# Patient Record
Sex: MALE | URBAN - NONMETROPOLITAN AREA
[De-identification: names, ages, dates, MRNs, and addresses within clinical notes are randomized per-mention and may not be internally consistent; named-entity substitution may affect disease eponyms.]

---

## 2017-11-27 ENCOUNTER — OFFICE VISIT (OUTPATIENT)
Dept: UROLOGY | Age: 67
End: 2017-11-27
Payer: COMMERCIAL

## 2017-11-27 VITALS
WEIGHT: 245 LBS | TEMPERATURE: 96.9 F | HEART RATE: 94 BPM | BODY MASS INDEX: 32.47 KG/M2 | DIASTOLIC BLOOD PRESSURE: 77 MMHG | SYSTOLIC BLOOD PRESSURE: 133 MMHG | HEIGHT: 73 IN

## 2017-11-27 DIAGNOSIS — R35.0 FREQUENCY OF URINATION: ICD-10-CM

## 2017-11-27 DIAGNOSIS — N41.8 OTHER PROSTATIC INFLAMMATORY DISEASES: ICD-10-CM

## 2017-11-27 DIAGNOSIS — N41.8 OTHER PROSTATIC INFLAMMATORY DISEASES: Primary | ICD-10-CM

## 2017-11-27 LAB
APPEARANCE FLUID: NORMAL
BILIRUBIN, POC: NORMAL
BLOOD URINE, POC: NORMAL
CLARITY, POC: CLEAR
COLOR, POC: YELLOW
GLUCOSE URINE, POC: NORMAL
KETONES, POC: NORMAL
LEUKOCYTE EST, POC: NORMAL
NITRITE, POC: NORMAL
PH, POC: 5.5
PROSTATE SPECIFIC ANTIGEN: 3.05 NG/ML (ref 0–4)
PROTEIN, POC: NORMAL
SPECIFIC GRAVITY, POC: 1.02
UROBILINOGEN, POC: 1

## 2017-11-27 PROCEDURE — 99202 OFFICE O/P NEW SF 15 MIN: CPT | Performed by: PHYSICIAN ASSISTANT

## 2017-11-27 PROCEDURE — 51798 US URINE CAPACITY MEASURE: CPT | Performed by: PHYSICIAN ASSISTANT

## 2017-11-27 PROCEDURE — 81003 URINALYSIS AUTO W/O SCOPE: CPT | Performed by: PHYSICIAN ASSISTANT

## 2017-11-27 RX ORDER — OMEPRAZOLE 20 MG/1
CAPSULE, DELAYED RELEASE ORAL
Refills: 2 | COMMUNITY
Start: 2017-09-06

## 2017-11-27 ASSESSMENT — ENCOUNTER SYMPTOMS
BACK PAIN: 0
EYE PAIN: 0
SHORTNESS OF BREATH: 0
VOMITING: 0
SINUS PAIN: 0
ABDOMINAL PAIN: 0
WHEEZING: 0
BLURRED VISION: 0

## 2017-11-27 NOTE — PROGRESS NOTES
Component Value Date    NITRITE neg 11/27/2017    COLORU yellow 11/27/2017    PROTEINU neg 11/27/2017    PHUR 5.5 11/27/2017    CLARITYU clear 11/27/2017    SPECGRAV 1.025 11/27/2017    LEUKOCYTESUR neg 11/27/2017    BILIRUBINUR neg 11/27/2017    BLOODU trace 11/27/2017    GLUCOSEU neg 11/27/2017           1. Other prostatic inflammatory diseases  With digital rectal exam I felt nothing suspicious there was no tenderness. Prostate is small benign feeling.   - POCT Urinalysis no Micro  - AL MEASUREMENT,POST-VOID RESIDUAL VOLUME BY US,NON-IMAGING  - PSA, Diagnostic; Future    2. Frequency of urination  His bladder scan was 48 mL postvoid residual. He states that he drinks a lot of Dr. Marleny Fang I told him to drastically reduce intake and Dr. Marleny Fang increased water intake. I feel this may help overall with his overactive bladder type symptoms. At this point I would not recommend any medications for his symptoms.  - PSA, Diagnostic; Future      Orders Placed This Encounter   Procedures    PSA, Diagnostic     Standing Status:   Future     Number of Occurrences:   1     Standing Expiration Date:   11/27/2018    POCT Urinalysis no Micro    AL MEASUREMENT,POST-VOID RESIDUAL VOLUME BY US,NON-IMAGING     48 ml     No orders of the defined types were placed in this encounter. Plan:  Patient will get PSA today and he will follow-up in 3 months.

## 2018-02-27 ENCOUNTER — OFFICE VISIT (OUTPATIENT)
Dept: UROLOGY | Age: 68
End: 2018-02-27
Payer: COMMERCIAL

## 2018-02-27 VITALS
BODY MASS INDEX: 31.94 KG/M2 | DIASTOLIC BLOOD PRESSURE: 78 MMHG | SYSTOLIC BLOOD PRESSURE: 138 MMHG | WEIGHT: 241 LBS | TEMPERATURE: 97.3 F | HEIGHT: 73 IN | HEART RATE: 78 BPM

## 2018-02-27 DIAGNOSIS — R35.0 FREQUENCY OF URINATION: Primary | ICD-10-CM

## 2018-02-27 DIAGNOSIS — R35.1 NOCTURIA: ICD-10-CM

## 2018-02-27 LAB
APPEARANCE FLUID: CLEAR
BILIRUBIN, POC: NORMAL
BLOOD URINE, POC: NORMAL
CLARITY, POC: NORMAL
COLOR, POC: NORMAL
GLUCOSE URINE, POC: NORMAL
KETONES, POC: NORMAL
LEUKOCYTE EST, POC: NORMAL
NITRITE, POC: NORMAL
PH, POC: 6
PROTEIN, POC: NORMAL
SPECIFIC GRAVITY, POC: 1.02
UROBILINOGEN, POC: 0.2

## 2018-02-27 PROCEDURE — 99213 OFFICE O/P EST LOW 20 MIN: CPT | Performed by: PHYSICIAN ASSISTANT

## 2018-02-27 RX ORDER — LEVOCETIRIZINE DIHYDROCHLORIDE 5 MG/1
TABLET, FILM COATED ORAL
Refills: 3 | COMMUNITY
Start: 2017-12-01

## 2018-02-27 ASSESSMENT — ENCOUNTER SYMPTOMS
HEARTBURN: 0
WHEEZING: 0
SHORTNESS OF BREATH: 0
EYE REDNESS: 0
NAUSEA: 0
EYE DISCHARGE: 0

## 2018-02-27 NOTE — PROGRESS NOTES
Mireya Kapadia is a 69 y.o. male who presents today   Chief Complaint   Patient presents with    Follow-up     I am here today for my follow up for prostate issues. Follow-up frequency nocturia:  Patient is a 38-year-old gentleman we saw 11/27/17 as a referral from Dr. Jimmie Muñoz with chief complaint of worsening frequency and nocturia. He was also treated for suspected prostatitis. Patient got PSA November 2017 that was 3.05. His digital rectal exam at last visit was negative. There were no prior PSAs to compare. I had told him to stop Dr. Jose Parsons intake and to start drinking water primarily. Since he has done this his symptoms have resolved. I did not place him on any medication. He states it took about 5 weeks after stopping the Dr. Jose Parsons but now he has no symptoms. He's not seen any gross hematuria had any urinary tract infections or has any history of kidney stones. Past Medical History:   Diagnosis Date    Acid reflux disease        Past Surgical History:   Procedure Laterality Date    SHOULDER SURGERY         Current Outpatient Prescriptions   Medication Sig Dispense Refill    levocetirizine (XYZAL) 5 MG tablet TAKE 1 TABLET BY MOUTH 2 TIMES DAILY  3    aspirin 81 MG tablet Take 81 mg by mouth daily      omeprazole (PRILOSEC) 20 MG delayed release capsule TAKE 1 CAPSULE TWICE A DAY  2     No current facility-administered medications for this visit. No Known Allergies    Social History     Social History    Marital status: Unknown     Spouse name: N/A    Number of children: N/A    Years of education: N/A     Social History Main Topics    Smoking status: Former Smoker    Smokeless tobacco: Never Used    Alcohol use None    Drug use: Unknown    Sexual activity: Not Asked     Other Topics Concern    None     Social History Narrative    None       History reviewed. No pertinent family history. REVIEW OF SYSTEMS:  Review of Systems   Constitutional: Negative for chills and fever. HENT: Negative for hearing loss. Eyes: Negative for discharge and redness. Respiratory: Negative for shortness of breath and wheezing. Cardiovascular: Negative for leg swelling and PND. Gastrointestinal: Negative for heartburn and nausea. Genitourinary: Negative for dysuria, flank pain, frequency, hematuria and urgency. Musculoskeletal: Negative for myalgias and neck pain. Skin: Negative for itching and rash. Neurological: Negative for seizures, loss of consciousness and headaches. Endo/Heme/Allergies: Negative for environmental allergies and polydipsia. Psychiatric/Behavioral: Negative for depression and suicidal ideas. PHYSICAL EXAM:  /78   Pulse 78   Temp 97.3 °F (36.3 °C) (Temporal)   Ht 6' 1\" (1.854 m)   Wt 241 lb (109.3 kg)   BMI 31.80 kg/m²   Physical Exam   Constitutional: No distress. HENT:   Mouth/Throat: No oropharyngeal exudate. Eyes: Left eye exhibits no discharge. No scleral icterus. Neck: No JVD present. No tracheal deviation present. No thyromegaly present. Cardiovascular: Exam reveals no gallop and no friction rub. Pulmonary/Chest: No stridor. He has no rales. Abdominal: He exhibits no distension and no mass. There is no rebound and no guarding. Musculoskeletal: He exhibits no edema. Neurological: No cranial nerve deficit. He exhibits normal muscle tone. Coordination normal.   Skin: He is not diaphoretic. No pallor. DATA:  U/A:    Lab Results   Component Value Date    NITRITE neg 2018    COLORU yellow 2017    PROTEINU neg 2018    PHUR 6.0 2018    CLARITYU clear 2017    SPECGRAV 1.020 2018    LEUKOCYTESUR neg 2018    BILIRUBINUR neg 2018    BLOODU trace--intact 2018    GLUCOSEU neg 2018           Imagin. Frequency of urination  Improved with stopping Dr. Asuncion Mehta intake and increasing water.  - POCT Urinalysis no Micro    2.  Nocturia  Symptoms have improved since last seen.      Orders Placed This Encounter   Procedures    PSA, Diagnostic     Standing Status:   Future     Standing Expiration Date:   2/27/2019    POCT Urinalysis no Micro     No orders of the defined types were placed in this encounter. Plan:  Patient will follow up in 6 months with PSA.

## 2019-07-08 ENCOUNTER — LAB REQUISITION (OUTPATIENT)
Dept: LAB | Facility: HOSPITAL | Age: 69
End: 2019-07-08

## 2019-07-08 DIAGNOSIS — Z00.00 ENCOUNTER FOR GENERAL ADULT MEDICAL EXAMINATION WITHOUT ABNORMAL FINDINGS: ICD-10-CM

## 2019-07-08 PROCEDURE — 88307 TISSUE EXAM BY PATHOLOGIST: CPT | Performed by: GENERAL PRACTICE

## 2019-07-09 LAB
CYTO UR: NORMAL
LAB AP CASE REPORT: NORMAL
PATH REPORT.FINAL DX SPEC: NORMAL
PATH REPORT.GROSS SPEC: NORMAL

## 2019-08-20 ENCOUNTER — OFFICE VISIT (OUTPATIENT)
Dept: PULMONOLOGY | Facility: CLINIC | Age: 69
End: 2019-08-20

## 2019-08-20 VITALS
DIASTOLIC BLOOD PRESSURE: 70 MMHG | OXYGEN SATURATION: 94 % | WEIGHT: 234 LBS | SYSTOLIC BLOOD PRESSURE: 124 MMHG | HEART RATE: 78 BPM | BODY MASS INDEX: 31.01 KG/M2 | HEIGHT: 73 IN

## 2019-08-20 DIAGNOSIS — J93.83 RECURRENT SPONTANEOUS PNEUMOTHORAX: Primary | ICD-10-CM

## 2019-08-20 DIAGNOSIS — J43.9 BULLOUS EMPHYSEMA (HCC): ICD-10-CM

## 2019-08-20 PROCEDURE — 99204 OFFICE O/P NEW MOD 45 MIN: CPT | Performed by: INTERNAL MEDICINE

## 2019-08-20 PROCEDURE — 94664 DEMO&/EVAL PT USE INHALER: CPT | Performed by: INTERNAL MEDICINE

## 2019-08-20 RX ORDER — OMEPRAZOLE 20 MG/1
CAPSULE, DELAYED RELEASE ORAL
COMMUNITY
Start: 2017-09-06

## 2019-08-20 RX ORDER — FLUTICASONE PROPIONATE 50 MCG
SPRAY, SUSPENSION (ML) NASAL
Refills: 2 | COMMUNITY
Start: 2019-07-16

## 2019-08-20 RX ORDER — LEVOCETIRIZINE DIHYDROCHLORIDE 5 MG/1
TABLET, FILM COATED ORAL
COMMUNITY
Start: 2017-12-01
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
## 2019-09-04 DIAGNOSIS — J93.83 RECURRENT SPONTANEOUS PNEUMOTHORAX: ICD-10-CM

## 2019-09-04 DIAGNOSIS — J43.9 BULLOUS EMPHYSEMA (HCC): ICD-10-CM

## 2019-10-17 ENCOUNTER — OFFICE VISIT (OUTPATIENT)
Dept: PULMONOLOGY | Facility: CLINIC | Age: 69
End: 2019-10-17

## 2019-10-17 VITALS
SYSTOLIC BLOOD PRESSURE: 110 MMHG | HEART RATE: 60 BPM | OXYGEN SATURATION: 98 % | WEIGHT: 240 LBS | HEIGHT: 72 IN | BODY MASS INDEX: 32.51 KG/M2 | DIASTOLIC BLOOD PRESSURE: 66 MMHG

## 2019-10-17 DIAGNOSIS — J98.4 RESTRICTIVE LUNG DISEASE: ICD-10-CM

## 2019-10-17 DIAGNOSIS — J43.9 BULLOUS EMPHYSEMA (HCC): Primary | ICD-10-CM

## 2019-10-17 DIAGNOSIS — E66.3 OVERWEIGHT: ICD-10-CM

## 2019-10-17 DIAGNOSIS — K21.9 GASTROESOPHAGEAL REFLUX DISEASE, ESOPHAGITIS PRESENCE NOT SPECIFIED: ICD-10-CM

## 2019-10-17 DIAGNOSIS — J30.9 ALLERGIC RHINITIS, UNSPECIFIED SEASONALITY, UNSPECIFIED TRIGGER: ICD-10-CM

## 2019-10-17 PROCEDURE — 94729 DIFFUSING CAPACITY: CPT | Performed by: NURSE PRACTITIONER

## 2019-10-17 PROCEDURE — 99214 OFFICE O/P EST MOD 30 MIN: CPT | Performed by: NURSE PRACTITIONER

## 2019-10-17 PROCEDURE — 94010 BREATHING CAPACITY TEST: CPT | Performed by: NURSE PRACTITIONER

## 2019-10-17 PROCEDURE — 94727 GAS DIL/WSHOT DETER LNG VOL: CPT | Performed by: NURSE PRACTITIONER

## 2019-10-17 RX ORDER — BUDESONIDE AND FORMOTEROL FUMARATE DIHYDRATE 160; 4.5 UG/1; UG/1
2 AEROSOL RESPIRATORY (INHALATION) 2 TIMES DAILY
Refills: 2 | COMMUNITY
Start: 2019-10-03 | End: 2020-01-17

## 2019-10-17 NOTE — PROGRESS NOTES
MIKE Fuentes  [facility]   Respiratory Disease Clinic  [address] [place] [address]  Phone: [phone]  Fax: [phone]     Jeramie Anaya is a 69 y.o. male.   : 1950  CC:   Chief Complaint   Patient presents with   • F/u of Shortness of Breath      HPI: Jeramie Anaya is a pleasant 69 y.o. male. The patient is here today for follow up of shortness of breath.  The patient was last seen in the office by Dr. Clay on 2019.  Pt with emphysema, left ptx and thoracoscopy with resection of bleb and pleurodesis in [facility] in July.  Overall the patient states that he feels stable from a pulmonary standpoint.  He was given a trial of Stiolto from his last office visit.  The patient states that he did not benefit from Stiolto as it gave him a sore throat.  He was then placed on Symbicort per his primary care provider.  He feels that he is benefiting from Symbicort.  He also utilizes Flonase and Xyzal for allergic rhinitis.  He is on chronic oxygen therapy for chronic respiratory failure with hypoxemia.  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  The patient states that he just had a chest x-ray obtained 2 weeks ago at [facility].  We will try to obtain those records.  The patient's PCP is Anders Longo MD.    The following portions of the patient's history were reviewed and updated as appropriate: allergies, current medications, past family history, past medical history, past social history, past surgical history and problem list.  Past Medical History:   Diagnosis Date   • Bullous emphysema (CMS/HCC) 2019   • High cholesterol      Family History   Problem Relation Age of Onset   • Leukemia Mother    • Alzheimer's disease Father    • Cancer Brother      Social History     Socioeconomic History   • Marital status:      Spouse name: Not on file   • Number of children: Not on file   • Years of education: Not on file   •  "Highest education level: Not on file   Tobacco Use   • Smoking status: Former Smoker     Packs/day: 3.00     Years: 35.00     Pack years: 105.00     Types: Cigarettes     Last attempt to quit:      Years since quittin.8   • Smokeless tobacco: Never Used   Substance and Sexual Activity   • Alcohol use: No     Frequency: Never   • Drug use: No   • Sexual activity: Defer     Review of Systems   Constitutional: Negative for chills and fever.   HENT: Negative for congestion.    Eyes: Negative for blurred vision.   Respiratory: Positive for shortness of breath (with exertion). Negative for cough.    Cardiovascular: Negative for chest pain.   Gastrointestinal: Negative for diarrhea, nausea and vomiting.   Endocrine: Negative for cold intolerance and heat intolerance.   Genitourinary: Negative for dysuria.   Musculoskeletal: Negative for arthralgias.   Skin: Negative for rash.   Neurological: Negative for dizziness, weakness and light-headedness.   Hematological: Does not bruise/bleed easily.   Psychiatric/Behavioral: Negative for agitation. The patient is not nervous/anxious.      /66   Pulse 60   Ht 181.6 cm (71.5\")   Wt 109 kg (240 lb)   SpO2 98% Comment: Ra  BMI 33.01 kg/m²   Physical Exam   Constitutional: He is oriented to person, place, and time. He appears well-developed and well-nourished. No distress. He appears overweight.   HENT:   Head: Normocephalic and atraumatic.   Eyes: Conjunctivae and EOM are normal. Pupils are equal, round, and reactive to light. No scleral icterus.   Neck: Normal range of motion. Neck supple.   Cardiovascular: Normal rate, regular rhythm and normal heart sounds. Exam reveals no friction rub.   No murmur heard.  Pulmonary/Chest: Effort normal. No respiratory distress. He has decreased breath sounds. He has no wheezes. He has no rales.   Abdominal: Soft. Bowel sounds are normal. He exhibits no distension. There is no tenderness.   Musculoskeletal: Normal range of " motion. He exhibits no edema.   Neurological: He is alert and oriented to person, place, and time.   Skin: Skin is warm and dry.   Psychiatric: He has a normal mood and affect. His behavior is normal. Judgment and thought content normal.   Nursing note and vitals reviewed.    Pulmonary Functions Testing Results:  Pulmonary Function Test  Performed by: Danna Sorenson APRN  Authorized by: Danna Sorenson APRN      Pre Drug    FVC: 76%   FEV1: 76%   FEF 25-75%: 70%   FEV1/FVC: 78.71%   T%   RV: 67%   DLCO: 41%   D/VAsb: 48%            My PFT Interpretation: Spirometry is consistent with moderate restrictive lung disease and moderate small airway disease.  Lung volumes show a reduced total lung capacity and residual volume.  Diffusion shows a severe diffusion impairment that remains severe when corrected for alveolar volume.  Imaging: None to review today    Assessment and Plan:   Jeramie was seen today for f/u of shortness of breath.    Diagnoses and all orders for this visit:    Bullous emphysema (CMS/HCC)  -     Pulmonary Function Test  Continue current treatment regimen with Symbicort and albuterol rescue inhaler as needed.  Restrictive lung disease  Noted per pulmonary function testing.  Likely secondary to overweight status and emphysema.  Overweight  Defer to PCP  Gastroesophageal reflux disease, esophagitis presence not specified  Continue Prilosec  Allergic rhinitis, unspecified seasonality, unspecified trigger  Continue Xyzal and Flonase.    Health maintenance:   Influenza vaccine: yes  Pneumovax 23: yes  Prevnar 13: yes  Patient's Body mass index is 33.01 kg/m². BMI is above normal parameters. Recommendations include: defer to pcp.    Follow up: 3 months  MIKE Fuentes  10/17/2019  3:44 PM    Please note that portions of this note were completed with a voice recognition program.

## 2019-10-17 NOTE — PROCEDURES
Pulmonary Function Test  Performed by: Danna Sorenson APRN  Authorized by: Danna Sorenson APRN      Pre Drug    FVC: 76%   FEV1: 76%   FEF 25-75%: 70%   FEV1/FVC: 78.71%   T%   RV: 67%   DLCO: 41%   D/VAsb: 48%

## 2020-01-17 ENCOUNTER — OFFICE VISIT (OUTPATIENT)
Dept: PULMONOLOGY | Facility: CLINIC | Age: 70
End: 2020-01-17

## 2020-01-17 VITALS
HEIGHT: 73 IN | WEIGHT: 247 LBS | SYSTOLIC BLOOD PRESSURE: 150 MMHG | DIASTOLIC BLOOD PRESSURE: 80 MMHG | BODY MASS INDEX: 32.74 KG/M2 | HEART RATE: 69 BPM | OXYGEN SATURATION: 98 %

## 2020-01-17 DIAGNOSIS — K21.9 GASTROESOPHAGEAL REFLUX DISEASE, ESOPHAGITIS PRESENCE NOT SPECIFIED: ICD-10-CM

## 2020-01-17 DIAGNOSIS — J98.4 SMALL AIRWAYS DISEASE: ICD-10-CM

## 2020-01-17 DIAGNOSIS — J30.9 ALLERGIC RHINITIS, UNSPECIFIED SEASONALITY, UNSPECIFIED TRIGGER: ICD-10-CM

## 2020-01-17 DIAGNOSIS — E66.3 OVERWEIGHT: ICD-10-CM

## 2020-01-17 DIAGNOSIS — J43.9 BULLOUS EMPHYSEMA (HCC): Primary | ICD-10-CM

## 2020-01-17 DIAGNOSIS — J98.4 RESTRICTIVE LUNG DISEASE: ICD-10-CM

## 2020-01-17 PROCEDURE — 99214 OFFICE O/P EST MOD 30 MIN: CPT | Performed by: NURSE PRACTITIONER

## 2020-01-17 NOTE — PROGRESS NOTES
MIKE Fuentes  [facility]   Respiratory Disease Clinic  [address] [place] [address]  Phone: [phone]  Fax: [phone]     Jeramie Anaya is a 69 y.o. male.   : 1950  CC:   Chief Complaint   Patient presents with   • bullous emphysema      HPI: Jeramie Anaya is a pleasant 69 y.o. male. The patient is here today for follow up of emphysema.    The patient has known emphysema, restrictive lung disease, GERD, and allergic rhinitis.  History of  left ptx and thoracoscopy with resection of bleb and pleurodesis in [facility] in 2019.  The patient has trialed Breo with no improvement and Stiolto caused him to have a sore throat.  He was given a sample of Symbicort at his last office visit.  He states that he feels that he benefited from Symbicort the best however about 4 hours after using the Symbicort he does experience some dizziness, hot flashes, and weakness.  However, he does like the way he is breathing on the Symbicort. No increasing shortness of breath, no fever, no chills, no cough with purulent sputum.    The patient's PCP is Anders Longo MD.    The following portions of the patient's history were reviewed and updated as appropriate: allergies, current medications, past family history, past medical history, past social history, past surgical history and problem list.  Past Medical History:   Diagnosis Date   • Bullous emphysema (CMS/HCC) 2019   • High cholesterol      Family History   Problem Relation Age of Onset   • Leukemia Mother    • Alzheimer's disease Father    • Cancer Brother      Social History     Socioeconomic History   • Marital status:      Spouse name: Not on file   • Number of children: Not on file   • Years of education: Not on file   • Highest education level: Not on file   Tobacco Use   • Smoking status: Former Smoker     Packs/day: 3.00     Years: 35.00     Pack years: 105.00     Types: Cigarettes     Last attempt  "to quit:      Years since quittin.0   • Smokeless tobacco: Never Used   Substance and Sexual Activity   • Alcohol use: No     Frequency: Never   • Drug use: No   • Sexual activity: Defer     Review of Systems   Constitutional: Negative for chills and fever.   HENT: Negative for congestion.    Eyes: Negative for blurred vision.   Respiratory: Negative for cough and shortness of breath.    Cardiovascular: Negative for chest pain.   Gastrointestinal: Negative for diarrhea, nausea and vomiting.   Endocrine: Negative for cold intolerance and heat intolerance.   Genitourinary: Negative for dysuria.   Musculoskeletal: Negative for arthralgias.   Skin: Negative for rash.   Neurological: Negative for dizziness, weakness and light-headedness.   Hematological: Does not bruise/bleed easily.   Psychiatric/Behavioral: Negative for agitation. The patient is not nervous/anxious.      /80   Pulse 69   Ht 185.4 cm (73\")   Wt 112 kg (247 lb)   SpO2 98% Comment: RA  BMI 32.59 kg/m²   Physical Exam   Constitutional: He is oriented to person, place, and time. He appears well-developed and well-nourished. No distress. He appears overweight.   HENT:   Head: Normocephalic and atraumatic.   Eyes: Pupils are equal, round, and reactive to light. Conjunctivae and EOM are normal. No scleral icterus.   Neck: Normal range of motion. Neck supple.   Cardiovascular: Normal rate, regular rhythm and normal heart sounds. Exam reveals no friction rub.   No murmur heard.  Pulmonary/Chest: Effort normal and breath sounds normal. No respiratory distress. He has no wheezes. He has no rales.   Abdominal: Soft. Bowel sounds are normal. He exhibits no distension. There is no tenderness.   Musculoskeletal: Normal range of motion. He exhibits no edema.   Left arm sling   Neurological: He is alert and oriented to person, place, and time.   Skin: Skin is warm and dry.   Psychiatric: He has a normal mood and affect. His behavior is normal. " Judgment and thought content normal.   Nursing note and vitals reviewed.      Pulmonary Functions Testing Results:  PFT Values        Some values may be hidden. Unless noted otherwise, only the newest values recorded on each date are displayed.         Old Values PFT Results 10/17/19   No data to display.      Pre Drug PFT Results 10/17/19   FVC 76   FEV1 76   FEF 25-75% 70   FEV1/FVC 78.71      Post Drug PFT Results 10/17/19   No data to display.      Other Tests PFT Results 10/17/19   TLC 79   RV 67   DLCO 41   D/VAsb 48              My PFT Interpretation: None to review today  Imaging: None to review today    Assessment and Plan:   Jeramie was seen today for bullous emphysema.    Diagnoses and all orders for this visit:    Bullous emphysema (CMS/HCC)  -     Mometasone Furoate (ASMANEX HFA) 100 MCG/ACT aerosol; Inhale 2 puffs 2 (Two) Times a Day.  The patient will stop Symbicort and trial Asmanex.  We will see if eliminating the beta agonist eliminates his symptoms of dizziness, fatigue, and hot flashes.  Small airways disease  Trial Asmanex.  Restrictive lung disease  Treatment regimen as stated above.  Allergic rhinitis, unspecified seasonality, unspecified trigger  Continue Flonase and Xyzal.  Gastroesophageal reflux disease, esophagitis presence not specified  Continue Prilosec  Overweight  Defer to PCP      Health maintenance:   Influenza vaccine: Yes  Pneumovax 23: Yes  Prevnar 13: Yes  Patient's Body mass index is 32.59 kg/m². BMI is above normal parameters. Recommendations include: Defer to PCP.    Follow up: 6 months  Danna Sorenson, APRN  1/17/2020  1:51 PM    Please note that portions of this note were completed with a voice recognition program.

## 2021-11-01 ENCOUNTER — LAB (OUTPATIENT)
Dept: LAB | Facility: HOSPITAL | Age: 71
End: 2021-11-01

## 2021-11-01 ENCOUNTER — APPOINTMENT (OUTPATIENT)
Dept: LAB | Facility: HOSPITAL | Age: 71
End: 2021-11-01

## 2021-11-01 ENCOUNTER — OFFICE VISIT (OUTPATIENT)
Dept: PULMONOLOGY | Facility: CLINIC | Age: 71
End: 2021-11-01

## 2021-11-01 VITALS — HEIGHT: 73 IN | BODY MASS INDEX: 33.53 KG/M2 | WEIGHT: 253 LBS

## 2021-11-01 DIAGNOSIS — J40 BRONCHITIS: ICD-10-CM

## 2021-11-01 DIAGNOSIS — E66.9 OBESITY (BMI 30-39.9): ICD-10-CM

## 2021-11-01 DIAGNOSIS — J43.9 BULLOUS EMPHYSEMA (HCC): Chronic | ICD-10-CM

## 2021-11-01 DIAGNOSIS — J96.11 CHRONIC RESPIRATORY FAILURE WITH HYPOXIA (HCC): ICD-10-CM

## 2021-11-01 DIAGNOSIS — K21.9 GASTROESOPHAGEAL REFLUX DISEASE, UNSPECIFIED WHETHER ESOPHAGITIS PRESENT: Chronic | ICD-10-CM

## 2021-11-01 DIAGNOSIS — J30.9 ALLERGIC RHINITIS, UNSPECIFIED SEASONALITY, UNSPECIFIED TRIGGER: Chronic | ICD-10-CM

## 2021-11-01 DIAGNOSIS — R06.02 SHORTNESS OF BREATH: Primary | ICD-10-CM

## 2021-11-01 DIAGNOSIS — J98.4 SMALL AIRWAYS DISEASE: Chronic | ICD-10-CM

## 2021-11-01 LAB
B PARAPERT DNA SPEC QL NAA+PROBE: NOT DETECTED
B PERT DNA SPEC QL NAA+PROBE: NOT DETECTED
C PNEUM DNA NPH QL NAA+NON-PROBE: NOT DETECTED
FLUAV SUBTYP SPEC NAA+PROBE: NOT DETECTED
FLUBV RNA ISLT QL NAA+PROBE: NOT DETECTED
HADV DNA SPEC NAA+PROBE: NOT DETECTED
HCOV 229E RNA SPEC QL NAA+PROBE: NOT DETECTED
HCOV HKU1 RNA SPEC QL NAA+PROBE: NOT DETECTED
HCOV NL63 RNA SPEC QL NAA+PROBE: NOT DETECTED
HCOV OC43 RNA SPEC QL NAA+PROBE: NOT DETECTED
HMPV RNA NPH QL NAA+NON-PROBE: NOT DETECTED
HPIV1 RNA SPEC QL NAA+PROBE: NOT DETECTED
HPIV2 RNA SPEC QL NAA+PROBE: NOT DETECTED
HPIV3 RNA NPH QL NAA+PROBE: NOT DETECTED
HPIV4 P GENE NPH QL NAA+PROBE: NOT DETECTED
M PNEUMO IGG SER IA-ACNC: NOT DETECTED
RHINOVIRUS RNA SPEC NAA+PROBE: NOT DETECTED
RSV RNA NPH QL NAA+NON-PROBE: NOT DETECTED

## 2021-11-01 PROCEDURE — 94664 DEMO&/EVAL PT USE INHALER: CPT | Performed by: NURSE PRACTITIONER

## 2021-11-01 PROCEDURE — 87633 RESP VIRUS 12-25 TARGETS: CPT

## 2021-11-01 PROCEDURE — 36415 COLL VENOUS BLD VENIPUNCTURE: CPT

## 2021-11-01 PROCEDURE — 86769 SARS-COV-2 COVID-19 ANTIBODY: CPT

## 2021-11-01 PROCEDURE — 99214 OFFICE O/P EST MOD 30 MIN: CPT | Performed by: NURSE PRACTITIONER

## 2021-11-01 RX ORDER — CEFDINIR 300 MG/1
300 CAPSULE ORAL 2 TIMES DAILY
Qty: 20 CAPSULE | Refills: 0 | Status: SHIPPED | OUTPATIENT
Start: 2021-11-01 | End: 2021-11-30

## 2021-11-01 RX ORDER — ALBUTEROL SULFATE 2.5 MG/3ML
2.5 SOLUTION RESPIRATORY (INHALATION) EVERY 4 HOURS PRN
COMMUNITY
End: 2022-08-02

## 2021-11-01 RX ORDER — PREDNISONE 10 MG/1
TABLET ORAL
Qty: 31 TABLET | Refills: 0 | Status: SHIPPED | OUTPATIENT
Start: 2021-11-01 | End: 2021-11-30

## 2021-11-01 RX ORDER — IPRATROPIUM BROMIDE AND ALBUTEROL SULFATE 2.5; .5 MG/3ML; MG/3ML
SOLUTION RESPIRATORY (INHALATION)
COMMUNITY
End: 2021-11-01

## 2021-11-01 RX ORDER — TIOTROPIUM BROMIDE INHALATION SPRAY 3.12 UG/1
2 SPRAY, METERED RESPIRATORY (INHALATION)
Qty: 2 EACH | Refills: 0 | COMMUNITY
Start: 2021-11-01 | End: 2021-12-15

## 2021-11-01 NOTE — PROCEDURES
Walking Oximetry  Performed by: Danna Sorenson APRN  Authorized by: Danna Sorenson APRN     Rest room air SAT %:  88  Exercise room air SAT %:  86  Rest on O2 @ Liters:  2  SAT %:  94  Exercise on O2 @ Liters:  2  SAT %:  86      Rest on O2 @ Liters 3  SAT % 98  Exercise on O2 @ liters 3  SAT % 91

## 2021-11-01 NOTE — PROGRESS NOTES
" MIKE Fuentes  [facility]   Respiratory Disease Clinic  [address] [place] [address]  Phone: [phone]  Fax: [phone]       Chief Complaint  BULLOUS EMPHYSEMA (has had the Moderna covid shots but doesnt know dates)    Subjective    History of Present Illness    Jeramie Anaya presents to [facility] PULMONARY & CRITICAL CARE MEDICINE   History of Present Illness   The patient has known severe emphysema, restrictive lung disease, GERD, chronic respiratory with hypoxemia, and allergic rhinitis.  History of  left ptx and thoracoscopy with resection of bleb and pleurodesis in [facility] in July 2019.  The patient has trialed Breo-no improvement, Stiolto-sore throat, Symbicort-dizziness, hot flashes, and weakness.  He did not trial the asmanex.  He now remains on symbicort.  States he still has dizziness, hot flashes, and weakness just after using Symbicort, but the symptoms go away quickly.   The patient is complaining of increased shortness of breath with exertion.  He states that this has been ongoing for about 2 months.  He has a cough with yellow sputum production.  He reports that he completed Z-Kosta last week.  He tells me that he was given a steroid shot from his PCP office.  Rest and exercise sats were obtained today.  The patient qualifies for 3 L with exertion and 2 L rest.  CT of the chest from October 20, 2021 was reviewed and shows stable moderate to severe emphysematous changes with numerous bullae in both lungs most pronounced peripherally.  No acute consolidation.  The patient has no fevers or chills.  He states that he was not Covid tested.  No other aggravating or alleviating factors.     Objective   Vital Signs:   Ht 185.4 cm (73\")   Wt 115 kg (253 lb)   BMI 33.38 kg/m²     Physical Exam  Vitals reviewed.   Constitutional:       General: He is not in acute distress.     Appearance: He is well-developed. He is obese.      Interventions: Nasal " cannula and face mask in place.   HENT:      Head: Normocephalic and atraumatic.   Eyes:      General: No scleral icterus.     Conjunctiva/sclera: Conjunctivae normal.      Pupils: Pupils are equal, round, and reactive to light.   Cardiovascular:      Rate and Rhythm: Normal rate.   Pulmonary:      Effort: Pulmonary effort is normal. No respiratory distress.      Breath sounds: Rhonchi present. No wheezing or rales.   Musculoskeletal:         General: Normal range of motion.      Cervical back: Normal range of motion and neck supple.   Skin:     General: Skin is warm and dry.   Neurological:      Mental Status: He is alert and oriented to person, place, and time.   Psychiatric:         Behavior: Behavior normal.         Thought Content: Thought content normal.         Judgment: Judgment normal.        Result Review :  The following data was reviewed by: MIKE Fuentes on 11/01/2021:  Chest x-ray from October 11, 2021 and showed no active disease.  Bilateral laterally placed bullae, more numerous on the left.    CT of the chest from October 20, 2021 was reviewed and shows stable moderate to severe emphysematous changes with numerous bullae in both lungs most pronounced peripherally.  No acute consolidation.  This was compared to a CT of the chest from February 26, 2021 per [facility] radiology.    Rest/Exercise Pulse Ox Values        Some values may be hidden. Unless noted otherwise, only the newest values recorded on each date are displayed.         Rest/Exercise Pulse Ox Results 11/1/21   Rest room air SAT % 88   Exercise room air SAT % 86   Rest on O2 @ Liters 2   Rest on O2 SAT % 94   Exercise on O2 @ Liters 2   Exercise on O2 SAT % 86               Results for orders placed in visit on 10/17/19    Pulmonary Function Test    Narrative  Pulmonary Function Test  Performed by: Danna Sorenson APRN  Authorized by: Danna Sorenson APRN    Pre Drug  FVC: 76%  FEV1: 76%  FEF 25-75%:  70%  FEV1/FVC: 78.71%  T%  RV: 67%  DLCO: 41%  D/VAsb: 48%           Assessment and Plan  Diagnoses and all orders for this visit:    1. Shortness of breath (Primary)  Comments:  Likely secondary to chronic respiratory failure with hypoxia and severe bullous emphysema.  The patient may possibly be experiencing post viral syndrome as he reports significant worsening in the last 2 months.  Orders:  -     Walking Oximetry; Future  -     Walking Oximetry    2. Chronic respiratory failure with hypoxia (HCC)  Comments:  Patient now qualifies for 3 L oxygen exertion and 2 L with rest.  Orders:  -     Oxygen Therapy    3. Bronchitis  Comments:  The patient was previously treated with a Z-Kosta.  Add Omnicef and a steroid taper.  Check for COVID-19 antibodies.  Obtain viral PCR  Orders:  -     predniSONE (DELTASONE) 10 MG tablet; Take 4 tabs daily x 3 days, then take 3 tabs daily x 3 days, then take 2 tabs daily x 3 days, then take 1 tab daily x 3 days  Dispense: 31 tablet; Refill: 0  -     cefdinir (OMNICEF) 300 MG capsule; Take 1 capsule by mouth 2 (Two) Times a Day for 10 days.  Dispense: 20 capsule; Refill: 0  -     Respiratory Panel, PCR (WITHOUT COVID) - Swab, Nasopharynx; Future  -     Cancel: SARS-CoV-2 Antibodies (Roche); Future    4. Bullous emphysema (HCC)  Comments:  Continue Symbicort and albuterol as needed.  Add Spiriva.  Inhaler demonstration provided.  Orders:  -     tiotropium bromide monohydrate (Spiriva Respimat) 2.5 MCG/ACT aerosol solution inhaler; Inhale 2 puffs Daily for 30 days.  Dispense: 2 each; Refill: 0  -     Pulmonary Function Test; Future    5. Small airways disease  Comments:  Continue Symbicort.  Trial Spiriva.  Continue albuterol as needed.    6. Allergic rhinitis, unspecified seasonality, unspecified trigger  Comments:  Continue Flonase    7. Gastroesophageal reflux disease, unspecified whether esophagitis present  Comments:  Continue Prilosec    8. Obesity (BMI  30-39.9)  Comments:  Recommend weight loss    Other orders  -     Cancel: Pulmonary Function Test; Future      Health maintenance:   Influenza vaccine: will take   Pneumovax 23: yes  Prevnar 13: yes  Covid 19: yes  Follow Up  Danna Sorenson, MIKE  11/2/2021  09:37 CDT  Return in about 4 weeks (around 11/29/2021) for FVL + Diff.  Patient was given instructions and counseling regarding his condition or for health maintenance advice. Please see specific information pulled into the AVS if appropriate.   Please note that portions of this note were completed with a voice recognition program.

## 2021-11-02 PROBLEM — J96.11 CHRONIC RESPIRATORY FAILURE WITH HYPOXIA: Chronic | Status: ACTIVE | Noted: 2021-11-02

## 2021-11-02 PROBLEM — J30.9 ALLERGIC RHINITIS: Chronic | Status: ACTIVE | Noted: 2019-10-17

## 2021-11-02 PROBLEM — E66.9 OBESITY (BMI 30-39.9): Status: ACTIVE | Noted: 2021-11-02

## 2021-11-02 PROBLEM — K21.9 GASTROESOPHAGEAL REFLUX DISEASE: Chronic | Status: ACTIVE | Noted: 2019-10-17

## 2021-11-02 PROBLEM — J98.4 SMALL AIRWAYS DISEASE: Chronic | Status: ACTIVE | Noted: 2019-10-17

## 2021-11-02 PROBLEM — J96.11 CHRONIC RESPIRATORY FAILURE WITH HYPOXIA: Status: ACTIVE | Noted: 2021-11-02

## 2021-11-02 PROBLEM — E66.9 OBESITY (BMI 30-39.9): Chronic | Status: ACTIVE | Noted: 2021-11-02

## 2021-11-02 PROBLEM — J43.9 BULLOUS EMPHYSEMA (HCC): Chronic | Status: ACTIVE | Noted: 2019-08-20

## 2021-11-03 LAB — SARS-COV-2 AB SERPL QL IA: NEGATIVE

## 2021-11-29 NOTE — PROGRESS NOTES
" Danna FarrisrMIKE  [facility]   Respiratory Disease Clinic  [address] [place] [address]  Phone: [phone]  Fax: [phone]       Chief Complaint  Shortness of Breath    Subjective    History of Present Illness    Jeramie Anaya presents to [facility] PULMONARY & CRITICAL CARE MEDICINE   History of Present Illness   The patient has known severe emphysema, restrictive lung disease, GERD, chronic respiratory with hypoxemia, and allergic rhinitis.  History of left ptx and thoracoscopy with resection of bleb and pleurodesis in [facility] in July 2019.  The patient has trialed Breo-no improvement, Stiolto-sore throat, Symbicort-dizziness, hot flashes, and weakness.  He did not trial the asmanex.  He now remains on symbicort.  States he still has dizziness, hot flashes, and weakness just after using Symbicort, but the symptoms go away quickly.  He was given a sample of Spiriva to use in addition to the Symbicort at his last office visit.  He states that he may have benefited from the Spiriva.  Now reports that he has been having a headache after using the inhalers.  Plan is to stop Symbicort and continue on Spiriva only to see if he has further benefit in that.  The patient states that he occasionally has yellow sputum production.  He states that he did clear up after completing Omnicef and a steroid taper.  He is wondering if he could do this again.  He states that his wife is currently sick with an upper respiratory infection.  The patient uses chronic oxygen therapy 3 L with exertion and 2 L at rest.  Covid 19 antibody screen from November 1, 2021 was negative.  The patient's viral panel from November 1, 2021 was negative.  No other aggravating or alleviating factors.     Objective   Vital Signs:   /78   Pulse 98   Ht 179.1 cm (70.5\")   Wt 115 kg (254 lb)   SpO2 92% Comment: 3 L  BMI 35.93 kg/m²     Physical Exam  Vitals reviewed.   Constitutional:       " General: He is not in acute distress.     Appearance: He is well-developed. He is obese.      Interventions: Face mask in place.   HENT:      Head: Normocephalic and atraumatic.   Eyes:      General: No scleral icterus.     Conjunctiva/sclera: Conjunctivae normal.      Pupils: Pupils are equal, round, and reactive to light.   Cardiovascular:      Rate and Rhythm: Normal rate.   Pulmonary:      Effort: Pulmonary effort is normal. No respiratory distress.      Breath sounds: Normal breath sounds. No wheezing or rales.   Musculoskeletal:         General: Normal range of motion.      Cervical back: Normal range of motion and neck supple.      Comments: Left arm sling   Skin:     General: Skin is warm and dry.   Neurological:      Mental Status: He is alert and oriented to person, place, and time.   Psychiatric:         Behavior: Behavior normal.         Thought Content: Thought content normal.         Judgment: Judgment normal.        Result Review :  The following data was reviewed by: MIKE Fuentes on 11/30/2021:  Respiratory Panel, PCR (WITHOUT COVID) - Swab, Nasopharynx (11/01/2021 17:13)  SSCANNED - IMAGING (10/11/2021 00:00)  ARS-CoV-2 Antibodies (Roche) (11/01/2021 16:54)  SCANNED - IMAGING (10/11/2021 00:00)    Rest/Exercise Pulse Ox Values        Some values may be hidden. Unless noted otherwise, only the newest values recorded on each date are displayed.         Rest/Exercise Pulse Ox Results 11/1/21   Rest room air SAT % 88   Exercise room air SAT % 86   Rest on O2 @ Liters 2   Rest on O2 SAT % 94   Exercise on O2 @ Liters 2   Exercise on O2 SAT % 86           PFT Values        Some values may be hidden. Unless noted otherwise, only the newest values recorded on each date are displayed.         Old Values PFT Results 11/30/21   No data to display.      Pre Drug PFT Results 11/30/21   FVC 90   FEV1 82   FEF 25-75% 55   FEV1/FVC 71.62      Post Drug PFT Results 11/30/21   No data to display.       Other Tests PFT Results 21   DLCO 42   D/VAsb 51             Results for orders placed in visit on 21    Pulmonary Function Test    Narrative  Pulmonary Function Test  Performed by: Luna Sanford RRT  Authorized by: Danna Sorenson APRN    Pre Drug % Predicted  FVC: 90%  FEV1: 82%  FEF 25-75%: 55%  FEV1/FVC: 71.62%  DLCO: 42%  D/VAsb: 51%    Interpretation  Spirometry  Spirometry shows normal results. There is reduced midflow suggesting small airway/airflow obstruction.  Diffusion Capacity  The patient's diffusion capacity is severely reduced.  Diffusion capacity is moderately reduced when corrected for alveolar volume.      Results for orders placed in visit on 10/17/19    Pulmonary Function Test    Narrative  Pulmonary Function Test  Performed by: Danna Sorenson APRN  Authorized by: Danna Sorenson APRN    Pre Drug  FVC: 76%  FEV1: 76%  FEF 25-75%: 70%  FEV1/FVC: 78.71%  T%  RV: 67%  DLCO: 41%  D/VAsb: 48%           Assessment and Plan  Diagnoses and all orders for this visit:    1. Bullous emphysema (HCC) (Primary)  Comments:  Stop Symbicort.  Trial Spiriva only.  He will notify us if he needs a prescription for the Spiriva.  Orders:  -     tiotropium bromide monohydrate (Spiriva Respimat) 2.5 MCG/ACT aerosol solution inhaler; Inhale 2 puffs Daily for 30 days.  Dispense: 2 each; Refill: 0    2. Encounter for preoperative screening laboratory testing for COVID-19 virus  Comments:  Completed for pulmonary function testing    3. Small airways disease  Comments:  Continue Spiriva, albuterol HFA, albuterol nebs as needed    4. Allergic rhinitis, unspecified seasonality, unspecified trigger  Comments:  Continue Xyzal and Flonase    5. Chronic respiratory failure with hypoxia (HCC)  Comments:  Continue chronic oxygen therapy.  The patient is benefiting from it and wishes to continue it.    6. Gastroesophageal reflux disease, unspecified whether esophagitis  present  Comments:  Continue Prilosec    7. Obesity (BMI 30-39.9)  Comments:  Recommend weight loss    8. Bronchitis  Comments:  Obtain respiratory culture and Gram stain.  Prescription was sent for doxycycline and prednisone taper.  Orders:  -     albuterol sulfate  (90 Base) MCG/ACT inhaler; Inhale 2 puffs Every 4 (Four) Hours As Needed for Wheezing or Shortness of Air for up to 30 days.  Dispense: 18 g; Refill: 11  -     Gram Stain With / Sputum Cult Rflx ([facility]) - Sputum, Cough; Future  -     Respiratory Culture - Sputum, Cough; Future  -     predniSONE (DELTASONE) 10 MG tablet; Take 4 tabs daily x 3 days, then take 3 tabs daily x 3 days, then take 2 tabs daily x 3 days, then take 1 tab daily x 3 days  Dispense: 31 tablet; Refill: 0  -     doxycycline (VIBRAMYICN) 100 MG tablet; Take 1 tablet by mouth 2 (Two) Times a Day.  Dispense: 20 tablet; Refill: 0      Health maintenance:   Influenza vaccine: yes  Pneumovax 23: yes  Prevnar 13: yes  Covid 19: yes  Follow Up  Danna Sorenson, APRN  11/30/2021  14:55 CST  Return in about 2 months (around 1/30/2022).  Patient was given instructions and counseling regarding his condition or for health maintenance advice. Please see specific information pulled into the AVS if appropriate.   Please note that portions of this note were completed with a voice recognition program.

## 2021-11-30 ENCOUNTER — OFFICE VISIT (OUTPATIENT)
Dept: PULMONOLOGY | Facility: CLINIC | Age: 71
End: 2021-11-30

## 2021-11-30 ENCOUNTER — LAB (OUTPATIENT)
Dept: LAB | Facility: HOSPITAL | Age: 71
End: 2021-11-30

## 2021-11-30 ENCOUNTER — PROCEDURE VISIT (OUTPATIENT)
Dept: PULMONOLOGY | Facility: CLINIC | Age: 71
End: 2021-11-30

## 2021-11-30 VITALS
WEIGHT: 254 LBS | HEART RATE: 98 BPM | DIASTOLIC BLOOD PRESSURE: 78 MMHG | BODY MASS INDEX: 35.56 KG/M2 | HEIGHT: 71 IN | OXYGEN SATURATION: 92 % | SYSTOLIC BLOOD PRESSURE: 138 MMHG

## 2021-11-30 DIAGNOSIS — Z01.812 ENCOUNTER FOR PREOPERATIVE SCREENING LABORATORY TESTING FOR COVID-19 VIRUS: ICD-10-CM

## 2021-11-30 DIAGNOSIS — J98.4 SMALL AIRWAYS DISEASE: Chronic | ICD-10-CM

## 2021-11-30 DIAGNOSIS — Z20.822 ENCOUNTER FOR PREOPERATIVE SCREENING LABORATORY TESTING FOR COVID-19 VIRUS: ICD-10-CM

## 2021-11-30 DIAGNOSIS — A49.2 HAEMOPHILUS INFLUENZAE INFECTION: ICD-10-CM

## 2021-11-30 DIAGNOSIS — E66.9 OBESITY (BMI 30-39.9): Chronic | ICD-10-CM

## 2021-11-30 DIAGNOSIS — J43.9 BULLOUS EMPHYSEMA (HCC): Chronic | ICD-10-CM

## 2021-11-30 DIAGNOSIS — J40 BRONCHITIS: ICD-10-CM

## 2021-11-30 DIAGNOSIS — K21.9 GASTROESOPHAGEAL REFLUX DISEASE, UNSPECIFIED WHETHER ESOPHAGITIS PRESENT: Chronic | ICD-10-CM

## 2021-11-30 DIAGNOSIS — J30.9 ALLERGIC RHINITIS, UNSPECIFIED SEASONALITY, UNSPECIFIED TRIGGER: Chronic | ICD-10-CM

## 2021-11-30 DIAGNOSIS — J43.9 BULLOUS EMPHYSEMA (HCC): Primary | Chronic | ICD-10-CM

## 2021-11-30 DIAGNOSIS — J96.11 CHRONIC RESPIRATORY FAILURE WITH HYPOXIA (HCC): Chronic | ICD-10-CM

## 2021-11-30 PROCEDURE — 87205 SMEAR GRAM STAIN: CPT

## 2021-11-30 PROCEDURE — 99214 OFFICE O/P EST MOD 30 MIN: CPT | Performed by: NURSE PRACTITIONER

## 2021-11-30 PROCEDURE — 87185 SC STD ENZYME DETCJ PER NZM: CPT

## 2021-11-30 PROCEDURE — 87077 CULTURE AEROBIC IDENTIFY: CPT

## 2021-11-30 PROCEDURE — 87070 CULTURE OTHR SPECIMN AEROBIC: CPT

## 2021-11-30 PROCEDURE — 94729 DIFFUSING CAPACITY: CPT | Performed by: NURSE PRACTITIONER

## 2021-11-30 PROCEDURE — 94010 BREATHING CAPACITY TEST: CPT | Performed by: NURSE PRACTITIONER

## 2021-11-30 RX ORDER — PREDNISONE 10 MG/1
TABLET ORAL
Qty: 31 TABLET | Refills: 0 | Status: SHIPPED | OUTPATIENT
Start: 2021-11-30 | End: 2022-02-01

## 2021-11-30 RX ORDER — ALBUTEROL SULFATE 90 UG/1
2 AEROSOL, METERED RESPIRATORY (INHALATION) EVERY 4 HOURS PRN
Qty: 18 G | Refills: 11 | Status: SHIPPED | OUTPATIENT
Start: 2021-11-30 | End: 2021-12-30

## 2021-11-30 RX ORDER — DOXYCYCLINE HYCLATE 100 MG
100 TABLET ORAL 2 TIMES DAILY
Qty: 20 TABLET | Refills: 0 | Status: SHIPPED | OUTPATIENT
Start: 2021-11-30 | End: 2022-02-01 | Stop reason: ALTCHOICE

## 2021-11-30 RX ORDER — TIOTROPIUM BROMIDE INHALATION SPRAY 3.12 UG/1
2 SPRAY, METERED RESPIRATORY (INHALATION)
Qty: 2 EACH | Refills: 0 | COMMUNITY
Start: 2021-11-30 | End: 2021-12-15

## 2021-11-30 NOTE — PROCEDURES
Pulmonary Function Test  Performed by: Luna Sanford, RRT  Authorized by: Danna Sorenson APRN      Pre Drug % Predicted    FVC: 90%   FEV1: 82%   FEF 25-75%: 55%   FEV1/FVC: 71.62%   DLCO: 42%   D/VAsb: 51%    Interpretation   Spirometry   Spirometry shows normal results. There is reduced midflow suggesting small airway/airflow obstruction.   Diffusion Capacity  The patient's diffusion capacity is severely reduced.  Diffusion capacity is moderately reduced when corrected for alveolar volume.

## 2021-11-30 NOTE — PATIENT INSTRUCTIONS
Gastroesophageal Reflux Disease, Adult    Gastroesophageal reflux (JEREMY) happens when acid from the stomach flows up into the tube that connects the mouth and the stomach (esophagus). Normally, food travels down the esophagus and stays in the stomach to be digested. With JEREMY, food and stomach acid sometimes move back up into the esophagus.  You may have a disease called gastroesophageal reflux disease (GERD) if the reflux:  · Happens often.  · Causes frequent or very bad symptoms.  · Causes problems such as damage to the esophagus.  When this happens, the esophagus becomes sore and swollen. Over time, GERD can make small holes (ulcers) in the lining of the esophagus.  What are the causes?  This condition is caused by a problem with the muscle between the esophagus and the stomach. When this muscle is weak or not normal, it does not close properly to keep food and acid from coming back up from the stomach.  The muscle can be weak because of:  · Tobacco use.  · Pregnancy.  · Having a certain type of hernia (hiatal hernia).  · Alcohol use.  · Certain foods and drinks, such as coffee, chocolate, onions, and peppermint.  What increases the risk?  · Being overweight.  · Having a disease that affects your connective tissue.  · Taking NSAIDs, such a ibuprofen.  What are the signs or symptoms?  · Heartburn.  · Difficult or painful swallowing.  · The feeling of having a lump in the throat.  · A bitter taste in the mouth.  · Bad breath.  · Having a lot of saliva.  · Having an upset or bloated stomach.  · Burping.  · Chest pain. Different conditions can cause chest pain. Make sure you see your doctor if you have chest pain.  · Shortness of breath or wheezing.  · A long-term cough or a cough at night.  · Wearing away of the surface of teeth (tooth enamel).  · Weight loss.  How is this treated?  · Making changes to your diet.  · Taking medicine.  · Having surgery.  Treatment will depend on how bad your symptoms are.  Follow these  instructions at home:  Eating and drinking    · Follow a diet as told by your doctor. You may need to avoid foods and drinks such as:  ? Coffee and tea, with or without caffeine.  ? Drinks that contain alcohol.  ? Energy drinks and sports drinks.  ? Bubbly (carbonated) drinks or sodas.  ? Chocolate and cocoa.  ? Peppermint and mint flavorings.  ? Garlic and onions.  ? Horseradish.  ? Spicy and acidic foods. These include peppers, chili powder, magaña powder, vinegar, hot sauces, and BBQ sauce.  ? Citrus fruit juices and citrus fruits, such as oranges, leydi, and limes.  ? Tomato-based foods. These include red sauce, chili, salsa, and pizza with red sauce.  ? Fried and fatty foods. These include donuts, french fries, potato chips, and high-fat dressings.  ? High-fat meats. These include hot dogs, rib eye steak, sausage, ham, and mendes.  ? High-fat dairy items, such as whole milk, butter, and cream cheese.  · Eat small meals often. Avoid eating large meals.  · Avoid drinking large amounts of liquid with your meals.  · Avoid eating meals during the 2-3 hours before bedtime.  · Avoid lying down right after you eat.  · Do not exercise right after you eat.    Lifestyle    · Do not smoke or use any products that contain nicotine or tobacco. If you need help quitting, ask your doctor.  · Try to lower your stress. If you need help doing this, ask your doctor.  · If you are overweight, lose an amount of weight that is healthy for you. Ask your doctor about a safe weight loss goal.    General instructions  · Pay attention to any changes in your symptoms.  · Take over-the-counter and prescription medicines only as told by your doctor.  · Do not take aspirin, ibuprofen, or other NSAIDs unless your doctor says it is okay.  · Wear loose clothes. Do not wear anything tight around your waist.  · Raise (elevate) the head of your bed about 6 inches (15 cm). You may need to use a wedge to do this.  · Avoid bending over if this makes  your symptoms worse.  · Keep all follow-up visits.  Contact a doctor if:  · You have new symptoms.  · You lose weight and you do not know why.  · You have trouble swallowing or it hurts to swallow.  · You have wheezing or a cough that keeps happening.  · You have a hoarse voice.  · Your symptoms do not get better with treatment.  Get help right away if:  · You have sudden pain in your arms, neck, jaw, teeth, or back.  · You suddenly feel sweaty, dizzy, or light-headed.  · You have chest pain or shortness of breath.  · You vomit and the vomit is green, yellow, or black, or it looks like blood or coffee grounds.  · You faint.  · Your poop (stool) is red, bloody, or black.  · You cannot swallow, drink, or eat.  These symptoms may represent a serious problem that is an emergency. Do not wait to see if the symptoms will go away. Get medical help right away. Call your local emergency services (911 in the [place] Do not drive yourself to the hospital.  Summary  · If a person has gastroesophageal reflux disease (GERD), food and stomach acid move back up into the esophagus and cause symptoms or problems such as damage to the esophagus.  · Treatment will depend on how bad your symptoms are.  · Follow a diet as told by your doctor.  · Take all medicines only as told by your doctor.  This information is not intended to replace advice given to you by your health care provider. Make sure you discuss any questions you have with your health care provider.  Document Revised: 06/28/2021 Document Reviewed: 06/28/2021  [employer] Patient Education © 2021 [employer]      Allergic Rhinitis, Adult    Allergic rhinitis is an allergic reaction that affects the mucous membrane inside the nose. The mucous membrane is the tissue that produces mucus.  There are two types of allergic rhinitis:  · Seasonal. This type is also called hay fever and happens only during certain seasons.  · Perennial. This type can happen at any time of the  year.  Allergic rhinitis cannot be spread from person to person. This condition can be mild, moderate, or severe. It can develop at any age and may be outgrown.  What are the causes?  This condition is caused by allergens. These are things that can cause an allergic reaction. Allergens may differ for seasonal allergic rhinitis and perennial allergic rhinitis.  · Seasonal allergic rhinitis is triggered by pollen. Pollen can come from grasses, trees, and weeds.  · Perennial allergic rhinitis may be triggered by:  ? Dust mites.  ? Proteins in a pet's urine, saliva, or dander. Dander is dead skin cells from a pet.  ? Smoke, mold, or car fumes.  What increases the risk?  You are more likely to develop this condition if you have a family history of allergies or other conditions related to allergies, including:  · Allergic conjunctivitis. This is inflammation of parts of the eyes and eyelids.  · Asthma. This condition affects the lungs and makes it hard to breathe.  · Atopic dermatitis or eczema. This is long term (chronic) inflammation of the skin.  · Food allergies.  What are the signs or symptoms?  Symptoms of this condition include:  · Sneezing or coughing.  · A stuffy nose (nasal congestion), itchy nose, or nasal discharge.  · Itchy eyes and tearing of the eyes.  · A feeling of mucus dripping down the back of your throat (postnasal drip).  · Trouble sleeping.  · Tiredness or fatigue.  · Headache.  · Sore throat.  How is this diagnosed?  This condition may be diagnosed with your symptoms, medical history, and physical exam. Your health care provider may check for related conditions, such as:  · Asthma.  · Pink eye. This is eye inflammation caused by infection (conjunctivitis).  · Ear infection.  · Upper respiratory infection. This is an infection in the nose, throat, or upper airways.  You may also have tests to find out which allergens trigger your symptoms. These may include skin tests or blood tests.  How is this  treated?  There is no cure for this condition, but treatment can help control symptoms. Treatment may include:  · Taking medicines that block allergy symptoms, such as corticosteroids and antihistamines. Medicine may be given as a shot, nasal spray, or pill.  · Avoiding any allergens.  · Being exposed again and again to tiny amounts of allergens to help you build a defense against allergens (immunotherapy). This is done if other treatments have not helped. It may include:  ? Allergy shots. These are injected medicines that have small amounts of allergen in them.  ? Sublingual immunotherapy. This involves taking small doses of a medicine with allergen in it under your tongue.  If these treatments do not work, your health care provider may prescribe newer, stronger medicines.  Follow these instructions at home:  Avoiding allergens  Find out what you are allergic to and avoid those allergens. These are some things you can do to help avoid allergens:  · If you have perennial allergies:  ? Replace carpet with wood, tile, or vinyl hipolito. Carpet can trap dander and dust.  ? Do not smoke. Do not allow smoking in your home.  ? Change your heating and air conditioning filters at least once a month.  · If you have seasonal allergies, take these steps during allergy season:  ? Keep windows closed as much as possible.  ? Plan outdoor activities when pollen counts are lowest. Check pollen counts before you plan outdoor activities.  ? When coming indoors, change clothing and shower before sitting on furniture or bedding.  · If you have a pet in the house that produces allergens:  ? Keep the pet out of the bedroom.  ? Vacuum, sweep, and dust regularly.  General instructions  · Take over-the-counter and prescription medicines only as told by your health care provider.  · Drink enough fluid to keep your urine pale yellow.  · Keep all follow-up visits as told by your health care provider. This is important.  Where to find more  information  · American Academy of Allergy, Asthma & Immunology: www.aaaai.org  Contact a health care provider if:  · You have a fever.  · You develop a cough that does not go away.  · You make whistling sounds when you breathe (wheeze).  · Your symptoms slow you down or stop you from doing your normal activities each day.  Get help right away if:  · You have shortness of breath.  This symptom may represent a serious problem that is an emergency. Do not wait to see if the symptom will go away. Get medical help right away. Call your local emergency services (911 in the [place] Do not drive yourself to the hospital.  Summary  · Allergic rhinitis may be managed by taking medicines as directed and avoiding allergens.  · If you have seasonal allergies, keep windows closed as much as possible during allergy season.  · Contact your health care provider if you develop a fever or a cough that does not go away.  This information is not intended to replace advice given to you by your health care provider. Make sure you discuss any questions you have with your health care provider.  Document Revised: 02/05/2021 Document Reviewed: 12/15/2020  [employer] Patient Education © 2021 [employer]

## 2021-12-02 LAB
BACTERIA SPEC RESP CULT: ABNORMAL
BACTERIA SPEC RESP CULT: ABNORMAL
GRAM STN SPEC: ABNORMAL

## 2021-12-02 RX ORDER — AMOXICILLIN AND CLAVULANATE POTASSIUM 875; 125 MG/1; MG/1
1 TABLET, FILM COATED ORAL 2 TIMES DAILY
Qty: 20 TABLET | Refills: 0 | Status: SHIPPED | OUTPATIENT
Start: 2021-12-02 | End: 2021-12-12

## 2021-12-15 ENCOUNTER — TELEPHONE (OUTPATIENT)
Dept: PULMONOLOGY | Facility: CLINIC | Age: 71
End: 2021-12-15

## 2021-12-15 DIAGNOSIS — J43.9 BULLOUS EMPHYSEMA (HCC): Chronic | ICD-10-CM

## 2021-12-15 RX ORDER — TIOTROPIUM BROMIDE INHALATION SPRAY 3.12 UG/1
2 SPRAY, METERED RESPIRATORY (INHALATION)
Qty: 2 EACH | Refills: 0 | COMMUNITY
Start: 2021-12-15 | End: 2021-12-15 | Stop reason: SDUPTHER

## 2021-12-15 RX ORDER — TIOTROPIUM BROMIDE INHALATION SPRAY 3.12 UG/1
2 SPRAY, METERED RESPIRATORY (INHALATION)
Qty: 1 EACH | Refills: 5 | Status: SHIPPED | OUTPATIENT
Start: 2021-12-15 | End: 2022-02-01 | Stop reason: ALTCHOICE

## 2021-12-15 NOTE — TELEPHONE ENCOUNTER
Patient just came in for a sample of Spiriva 2.5    Rx Refill Note  Requested Prescriptions     Pending Prescriptions Disp Refills   • tiotropium bromide monohydrate (Spiriva Respimat) 2.5 MCG/ACT aerosol solution inhaler 2 each 0     Sig: Inhale 2 puffs Daily for 30 days.      Last office visit with prescribing clinician: 11/30/2021      Next office visit with prescribing clinician: 2/1/2022            Ainsley England MA  12/15/21, 08:44 CST

## 2021-12-15 NOTE — TELEPHONE ENCOUNTER
Patient was in for a sample of Spiriva and I gave him one but he is also wanting it sent to [facility] pharmacy.  Rx Refill Note  Requested Prescriptions     Pending Prescriptions Disp Refills   • tiotropium bromide monohydrate (Spiriva Respimat) 2.5 MCG/ACT aerosol solution inhaler 1 each 05     Sig: Inhale 2 puffs Daily for 30 days.      Last office visit with prescribing clinician: 11/30/2021      Next office visit with prescribing clinician: 2/1/2022            Ainsley Engladn MA  12/15/21, 09:16 CST

## 2021-12-16 NOTE — TELEPHONE ENCOUNTER
Patient was given a sample of Spiriva yesterday and wanted a prescription to the pharmacy also.    Pharmacy sent a fax stating that it isn't covered with patients insurance. The suggested alternative listed is Incruse ellipta. Would you want to change it?

## 2021-12-23 NOTE — TELEPHONE ENCOUNTER
Talked with insurance company to try to get Spiriva approved cause there hasn't been a determination. She is sending over the information to get it approved and return. Case # [id number]  Talked with [facility].

## 2021-12-23 NOTE — TELEPHONE ENCOUNTER
Danna said we could try the Incruse inhaler for him.   Patient notified of the change.  If he doesn't like it then we could try to get it through [facility].    Please approve attached prescription

## 2022-02-01 ENCOUNTER — OFFICE VISIT (OUTPATIENT)
Dept: PULMONOLOGY | Facility: CLINIC | Age: 72
End: 2022-02-01

## 2022-02-01 VITALS
OXYGEN SATURATION: 93 % | HEART RATE: 98 BPM | SYSTOLIC BLOOD PRESSURE: 142 MMHG | DIASTOLIC BLOOD PRESSURE: 78 MMHG | HEIGHT: 71 IN | WEIGHT: 254 LBS | BODY MASS INDEX: 35.56 KG/M2

## 2022-02-01 DIAGNOSIS — E66.01 SEVERE OBESITY (BMI 35.0-35.9 WITH COMORBIDITY): ICD-10-CM

## 2022-02-01 DIAGNOSIS — J96.11 CHRONIC RESPIRATORY FAILURE WITH HYPOXIA: Chronic | ICD-10-CM

## 2022-02-01 DIAGNOSIS — J43.9 PULMONARY EMPHYSEMA, UNSPECIFIED EMPHYSEMA TYPE: Primary | ICD-10-CM

## 2022-02-01 DIAGNOSIS — J98.4 SMALL AIRWAYS DISEASE: Chronic | ICD-10-CM

## 2022-02-01 DIAGNOSIS — K21.9 GASTROESOPHAGEAL REFLUX DISEASE, UNSPECIFIED WHETHER ESOPHAGITIS PRESENT: Chronic | ICD-10-CM

## 2022-02-01 DIAGNOSIS — J30.9 ALLERGIC RHINITIS, UNSPECIFIED SEASONALITY, UNSPECIFIED TRIGGER: Chronic | ICD-10-CM

## 2022-02-01 PROBLEM — E66.9 OBESITY (BMI 30-39.9): Chronic | Status: RESOLVED | Noted: 2021-11-02 | Resolved: 2022-02-01

## 2022-02-01 PROCEDURE — 99214 OFFICE O/P EST MOD 30 MIN: CPT | Performed by: NURSE PRACTITIONER

## 2022-02-01 RX ORDER — TIOTROPIUM BROMIDE INHALATION SPRAY 3.12 UG/1
2 SPRAY, METERED RESPIRATORY (INHALATION) DAILY
Qty: 1 EACH | Refills: 11 | Status: SHIPPED | OUTPATIENT
Start: 2022-02-01 | End: 2022-03-10

## 2022-02-01 RX ORDER — ALBUTEROL SULFATE 90 UG/1
2 AEROSOL, METERED RESPIRATORY (INHALATION) EVERY 4 HOURS PRN
Qty: 18 G | Refills: 11 | Status: SHIPPED | OUTPATIENT
Start: 2022-02-01 | End: 2022-03-03

## 2022-02-01 RX ORDER — TIOTROPIUM BROMIDE INHALATION SPRAY 3.12 UG/1
2 SPRAY, METERED RESPIRATORY (INHALATION) DAILY
Qty: 2 EACH | Refills: 0 | COMMUNITY
Start: 2022-02-01 | End: 2022-02-07 | Stop reason: SDUPTHER

## 2022-02-01 NOTE — PATIENT INSTRUCTIONS
Obesity, Adult  Obesity is having too much body fat. Being obese means that your weight is more than what is healthy for you.  BMI is a number that explains how much body fat you have. If you have a BMI of 30 or more, you are obese. Obesity is often caused by eating or drinking more calories than your body uses. Changing your lifestyle can help you lose weight.  Obesity can cause serious health problems, such as:  · Stroke.  · Coronary artery disease (CAD).  · Type 2 diabetes.  · Some types of cancer, including cancers of the colon, breast, uterus, and gallbladder.  · Osteoarthritis.  · High blood pressure (hypertension).  · High cholesterol.  · Sleep apnea.  · Gallbladder stones.  · Infertility problems.  What are the causes?  · Eating meals each day that are high in calories, sugar, and fat.  · Being born with genes that may make you more likely to become obese.  · Having a medical condition that causes obesity.  · Taking certain medicines.  · Sitting a lot (having a sedentary lifestyle).  · Not getting enough sleep.  · Drinking a lot of drinks that have sugar in them.  What increases the risk?  · Having a family history of obesity.  · Being an  woman.  · Being a  man.  · Living in an area with limited access to:  ? [place], recreation centers, or sidewalks.  ? Healthy food choices, such as grocery stores and [employer].  What are the signs or symptoms?  The main sign is having too much body fat.  How is this treated?  · Treatment for this condition often includes changing your lifestyle. Treatment may include:  ? Changing your diet. This may include making a healthy meal plan.  ? Exercise. This may include activity that causes your heart to beat faster (aerobic exercise) and strength training. Work with your doctor to design a program that works for you.  ? Medicine to help you lose weight. This may be used if you are not able to lose 1 pound a week after 6 weeks of healthy eating and  more exercise.  ? Treating conditions that cause the obesity.  ? Surgery. Options may include gastric banding and gastric bypass. This may be done if:  § Other treatments have not helped to improve your condition.  § You have a BMI of 40 or higher.  § You have life-threatening health problems related to obesity.  Follow these instructions at home:  Eating and drinking    · Follow advice from your doctor about what to eat and drink. Your doctor may tell you to:  ? Limit fast food, sweets, and processed snack foods.  ? Choose low-fat options. For example, choose low-fat milk instead of whole milk.  ? Eat 5 or more servings of fruits or vegetables each day.  ? Eat at home more often. This gives you more control over what you eat.  ? Choose healthy foods when you eat out.  ? Learn to read food labels. This will help you learn how much food is in 1 serving.  ? Keep low-fat snacks available.  ? Avoid drinks that have a lot of sugar in them. These include soda, fruit juice, iced tea with sugar, and flavored milk.  · Drink enough water to keep your pee (urine) pale yellow.  · Do not go on fad diets.    Physical activity  · Exercise often, as told by your doctor. Most adults should get up to 150 minutes of moderate-intensity exercise every week.Ask your doctor:  ? What types of exercise are safe for you.  ? How often you should exercise.  · Warm up and stretch before being active.  · Do slow stretching after being active (cool down).  · Rest between times of being active.  Lifestyle  · Work with your doctor and a food expert (dietitian) to set a weight-loss goal that is best for you.  · Limit your screen time.  · Find ways to reward yourself that do not involve food.  · Do not drink alcohol if:  ? Your doctor tells you not to drink.  ? You are pregnant, may be pregnant, or are planning to become pregnant.  · If you drink alcohol:  ? Limit how much you use to:  § 0-1 drink a day for women.  § 0-2 drinks a day for men.  ? Be  aware of how much alcohol is in your drink. In the [place] one drink equals one 12 oz bottle of beer (355 mL), one 5 oz glass of wine (148 mL), or one 1½ oz glass of hard liquor (44 mL).  General instructions  · Keep a weight-loss journal. This can help you keep track of:  ? The food that you eat.  ? How much exercise you get.  · Take over-the-counter and prescription medicines only as told by your doctor.  · Take vitamins and supplements only as told by your doctor.  · Think about joining a support group.  · Keep all follow-up visits as told by your doctor. This is important.  Contact a doctor if:  · You cannot meet your weight loss goal after you have changed your diet and lifestyle for 6 weeks.  Get help right away if you:  · Are having trouble breathing.  · Are having thoughts of harming yourself.  Summary  · Obesity is having too much body fat.  · Being obese means that your weight is more than what is healthy for you.  · Work with your doctor to set a weight-loss goal.  · Get regular exercise as told by your doctor.  This information is not intended to replace advice given to you by your health care provider. Make sure you discuss any questions you have with your health care provider.  Document Revised: 08/22/2019 Document Reviewed: 08/22/2019  [employer] Patient Education © 2021 [employer]      Allergic Rhinitis, Adult    Allergic rhinitis is an allergic reaction that affects the mucous membrane inside the nose. The mucous membrane is the tissue that produces mucus.  There are two types of allergic rhinitis:  · Seasonal. This type is also called hay fever and happens only during certain seasons.  · Perennial. This type can happen at any time of the year.  Allergic rhinitis cannot be spread from person to person. This condition can be mild, moderate, or severe. It can develop at any age and may be outgrown.  What are the causes?  This condition is caused by allergens. These are things that can cause an allergic  reaction. Allergens may differ for seasonal allergic rhinitis and perennial allergic rhinitis.  · Seasonal allergic rhinitis is triggered by pollen. Pollen can come from grasses, trees, and weeds.  · Perennial allergic rhinitis may be triggered by:  ? Dust mites.  ? Proteins in a pet's urine, saliva, or dander. Dander is dead skin cells from a pet.  ? Smoke, mold, or car fumes.  What increases the risk?  You are more likely to develop this condition if you have a family history of allergies or other conditions related to allergies, including:  · Allergic conjunctivitis. This is inflammation of parts of the eyes and eyelids.  · Asthma. This condition affects the lungs and makes it hard to breathe.  · Atopic dermatitis or eczema. This is long term (chronic) inflammation of the skin.  · Food allergies.  What are the signs or symptoms?  Symptoms of this condition include:  · Sneezing or coughing.  · A stuffy nose (nasal congestion), itchy nose, or nasal discharge.  · Itchy eyes and tearing of the eyes.  · A feeling of mucus dripping down the back of your throat (postnasal drip).  · Trouble sleeping.  · Tiredness or fatigue.  · Headache.  · Sore throat.  How is this diagnosed?  This condition may be diagnosed with your symptoms, medical history, and physical exam. Your health care provider may check for related conditions, such as:  · Asthma.  · Pink eye. This is eye inflammation caused by infection (conjunctivitis).  · Ear infection.  · Upper respiratory infection. This is an infection in the nose, throat, or upper airways.  You may also have tests to find out which allergens trigger your symptoms. These may include skin tests or blood tests.  How is this treated?  There is no cure for this condition, but treatment can help control symptoms. Treatment may include:  · Taking medicines that block allergy symptoms, such as corticosteroids and antihistamines. Medicine may be given as a shot, nasal spray, or  pill.  · Avoiding any allergens.  · Being exposed again and again to tiny amounts of allergens to help you build a defense against allergens (immunotherapy). This is done if other treatments have not helped. It may include:  ? Allergy shots. These are injected medicines that have small amounts of allergen in them.  ? Sublingual immunotherapy. This involves taking small doses of a medicine with allergen in it under your tongue.  If these treatments do not work, your health care provider may prescribe newer, stronger medicines.  Follow these instructions at home:  Avoiding allergens  Find out what you are allergic to and avoid those allergens. These are some things you can do to help avoid allergens:  · If you have perennial allergies:  ? Replace carpet with wood, tile, or vinyl hipolito. Carpet can trap dander and dust.  ? Do not smoke. Do not allow smoking in your home.  ? Change your heating and air conditioning filters at least once a month.  · If you have seasonal allergies, take these steps during allergy season:  ? Keep windows closed as much as possible.  ? Plan outdoor activities when pollen counts are lowest. Check pollen counts before you plan outdoor activities.  ? When coming indoors, change clothing and shower before sitting on furniture or bedding.  · If you have a pet in the house that produces allergens:  ? Keep the pet out of the bedroom.  ? Vacuum, sweep, and dust regularly.  General instructions  · Take over-the-counter and prescription medicines only as told by your health care provider.  · Drink enough fluid to keep your urine pale yellow.  · Keep all follow-up visits as told by your health care provider. This is important.  Where to find more information  · American Academy of Allergy, Asthma & Immunology: www.aaaai.org  Contact a health care provider if:  · You have a fever.  · You develop a cough that does not go away.  · You make whistling sounds when you breathe (wheeze).  · Your symptoms  slow you down or stop you from doing your normal activities each day.  Get help right away if:  · You have shortness of breath.  This symptom may represent a serious problem that is an emergency. Do not wait to see if the symptom will go away. Get medical help right away. Call your local emergency services (911 in the [place] Do not drive yourself to the hospital.  Summary  · Allergic rhinitis may be managed by taking medicines as directed and avoiding allergens.  · If you have seasonal allergies, keep windows closed as much as possible during allergy season.  · Contact your health care provider if you develop a fever or a cough that does not go away.  This information is not intended to replace advice given to you by your health care provider. Make sure you discuss any questions you have with your health care provider.  Document Revised: 02/05/2021 Document Reviewed: 12/15/2020  [employer] Patient Education © 2021 [employer]

## 2022-02-07 ENCOUNTER — TELEPHONE (OUTPATIENT)
Dept: PULMONOLOGY | Facility: CLINIC | Age: 72
End: 2022-02-07

## 2022-02-07 DIAGNOSIS — J43.9 PULMONARY EMPHYSEMA, UNSPECIFIED EMPHYSEMA TYPE: ICD-10-CM

## 2022-02-07 RX ORDER — TIOTROPIUM BROMIDE INHALATION SPRAY 3.12 UG/1
2 SPRAY, METERED RESPIRATORY (INHALATION) DAILY
Qty: 3 EACH | Refills: 3 | Status: SHIPPED | OUTPATIENT
Start: 2022-02-07 | End: 2022-03-10

## 2022-02-08 NOTE — TELEPHONE ENCOUNTER
Faxed prescription to  [facility]ares for the Spiriva    
MALGORZATA rebollar is needing a prescription for the Spiriva.   Needs to be signed. It is in your in box.  
That's fine just make sure its in my box to sign.   
anxiety

## 2022-02-14 ENCOUNTER — TELEPHONE (OUTPATIENT)
Dept: PULMONOLOGY | Facility: CLINIC | Age: 72
End: 2022-02-14

## 2022-02-14 NOTE — TELEPHONE ENCOUNTER
Pharmacy faxed us regarding the Spiriva not being covered under patients insurance. The suggested alternative is Incruse.    We received the fax last week from [facility] on this and they just needed the prescription so hopefully he will be approved soon on the assistance program.    Called [facility] to check on status of the assistance    Approved 02/14/2022-12/31/2022  Shipping out today and should receive in 5-7 business days.

## 2022-02-15 ENCOUNTER — TELEPHONE (OUTPATIENT)
Dept: PULMONOLOGY | Facility: CLINIC | Age: 72
End: 2022-02-15

## 2022-02-15 NOTE — TELEPHONE ENCOUNTER
Pharmacy faxed a request to change the inhaler to Incruse.  I wrote on the prescription that patient is getting Spiriva through the [facility] patient assistant program.

## 2022-03-09 NOTE — TELEPHONE ENCOUNTER
Pharmacy sent a request for refills on Incruse.  Rx Refill Note  Requested Prescriptions     Pending Prescriptions Disp Refills   • Incruse Ellipta 62.5 MCG/INH aerosol powder  [Pharmacy Med Name: INCRUSE ELLIPTA 62.5 MCG INH] 30 each 11     Sig: INHALE 1 PUFF DAILY FOR 30 DAYS.      Last office visit with prescribing clinician: 2/1/2022      Next office visit with prescribing clinician: 8/2/2022            Silvino Kevin CMA  03/09/22, 12:24 CST

## 2022-03-09 NOTE — TELEPHONE ENCOUNTER
Stopped spiriva 3 days ago due to blurry vision and he has some incruse that he started on and the blurry vision is getting better.       Patient aware that you are out today.       Please advise.

## 2022-03-10 NOTE — TELEPHONE ENCOUNTER
Notified patient and he is tolerating the incruse.       He does need a script to go the pharmacy.    He is also contacting [facility] to let them know he is no longer taking the spiriva.        Please approve the script.

## 2022-03-10 NOTE — TELEPHONE ENCOUNTER
The [facility] was approved until the end of this year for Spiriva.        Do you want him to continue the spiriva or go back to incruse, since spiriva is causing blurry vision and seems to be better after stoppping it for 3 days and being back on incruse.

## 2022-03-10 NOTE — TELEPHONE ENCOUNTER
Call the patient and discuss the options with him.  Again, I have that incruse also cause him to have a headache.  I am fine with him continuing on incruse if he is tolerating it now.  His choice.

## 2022-03-10 NOTE — TELEPHONE ENCOUNTER
Incruse causes him to have a headache.  The last OV he filled out paperwork for [facility].  Did that go through?

## 2022-04-18 ENCOUNTER — TELEPHONE (OUTPATIENT)
Dept: PULMONOLOGY | Facility: CLINIC | Age: 72
End: 2022-04-18

## 2022-04-18 DIAGNOSIS — J43.9 PULMONARY EMPHYSEMA, UNSPECIFIED EMPHYSEMA TYPE: Primary | ICD-10-CM

## 2022-06-09 RX ORDER — TIOTROPIUM BROMIDE INHALATION SPRAY 3.12 UG/1
2 SPRAY, METERED RESPIRATORY (INHALATION) DAILY
Qty: 1 EACH | Refills: 11 | Status: SHIPPED | OUTPATIENT
Start: 2022-06-09 | End: 2022-08-02

## 2022-06-09 NOTE — TELEPHONE ENCOUNTER
Patient called checking on the status of the [employer] application on the Incruse    I called [facility] to check on that information for the patient. I was told that they needed a prescription, copy of prescription insurance and documentation on that he has spent $600 at the pharmacy for the calendar year.    Prescription printed    I am calling the pharmacy to check on what he has spent for the calendar year.  The pharmacy said he had only spent $200-250 for the calendar year of 2022.    It is covered on his insurance but he is having to pay $87.56 for it with his insurance. He is wanting something cheaper than that.

## 2022-06-10 NOTE — TELEPHONE ENCOUNTER
Pharmacy faxed us regarding that the Spiriva wasn't covered and the alternatives is Incruse.    Incruse is the one that the patient cant afford so that is why we changed it to Spiriva.   I talked with Danna and she suggested that we get the [facility] started for the patient to see if we could assist him with that.  I left a message with the patient to see if he wanted to do that.

## 2022-08-01 NOTE — PROGRESS NOTES
" Danna Sorenson, MSN, APRN, NP-C, REJI, CFRN  [facility]  [address]            [place] [address]  Phone: [phone]  Fax: [phone]          Chief Complaint  pulmonary emphysema    Subjective    History of Present Illness    Jeramie Anaya presents to [facility]ULMON[facility]IT[facility] MEDICINE   History of Present Illness   The patient presents today for follow-up of emphysema.  The patient has known severe emphysema, small airway disease, GERD, chronic respiratory with hypoxemia, and allergic rhinitis.  History of left ptx and thoracoscopy with resection of bleb and pleurodesis in [facility] in July 2019.  He is using albuterol HFA as needed.  He has tried multiple inhalers without benefit.  He is compliant with portable oxygen.  He has no new pulmonary complaints today.  No other aggravating or alleviating factors.     Objective   Vital Signs:   /80   Ht 179.1 cm (70.5\")   Wt 108 kg (237 lb)   BMI 33.53 kg/m²     Physical Exam  Vitals reviewed.   Constitutional:       General: He is not in acute distress.     Appearance: He is well-developed. He is obese.      Interventions: Nasal cannula and face mask in place.   HENT:      Head: Normocephalic and atraumatic.   Eyes:      General: No scleral icterus.     Conjunctiva/sclera: Conjunctivae normal.      Pupils: Pupils are equal, round, and reactive to light.   Cardiovascular:      Rate and Rhythm: Normal rate.   Pulmonary:      Effort: Pulmonary effort is normal. No respiratory distress.      Breath sounds: Decreased breath sounds present. No wheezing or rales.   Musculoskeletal:         General: Normal range of motion.      Cervical back: Normal range of motion and neck supple.      Comments: Chronic Left arm sling   Skin:     General: Skin is warm and dry.   Neurological:      Mental Status: He is alert and oriented to person, place, and time.   Psychiatric:         Behavior: Behavior normal.         " Thought Content: Thought content normal.         Judgment: Judgment normal.        Result Review :  The following data was reviewed by: MIKE Fuentes on 2022:    Rest/Exercise Pulse Ox Values        Some values may be hidden. Unless noted otherwise, only the newest values recorded on each date are displayed.         Rest/Exercise Pulse Ox Results 21   Rest room air SAT % 88   Exercise room air SAT % 86   Rest on O2 @ Liters 2   Rest on O2 SAT % 94   Exercise on O2 @ Liters 2   Exercise on O2 SAT % 86           PFT Values        Some values may be hidden. Unless noted otherwise, only the newest values recorded on each date are displayed.         Old Values PFT Results 21   No data to display.      Pre Drug PFT Results 21   FVC 90   FEV1 82   FEF 25-75% 55   FEV1/FVC 71.62      Post Drug PFT Results 21   No data to display.      Other Tests PFT Results 21   DLCO 42   D/VAsb 51             Results for orders placed in visit on 21    Pulmonary Function Test    Narrative  Pulmonary Function Test  Performed by: Luna Sanford, RRT  Authorized by: Danna Sorenson APRN    Pre Drug % Predicted  FVC: 90%  FEV1: 82%  FEF 25-75%: 55%  FEV1/FVC: 71.62%  DLCO: 42%  D/VAsb: 51%    Interpretation  Spirometry  Spirometry shows normal results. There is reduced midflow suggesting small airway/airflow obstruction.  Diffusion Capacity  The patient's diffusion capacity is severely reduced.  Diffusion capacity is moderately reduced when corrected for alveolar volume.      Results for orders placed in visit on 10/17/19    Pulmonary Function Test    Narrative  Pulmonary Function Test  Performed by: Danna Sorenson APRN  Authorized by: Danna Sorenson APRN    Pre Drug  FVC: 76%  FEV1: 76%  FEF 25-75%: 70%  FEV1/FVC: 78.71%  T%  RV: 67%  DLCO: 41%  D/VAsb: 48%           Assessment and Plan  Diagnoses and all orders for this visit:    1. Pulmonary emphysema,  unspecified emphysema type (HCC) (Primary)  Comments:  Continue current treatment regimen    2. Small airways disease  Comments:  Continue current treatment regimen  Overview:  Albuterol HFA as needed      3. Allergic rhinitis, unspecified seasonality, unspecified trigger  Comments:  Continue current treatment regimen  Overview:  Xyzal, Flonase      4. Chronic respiratory failure with hypoxia (HCC)  Comments:  Continue chronic oxygen therapy.  The patient is benefiting from it and wishes to continue it.    5. Gastroesophageal reflux disease, unspecified whether esophagitis present  Comments:  Continue current treatment regimen  Overview:  PPI-Prilosec      6. Obesity (BMI 30-39.9)  Assessment & Plan:  Patient's (Body mass index is 33.53 kg/m².)  Recommend weight loss as this can help improve pulmonary function.    Follow Up  Danna Sorenson, MIKE  8/2/2022  15:16 CDT  Return in about 6 months (around 2/2/2023) for FVL+DLCO.  Patient was given instructions and counseling regarding his condition or for health maintenance advice. Please see specific information pulled into the AVS if appropriate.   Please note that portions of this note were completed with a voice recognition program.

## 2022-08-02 ENCOUNTER — OFFICE VISIT (OUTPATIENT)
Dept: PULMONOLOGY | Facility: CLINIC | Age: 72
End: 2022-08-02

## 2022-08-02 VITALS
SYSTOLIC BLOOD PRESSURE: 142 MMHG | BODY MASS INDEX: 33.18 KG/M2 | DIASTOLIC BLOOD PRESSURE: 80 MMHG | WEIGHT: 237 LBS | HEIGHT: 71 IN

## 2022-08-02 DIAGNOSIS — J96.11 CHRONIC RESPIRATORY FAILURE WITH HYPOXIA: Chronic | ICD-10-CM

## 2022-08-02 DIAGNOSIS — E66.9 OBESITY (BMI 30-39.9): ICD-10-CM

## 2022-08-02 DIAGNOSIS — J43.9 PULMONARY EMPHYSEMA, UNSPECIFIED EMPHYSEMA TYPE: Primary | Chronic | ICD-10-CM

## 2022-08-02 DIAGNOSIS — J30.9 ALLERGIC RHINITIS, UNSPECIFIED SEASONALITY, UNSPECIFIED TRIGGER: Chronic | ICD-10-CM

## 2022-08-02 DIAGNOSIS — K21.9 GASTROESOPHAGEAL REFLUX DISEASE, UNSPECIFIED WHETHER ESOPHAGITIS PRESENT: Chronic | ICD-10-CM

## 2022-08-02 DIAGNOSIS — J98.4 SMALL AIRWAYS DISEASE: Chronic | ICD-10-CM

## 2022-08-02 PROCEDURE — 99214 OFFICE O/P EST MOD 30 MIN: CPT | Performed by: NURSE PRACTITIONER

## 2022-08-02 RX ORDER — ALBUTEROL SULFATE 90 UG/1
2 AEROSOL, METERED RESPIRATORY (INHALATION) EVERY 4 HOURS PRN
COMMUNITY
End: 2023-01-20 | Stop reason: SDUPTHER

## 2022-08-02 NOTE — ASSESSMENT & PLAN NOTE
Patient's (Body mass index is 33.53 kg/m².)  Recommend weight loss as this can help improve pulmonary function.

## 2022-08-02 NOTE — PATIENT INSTRUCTIONS
Obesity, Adult  Obesity is having too much body fat. Being obese means that your weight is more than what is healthy for you.  BMI is a number that explains how much body fat you have. If you have a BMI of 30 or more, you are obese. Obesity is often caused by eating or drinking more calories than your body uses. Changing your lifestyle can help you lose weight.  Obesity can cause serious health problems, such as:  Stroke.  Coronary artery disease (CAD).  Type 2 diabetes.  Some types of cancer, including cancers of the colon, breast, uterus, and gallbladder.  Osteoarthritis.  High blood pressure (hypertension).  High cholesterol.  Sleep apnea.  Gallbladder stones.  Infertility problems.  What are the causes?  Eating meals each day that are high in calories, sugar, and fat.  Being born with genes that may make you more likely to become obese.  Having a medical condition that causes obesity.  Taking certain medicines.  Sitting a lot (having a sedentary lifestyle).  Not getting enough sleep.  Drinking a lot of drinks that have sugar in them.  What increases the risk?  Having a family history of obesity.  Being an  woman.  Being a  man.  Living in an area with limited access to:  [place], recreation centers, or sidewalks.  Healthy food choices, such as grocery stores and [employer] markets.  What are the signs or symptoms?  The main sign is having too much body fat.  How is this treated?  Treatment for this condition often includes changing your lifestyle. Treatment may include:  Changing your diet. This may include making a healthy meal plan.  Exercise. This may include activity that causes your heart to beat faster (aerobic exercise) and strength training. Work with your doctor to design a program that works for you.  Medicine to help you lose weight. This may be used if you are not able to lose 1 pound a week after 6 weeks of healthy eating and more exercise.  Treating conditions that cause the  obesity.  Surgery. Options may include gastric banding and gastric bypass. This may be done if:  Other treatments have not helped to improve your condition.  You have a BMI of 40 or higher.  You have life-threatening health problems related to obesity.  Follow these instructions at home:  Eating and drinking    Follow advice from your doctor about what to eat and drink. Your doctor may tell you to:  Limit fast food, sweets, and processed snack foods.  Choose low-fat options. For example, choose low-fat milk instead of whole milk.  Eat 5 or more servings of fruits or vegetables each day.  Eat at home more often. This gives you more control over what you eat.  Choose healthy foods when you eat out.  Learn to read food labels. This will help you learn how much food is in 1 serving.  Keep low-fat snacks available.  Avoid drinks that have a lot of sugar in them. These include soda, fruit juice, iced tea with sugar, and flavored milk.  Drink enough water to keep your pee (urine) pale yellow.  Do not go on fad diets.    Physical activity  Exercise often, as told by your doctor. Most adults should get up to 150 minutes of moderate-intensity exercise every week.Ask your doctor:  What types of exercise are safe for you.  How often you should exercise.  Warm up and stretch before being active.  Do slow stretching after being active (cool down).  Rest between times of being active.  Lifestyle  Work with your doctor and a food expert (dietitian) to set a weight-loss goal that is best for you.  Limit your screen time.  Find ways to reward yourself that do not involve food.  Do not drink alcohol if:  Your doctor tells you not to drink.  You are pregnant, may be pregnant, or are planning to become pregnant.  If you drink alcohol:  Limit how much you use to:  0-1 drink a day for women.  0-2 drinks a day for men.  Be aware of how much alcohol is in your drink. In the [place] one drink equals one 12 oz bottle of beer (355 mL), one 5 oz  glass of wine (148 mL), or one 1½ oz glass of hard liquor (44 mL).  General instructions  Keep a weight-loss journal. This can help you keep track of:  The food that you eat.  How much exercise you get.  Take over-the-counter and prescription medicines only as told by your doctor.  Take vitamins and supplements only as told by your doctor.  Think about joining a support group.  Keep all follow-up visits as told by your doctor. This is important.  Contact a doctor if:  You cannot meet your weight loss goal after you have changed your diet and lifestyle for 6 weeks.  Get help right away if you:  Are having trouble breathing.  Are having thoughts of harming yourself.  Summary  Obesity is having too much body fat.  Being obese means that your weight is more than what is healthy for you.  Work with your doctor to set a weight-loss goal.  Get regular exercise as told by your doctor.  This information is not intended to replace advice given to you by your health care provider. Make sure you discuss any questions you have with your health care provider.  Document Revised: 08/22/2019 Document Reviewed: 08/22/2019  [employer] Patient Education © 2021 [employer]  Gastroesophageal Reflux Disease, Adult    Gastroesophageal reflux (JEREMY) happens when acid from the stomach flows up into the tube that connects the mouth and the stomach (esophagus). Normally, food travels down the esophagus and stays in the stomach to be digested. With JEREMY, food and stomach acid sometimes move back up into the esophagus.  You may have a disease called gastroesophageal reflux disease (GERD) if the reflux:  Happens often.  Causes frequent or very bad symptoms.  Causes problems such as damage to the esophagus.  When this happens, the esophagus becomes sore and swollen. Over time, GERD can make small holes (ulcers) in the lining of the esophagus.  What are the causes?  This condition is caused by a problem with the muscle between the esophagus and the  stomach. When this muscle is weak or not normal, it does not close properly to keep food and acid from coming back up from the stomach.  The muscle can be weak because of:  Tobacco use.  Pregnancy.  Having a certain type of hernia (hiatal hernia).  Alcohol use.  Certain foods and drinks, such as coffee, chocolate, onions, and peppermint.  What increases the risk?  Being overweight.  Having a disease that affects your connective tissue.  Taking NSAIDs, such a ibuprofen.  What are the signs or symptoms?  Heartburn.  Difficult or painful swallowing.  The feeling of having a lump in the throat.  A bitter taste in the mouth.  Bad breath.  Having a lot of saliva.  Having an upset or bloated stomach.  Burping.  Chest pain. Different conditions can cause chest pain. Make sure you see your doctor if you have chest pain.  Shortness of breath or wheezing.  A long-term cough or a cough at night.  Wearing away of the surface of teeth (tooth enamel).  Weight loss.  How is this treated?  Making changes to your diet.  Taking medicine.  Having surgery.  Treatment will depend on how bad your symptoms are.  Follow these instructions at home:  Eating and drinking    Follow a diet as told by your doctor. You may need to avoid foods and drinks such as:  Coffee and tea, with or without caffeine.  Drinks that contain alcohol.  Energy drinks and sports drinks.  Bubbly (carbonated) drinks or sodas.  Chocolate and cocoa.  Peppermint and mint flavorings.  Garlic and onions.  Horseradish.  Spicy and acidic foods. These include peppers, chili powder, magaña powder, vinegar, hot sauces, and BBQ sauce.  Citrus fruit juices and citrus fruits, such as oranges, leydi, and limes.  Tomato-based foods. These include red sauce, chili, salsa, and pizza with red sauce.  Fried and fatty foods. These include donuts, french fries, potato chips, and high-fat dressings.  High-fat meats. These include hot dogs, rib eye steak, sausage, ham, and mendes.  High-fat  dairy items, such as whole milk, butter, and cream cheese.  Eat small meals often. Avoid eating large meals.  Avoid drinking large amounts of liquid with your meals.  Avoid eating meals during the 2-3 hours before bedtime.  Avoid lying down right after you eat.  Do not exercise right after you eat.    Lifestyle    Do not smoke or use any products that contain nicotine or tobacco. If you need help quitting, ask your doctor.  Try to lower your stress. If you need help doing this, ask your doctor.  If you are overweight, lose an amount of weight that is healthy for you. Ask your doctor about a safe weight loss goal.    General instructions  Pay attention to any changes in your symptoms.  Take over-the-counter and prescription medicines only as told by your doctor.  Do not take aspirin, ibuprofen, or other NSAIDs unless your doctor says it is okay.  Wear loose clothes. Do not wear anything tight around your waist.  Raise (elevate) the head of your bed about 6 inches (15 cm). You may need to use a wedge to do this.  Avoid bending over if this makes your symptoms worse.  Keep all follow-up visits.  Contact a doctor if:  You have new symptoms.  You lose weight and you do not know why.  You have trouble swallowing or it hurts to swallow.  You have wheezing or a cough that keeps happening.  You have a hoarse voice.  Your symptoms do not get better with treatment.  Get help right away if:  You have sudden pain in your arms, neck, jaw, teeth, or back.  You suddenly feel sweaty, dizzy, or light-headed.  You have chest pain or shortness of breath.  You vomit and the vomit is green, yellow, or black, or it looks like blood or coffee grounds.  You faint.  Your poop (stool) is red, bloody, or black.  You cannot swallow, drink, or eat.  These symptoms may represent a serious problem that is an emergency. Do not wait to see if the symptoms will go away. Get medical help right away. Call your local emergency services (704 in the  [place] Do not drive yourself to the hospital.  Summary  If a person has gastroesophageal reflux disease (GERD), food and stomach acid move back up into the esophagus and cause symptoms or problems such as damage to the esophagus.  Treatment will depend on how bad your symptoms are.  Follow a diet as told by your doctor.  Take all medicines only as told by your doctor.  This information is not intended to replace advice given to you by your health care provider. Make sure you discuss any questions you have with your health care provider.  Document Revised: 06/28/2021 Document Reviewed: 06/28/2021  [employer] Patient Education © 2021 [employer]

## 2022-09-28 ENCOUNTER — TELEPHONE (OUTPATIENT)
Dept: PULMONOLOGY | Facility: CLINIC | Age: 72
End: 2022-09-28

## 2022-09-28 NOTE — TELEPHONE ENCOUNTER
I called patient due to not coming by to get paperwork on the [facility] (spiriva) assistance program.  Patient said he was no longer using an inhaler.    He said he received a letter stating that his insurance said that Danna was no longer in his network and he needed to find a new provider.   Saritha said that he needed to call his insurance to make sure they are using [facility] instead of another office.  He said he would do that and let us know what he finds out.

## 2023-01-19 NOTE — PROGRESS NOTES
Danna Sorenson, MSN, APRN, NP-C, REJI, CFRN, EMT-B  [facility]  [address] [place] [address]  Phone: [phone]  Fax: [phone]          Chief Complaint  PULMONARY EMPHYSEMA, UNSPECIFIED TYPE    Subjective    History of Present Illness    Jeramie Anaya presents to [facility] PULMONARY & CRITICAL CARE MEDICINE   History of Present Illness   The patient presents today for follow-up of emphysema.  The patient has known severe emphysema, small airway disease, GERD, chronic respiratory with hypoxemia, and allergic rhinitis.  History of left ptx and thoracoscopy with resection of bleb and pleurodesis in [facility] in July 2019.  He is using albuterol HFA as needed.  He has tried multiple inhalers without benefit.  He is compliant with portable oxygen.  He reports having shingles recently.  He states that he has had shingles several times and felt like he was getting it again.  He states that he had increasing shortness of breath and some pain with breathing then he broke out in the rash on his chest area and around to his back.  He states that after the rash appeared that his breathing improved.  He states that he took and antibiotic and steroid as well.  He states that he utilized his albuterol HFA a few times when he had the shortness of breath.  He is asking if the shortness of breath could have been related to him having shingles.  Discussed that it is possible that shingles could have contributed to the shortness of breath.  He denies fever or chills now.  He states that his breathing is much better at this time.  He denies cough with purulent sputum.  O2 sat 94% on 3L.  CXR from 1/5/23 shows possible worsening of interstitial disease compared to CXR 10/11/21.  Discussed that if patient were to experience increasing shortness of breath again then need to consider CT chest for further evaluation.  No other aggravating or alleviating factors.     Objective  "  Vital Signs:   /82   Pulse 93   Ht 179.1 cm (70.5\")   Wt 106 kg (234 lb)   SpO2 94% Comment: 3 L  BMI 33.10 kg/m²     Physical Exam  Vitals reviewed.   Constitutional:       General: He is not in acute distress.     Appearance: He is well-developed. He is obese.      Interventions: Face mask in place.   HENT:      Head: Normocephalic and atraumatic.   Eyes:      General: No scleral icterus.     Conjunctiva/sclera: Conjunctivae normal.      Pupils: Pupils are equal, round, and reactive to light.   Cardiovascular:      Rate and Rhythm: Normal rate.   Pulmonary:      Effort: Pulmonary effort is normal. No respiratory distress.      Breath sounds: Normal breath sounds. No wheezing or rales.   Musculoskeletal:         General: Normal range of motion.      Cervical back: Normal range of motion and neck supple.      Comments: Left arm sling    Skin:     General: Skin is warm and dry.   Neurological:      Mental Status: He is alert and oriented to person, place, and time.   Psychiatric:         Behavior: Behavior normal.         Thought Content: Thought content normal.         Judgment: Judgment normal.        Result Review :  The following data was reviewed by: MIKE Fuentes on 01/20/2023:    1/5/23        10/11/21    Rest/Exercise Pulse Ox Values        Some values may be hidden. Unless noted otherwise, only the newest values recorded on each date are displayed.         Rest/Exercise Pulse Ox Results 11/1/21   Rest room air SAT % 88   Exercise room air SAT % 86   Rest on O2 @ Liters 2   Rest on O2 SAT % 94   Exercise on O2 @ Liters 2   Exercise on O2 SAT % 86           PFT Values        Some values may be hidden. Unless noted otherwise, only the newest values recorded on each date are displayed.         Old Values PFT Results 11/30/21 1/20/23   No data to display.      Pre Drug PFT Results 11/30/21 1/20/23   FVC 90 91   FEV1 82 79   FEF 25-75% 55 45   FEV1/FVC 71.62 67.9      Post Drug PFT Results " 21   No data to display.      Other Tests PFT Results 21   DLCO 42 35   D/VAsb 51 45             Results for orders placed in visit on 23    Pulmonary Function Test    Narrative  Pulmonary Function Test  Performed by: Silvino Kevin CMA  Authorized by: Danna Sorenson APRN    Pre Drug % Predicted  FVC: 91%  FEV1: 79%  FEF 25-75%: 45%  FEV1/FVC: 67.9%  DLCO: 35%  D/VAsb: 45%    Interpretation  Spirometry  Spirometry shows mild obstruction. There is reduced midflow suggesting small airway/airflow obstruction.  Review of FVL curve  Patient's effort is normal.  Diffusion Capacity  The patient's diffusion capacity is severely reduced.  Diffusion capacity is severely reduced when corrected for alveolar volume.      Results for orders placed in visit on 21    Pulmonary Function Test    Narrative  Pulmonary Function Test  Performed by: Luna Sanford RRT  Authorized by: Danna Sorenson APRN    Pre Drug % Predicted  FVC: 90%  FEV1: 82%  FEF 25-75%: 55%  FEV1/FVC: 71.62%  DLCO: 42%  D/VAsb: 51%    Interpretation  Spirometry  Spirometry shows normal results. There is reduced midflow suggesting small airway/airflow obstruction.  Diffusion Capacity  The patient's diffusion capacity is severely reduced.  Diffusion capacity is moderately reduced when corrected for alveolar volume.      Results for orders placed in visit on 10/17/19    Pulmonary Function Test    Narrative  Pulmonary Function Test  Performed by: Danna Sorenson APRN  Authorized by: Danna Sorenson APRN    Pre Drug  FVC: 76%  FEV1: 76%  FEF 25-75%: 70%  FEV1/FVC: 78.71%  T%  RV: 67%  DLCO: 41%  D/VAsb: 48%           Assessment and Plan  Diagnoses and all orders for this visit:    1. Pulmonary emphysema, unspecified emphysema type (HCC) (Primary)  Overview:  Albuterol HFA     Assessment & Plan:  Continue         Orders:  -     Pulmonary Function Test  -     albuterol sulfate  (90  Base) MCG/ACT inhaler; Inhale 2 puffs Every 4 (Four) Hours As Needed for Wheezing or Shortness of Air for up to 30 days.  Dispense: 18 g; Refill: 11    2. Small airways disease  Overview:  Albuterol HFA as needed    Assessment & Plan:  Continue current treatment regimen       3. Allergic rhinitis, unspecified seasonality, unspecified trigger  Overview:  Xyzal, Flonase    Assessment & Plan:  Continue       4. Chronic respiratory failure with hypoxia (HCC)  Overview:  O2 at 3L    Assessment & Plan:  The patient is using home oxygen, benefiting from it, and wishes to continue it.      5. Gastroesophageal reflux disease, unspecified whether esophagitis present  Overview:  PPI-Prilosec    Assessment & Plan:  Continue current treatment regimen      6. Obesity (BMI 30-39.9)  Assessment & Plan:  Patient's (Body mass index is 33.1 kg/m².)  Recommend weight loss, defer to PCP.        Follow Up  Danna Sorenson, APRN  1/20/2023  14:06 CST  Return in about 6 months (around 7/20/2023).  Patient was given instructions and counseling regarding his condition or for health maintenance advice. Please see specific information pulled into the AVS if appropriate.   Please note that portions of this note were completed with a voice recognition program.

## 2023-01-20 ENCOUNTER — OFFICE VISIT (OUTPATIENT)
Dept: PULMONOLOGY | Facility: CLINIC | Age: 73
End: 2023-01-20
Payer: COMMERCIAL

## 2023-01-20 ENCOUNTER — PROCEDURE VISIT (OUTPATIENT)
Dept: PULMONOLOGY | Facility: CLINIC | Age: 73
End: 2023-01-20
Payer: COMMERCIAL

## 2023-01-20 VITALS
HEIGHT: 71 IN | WEIGHT: 234 LBS | OXYGEN SATURATION: 94 % | HEART RATE: 93 BPM | SYSTOLIC BLOOD PRESSURE: 138 MMHG | BODY MASS INDEX: 32.76 KG/M2 | DIASTOLIC BLOOD PRESSURE: 82 MMHG

## 2023-01-20 DIAGNOSIS — J43.9 PULMONARY EMPHYSEMA, UNSPECIFIED EMPHYSEMA TYPE: Primary | ICD-10-CM

## 2023-01-20 DIAGNOSIS — K21.9 GASTROESOPHAGEAL REFLUX DISEASE, UNSPECIFIED WHETHER ESOPHAGITIS PRESENT: Chronic | ICD-10-CM

## 2023-01-20 DIAGNOSIS — E66.9 OBESITY (BMI 30-39.9): Chronic | ICD-10-CM

## 2023-01-20 DIAGNOSIS — J98.4 SMALL AIRWAYS DISEASE: Chronic | ICD-10-CM

## 2023-01-20 DIAGNOSIS — J96.11 CHRONIC RESPIRATORY FAILURE WITH HYPOXIA: Chronic | ICD-10-CM

## 2023-01-20 DIAGNOSIS — J30.9 ALLERGIC RHINITIS, UNSPECIFIED SEASONALITY, UNSPECIFIED TRIGGER: Chronic | ICD-10-CM

## 2023-01-20 PROCEDURE — 94010 BREATHING CAPACITY TEST: CPT | Performed by: NURSE PRACTITIONER

## 2023-01-20 PROCEDURE — 99214 OFFICE O/P EST MOD 30 MIN: CPT | Performed by: NURSE PRACTITIONER

## 2023-01-20 PROCEDURE — 94729 DIFFUSING CAPACITY: CPT | Performed by: NURSE PRACTITIONER

## 2023-01-20 RX ORDER — GABAPENTIN 100 MG/1
CAPSULE ORAL
COMMUNITY
Start: 2023-01-15

## 2023-01-20 RX ORDER — ACYCLOVIR 800 MG/1
TABLET ORAL
COMMUNITY
Start: 2023-01-15

## 2023-01-20 RX ORDER — ALBUTEROL SULFATE 90 UG/1
2 AEROSOL, METERED RESPIRATORY (INHALATION) EVERY 4 HOURS PRN
Qty: 18 G | Refills: 11 | Status: SHIPPED | OUTPATIENT
Start: 2023-01-20 | End: 2023-02-19

## 2023-01-20 NOTE — PATIENT INSTRUCTIONS
Obesity, Adult  Obesity is having too much body fat. Being obese means that your weight is more than what is healthy for you.   BMI (body mass index) is a number that explains how much body fat you have. If you have a BMI of 30 or more, you are obese.  Obesity can cause serious health problems, such as:  Stroke.  Coronary artery disease (CAD).  Type 2 diabetes.  Some types of cancer.  High blood pressure (hypertension).  High cholesterol.  Gallbladder stones.  Obesity can also contribute to:  Osteoarthritis.  Sleep apnea.  Infertility problems.  What are the causes?  Eating meals each day that are high in calories, sugar, and fat.  Drinking a lot of drinks that have sugar in them.  Being born with genes that may make you more likely to become obese.  Having a medical condition that causes obesity.  Taking certain medicines.  Sitting a lot (having a sedentary lifestyle).  Not getting enough sleep.  What increases the risk?  Having a family history of obesity.  Living in an area with limited access to:  [place], recreation centers, or sidewalks.  Healthy food choices, such as grocery stores and farmers' markets.  What are the signs or symptoms?  The main sign is having too much body fat.  How is this treated?  Treatment for this condition often includes changing your lifestyle. Treatment may include:  Changing your diet. This may include making a healthy meal plan.  Exercise. This may include activity that causes your heart to beat faster (aerobic exercise) and strength training. Work with your doctor to design a program that works for you.  Medicine to help you lose weight. This may be used if you are not able to lose one pound a week after 6 weeks of healthy eating and more exercise.  Treating conditions that cause the obesity.  Surgery. Options may include gastric banding and gastric bypass. This may be done if:  Other treatments have not helped to improve your condition.  You have a BMI of 40 or higher.  You have  life-threatening health problems related to obesity.  Follow these instructions at home:  Eating and drinking    Follow advice from your doctor about what to eat and drink. Your doctor may tell you to:  Limit fast food, sweets, and processed snack foods.  Choose low-fat options. For example, choose low-fat milk instead of whole milk.  Eat five or more servings of fruits or vegetables each day.  Eat at home more often. This gives you more control over what you eat.  Choose healthy foods when you eat out.  Learn to read food labels. This will help you learn how much food is in one serving.  Keep low-fat snacks available.  Avoid drinks that have a lot of sugar in them. These include soda, fruit juice, iced tea with sugar, and flavored milk.  Drink enough water to keep your pee (urine) pale yellow.  Do not go on fad diets.  Physical activity  Exercise often, as told by your doctor. Most adults should get up to 150 minutes of moderate-intensity exercise every week.Ask your doctor:  What types of exercise are safe for you.  How often you should exercise.  Warm up and stretch before being active.  Do slow stretching after being active (cool down).  Rest between times of being active.  Lifestyle  Work with your doctor and a food expert (dietitian) to set a weight-loss goal that is best for you.  Limit your screen time.  Find ways to reward yourself that do not involve food.  Do not drink alcohol if:  Your doctor tells you not to drink.  You are pregnant, may be pregnant, or are planning to become pregnant.  If you drink alcohol:  Limit how much you have to:  0-1 drink a day for women.  0-2 drinks a day for men.  Know how much alcohol is in your drink. In the [place] one drink equals one 12 oz bottle of beer (355 mL), one 5 oz glass of wine (148 mL), or one 1½ oz glass of hard liquor (44 mL).  General instructions  Keep a weight-loss journal. This can help you keep track of:  The food that you eat.  How much exercise you  get.  Take over-the-counter and prescription medicines only as told by your doctor.  Take vitamins and supplements only as told by your doctor.  Think about joining a support group.  Pay attention to your mental health as obesity can lead to depression or self esteem issues.  Keep all follow-up visits.  Contact a doctor if:  You cannot meet your weight-loss goal after you have changed your diet and lifestyle for 6 weeks.  You are having trouble breathing.  Summary  Obesity is having too much body fat.  Being obese means that your weight is more than what is healthy for you.  Work with your doctor to set a weight-loss goal.  Get regular exercise as told by your doctor.  This information is not intended to replace advice given to you by your health care provider. Make sure you discuss any questions you have with your health care provider.  Document Revised: 07/26/2022 Document Reviewed: 07/26/2022  Elsevier Patient Education © 2022 [employer]

## 2023-01-20 NOTE — PROCEDURES
Pulmonary Function Test  Performed by: Silvino Kevin CMA  Authorized by: Danna Sorenson APRN      Pre Drug % Predicted    FVC: 91%   FEV1: 79%   FEF 25-75%: 45%   FEV1/FVC: 67.9%   DLCO: 35%   D/VAsb: 45%    Interpretation   Spirometry   Spirometry shows mild obstruction. There is reduced midflow suggesting small airway/airflow obstruction.   Review of FVL curve   Patient's effort is normal.   Diffusion Capacity  The patient's diffusion capacity is severely reduced.  Diffusion capacity is severely reduced when corrected for alveolar volume.

## 2023-08-01 ENCOUNTER — OFFICE VISIT (OUTPATIENT)
Dept: PULMONOLOGY | Facility: CLINIC | Age: 73
End: 2023-08-01
Payer: COMMERCIAL

## 2023-08-01 ENCOUNTER — HOSPITAL ENCOUNTER (OUTPATIENT)
Dept: GENERAL RADIOLOGY | Facility: HOSPITAL | Age: 73
Discharge: HOME | End: 2023-08-01
Admitting: NURSE PRACTITIONER
Payer: COMMERCIAL

## 2023-08-01 VITALS
OXYGEN SATURATION: 92 % | HEIGHT: 71 IN | HEART RATE: 74 BPM | BODY MASS INDEX: 30.24 KG/M2 | WEIGHT: 216 LBS | SYSTOLIC BLOOD PRESSURE: 122 MMHG | DIASTOLIC BLOOD PRESSURE: 84 MMHG

## 2023-08-01 DIAGNOSIS — J43.9 ACUTE EXACERBATION OF EMPHYSEMA: ICD-10-CM

## 2023-08-01 DIAGNOSIS — J96.11 CHRONIC RESPIRATORY FAILURE WITH HYPOXIA: ICD-10-CM

## 2023-08-01 DIAGNOSIS — E66.9 OBESITY (BMI 30-39.9): Chronic | ICD-10-CM

## 2023-08-01 DIAGNOSIS — J30.9 ALLERGIC RHINITIS, UNSPECIFIED SEASONALITY, UNSPECIFIED TRIGGER: Chronic | ICD-10-CM

## 2023-08-01 DIAGNOSIS — J43.9 PULMONARY EMPHYSEMA, UNSPECIFIED EMPHYSEMA TYPE: Primary | ICD-10-CM

## 2023-08-01 PROCEDURE — 71046 X-RAY EXAM CHEST 2 VIEWS: CPT

## 2023-08-01 RX ORDER — REVEFENACIN 175 UG/3ML
175 SOLUTION RESPIRATORY (INHALATION) DAILY
Qty: 90 ML | Refills: 0 | COMMUNITY
Start: 2023-08-01 | End: 2023-08-31

## 2023-08-01 RX ORDER — PREDNISONE 10 MG/1
TABLET ORAL
Qty: 31 TABLET | Refills: 0 | Status: SHIPPED | OUTPATIENT
Start: 2023-08-01

## 2023-08-01 RX ORDER — ALBUTEROL SULFATE 2.5 MG/3ML
2.5 SOLUTION RESPIRATORY (INHALATION) EVERY 4 HOURS PRN
COMMUNITY

## 2023-08-01 RX ORDER — PANTOPRAZOLE SODIUM 20 MG/1
20 TABLET, DELAYED RELEASE ORAL
COMMUNITY
Start: 2023-03-01

## 2023-08-01 RX ORDER — ALBUTEROL SULFATE 90 UG/1
2 AEROSOL, METERED RESPIRATORY (INHALATION) EVERY 4 HOURS PRN
COMMUNITY

## 2023-08-14 DIAGNOSIS — J43.9 PULMONARY EMPHYSEMA, UNSPECIFIED EMPHYSEMA TYPE: ICD-10-CM

## 2023-08-14 RX ORDER — REVEFENACIN 175 UG/3ML
175 SOLUTION RESPIRATORY (INHALATION) DAILY
Qty: 90 ML | Refills: 11 | Status: SHIPPED | OUTPATIENT
Start: 2023-08-14

## 2023-08-14 NOTE — TELEPHONE ENCOUNTER
Patient notified and said the pharmacy was going to order it for him and let him know when it is available. I told him that if there was a problem to let us know so we can try to send it somewhere else. He voiced understanding.

## 2023-08-14 NOTE — TELEPHONE ENCOUNTER
Patient called stating that he thinks the Yupelri worked and would like a prescription sent to [facility] pharmacy.    Rx Refill Note  Requested Prescriptions     Pending Prescriptions Disp Refills    revefenacin (Yupelri) 175 MCG/3ML nebulizer solution 90 mL      Sig: Take 3 mL by nebulization Daily for 30 days.      Last office visit with prescribing clinician: 8/1/2023   Last telemedicine visit with prescribing clinician: Visit date not found   Next office visit with prescribing clinician: 8/31/2023                         Would you like a call back once the refill request has been completed: [] Yes [] No    If the office needs to give you a call back, can they leave a voicemail: [] Yes [] No    Ainsley England MA  08/14/23, 13:39 CDT

## 2023-08-29 ENCOUNTER — HOSPITAL ENCOUNTER (INPATIENT)
Facility: HOSPITAL | Age: 73
LOS: 8 days | Discharge: HOME | DRG: 694 | End: 2023-09-06
Attending: HOSPITALIST | Admitting: HOSPITALIST
Payer: MEDICARE

## 2023-08-29 DIAGNOSIS — N20.1 URETEROLITHIASIS: Primary | ICD-10-CM

## 2023-08-29 LAB
ANION GAP SERPL CALCULATED.3IONS-SCNC: 10 MMOL/L (ref 5–15)
BASOPHILS # BLD AUTO: 0.02 10*3/MM3 (ref 0–0.2)
BASOPHILS NFR BLD AUTO: 0.3 % (ref 0–1.5)
BUN SERPL-MCNC: 16 MG/DL (ref 8–23)
BUN/CREAT SERPL: 12.5 (ref 7–25)
CALCIUM SPEC-SCNC: 8.5 MG/DL (ref 8.6–10.5)
CHLORIDE SERPL-SCNC: 100 MMOL/L (ref 98–107)
CO2 SERPL-SCNC: 23 MMOL/L (ref 22–29)
CREAT SERPL-MCNC: 1.28 MG/DL (ref 0.76–1.27)
DEPRECATED RDW RBC AUTO: 43.1 FL (ref 37–54)
EGFRCR SERPLBLD CKD-EPI 2021: 58.4 ML/MIN/1.73
EOSINOPHIL # BLD AUTO: 0.1 10*3/MM3 (ref 0–0.4)
EOSINOPHIL NFR BLD AUTO: 1.4 % (ref 0.3–6.2)
ERYTHROCYTE [DISTWIDTH] IN BLOOD BY AUTOMATED COUNT: 13.9 % (ref 12.3–15.4)
GLUCOSE SERPL-MCNC: 119 MG/DL (ref 65–99)
HCT VFR BLD AUTO: 42.8 % (ref 37.5–51)
HGB BLD-MCNC: 14.5 G/DL (ref 13–17.7)
HOLD SPECIMEN: NORMAL
HOLD SPECIMEN: NORMAL
IMM GRANULOCYTES # BLD AUTO: 0.03 10*3/MM3 (ref 0–0.05)
IMM GRANULOCYTES NFR BLD AUTO: 0.4 % (ref 0–0.5)
LYMPHOCYTES # BLD AUTO: 0.62 10*3/MM3 (ref 0.7–3.1)
LYMPHOCYTES NFR BLD AUTO: 8.9 % (ref 19.6–45.3)
MCH RBC QN AUTO: 28.7 PG (ref 26.6–33)
MCHC RBC AUTO-ENTMCNC: 33.9 G/DL (ref 31.5–35.7)
MCV RBC AUTO: 84.8 FL (ref 79–97)
MONOCYTES # BLD AUTO: 0.83 10*3/MM3 (ref 0.1–0.9)
MONOCYTES NFR BLD AUTO: 11.9 % (ref 5–12)
NEUTROPHILS NFR BLD AUTO: 5.35 10*3/MM3 (ref 1.7–7)
NEUTROPHILS NFR BLD AUTO: 77.1 % (ref 42.7–76)
NRBC BLD AUTO-RTO: 0 /100 WBC (ref 0–0.2)
PLATELET # BLD AUTO: 217 10*3/MM3 (ref 140–450)
PMV BLD AUTO: 9.4 FL (ref 6–12)
POTASSIUM SERPL-SCNC: 4.1 MMOL/L (ref 3.5–5.2)
RBC # BLD AUTO: 5.05 10*6/MM3 (ref 4.14–5.8)
SODIUM SERPL-SCNC: 133 MMOL/L (ref 136–145)
WBC NRBC COR # BLD: 6.95 10*3/MM3 (ref 3.4–10.8)
WHOLE BLOOD HOLD COAG: NORMAL

## 2023-08-29 PROCEDURE — 80048 BASIC METABOLIC PNL TOTAL CA: CPT | Performed by: HOSPITALIST

## 2023-08-29 PROCEDURE — 85025 COMPLETE CBC W/AUTO DIFF WBC: CPT | Performed by: HOSPITALIST

## 2023-08-29 RX ORDER — ALBUTEROL SULFATE 2.5 MG/3ML
2.5 SOLUTION RESPIRATORY (INHALATION) EVERY 6 HOURS PRN
Status: DISCONTINUED | OUTPATIENT
Start: 2023-08-29 | End: 2023-09-06 | Stop reason: HOSPADM

## 2023-08-29 RX ORDER — NALOXONE HCL 0.4 MG/ML
0.4 VIAL (ML) INJECTION
Status: DISCONTINUED | OUTPATIENT
Start: 2023-08-29 | End: 2023-08-30

## 2023-08-29 RX ORDER — PANTOPRAZOLE SODIUM 40 MG/1
40 TABLET, DELAYED RELEASE ORAL
Status: DISCONTINUED | OUTPATIENT
Start: 2023-08-30 | End: 2023-09-06 | Stop reason: HOSPADM

## 2023-08-29 RX ORDER — SODIUM CHLORIDE 0.9 % (FLUSH) 0.9 %
10 SYRINGE (ML) INJECTION EVERY 12 HOURS SCHEDULED
Status: DISCONTINUED | OUTPATIENT
Start: 2023-08-29 | End: 2023-09-06 | Stop reason: HOSPADM

## 2023-08-29 RX ORDER — MORPHINE SULFATE 2 MG/ML
2 INJECTION, SOLUTION INTRAMUSCULAR; INTRAVENOUS EVERY 4 HOURS PRN
Status: DISCONTINUED | OUTPATIENT
Start: 2023-08-29 | End: 2023-08-30

## 2023-08-29 RX ORDER — BISACODYL 10 MG
10 SUPPOSITORY, RECTAL RECTAL DAILY PRN
Status: DISCONTINUED | OUTPATIENT
Start: 2023-08-29 | End: 2023-09-06 | Stop reason: HOSPADM

## 2023-08-29 RX ORDER — ALBUTEROL SULFATE 2.5 MG/3ML
2.5 SOLUTION RESPIRATORY (INHALATION) EVERY 4 HOURS PRN
Status: DISCONTINUED | OUTPATIENT
Start: 2023-08-29 | End: 2023-08-29

## 2023-08-29 RX ORDER — SODIUM CHLORIDE 9 MG/ML
40 INJECTION, SOLUTION INTRAVENOUS AS NEEDED
Status: DISCONTINUED | OUTPATIENT
Start: 2023-08-29 | End: 2023-09-06 | Stop reason: HOSPADM

## 2023-08-29 RX ORDER — BISACODYL 5 MG/1
5 TABLET, DELAYED RELEASE ORAL DAILY PRN
Status: DISCONTINUED | OUTPATIENT
Start: 2023-08-29 | End: 2023-09-06 | Stop reason: HOSPADM

## 2023-08-29 RX ORDER — SODIUM CHLORIDE 0.9 % (FLUSH) 0.9 %
10 SYRINGE (ML) INJECTION AS NEEDED
Status: DISCONTINUED | OUTPATIENT
Start: 2023-08-29 | End: 2023-09-06 | Stop reason: HOSPADM

## 2023-08-29 RX ORDER — POLYETHYLENE GLYCOL 3350 17 G/17G
17 POWDER, FOR SOLUTION ORAL DAILY PRN
Status: DISCONTINUED | OUTPATIENT
Start: 2023-08-29 | End: 2023-09-06 | Stop reason: HOSPADM

## 2023-08-29 RX ORDER — ONDANSETRON 2 MG/ML
4 INJECTION INTRAMUSCULAR; INTRAVENOUS EVERY 6 HOURS PRN
Status: DISCONTINUED | OUTPATIENT
Start: 2023-08-29 | End: 2023-09-06 | Stop reason: HOSPADM

## 2023-08-29 RX ORDER — SODIUM CHLORIDE 9 MG/ML
75 INJECTION, SOLUTION INTRAVENOUS CONTINUOUS
Status: DISCONTINUED | OUTPATIENT
Start: 2023-08-29 | End: 2023-08-31

## 2023-08-29 RX ORDER — FLUTICASONE PROPIONATE 50 MCG
2 SPRAY, SUSPENSION (ML) NASAL DAILY
Status: DISCONTINUED | OUTPATIENT
Start: 2023-08-30 | End: 2023-09-06 | Stop reason: HOSPADM

## 2023-08-29 RX ORDER — AMOXICILLIN 250 MG
2 CAPSULE ORAL 2 TIMES DAILY
Status: DISCONTINUED | OUTPATIENT
Start: 2023-08-29 | End: 2023-09-06 | Stop reason: HOSPADM

## 2023-08-29 RX ADMIN — SODIUM CHLORIDE 75 ML/HR: 9 INJECTION, SOLUTION INTRAVENOUS at 17:54

## 2023-08-29 RX ADMIN — DOCUSATE SODIUM 50 MG AND SENNOSIDES 8.6 MG 2 TABLET: 8.6; 5 TABLET, FILM COATED ORAL at 20:28

## 2023-08-29 RX ADMIN — Medication 10 ML: at 20:28

## 2023-08-29 NOTE — PLAN OF CARE
Goal Outcome Evaluation: patient admitted from [facility]. Patient has kidney stone and hypoxic events. Patient desats very quickly with any activity. Patient is currently on the veti mask 14L of oxygen. Nasal canula 4L when eating meals. Patient is alert and oriented. Admission assessment complete, pta med list complete. Meds were sent down to pharmacy that were from home. VSS. No new concerns at this time. Will continue to monitor.

## 2023-08-29 NOTE — H&P
[facility] Medicine Admission      Date of Admission: 8/29/2023      Primary Care Physician: Hi Herrera      Chief Complaint: Right flank pain    HPI: Patient is a 73-year-old male past medical history of severe emphysema on home oxygen who presented to [facility] 5 days ago with complaints of flank pain.  He was found to have a ureteral stone with hydronephrosis.  Since that time he has been monitored in the [facility] getting IV fluids and symptomatic care.  He has not passed the stone.  He was transferred to our facility for further specialty care.  He still does complain of fairly significant pain.  He states that he does not really have any nausea at this point.  He states he was getting Toradol for the entire time he was at the [facility].  He states that the Toradol helped fairly well, but Demerol did not help at all.    Concurrent Medical History:  has a past medical history of Bullous emphysema (08/20/2019), Emphysema of lung (03/2019), High cholesterol, and Shingles.    Past Surgical History:  has a past surgical history that includes Superior vena cava angioplasty / stenting; Lung surgery; and Cyst Removal.    Family History: family history includes Alzheimer's disease in his father; Cancer in his brother; Leukemia in his mother.     Social History:  reports that he quit smoking about 26 years ago. His smoking use included cigarettes. He has a 105.00 pack-year smoking history. He has never used smokeless tobacco. He reports that he does not drink alcohol and does not use drugs.    Allergies:   Allergies   Allergen Reactions    Other Other (See Comments)     Contrast for eyes    Statins Other (See Comments)     Pain all over body and joint pain all over body.        Medications:   Prior to Admission medications    Medication Sig Start Date End Date Taking? Authorizing Provider   revefenacin (Yupelri)  175 MCG/3ML nebulizer solution Take 3 mL by nebulization Daily. 8/14/23  Yes Danna Sorenson APRN   albuterol (PROVENTIL) (2.5 MG/3ML) 0.083% nebulizer solution Take 2.5 mg by nebulization Every 4 (Four) Hours As Needed for Wheezing.    Alissa Barnard MD   albuterol sulfate  (90 Base) MCG/ACT inhaler Inhale 2 puffs Every 4 (Four) Hours As Needed for Wheezing.    Alissa Barnard MD   aspirin 81 MG tablet Take 1 tablet by mouth.    Alissa Barnard MD   fluticasone (FLONASE) 50 MCG/ACT nasal spray 2 SPRAYS IN EACH NOSTRIL ONCE A DAY 7/16/19   Alissa Barnard MD   levocetirizine (XYZAL) 5 MG tablet TAKE 1 TABLET BY MOUTH 2 TIMES DAILY 12/1/17   Alissa Barnard MD   omeprazole (priLOSEC) 20 MG capsule TAKE 1 CAPSULE TWICE A DAY  Patient not taking: Reported on 8/1/2023 9/6/17   Alissa Barnard MD   pantoprazole (PROTONIX) 20 MG EC tablet Take 1 tablet by mouth. 3/1/23   Alissa Barnard MD   predniSONE (DELTASONE) 10 MG tablet Take 4 tabs daily x 3 days, then take 3 tabs daily x 3 days, then take 2 tabs daily x 3 days, then take 1 tab daily x 3 days 8/1/23   Danna Sorenson APRN     I have utilized all available immediate resources to obtain, update, or review the patient's current medications (including all prescriptions, over-the-counter products, herbals, cannabis/cannabidiol products, and vitamin/mineral/dietary (nutritional) supplements).      Review of Systems:  Review of Systems   Constitutional:  Negative for appetite change, chills, fatigue, fever and unexpected weight change.   HENT:  Negative for congestion, facial swelling, hearing loss, nosebleeds, rhinorrhea, sneezing, trouble swallowing and voice change.    Eyes:  Negative for photophobia and visual disturbance.   Respiratory:  Negative for apnea, cough, choking, chest tightness, shortness of breath, wheezing and stridor.    Cardiovascular:  Negative for chest pain, palpitations and leg swelling.    Gastrointestinal:  Negative for abdominal pain, blood in stool, constipation, diarrhea, nausea and vomiting.   Endocrine: Negative for cold intolerance, heat intolerance, polydipsia, polyphagia and polyuria.   Genitourinary:  Positive for flank pain. Negative for dysuria and hematuria.   Musculoskeletal:  Negative for arthralgias, back pain, myalgias and neck pain.   Skin:  Negative for rash and wound.   Allergic/Immunologic: Negative for immunocompromised state.   Neurological:  Negative for dizziness, seizures, syncope, speech difficulty, weakness, light-headedness, numbness and headaches.   Hematological:  Does not bruise/bleed easily.   Psychiatric/Behavioral:  Negative for agitation, behavioral problems, confusion, decreased concentration, hallucinations, self-injury and suicidal ideas. The patient is not nervous/anxious.     Otherwise complete ROS is negative except as mentioned above.    Physical Exam:   Temp:  [97.9 °F (36.6 °C)] 97.9 °F (36.6 °C)  Heart Rate:  [81] 81  Resp:  [20] 20  BP: (123)/(73) 123/73  Physical Exam  Constitutional:       Appearance: He is well-developed.   HENT:      Head: Normocephalic and atraumatic.      Nose: Nose normal.   Eyes:      General: Lids are normal. No scleral icterus.     Conjunctiva/sclera: Conjunctivae normal.      Pupils: Pupils are equal, round, and reactive to light.   Neck:      Vascular: No JVD.      Trachea: No tracheal tenderness or tracheal deviation.   Cardiovascular:      Rate and Rhythm: Normal rate and regular rhythm.      Pulses: Normal pulses.      Heart sounds: Normal heart sounds, S1 normal and S2 normal. No murmur heard.    No friction rub. No gallop.   Pulmonary:      Effort: Pulmonary effort is normal. No accessory muscle usage or respiratory distress.      Breath sounds: Normal breath sounds. No decreased breath sounds, wheezing or rales.   Chest:      Chest wall: No tenderness.   Abdominal:      General: Bowel sounds are normal. There is no  distension.      Palpations: Abdomen is soft. There is no mass.      Tenderness: There is no abdominal tenderness. There is no guarding or rebound.   Musculoskeletal:         General: No tenderness.      Cervical back: Normal range of motion and neck supple. No spinous process tenderness.   Skin:     General: Skin is warm.      Coloration: Skin is not pale.      Findings: No rash.   Neurological:      Mental Status: He is alert and oriented to person, place, and time.      Cranial Nerves: No cranial nerve deficit.      Sensory: No sensory deficit.      Motor: No atrophy, abnormal muscle tone or seizure activity.      Coordination: Coordination normal.      Deep Tendon Reflexes: Reflexes are normal and symmetric. Reflexes normal.   Psychiatric:         Behavior: Behavior normal.         Thought Content: Thought content normal.         Judgment: Judgment normal.         Results Reviewed:  I have personally reviewed current lab, radiology, and data and agree with results.  Lab Results (last 24 hours)       ** No results found for the last 24 hours. **          Imaging Results (Last 24 Hours)       ** No results found for the last 24 hours. **              Assessment:    Active Hospital Problems    Diagnosis     **Ureterolithiasis              Plan:  1.  Ureterolithiasis: Continue gentle IV fluid hydration, off Flomax, antiemetics, and pain control.  As patient received 5 days of Toradol this will not be given.  Will start morphine and escalate as indicated.  Discussed with Dr. Malone who is agreed to see in consultation.  2.  Hydronephrosis: Secondary to #1.  3.  COPD: Patient with home oxygen requirement of 3 L per nasal cannula at rest and 4 L per nasal cannula with exertion.  Patient states that since he has been hospitalized he has required 4 L per simple mask.  He also states that he has gained significant weight since hospitalization due to IV fluids.  4.  DVT prophylaxis: SCDs.    Medical Decision  Making  Number and Complexity of problems: 3 highly complex medical problems    Conditions and Status:        Condition is unchanged.     [facility] Data  External documents reviewed: Outpatient subspecialty notes.     Discussed with: Patient and wife     Treatment Plan  As above    Care Planning  Shared decision making: Patient agreement plan of care  Code status and discussions: Full code    Disposition  Social Determinants of Health that impact treatment or disposition: None  I expect the patient to be discharged to home in 1-2 days.      I confirmed that the patient's Advance Care Plan is present, code status is documented, or surrogate decision maker is listed in the patient's medical record.          This document has been electronically signed by Carl Mendoza MD on August 29, 2023 17:51 CDT

## 2023-08-30 ENCOUNTER — APPOINTMENT (OUTPATIENT)
Dept: CT IMAGING | Facility: HOSPITAL | Age: 73
DRG: 694 | End: 2023-08-30
Payer: MEDICARE

## 2023-08-30 ENCOUNTER — ANESTHESIA (OUTPATIENT)
Dept: TELEMETRY | Facility: HOSPITAL | Age: 73
End: 2023-08-30

## 2023-08-30 ENCOUNTER — ANESTHESIA EVENT (OUTPATIENT)
Dept: TELEMETRY | Facility: HOSPITAL | Age: 73
End: 2023-08-30

## 2023-08-30 LAB
ANION GAP SERPL CALCULATED.3IONS-SCNC: 9 MMOL/L (ref 5–15)
BASOPHILS # BLD AUTO: 0.05 10*3/MM3 (ref 0–0.2)
BASOPHILS NFR BLD AUTO: 0.7 % (ref 0–1.5)
BUN SERPL-MCNC: 15 MG/DL (ref 8–23)
BUN/CREAT SERPL: 12.6 (ref 7–25)
CALCIUM SPEC-SCNC: 8.1 MG/DL (ref 8.6–10.5)
CHLORIDE SERPL-SCNC: 102 MMOL/L (ref 98–107)
CO2 SERPL-SCNC: 22 MMOL/L (ref 22–29)
CREAT SERPL-MCNC: 1.19 MG/DL (ref 0.76–1.27)
DEPRECATED RDW RBC AUTO: 42.8 FL (ref 37–54)
EGFRCR SERPLBLD CKD-EPI 2021: 63.7 ML/MIN/1.73
EOSINOPHIL # BLD AUTO: 0.14 10*3/MM3 (ref 0–0.4)
EOSINOPHIL NFR BLD AUTO: 2 % (ref 0.3–6.2)
ERYTHROCYTE [DISTWIDTH] IN BLOOD BY AUTOMATED COUNT: 13.7 % (ref 12.3–15.4)
GLUCOSE SERPL-MCNC: 100 MG/DL (ref 65–99)
HCT VFR BLD AUTO: 38.6 % (ref 37.5–51)
HGB BLD-MCNC: 13.3 G/DL (ref 13–17.7)
IMM GRANULOCYTES # BLD AUTO: 0.02 10*3/MM3 (ref 0–0.05)
IMM GRANULOCYTES NFR BLD AUTO: 0.3 % (ref 0–0.5)
LYMPHOCYTES # BLD AUTO: 0.77 10*3/MM3 (ref 0.7–3.1)
LYMPHOCYTES NFR BLD AUTO: 11 % (ref 19.6–45.3)
MCH RBC QN AUTO: 29.3 PG (ref 26.6–33)
MCHC RBC AUTO-ENTMCNC: 34.5 G/DL (ref 31.5–35.7)
MCV RBC AUTO: 85 FL (ref 79–97)
MONOCYTES # BLD AUTO: 1.08 10*3/MM3 (ref 0.1–0.9)
MONOCYTES NFR BLD AUTO: 15.4 % (ref 5–12)
NEUTROPHILS NFR BLD AUTO: 4.96 10*3/MM3 (ref 1.7–7)
NEUTROPHILS NFR BLD AUTO: 70.6 % (ref 42.7–76)
NRBC BLD AUTO-RTO: 0 /100 WBC (ref 0–0.2)
PLATELET # BLD AUTO: 207 10*3/MM3 (ref 140–450)
PMV BLD AUTO: 9.3 FL (ref 6–12)
POTASSIUM SERPL-SCNC: 4.7 MMOL/L (ref 3.5–5.2)
RBC # BLD AUTO: 4.54 10*6/MM3 (ref 4.14–5.8)
SODIUM SERPL-SCNC: 133 MMOL/L (ref 136–145)
WBC NRBC COR # BLD: 7.02 10*3/MM3 (ref 3.4–10.8)

## 2023-08-30 PROCEDURE — 71275 CT ANGIOGRAPHY CHEST: CPT

## 2023-08-30 PROCEDURE — G0463 HOSPITAL OUTPT CLINIC VISIT: HCPCS | Performed by: UROLOGY

## 2023-08-30 PROCEDURE — 94760 N-INVAS EAR/PLS OXIMETRY 1: CPT

## 2023-08-30 PROCEDURE — 94799 UNLISTED PULMONARY SVC/PX: CPT

## 2023-08-30 PROCEDURE — 85025 COMPLETE CBC W/AUTO DIFF WBC: CPT | Performed by: HOSPITALIST

## 2023-08-30 PROCEDURE — 25010000002 MORPHINE PER 10 MG

## 2023-08-30 PROCEDURE — 80048 BASIC METABOLIC PNL TOTAL CA: CPT | Performed by: HOSPITALIST

## 2023-08-30 PROCEDURE — 25010000002 MORPHINE PER 10 MG: Performed by: HOSPITALIST

## 2023-08-30 PROCEDURE — 25510000001 IOPAMIDOL PER 1 ML: Performed by: HOSPITALIST

## 2023-08-30 RX ORDER — NALOXONE HCL 0.4 MG/ML
0.4 VIAL (ML) INJECTION
Status: DISCONTINUED | OUTPATIENT
Start: 2023-08-30 | End: 2023-09-06 | Stop reason: HOSPADM

## 2023-08-30 RX ADMIN — Medication 10 ML: at 08:51

## 2023-08-30 RX ADMIN — MORPHINE SULFATE 4 MG: 4 INJECTION, SOLUTION INTRAMUSCULAR; INTRAVENOUS at 17:05

## 2023-08-30 RX ADMIN — IOPAMIDOL 74 ML: 755 INJECTION, SOLUTION INTRAVENOUS at 18:09

## 2023-08-30 RX ADMIN — MORPHINE SULFATE 2 MG: 2 INJECTION, SOLUTION INTRAMUSCULAR; INTRAVENOUS at 01:15

## 2023-08-30 RX ADMIN — PANTOPRAZOLE SODIUM 40 MG: 40 TABLET, DELAYED RELEASE ORAL at 04:13

## 2023-08-30 RX ADMIN — MORPHINE SULFATE 4 MG: 4 INJECTION, SOLUTION INTRAMUSCULAR; INTRAVENOUS at 04:13

## 2023-08-30 RX ADMIN — SODIUM CHLORIDE 75 ML/HR: 9 INJECTION, SOLUTION INTRAVENOUS at 07:09

## 2023-08-30 RX ADMIN — SODIUM CHLORIDE 75 ML/HR: 9 INJECTION, SOLUTION INTRAVENOUS at 21:51

## 2023-08-30 RX ADMIN — FLUTICASONE PROPIONATE 2 SPRAY: 50 SPRAY, METERED NASAL at 08:51

## 2023-08-30 RX ADMIN — MORPHINE SULFATE 4 MG: 4 INJECTION, SOLUTION INTRAMUSCULAR; INTRAVENOUS at 09:48

## 2023-08-30 NOTE — PROGRESS NOTES
[facility] Medicine Services  INPATIENT PROGRESS NOTE    Length of Stay: 1  Date of Admission: 8/29/2023  Primary Care Physician: Hi Herrera    Subjective   Chief Complaint: Flank pain  HPI: Continues to have flank pain.  Otherwise okay.    As of today, 08/30/23  Review of Systems   Constitutional:  Negative for appetite change, chills, fatigue and fever.   Respiratory:  Negative for chest tightness and shortness of breath.    Cardiovascular:  Negative for chest pain, palpitations and leg swelling.   Gastrointestinal:  Negative for abdominal pain, constipation, diarrhea, nausea and vomiting.   Genitourinary:  Positive for flank pain.   Skin:  Negative for wound.   Neurological:  Negative for dizziness, weakness, light-headedness, numbness and headaches.      All pertinent negatives and positives are as above. All other systems have been reviewed and are negative unless otherwise stated.    Objective    Temp:  [97.7 °F (36.5 °C)-98.4 °F (36.9 °C)] (P) 97.7 °F (36.5 °C)  Heart Rate:  [81-99] (P) 81  Resp:  [20] (P) 20  BP: (123-130)/(61-76) (P) 113/71    AM-PAC 6 Clicks Score (PT): 24 (08/29/23 1737)    As of today, 08/30/23  Physical Exam  Vitals reviewed.   Constitutional:       Appearance: He is well-developed.   HENT:      Head: Normocephalic and atraumatic.   Eyes:      Pupils: Pupils are equal, round, and reactive to light.   Cardiovascular:      Rate and Rhythm: Normal rate and regular rhythm.      Heart sounds: Normal heart sounds. No murmur heard.    No friction rub. No gallop.   Pulmonary:      Effort: Pulmonary effort is normal. No respiratory distress.      Breath sounds: Normal breath sounds. No wheezing or rales.   Chest:      Chest wall: No tenderness.   Abdominal:      General: Bowel sounds are normal. There is no distension.      Palpations: Abdomen is soft.      Tenderness: There is no abdominal tenderness.   Musculoskeletal:      Cervical back:  Normal range of motion and neck supple.   Psychiatric:         Behavior: Behavior normal.         Results Review:  I have reviewed the labs, radiology results, and diagnostic studies.    Laboratory Data:   Results from last 7 days   Lab Units 08/30/23  0557 08/29/23  1853   SODIUM mmol/L 133* 133*   POTASSIUM mmol/L 4.7 4.1   CHLORIDE mmol/L 102 100   CO2 mmol/L 22.0 23.0   BUN mg/dL 15 16   CREATININE mg/dL 1.19 1.28*   GLUCOSE mg/dL 100* 119*   CALCIUM mg/dL 8.1* 8.5*   ANION GAP mmol/L 9.0 10.0     Estimated Creatinine Clearance: 67.8 mL/min (by C-G formula based on SCr of 1.19 mg/dL).          Results from last 7 days   Lab Units 08/30/23  0557 08/29/23  1853   WBC 10*3/mm3 7.02 6.95   HEMOGLOBIN g/dL 13.3 14.5   HEMATOCRIT % 38.6 42.8   PLATELETS 10*3/mm3 207 217           Culture Data:   No results found for: BLOODCX  No results found for: URINECX  No results found for: RESPCX  No results found for: WOUNDCX  No results found for: STOOLCX  No components found for: BODYFLD    Radiology Data:   Imaging Results (Last 24 Hours)       ** No results found for the last 24 hours. **            I have utilized all available immediate resources to obtain, update, or review the patient's current medications (including all prescriptions, over-the-counter products, herbals, cannabis/cannabidiol products, and vitamin/mineral/dietary (nutritional) supplements).       Assessment/Plan     Active Hospital Problems    Diagnosis     **Ureterolithiasis        Plan:    1.  Ureterolithiasis: Continue gentle IV fluid hydration, Flomax, antiemetics, and pain control.  Appreciate Dr. Malone help.  2.  Hydronephrosis: Secondary to #1.  3.  COPD: Patient with home oxygen requirement of 3 L per nasal cannula at rest and 4 L per nasal cannula with exertion.  Patient states that since he has been hospitalized he has required 4 L per simple mask.  4.  DVT prophylaxis: SCDs.    Medical Decision Making  Number and Complexity of problems: 2  highly complex medical problems    Conditions and Status:        Condition is unchanged.     Discussed with: Patient     Treatment Plan  As above    Care Planning  Shared decision making: Patient agreement plan of care  Code status and discussions: Patient request DNR/DNI    Disposition  Social Determinants of Health that impact treatment or disposition: None  I expect the patient to be discharged to home in 2-3 days.       The patient was evaluated during the global COVID-19 pandemic, and the diagnosis was suspected/considered upon their initial presentation.  Evaluation, treatment, and testing were consistent with current guidelines for patients who present with complaints or symptoms that may be related to COVID-19.    I confirmed that the patient's Advance Care Plan is present, code status is documented, or surrogate decision maker is listed in the patient's medical record.        This document has been electronically signed by Carl Mendoza MD on August 30, 2023 11:43 CDT

## 2023-08-30 NOTE — PAYOR COMM NOTE
"[facility]  Case Managment Extender   Hailee Bush  P) [phone] (F) [phone]        REF# [id number]  Jeramie Baker (73 y.o. Male)       Date of Birth   1950    Social Security Number       Address   [address] [place] [address]    Home Phone       MRN   [id number]       [facility]    Marital Status                               Admission Date   8/29/23    Admission Type   Urgent    Admitting Provider   Carl Mendoza MD    Attending Provider   Carl Mendoza MD    Department, Room/Bed   [facility] 3 EAST, 371/1       Discharge Date       Discharge Disposition       Discharge Destination                                 Attending Provider: Carl Mendoza MD    Allergies: Other, Statins    Isolation: None   Infection: None   Code Status: CPR    Ht: 182.9 cm (72\")   Wt: 107 kg (235 lb)    Admission Cmt: None   Principal Problem: Ureterolithiasis [N20.1]                   Active Insurance as of 8/29/2023       Primary Coverage       Payor Plan Insurance Group Employer/Plan Group    AETNA MEDICARE REPLACEMENT AETNA MEDICARE REPLACEMENT [id number]       Payor Plan Address Payor Plan Phone Number Payor Plan Fax Number Effective Dates    [address] [phone]  4/1/2022 - None Entered    [facility] [id number]         Subscriber Name Subscriber Birth Date Member ID       JERAMIE BAKER 1950 [id number]                     Emergency Contacts        (Rel.) Home Phone Work Phone Mobile Phone    Meme Ellis (Daughter) -- -- [phone]                 History & Physical        Carl Mendoza MD at 08/29/23 1751                [facility] Medicine Admission      Date of Admission: 8/29/2023      Primary Care Physician: Hi Herrera      Chief Complaint: Right flank pain    HPI: Patient is a 73-year-old male past medical history of severe " emphysema on home oxygen who presented to [facility] 5 days ago with complaints of flank pain.  He was found to have a ureteral stone with hydronephrosis.  Since that time he has been monitored in the [facility] getting IV fluids and symptomatic care.  He has not passed the stone.  He was transferred to our facility for further specialty care.  He still does complain of fairly significant pain.  He states that he does not really have any nausea at this point.  He states he was getting Toradol for the entire time he was at the [facility].  He states that the Toradol helped fairly well, but Demerol did not help at all.    Concurrent Medical History:  has a past medical history of Bullous emphysema (08/20/2019), Emphysema of lung (03/2019), High cholesterol, and Shingles.    Past Surgical History:  has a past surgical history that includes Superior vena cava angioplasty / stenting; Lung surgery; and Cyst Removal.    Family History: family history includes Alzheimer's disease in his father; Cancer in his brother; Leukemia in his mother.     Social History:  reports that he quit smoking about 26 years ago. His smoking use included cigarettes. He has a 105.00 pack-year smoking history. He has never used smokeless tobacco. He reports that he does not drink alcohol and does not use drugs.    Allergies:   Allergies   Allergen Reactions    Other Other (See Comments)     Contrast for eyes    Statins Other (See Comments)     Pain all over body and joint pain all over body.        Medications:   Prior to Admission medications    Medication Sig Start Date End Date Taking? Authorizing Provider   revefenacin (Yupelri) 175 MCG/3ML nebulizer solution Take 3 mL by nebulization Daily. 8/14/23  Yes YasDanna APRN   albuterol (PROVENTIL) (2.5 MG/3ML) 0.083% nebulizer solution Take 2.5 mg by nebulization Every 4 (Four) Hours As Needed for Wheezing.    Provider, MD Alissa    albuterol sulfate  (90 Base) MCG/ACT inhaler Inhale 2 puffs Every 4 (Four) Hours As Needed for Wheezing.    Alissa Barnard MD   aspirin 81 MG tablet Take 1 tablet by mouth.    Alissa Barnard MD   fluticasone (FLONASE) 50 MCG/ACT nasal spray 2 SPRAYS IN EACH NOSTRIL ONCE A DAY 7/16/19   Alissa Barnard MD   levocetirizine (XYZAL) 5 MG tablet TAKE 1 TABLET BY MOUTH 2 TIMES DAILY 12/1/17   Alissa Barnard MD   omeprazole (priLOSEC) 20 MG capsule TAKE 1 CAPSULE TWICE A DAY  Patient not taking: Reported on 8/1/2023 9/6/17   Alissa Barnard MD   pantoprazole (PROTONIX) 20 MG EC tablet Take 1 tablet by mouth. 3/1/23   Alissa Barnard MD   predniSONE (DELTASONE) 10 MG tablet Take 4 tabs daily x 3 days, then take 3 tabs daily x 3 days, then take 2 tabs daily x 3 days, then take 1 tab daily x 3 days 8/1/23   YasDanna APRN     I have utilized all available immediate resources to obtain, update, or review the patient's current medications (including all prescriptions, over-the-counter products, herbals, cannabis/cannabidiol products, and vitamin/mineral/dietary (nutritional) supplements).      Review of Systems:  Review of Systems   Constitutional:  Negative for appetite change, chills, fatigue, fever and unexpected weight change.   HENT:  Negative for congestion, facial swelling, hearing loss, nosebleeds, rhinorrhea, sneezing, trouble swallowing and voice change.    Eyes:  Negative for photophobia and visual disturbance.   Respiratory:  Negative for apnea, cough, choking, chest tightness, shortness of breath, wheezing and stridor.    Cardiovascular:  Negative for chest pain, palpitations and leg swelling.   Gastrointestinal:  Negative for abdominal pain, blood in stool, constipation, diarrhea, nausea and vomiting.   Endocrine: Negative for cold intolerance, heat intolerance, polydipsia, polyphagia and polyuria.   Genitourinary:  Positive for flank pain. Negative for  dysuria and hematuria.   Musculoskeletal:  Negative for arthralgias, back pain, myalgias and neck pain.   Skin:  Negative for rash and wound.   Allergic/Immunologic: Negative for immunocompromised state.   Neurological:  Negative for dizziness, seizures, syncope, speech difficulty, weakness, light-headedness, numbness and headaches.   Hematological:  Does not bruise/bleed easily.   Psychiatric/Behavioral:  Negative for agitation, behavioral problems, confusion, decreased concentration, hallucinations, self-injury and suicidal ideas. The patient is not nervous/anxious.     Otherwise complete ROS is negative except as mentioned above.    Physical Exam:   Temp:  [97.9 °F (36.6 °C)] 97.9 °F (36.6 °C)  Heart Rate:  [81] 81  Resp:  [20] 20  BP: (123)/(73) 123/73  Physical Exam  Constitutional:       Appearance: He is well-developed.   HENT:      Head: Normocephalic and atraumatic.      Nose: Nose normal.   Eyes:      General: Lids are normal. No scleral icterus.     Conjunctiva/sclera: Conjunctivae normal.      Pupils: Pupils are equal, round, and reactive to light.   Neck:      Vascular: No JVD.      Trachea: No tracheal tenderness or tracheal deviation.   Cardiovascular:      Rate and Rhythm: Normal rate and regular rhythm.      Pulses: Normal pulses.      Heart sounds: Normal heart sounds, S1 normal and S2 normal. No murmur heard.    No friction rub. No gallop.   Pulmonary:      Effort: Pulmonary effort is normal. No accessory muscle usage or respiratory distress.      Breath sounds: Normal breath sounds. No decreased breath sounds, wheezing or rales.   Chest:      Chest wall: No tenderness.   Abdominal:      General: Bowel sounds are normal. There is no distension.      Palpations: Abdomen is soft. There is no mass.      Tenderness: There is no abdominal tenderness. There is no guarding or rebound.   Musculoskeletal:         General: No tenderness.      Cervical back: Normal range of motion and neck supple. No spinous  process tenderness.   Skin:     General: Skin is warm.      Coloration: Skin is not pale.      Findings: No rash.   Neurological:      Mental Status: He is alert and oriented to person, place, and time.      Cranial Nerves: No cranial nerve deficit.      Sensory: No sensory deficit.      Motor: No atrophy, abnormal muscle tone or seizure activity.      Coordination: Coordination normal.      Deep Tendon Reflexes: Reflexes are normal and symmetric. Reflexes normal.   Psychiatric:         Behavior: Behavior normal.         Thought Content: Thought content normal.         Judgment: Judgment normal.         Results Reviewed:  I have personally reviewed current lab, radiology, and data and agree with results.  Lab Results (last 24 hours)       ** No results found for the last 24 hours. **          Imaging Results (Last 24 Hours)       ** No results found for the last 24 hours. **              Assessment:    Active Hospital Problems    Diagnosis     **Ureterolithiasis              Plan:  1.  Ureterolithiasis: Continue gentle IV fluid hydration, off Flomax, antiemetics, and pain control.  As patient received 5 days of Toradol this will not be given.  Will start morphine and escalate as indicated.  Discussed with Dr. Malone who is agreed to see in consultation.  2.  Hydronephrosis: Secondary to #1.  3.  COPD: Patient with home oxygen requirement of 3 L per nasal cannula at rest and 4 L per nasal cannula with exertion.  Patient states that since he has been hospitalized he has required 4 L per simple mask.  He also states that he has gained significant weight since hospitalization due to IV fluids.  4.  DVT prophylaxis: SCDs.    Medical Decision Making  Number and Complexity of problems: 3 highly complex medical problems    Conditions and Status:        Condition is unchanged.     [facility] Data  External documents reviewed: Outpatient subspecialty notes.     Discussed with: Patient and wife     Treatment Plan  As above    Care  Planning  Shared decision making: Patient agreement plan of care  Code status and discussions: Full code    Disposition  Social Determinants of Health that impact treatment or disposition: None  I expect the patient to be discharged to home in 1-2 days.      I confirmed that the patient's Advance Care Plan is present, code status is documented, or surrogate decision maker is listed in the patient's medical record.          This document has been electronically signed by Carl Mendoza MD on August 29, 2023 17:51 CDT                    Electronically signed by Carl Mendoza MD at 08/29/23 1801       Lab Results (last 24 hours)       Procedure Component Value Units Date/Time    Basic Metabolic Panel [[id number]]  (Abnormal) Collected: 08/30/23 0557    Specimen: Blood Updated: 08/30/23 0717     Glucose 100 mg/dL      BUN 15 mg/dL      Creatinine 1.19 mg/dL      Sodium 133 mmol/L      Potassium 4.7 mmol/L      Chloride 102 mmol/L      CO2 22.0 mmol/L      Calcium 8.1 mg/dL      BUN/Creatinine Ratio 12.6     Anion Gap 9.0 mmol/L      eGFR 63.7 mL/min/1.73     Narrative:      GFR Normal >60  Chronic Kidney Disease <60  Kidney Failure <15    The GFR formula is only valid for adults with stable renal function between ages 18 and 70.    CBC & Differential [[id number]]  (Abnormal) Collected: 08/30/23 0557    Specimen: Blood Updated: 08/30/23 0656    Narrative:      The following orders were created for panel order CBC & Differential.  Procedure                               Abnormality         Status                     ---------                               -----------         ------                     CBC Auto Differential[[id number]]        Abnormal            Final result                 Please view results for these tests on the individual orders.    CBC Auto Differential [[id number]]  (Abnormal) Collected: 08/30/23 0557    Specimen: Blood Updated: 08/30/23 0656     WBC 7.02 10*3/mm3      RBC 4.54 10*6/mm3       Hemoglobin 13.3 g/dL      Hematocrit 38.6 %      MCV 85.0 fL      MCH 29.3 pg      MCHC 34.5 g/dL      RDW 13.7 %      RDW-SD 42.8 fl      MPV 9.3 fL      Platelets 207 10*3/mm3      Neutrophil % 70.6 %      Lymphocyte % 11.0 %      Monocyte % 15.4 %      Eosinophil % 2.0 %      Basophil % 0.7 %      Immature Grans % 0.3 %      Neutrophils, Absolute 4.96 10*3/mm3      Lymphocytes, Absolute 0.77 10*3/mm3      Monocytes, Absolute 1.08 10*3/mm3      Eosinophils, Absolute 0.14 10*3/mm3      Basophils, Absolute 0.05 10*3/mm3      Immature Grans, Absolute 0.02 10*3/mm3      nRBC 0.0 /100 WBC     Extra Tubes [[id number]] Collected: 08/29/23 1853    Specimen: Blood, Venous Line Updated: 08/29/23 2300    Narrative:      The following orders were created for panel order Extra Tubes.  Procedure                               Abnormality         Status                     ---------                               -----------         ------                     Gold Top - SST[[id number]]                                   Final result               Gray Top[[id number]]                                         Final result               Light Blue Top[[id number]]                                   Final result                 Please view results for these tests on the individual orders.    Gray Top [[id number]] Collected: 08/29/23 1853    Specimen: Blood Updated: 08/29/23 2300     Extra Tube Hold for add-ons.     Comment: Auto resulted.       Gold Top - SST [[id number]] Collected: 08/29/23 1853    Specimen: Blood Updated: 08/29/23 2002     Extra Tube Hold for add-ons.     Comment: Auto resulted.       Light Blue Top [[id number]] Collected: 08/29/23 1853    Specimen: Blood Updated: 08/29/23 2002     Extra Tube Hold for add-ons.     Comment: Auto resulted       Basic Metabolic Panel [[id number]]  (Abnormal) Collected: 08/29/23 1853    Specimen: Blood Updated: 08/29/23 1930     Glucose 119 mg/dL      BUN 16 mg/dL      Creatinine 1.28 mg/dL       Sodium 133 mmol/L      Potassium 4.1 mmol/L      Chloride 100 mmol/L      CO2 23.0 mmol/L      Calcium 8.5 mg/dL      BUN/Creatinine Ratio 12.5     Anion Gap 10.0 mmol/L      eGFR 58.4 mL/min/1.73     Narrative:      GFR Normal >60  Chronic Kidney Disease <60  Kidney Failure <15    The GFR formula is only valid for adults with stable renal function between ages 18 and 70.    CBC & Differential [[id number]]  (Abnormal) Collected: 08/29/23 1853    Specimen: Blood Updated: 08/29/23 1915    Narrative:      The following orders were created for panel order CBC & Differential.  Procedure                               Abnormality         Status                     ---------                               -----------         ------                     CBC Auto Differential[[id number]]        Abnormal            Final result                 Please view results for these tests on the individual orders.    CBC Auto Differential [[id number]]  (Abnormal) Collected: 08/29/23 1853    Specimen: Blood Updated: 08/29/23 1915     WBC 6.95 10*3/mm3      RBC 5.05 10*6/mm3      Hemoglobin 14.5 g/dL      Hematocrit 42.8 %      MCV 84.8 fL      MCH 28.7 pg      MCHC 33.9 g/dL      RDW 13.9 %      RDW-SD 43.1 fl      MPV 9.4 fL      Platelets 217 10*3/mm3      Neutrophil % 77.1 %      Lymphocyte % 8.9 %      Monocyte % 11.9 %      Eosinophil % 1.4 %      Basophil % 0.3 %      Immature Grans % 0.4 %      Neutrophils, Absolute 5.35 10*3/mm3      Lymphocytes, Absolute 0.62 10*3/mm3      Monocytes, Absolute 0.83 10*3/mm3      Eosinophils, Absolute 0.10 10*3/mm3      Basophils, Absolute 0.02 10*3/mm3      Immature Grans, Absolute 0.03 10*3/mm3      nRBC 0.0 /100 WBC           Imaging Results (Last 24 Hours)       ** No results found for the last 24 hours. **          ECG/EMG Results (last 24 hours)       ** No results found for the last 24 hours. **          Physician Progress Notes (last 24 hours)  Notes from 08/29/23 1110 through 08/30/23 1110    No notes of this type exist for this encounter.       Medical Student Notes (last 24 hours)  Notes from 08/29/23 1110 through 08/30/23 1110   No notes of this type exist for this encounter.       Consult Notes (last 24 hours)  Notes from 08/29/23 1110 through 08/30/23 1110   No notes of this type exist for this encounter.

## 2023-08-30 NOTE — ANESTHESIA PREPROCEDURE EVALUATION
Anesthesia Evaluation     Patient summary reviewed and Nursing notes reviewed   NPO Solid Status: > 8 hours  NPO Liquid Status: > 8 hours           Airway   Mallampati: II  TM distance: >3 FB  Neck ROM: full  Dental    (+) upper dentures and lower dentures    Pulmonary     breath sounds clear to auscultation  (+) a smoker Former, COPD,home oxygen (3L at rest and 4L when walking), shortness of breath  (-) sleep apnea, rhonchi, decreased breath sounds, wheezes, rales    PE comment: 14L via venturi mask system   Cardiovascular   Exercise tolerance: poor (<4 METS)    Rhythm: regular  Rate: normal    (+) cardiac stents (10-12 years ago) Drug eluting stent more than 12 months ago , FOY, hyperlipidemia  (-) hypertension, past MI, dysrhythmias, angina, murmur, DVT      Neuro/Psych  (-) seizures, TIA, CVA  GI/Hepatic/Renal/Endo    (+) obesity, GERD well controlled  (-) liver disease, no renal disease, diabetes, no thyroid disorder    Musculoskeletal     Abdominal   (+) obese   Substance History   (-) alcohol use, drug use     OB/GYN          Other        ROS/Med Hx Other: Hx: COPD: uses once a day at night.     Hx:GERD: controlled on Protonix     Pulmonary emphysema: 14L Via Venturi mask                       Anesthesia Plan    ASA 3     general     (Currently on 14L via Venturi mask  Per pt he had a reaction from Duo-Neb that has caused his oxygen to drop since being in the hospital.  )  intravenous induction     Anesthetic plan, risks, benefits, and alternatives have been provided, discussed and informed consent has been obtained with: patient.      CODE STATUS:    While under anesthesia and immediate perioperative period:  Code Status: CPR - Full Support

## 2023-08-30 NOTE — CONSULTS
Referring Provider: Hospitalist  Reason for Consultation: Mid right ureteral stone with hydronephrosis    Patient Care Team:  Hi Herrera as PCP - General (Physician Assistant)  Ryan Clay MD as Consulting Physician (Pulmonary Disease)  YasDanna APRN as Nurse Practitioner (Pulmonary Disease)  [place]     Chief complaint: Patient is admitted from transfer at [facility] for he had been in the hospital for several days for right ureteral stone and would be passed due to the chronicity and not getting results of passing stone transferred here his significant history of COPD has had some nausea but no real fever    Subjective .     History of present illness: As above    Review of Systems:  Pertinent items are noted in HPI    History:  Past Medical History:   Diagnosis Date    Bullous emphysema 2019    Emphysema of lung 2019    High cholesterol     Shingles      Past Surgical History:   Procedure Laterality Date    CYST REMOVAL      on tailbone    LUNG SURGERY      SUPERIOR VENA CAVA ANGIOPLASTY / STENTING       Family History   Problem Relation Age of Onset    Leukemia Mother     Alzheimer's disease Father     Cancer Brother      Social History     Tobacco Use    Smoking status: Former     Packs/day: 3.00     Years: 35.00     Pack years: 105.00     Types: Cigarettes     Quit date: 1997     Years since quittin.6    Smokeless tobacco: Never   Vaping Use    Vaping Use: Never used   Substance Use Topics    Alcohol use: No    Drug use: No     Medications Prior to Admission   Medication Sig Dispense Refill Last Dose    revefenacin (Yupelri) 175 MCG/3ML nebulizer solution Take 3 mL by nebulization Daily. 90 mL 11 2023    albuterol (PROVENTIL) (2.5 MG/3ML) 0.083% nebulizer solution Take 2.5 mg by nebulization Every 4 (Four) Hours As Needed for Wheezing.       albuterol sulfate  (90 Base) MCG/ACT inhaler Inhale 2 puffs Every 4 (Four) Hours As Needed for  Wheezing.       aspirin 81 MG tablet Take 1 tablet by mouth.       fluticasone (FLONASE) 50 MCG/ACT nasal spray 2 SPRAYS IN EACH NOSTRIL ONCE A DAY  2     levocetirizine (XYZAL) 5 MG tablet TAKE 1 TABLET BY MOUTH 2 TIMES DAILY       omeprazole (priLOSEC) 20 MG capsule TAKE 1 CAPSULE TWICE A DAY (Patient not taking: Reported on 8/1/2023)       pantoprazole (PROTONIX) 20 MG EC tablet Take 1 tablet by mouth.       predniSONE (DELTASONE) 10 MG tablet Take 4 tabs daily x 3 days, then take 3 tabs daily x 3 days, then take 2 tabs daily x 3 days, then take 1 tab daily x 3 days 31 tablet 0         Allergies: Other and Statins    Objective     Vital Signs:   Temp:  [97.9 °F (36.6 °C)] 97.9 °F (36.6 °C)  Heart Rate:  [81] 81  Resp:  [20] 20  BP: (123)/(73) 123/73    Physical Exam:     General Appearance:    Alert, cooperative, in no acute distress.   Head:    Normocephalic, without obvious abnormality, atraumatic.   Eyes:            Lids and lashes normal, conjunctivae and sclerae normal, no   icterus, no pallor, corneas clear, PERRLA.   Ears:    Ears appear intact with no abnormalities noted.   Throat:   No oral lesions, no thrush, oral mucosa moist.   Lungs:     Clear to auscultation, respirations regular, even and                  unlabored.    Heart:    Regular rhythm and normal rate, normal S1 and S2, no            murmur, no gallop, no rub, no click.   Abdomen:     Normal bowel sounds, no masses, no organomegaly, soft        nontender, nondistended, no guarding, no rebound          tenderness.   Genitourinary: Urine clear.   Rectal:   Deferred.   Extremities:   Moves all extremities well, no edema, no cyanosis, no             redness.   Pulses:   Pulses palpable and equal bilaterally.   Skin:   No bleeding, bruising or rash.   Neurologic:   Cranial nerves 2 - 12 grossly intact, sensation intact, DTR       present and equal bilaterally.       Results Review:    Lab Results (last 24 hours)       Procedure Component Value  Units Date/Time    Basic Metabolic Panel [[id number]]  (Abnormal) Collected: 08/29/23 1853    Specimen: Blood Updated: 08/29/23 1930     Glucose 119 mg/dL      BUN 16 mg/dL      Creatinine 1.28 mg/dL      Sodium 133 mmol/L      Potassium 4.1 mmol/L      Chloride 100 mmol/L      CO2 23.0 mmol/L      Calcium 8.5 mg/dL      BUN/Creatinine Ratio 12.5     Anion Gap 10.0 mmol/L      eGFR 58.4 mL/min/1.73     Narrative:      GFR Normal >60  Chronic Kidney Disease <60  Kidney Failure <15    The GFR formula is only valid for adults with stable renal function between ages 18 and 70.    CBC & Differential [[id number]]  (Abnormal) Collected: 08/29/23 1853    Specimen: Blood Updated: 08/29/23 1915    Narrative:      The following orders were created for panel order CBC & Differential.  Procedure                               Abnormality         Status                     ---------                               -----------         ------                     CBC Auto Differential[[id number]]        Abnormal            Final result                 Please view results for these tests on the individual orders.    CBC Auto Differential [[id number]]  (Abnormal) Collected: 08/29/23 1853    Specimen: Blood Updated: 08/29/23 1915     WBC 6.95 10*3/mm3      RBC 5.05 10*6/mm3      Hemoglobin 14.5 g/dL      Hematocrit 42.8 %      MCV 84.8 fL      MCH 28.7 pg      MCHC 33.9 g/dL      RDW 13.9 %      RDW-SD 43.1 fl      MPV 9.4 fL      Platelets 217 10*3/mm3      Neutrophil % 77.1 %      Lymphocyte % 8.9 %      Monocyte % 11.9 %      Eosinophil % 1.4 %      Basophil % 0.3 %      Immature Grans % 0.4 %      Neutrophils, Absolute 5.35 10*3/mm3      Lymphocytes, Absolute 0.62 10*3/mm3      Monocytes, Absolute 0.83 10*3/mm3      Eosinophils, Absolute 0.10 10*3/mm3      Basophils, Absolute 0.02 10*3/mm3      Immature Grans, Absolute 0.03 10*3/mm3      nRBC 0.0 /100 WBC     Extra Tubes [[id number]] Collected: 08/29/23 1853    Specimen: Blood, Venous Line  Updated: 08/29/23 1912    Narrative:      The following orders were created for panel order Extra Tubes.  Procedure                               Abnormality         Status                     ---------                               -----------         ------                     Gold Top - SST[[id number]]                                   In process                 Vickers Top[[id number]]                                         In process                 Light Blue Top[[id number]]                                   In process                   Please view results for these tests on the individual orders.    Gold Top - SST [[id number]] Collected: 08/29/23 1853    Specimen: Blood Updated: 08/29/23 1912    Vickers Top [[id number]] Collected: 08/29/23 1853    Specimen: Blood Updated: 08/29/23 1912    Light Blue Top [[id number]] Collected: 08/29/23 1853    Specimen: Blood Updated: 08/29/23 1912           Imaging Results (Last 24 Hours)       ** No results found for the last 24 hours. **            I reviewed the patient's new clinical results.  I reviewed the patient's new imaging results and agree with the interpretation.  I reviewed the patient's other test results and agree with the interpretation.      Assessment:    Mid right ureteral stone with hydronephrosis    Plan:    Strain urine analgesics IV fluid Flomax and possible ureteroscopy laser lithotripsy    I discussed the patient's findings and my recommendations with patient.     Krishna Malone MD  08/29/23  19:39 CDT

## 2023-08-30 NOTE — PLAN OF CARE
Goal Outcome Evaluation: Patient to have stent placed today for kidney stone. Strict NPO. No new concerns at this time. Will continue to monitor.

## 2023-08-31 LAB
ANION GAP SERPL CALCULATED.3IONS-SCNC: 8 MMOL/L (ref 5–15)
BASOPHILS # BLD AUTO: 0.02 10*3/MM3 (ref 0–0.2)
BASOPHILS NFR BLD AUTO: 0.3 % (ref 0–1.5)
BUN SERPL-MCNC: 13 MG/DL (ref 8–23)
BUN/CREAT SERPL: 16.3 (ref 7–25)
CALCIUM SPEC-SCNC: 8.3 MG/DL (ref 8.6–10.5)
CHLORIDE SERPL-SCNC: 102 MMOL/L (ref 98–107)
CO2 SERPL-SCNC: 23 MMOL/L (ref 22–29)
CREAT SERPL-MCNC: 0.8 MG/DL (ref 0.76–1.27)
DEPRECATED RDW RBC AUTO: 42.1 FL (ref 37–54)
EGFRCR SERPLBLD CKD-EPI 2021: 92.3 ML/MIN/1.73
EOSINOPHIL # BLD AUTO: 0.11 10*3/MM3 (ref 0–0.4)
EOSINOPHIL NFR BLD AUTO: 1.6 % (ref 0.3–6.2)
ERYTHROCYTE [DISTWIDTH] IN BLOOD BY AUTOMATED COUNT: 13.6 % (ref 12.3–15.4)
GLUCOSE SERPL-MCNC: 132 MG/DL (ref 65–99)
HCT VFR BLD AUTO: 39.2 % (ref 37.5–51)
HGB BLD-MCNC: 13.4 G/DL (ref 13–17.7)
IMM GRANULOCYTES # BLD AUTO: 0.03 10*3/MM3 (ref 0–0.05)
IMM GRANULOCYTES NFR BLD AUTO: 0.4 % (ref 0–0.5)
LYMPHOCYTES # BLD AUTO: 0.58 10*3/MM3 (ref 0.7–3.1)
LYMPHOCYTES NFR BLD AUTO: 8.5 % (ref 19.6–45.3)
MCH RBC QN AUTO: 28.5 PG (ref 26.6–33)
MCHC RBC AUTO-ENTMCNC: 34.2 G/DL (ref 31.5–35.7)
MCV RBC AUTO: 83.4 FL (ref 79–97)
MONOCYTES # BLD AUTO: 1.01 10*3/MM3 (ref 0.1–0.9)
MONOCYTES NFR BLD AUTO: 14.7 % (ref 5–12)
NEUTROPHILS NFR BLD AUTO: 5.11 10*3/MM3 (ref 1.7–7)
NEUTROPHILS NFR BLD AUTO: 74.5 % (ref 42.7–76)
NRBC BLD AUTO-RTO: 0 /100 WBC (ref 0–0.2)
PLATELET # BLD AUTO: 226 10*3/MM3 (ref 140–450)
PMV BLD AUTO: 9.1 FL (ref 6–12)
POTASSIUM SERPL-SCNC: 4.3 MMOL/L (ref 3.5–5.2)
RBC # BLD AUTO: 4.7 10*6/MM3 (ref 4.14–5.8)
SODIUM SERPL-SCNC: 133 MMOL/L (ref 136–145)
WBC NRBC COR # BLD: 6.86 10*3/MM3 (ref 3.4–10.8)

## 2023-08-31 PROCEDURE — 25010000002 FUROSEMIDE PER 20 MG: Performed by: HOSPITALIST

## 2023-08-31 PROCEDURE — 94799 UNLISTED PULMONARY SVC/PX: CPT

## 2023-08-31 PROCEDURE — 94760 N-INVAS EAR/PLS OXIMETRY 1: CPT

## 2023-08-31 PROCEDURE — 85025 COMPLETE CBC W/AUTO DIFF WBC: CPT | Performed by: HOSPITALIST

## 2023-08-31 PROCEDURE — 80048 BASIC METABOLIC PNL TOTAL CA: CPT | Performed by: HOSPITALIST

## 2023-08-31 RX ORDER — FUROSEMIDE 10 MG/ML
40 INJECTION INTRAMUSCULAR; INTRAVENOUS EVERY 12 HOURS
Status: DISCONTINUED | OUTPATIENT
Start: 2023-08-31 | End: 2023-09-02

## 2023-08-31 RX ADMIN — FUROSEMIDE 40 MG: 10 INJECTION, SOLUTION INTRAMUSCULAR; INTRAVENOUS at 20:16

## 2023-08-31 RX ADMIN — DOCUSATE SODIUM 50 MG AND SENNOSIDES 8.6 MG 2 TABLET: 8.6; 5 TABLET, FILM COATED ORAL at 20:02

## 2023-08-31 RX ADMIN — FUROSEMIDE 40 MG: 10 INJECTION, SOLUTION INTRAMUSCULAR; INTRAVENOUS at 08:52

## 2023-08-31 RX ADMIN — Medication 10 ML: at 20:02

## 2023-08-31 RX ADMIN — DOCUSATE SODIUM 50 MG AND SENNOSIDES 8.6 MG 2 TABLET: 8.6; 5 TABLET, FILM COATED ORAL at 08:52

## 2023-08-31 RX ADMIN — PANTOPRAZOLE SODIUM 40 MG: 40 TABLET, DELAYED RELEASE ORAL at 05:26

## 2023-08-31 RX ADMIN — FLUTICASONE PROPIONATE 2 SPRAY: 50 SPRAY, METERED NASAL at 08:52

## 2023-08-31 NOTE — PROGRESS NOTES
"   LOS: 2 days   Patient Care Team:  Hi Herrera as PCP - General (Physician Assistant)  Ryan Clay MD as Consulting Physician (Pulmonary Disease)  Yas, MIKE Smith as Nurse Practitioner (Pulmonary Disease)  [place]     Subjective     Subjective:  Symptoms:  Stable.  He reports shortness of breath.    Pain:  He reports pain is improving.      History taken from: patient chart family    Objective     Vital Signs  Temp:  [97.5 °F (36.4 °C)-99.1 °F (37.3 °C)] 97.8 °F (36.6 °C)  Heart Rate:  [] 84  Resp:  [16-20] 16  BP: (104-176)/(47-90) 137/61    Objective:  General Appearance:  In no acute distress.    Vital signs: (most recent): Blood pressure 137/61, pulse 84, temperature 97.8 °F (36.6 °C), temperature source Oral, resp. rate 16, height 182.9 cm (72\"), weight 107 kg (235 lb), SpO2 94 %.  Vital signs are normal.  No fever.    Output: Producing urine.    Lungs:  Tachypnea and increased effort.    Abdomen: Abdomen is soft and non-distended.  There is no abdominal tenderness.     Neurological: Patient is alert and oriented to person, place and time.    Pupils:  Pupils are equal, round, and reactive to light.    Skin:  Warm and dry.              Results Review:    Lab Results (last 24 hours)       Procedure Component Value Units Date/Time    Basic Metabolic Panel [[id number]]  (Abnormal) Collected: 08/31/23 0555    Specimen: Blood Updated: 08/31/23 0630     Glucose 132 mg/dL      BUN 13 mg/dL      Creatinine 0.80 mg/dL      Sodium 133 mmol/L      Potassium 4.3 mmol/L      Chloride 102 mmol/L      CO2 23.0 mmol/L      Calcium 8.3 mg/dL      BUN/Creatinine Ratio 16.3     Anion Gap 8.0 mmol/L      eGFR 92.3 mL/min/1.73     Narrative:      GFR Normal >60  Chronic Kidney Disease <60  Kidney Failure <15    The GFR formula is only valid for adults with stable renal function between ages 18 and 70.    CBC & Differential [[id number]]  (Abnormal) Collected: 08/31/23 0555    Specimen: Blood Updated: " 08/31/23 0607    Narrative:      The following orders were created for panel order CBC & Differential.  Procedure                               Abnormality         Status                     ---------                               -----------         ------                     CBC Auto Differential[[id number]]        Abnormal            Final result                 Please view results for these tests on the individual orders.    CBC Auto Differential [[id number]]  (Abnormal) Collected: 08/31/23 0555    Specimen: Blood Updated: 08/31/23 0607     WBC 6.86 10*3/mm3      RBC 4.70 10*6/mm3      Hemoglobin 13.4 g/dL      Hematocrit 39.2 %      MCV 83.4 fL      MCH 28.5 pg      MCHC 34.2 g/dL      RDW 13.6 %      RDW-SD 42.1 fl      MPV 9.1 fL      Platelets 226 10*3/mm3      Neutrophil % 74.5 %      Lymphocyte % 8.5 %      Monocyte % 14.7 %      Eosinophil % 1.6 %      Basophil % 0.3 %      Immature Grans % 0.4 %      Neutrophils, Absolute 5.11 10*3/mm3      Lymphocytes, Absolute 0.58 10*3/mm3      Monocytes, Absolute 1.01 10*3/mm3      Eosinophils, Absolute 0.11 10*3/mm3      Basophils, Absolute 0.02 10*3/mm3      Immature Grans, Absolute 0.03 10*3/mm3      nRBC 0.0 /100 WBC            Imaging Results (Last 24 Hours)       Procedure Component Value Units Date/Time    CT Angiogram Chest [[id number]] Collected: 08/30/23 1820     Updated: 08/30/23 1856    Narrative:      INDICATION:  Pulmonary embolism (PE) suspected, unknown D-dimer.    COMPARISON:  None relevant.    TECHNIQUE:  Helical CT of the chest was performed with intravenous contrast in the pulmonary  arterial phase of enhancement.  Multiplanar and MIP reformations were provided  performed on an independent workstation.    FINDINGS:  No pulmonary embolism.  Thoracic aorta is atherosclerotic without aneurysm or  dissection.  Heart size normal with coronary artery calcifications.  Mediastinal  and bilateral hilar lymphadenopathy, largest node right hilar region 2.2  cm  short axis diameter.    Chronic interstitial lung disease with diffuse intralobular and interlobular  septal thickening, centrilobular paraseptal cystic changes with honeycombing in  the lung bases.  Trace bilateral pleural fluid.      Impression:      No pulmonary embolism.    Chronic ILD with UIP appearance.    Prominent intrathoracic lymphadenopathy, presumably reactive.                 I reviewed the patient's new clinical results.  I reviewed the patient's new imaging results and agree with the interpretation.  I reviewed the patient's other test results and agree with the interpretation      Assessment & Plan       Ureterolithiasis      Assessment:    Condition: In stable condition.       Consulted to Urology for mid right ureter stone with hydronephrosis, lithotripsy cancelled due to respiratory function  Estimated Creatinine Clearance: 100.9 mL/min (by C-G formula based on SCr of 0.8 mg/dL).    Plan:  Continue medical management to facilitate stone's passage for now    Marthatahir MIKE Ivey  08/31/23  16:19 CDT

## 2023-08-31 NOTE — PLAN OF CARE
Problem: Adult Inpatient Plan of Care  Goal: Plan of Care Review  Outcome: Ongoing, Progressing  Flowsheets  Taken 8/31/2023 1754  Progress: improving  Plan of Care Reviewed With: patient  Outcome Evaluation: Patient remains on 10L HFNC. No episodes of SOB thios shift. Urine pale yellow without hematuria noted this shift. VSS. NAD noted.  Taken 8/31/2023 1740  Progress: improving  Plan of Care Reviewed With: patient  Outcome Evaluation: D/C instructions given to patient. All questions answered. Pt waiting on her son to pick her up.   Goal Outcome Evaluation:  Plan of Care Reviewed With: patient        Progress: improving  Outcome Evaluation: Patient remains on 10L HFNC. No episodes of SOB thios shift. Urine pale yellow without hematuria noted this shift. VSS. NAD noted.

## 2023-08-31 NOTE — PLAN OF CARE
Goal Outcome Evaluation:  Plan of Care Reviewed With: patient        Progress: improving  Outcome Evaluation: VSS. pt has blood in his urine, MD notifed. Will continue to monitor

## 2023-08-31 NOTE — PAYOR COMM NOTE
"[facility]  Case Managment Extender   Hailee Bush  P) [phone] (F) [phone]      REF# [id number]   Jeramie Baker (73 y.o. Male)       Date of Birth   1950    Social Security Number       Address   [address] [place] [address]    Home Phone       MRN   [id number]       Protestant   Spiritism    Marital Status                               Admission Date   8/29/23    Admission Type   Urgent    Admitting Provider   Carl Mendoza MD    Attending Provider   Carl Mendoza MD    Department, Room/Bed   [facility] 3 EAST, 371/1       Discharge Date       Discharge Disposition       Discharge Destination                                 Attending Provider: Carl Mendoza MD    Allergies: Other, Statins    Isolation: None   Infection: None   Code Status: No CPR    Ht: 182.9 cm (72\")   Wt: 107 kg (235 lb)    Admission Cmt: None   Principal Problem: Ureterolithiasis [N20.1]                   Active Insurance as of 8/29/2023       Primary Coverage       Payor Plan Insurance Group Employer/Plan Group    AETNA MEDICARE REPLACEMENT AETNA MEDICARE REPLACEMENT [id number]       Payor Plan Address Payor Plan Phone Number Payor Plan Fax Number Effective Dates    [address] [phone]  4/1/2022 - None Entered    [facility] [id number]         Subscriber Name Subscriber Birth Date Member ID       JERAMIE BAKER 1950 [id number]                     Emergency Contacts        (Rel.) Home Phone Work Phone Mobile Phone    Meme Ellis (Daughter) -- -- [phone]              Lab Results (last 24 hours)       Procedure Component Value Units Date/Time    Basic Metabolic Panel [[id number]]  (Abnormal) Collected: 08/31/23 0555    Specimen: Blood Updated: 08/31/23 0630     Glucose 132 mg/dL      BUN 13 mg/dL      Creatinine 0.80 mg/dL      Sodium 133 mmol/L      Potassium 4.3 mmol/L      Chloride 102 mmol/L      CO2 23.0 " mmol/L      Calcium 8.3 mg/dL      BUN/Creatinine Ratio 16.3     Anion Gap 8.0 mmol/L      eGFR 92.3 mL/min/1.73     Narrative:      GFR Normal >60  Chronic Kidney Disease <60  Kidney Failure <15    The GFR formula is only valid for adults with stable renal function between ages 18 and 70.    CBC & Differential [[id number]]  (Abnormal) Collected: 08/31/23 0555    Specimen: Blood Updated: 08/31/23 0607    Narrative:      The following orders were created for panel order CBC & Differential.  Procedure                               Abnormality         Status                     ---------                               -----------         ------                     CBC Auto Differential[[id number]]        Abnormal            Final result                 Please view results for these tests on the individual orders.    CBC Auto Differential [[id number]]  (Abnormal) Collected: 08/31/23 0555    Specimen: Blood Updated: 08/31/23 0607     WBC 6.86 10*3/mm3      RBC 4.70 10*6/mm3      Hemoglobin 13.4 g/dL      Hematocrit 39.2 %      MCV 83.4 fL      MCH 28.5 pg      MCHC 34.2 g/dL      RDW 13.6 %      RDW-SD 42.1 fl      MPV 9.1 fL      Platelets 226 10*3/mm3      Neutrophil % 74.5 %      Lymphocyte % 8.5 %      Monocyte % 14.7 %      Eosinophil % 1.6 %      Basophil % 0.3 %      Immature Grans % 0.4 %      Neutrophils, Absolute 5.11 10*3/mm3      Lymphocytes, Absolute 0.58 10*3/mm3      Monocytes, Absolute 1.01 10*3/mm3      Eosinophils, Absolute 0.11 10*3/mm3      Basophils, Absolute 0.02 10*3/mm3      Immature Grans, Absolute 0.03 10*3/mm3      nRBC 0.0 /100 WBC           Imaging Results (Last 24 Hours)       Procedure Component Value Units Date/Time    CT Angiogram Chest [[id number]] Collected: 08/30/23 1820     Updated: 08/30/23 1856    Narrative:      INDICATION:  Pulmonary embolism (PE) suspected, unknown D-dimer.    COMPARISON:  None relevant.    TECHNIQUE:  Helical CT of the chest was performed with intravenous contrast in  the pulmonary  arterial phase of enhancement.  Multiplanar and MIP reformations were provided  performed on an independent workstation.    FINDINGS:  No pulmonary embolism.  Thoracic aorta is atherosclerotic without aneurysm or  dissection.  Heart size normal with coronary artery calcifications.  Mediastinal  and bilateral hilar lymphadenopathy, largest node right hilar region 2.2 cm  short axis diameter.    Chronic interstitial lung disease with diffuse intralobular and interlobular  septal thickening, centrilobular paraseptal cystic changes with honeycombing in  the lung bases.  Trace bilateral pleural fluid.      Impression:      No pulmonary embolism.    Chronic ILD with UIP appearance.    Prominent intrathoracic lymphadenopathy, presumably reactive.              ECG/EMG Results (last 24 hours)       ** No results found for the last 24 hours. **             Physician Progress Notes (last 24 hours)        Carl Mendoza MD at 08/31/23 1057              [facility] Medicine Services  INPATIENT PROGRESS NOTE    Length of Stay: 2  Date of Admission: 8/29/2023  Primary Care Physician: Hi Herrera    Subjective   Chief Complaint: Flank pain  HPI: Developed significant shortness of breath yesterday.  Remains short of breath today, although he may be some better.    As of today, 08/31/23  Review of Systems   Constitutional:  Negative for appetite change, chills, fatigue and fever.   Respiratory:  Positive for shortness of breath. Negative for chest tightness.    Cardiovascular:  Negative for chest pain, palpitations and leg swelling.   Gastrointestinal:  Negative for abdominal pain, constipation, diarrhea, nausea and vomiting.   Genitourinary:  Positive for flank pain.   Skin:  Negative for wound.   Neurological:  Negative for dizziness, weakness, light-headedness, numbness and headaches.      All pertinent negatives and positives are as above. All other systems have  been reviewed and are negative unless otherwise stated.    Objective    Temp:  [97.2 °F (36.2 °C)-99.1 °F (37.3 °C)] 99.1 °F (37.3 °C)  Heart Rate:  [] 85  Resp:  [18-20] 20  BP: (114-176)/(63-97) 132/90    AM-PAC 6 Clicks Score (PT): 24 (08/30/23 0803)    As of today, 08/31/23  Physical Exam  Vitals reviewed.   Constitutional:       Appearance: He is well-developed.   HENT:      Head: Normocephalic and atraumatic.   Eyes:      Pupils: Pupils are equal, round, and reactive to light.   Cardiovascular:      Rate and Rhythm: Normal rate and regular rhythm.      Heart sounds: Normal heart sounds. No murmur heard.    No friction rub. No gallop.   Pulmonary:      Effort: Pulmonary effort is normal. No respiratory distress.      Breath sounds: Examination of the right-lower field reveals rales. Rales present. No wheezing.      Comments: Patient with increased respiratory effort  Chest:      Chest wall: No tenderness.   Abdominal:      General: Bowel sounds are normal. There is no distension.      Palpations: Abdomen is soft.      Tenderness: There is no abdominal tenderness.   Musculoskeletal:      Cervical back: Normal range of motion and neck supple.   Psychiatric:         Behavior: Behavior normal.         Results Review:  I have reviewed the labs, radiology results, and diagnostic studies.    Laboratory Data:   Results from last 7 days   Lab Units 08/31/23  0555 08/30/23  0557 08/29/23  1853   SODIUM mmol/L 133* 133* 133*   POTASSIUM mmol/L 4.3 4.7 4.1   CHLORIDE mmol/L 102 102 100   CO2 mmol/L 23.0 22.0 23.0   BUN mg/dL 13 15 16   CREATININE mg/dL 0.80 1.19 1.28*   GLUCOSE mg/dL 132* 100* 119*   CALCIUM mg/dL 8.3* 8.1* 8.5*   ANION GAP mmol/L 8.0 9.0 10.0       Estimated Creatinine Clearance: 100.9 mL/min (by C-G formula based on SCr of 0.8 mg/dL).          Results from last 7 days   Lab Units 08/31/23  0555 08/30/23  0557 08/29/23  1853   WBC 10*3/mm3 6.86 7.02 6.95   HEMOGLOBIN g/dL 13.4 13.3 14.5   HEMATOCRIT  % 39.2 38.6 42.8   PLATELETS 10*3/mm3 226 207 217             Culture Data:   No results found for: BLOODCX  No results found for: URINECX  No results found for: RESPCX  No results found for: WOUNDCX  No results found for: STOOLCX  No components found for: BODYFLD    Radiology Data:   Imaging Results (Last 24 Hours)       Procedure Component Value Units Date/Time    CT Angiogram Chest [[id number]] Collected: 08/30/23 1820     Updated: 08/30/23 1856    Narrative:      INDICATION:  Pulmonary embolism (PE) suspected, unknown D-dimer.    COMPARISON:  None relevant.    TECHNIQUE:  Helical CT of the chest was performed with intravenous contrast in the pulmonary  arterial phase of enhancement.  Multiplanar and MIP reformations were provided  performed on an independent workstation.    FINDINGS:  No pulmonary embolism.  Thoracic aorta is atherosclerotic without aneurysm or  dissection.  Heart size normal with coronary artery calcifications.  Mediastinal  and bilateral hilar lymphadenopathy, largest node right hilar region 2.2 cm  short axis diameter.    Chronic interstitial lung disease with diffuse intralobular and interlobular  septal thickening, centrilobular paraseptal cystic changes with honeycombing in  the lung bases.  Trace bilateral pleural fluid.      Impression:      No pulmonary embolism.    Chronic ILD with UIP appearance.    Prominent intrathoracic lymphadenopathy, presumably reactive.                I have utilized all available immediate resources to obtain, update, or review the patient's current medications (including all prescriptions, over-the-counter products, herbals, cannabis/cannabidiol products, and vitamin/mineral/dietary (nutritional) supplements).       Assessment/Plan     Active Hospital Problems    Diagnosis     **Ureterolithiasis        Plan:    1.  Ureterolithiasis: Continue gentle IV fluid hydration, Flomax, antiemetics, and pain control.  Appreciate Dr. Malone help.  2.  Hydronephrosis:  Secondary to #1.  3.  Acute on chronic hypoxic respiratory failure: Patient currently requiring 10 L of oxygen per nasal cannula.  Baseline oxygen requirement is 3 L at rest and 4 L with exertion.  CTA negative for PE or pneumonia.  Patient does have significant weight gain over the last 2 weeks.  Likely fluid overload.  We will diurese.  4.  DVT prophylaxis: SCDs.    Medical Decision Making  Number and Complexity of problems: 3 highly complex medical problems    Conditions and Status:        Condition is unchanged.     Discussed with: Patient     Treatment Plan  As above    Care Planning  Shared decision making: Patient agreement plan of care  Code status and discussions: Patient request DNR/DNI    Disposition  Social Determinants of Health that impact treatment or disposition: None  I expect the patient to be discharged to home in 2-3 days.       The patient was evaluated during the global COVID-19 pandemic, and the diagnosis was suspected/considered upon their initial presentation.  Evaluation, treatment, and testing were consistent with current guidelines for patients who present with complaints or symptoms that may be related to COVID-19.    I confirmed that the patient's Advance Care Plan is present, code status is documented, or surrogate decision maker is listed in the patient's medical record.        This document has been electronically signed by Carl Mendoza MD on August 31, 2023 10:57 CDT        Electronically signed by Carl Mendoza MD at 08/31/23 1100       Carl Mendoza MD at 08/30/23 1143              [facility] Medicine Services  INPATIENT PROGRESS NOTE    Length of Stay: 1  Date of Admission: 8/29/2023  Primary Care Physician: Hi Herrera    Subjective   Chief Complaint: Flank pain  HPI: Continues to have flank pain.  Otherwise okay.    As of today, 08/30/23  Review of Systems   Constitutional:  Negative for appetite change, chills,  fatigue and fever.   Respiratory:  Negative for chest tightness and shortness of breath.    Cardiovascular:  Negative for chest pain, palpitations and leg swelling.   Gastrointestinal:  Negative for abdominal pain, constipation, diarrhea, nausea and vomiting.   Genitourinary:  Positive for flank pain.   Skin:  Negative for wound.   Neurological:  Negative for dizziness, weakness, light-headedness, numbness and headaches.      All pertinent negatives and positives are as above. All other systems have been reviewed and are negative unless otherwise stated.    Objective    Temp:  [97.7 °F (36.5 °C)-98.4 °F (36.9 °C)] (P) 97.7 °F (36.5 °C)  Heart Rate:  [81-99] (P) 81  Resp:  [20] (P) 20  BP: (123-130)/(61-76) (P) 113/71    AM-PAC 6 Clicks Score (PT): 24 (08/29/23 1737)    As of today, 08/30/23  Physical Exam  Vitals reviewed.   Constitutional:       Appearance: He is well-developed.   HENT:      Head: Normocephalic and atraumatic.   Eyes:      Pupils: Pupils are equal, round, and reactive to light.   Cardiovascular:      Rate and Rhythm: Normal rate and regular rhythm.      Heart sounds: Normal heart sounds. No murmur heard.    No friction rub. No gallop.   Pulmonary:      Effort: Pulmonary effort is normal. No respiratory distress.      Breath sounds: Normal breath sounds. No wheezing or rales.   Chest:      Chest wall: No tenderness.   Abdominal:      General: Bowel sounds are normal. There is no distension.      Palpations: Abdomen is soft.      Tenderness: There is no abdominal tenderness.   Musculoskeletal:      Cervical back: Normal range of motion and neck supple.   Psychiatric:         Behavior: Behavior normal.         Results Review:  I have reviewed the labs, radiology results, and diagnostic studies.    Laboratory Data:   Results from last 7 days   Lab Units 08/30/23  0557 08/29/23  1853   SODIUM mmol/L 133* 133*   POTASSIUM mmol/L 4.7 4.1   CHLORIDE mmol/L 102 100   CO2 mmol/L 22.0 23.0   BUN mg/dL 15 16    CREATININE mg/dL 1.19 1.28*   GLUCOSE mg/dL 100* 119*   CALCIUM mg/dL 8.1* 8.5*   ANION GAP mmol/L 9.0 10.0     Estimated Creatinine Clearance: 67.8 mL/min (by C-G formula based on SCr of 1.19 mg/dL).          Results from last 7 days   Lab Units 08/30/23  0557 08/29/23  1853   WBC 10*3/mm3 7.02 6.95   HEMOGLOBIN g/dL 13.3 14.5   HEMATOCRIT % 38.6 42.8   PLATELETS 10*3/mm3 207 217           Culture Data:   No results found for: BLOODCX  No results found for: URINECX  No results found for: RESPCX  No results found for: WOUNDCX  No results found for: STOOLCX  No components found for: BODYFLD    Radiology Data:   Imaging Results (Last 24 Hours)       ** No results found for the last 24 hours. **            I have utilized all available immediate resources to obtain, update, or review the patient's current medications (including all prescriptions, over-the-counter products, herbals, cannabis/cannabidiol products, and vitamin/mineral/dietary (nutritional) supplements).       Assessment/Plan     Active Hospital Problems    Diagnosis     **Ureterolithiasis        Plan:    1.  Ureterolithiasis: Continue gentle IV fluid hydration, Flomax, antiemetics, and pain control.  Appreciate Dr. Malone help.  2.  Hydronephrosis: Secondary to #1.  3.  COPD: Patient with home oxygen requirement of 3 L per nasal cannula at rest and 4 L per nasal cannula with exertion.  Patient states that since he has been hospitalized he has required 4 L per simple mask.  4.  DVT prophylaxis: SCDs.    Medical Decision Making  Number and Complexity of problems: 2 highly complex medical problems    Conditions and Status:        Condition is unchanged.     Discussed with: Patient     Treatment Plan  As above    Care Planning  Shared decision making: Patient agreement plan of care  Code status and discussions: Patient request DNR/DNI    Disposition  Social Determinants of Health that impact treatment or disposition: None  I expect the patient to be  discharged to home in 2-3 days.       The patient was evaluated during the global COVID-19 pandemic, and the diagnosis was suspected/considered upon their initial presentation.  Evaluation, treatment, and testing were consistent with current guidelines for patients who present with complaints or symptoms that may be related to COVID-19.    I confirmed that the patient's Advance Care Plan is present, code status is documented, or surrogate decision maker is listed in the patient's medical record.        This document has been electronically signed by Carl Mendoza MD on August 30, 2023 11:43 CDT        Electronically signed by Carl Mendoza MD at 08/30/23 1200       Medical Student Notes (last 24 hours)  Notes from 08/30/23 1104 through 08/31/23 1104   No notes of this type exist for this encounter.       Consult Notes (last 24 hours)  Notes from 08/30/23 1104 through 08/31/23 1104   No notes of this type exist for this encounter.

## 2023-08-31 NOTE — PROGRESS NOTES
[facility] Medicine Services  INPATIENT PROGRESS NOTE    Length of Stay: 2  Date of Admission: 8/29/2023  Primary Care Physician: Hi Herrera    Subjective   Chief Complaint: Flank pain  HPI: Developed significant shortness of breath yesterday.  Remains short of breath today, although he may be some better.    As of today, 08/31/23  Review of Systems   Constitutional:  Negative for appetite change, chills, fatigue and fever.   Respiratory:  Positive for shortness of breath. Negative for chest tightness.    Cardiovascular:  Negative for chest pain, palpitations and leg swelling.   Gastrointestinal:  Negative for abdominal pain, constipation, diarrhea, nausea and vomiting.   Genitourinary:  Positive for flank pain.   Skin:  Negative for wound.   Neurological:  Negative for dizziness, weakness, light-headedness, numbness and headaches.      All pertinent negatives and positives are as above. All other systems have been reviewed and are negative unless otherwise stated.    Objective    Temp:  [97.2 °F (36.2 °C)-99.1 °F (37.3 °C)] 99.1 °F (37.3 °C)  Heart Rate:  [] 85  Resp:  [18-20] 20  BP: (114-176)/(63-97) 132/90    AM-PAC 6 Clicks Score (PT): 24 (08/30/23 0803)    As of today, 08/31/23  Physical Exam  Vitals reviewed.   Constitutional:       Appearance: He is well-developed.   HENT:      Head: Normocephalic and atraumatic.   Eyes:      Pupils: Pupils are equal, round, and reactive to light.   Cardiovascular:      Rate and Rhythm: Normal rate and regular rhythm.      Heart sounds: Normal heart sounds. No murmur heard.    No friction rub. No gallop.   Pulmonary:      Effort: Pulmonary effort is normal. No respiratory distress.      Breath sounds: Examination of the right-lower field reveals rales. Rales present. No wheezing.      Comments: Patient with increased respiratory effort  Chest:      Chest wall: No tenderness.   Abdominal:      General: Bowel sounds  are normal. There is no distension.      Palpations: Abdomen is soft.      Tenderness: There is no abdominal tenderness.   Musculoskeletal:      Cervical back: Normal range of motion and neck supple.   Psychiatric:         Behavior: Behavior normal.         Results Review:  I have reviewed the labs, radiology results, and diagnostic studies.    Laboratory Data:   Results from last 7 days   Lab Units 08/31/23  0555 08/30/23  0557 08/29/23  1853   SODIUM mmol/L 133* 133* 133*   POTASSIUM mmol/L 4.3 4.7 4.1   CHLORIDE mmol/L 102 102 100   CO2 mmol/L 23.0 22.0 23.0   BUN mg/dL 13 15 16   CREATININE mg/dL 0.80 1.19 1.28*   GLUCOSE mg/dL 132* 100* 119*   CALCIUM mg/dL 8.3* 8.1* 8.5*   ANION GAP mmol/L 8.0 9.0 10.0       Estimated Creatinine Clearance: 100.9 mL/min (by C-G formula based on SCr of 0.8 mg/dL).          Results from last 7 days   Lab Units 08/31/23  0555 08/30/23  0557 08/29/23  1853   WBC 10*3/mm3 6.86 7.02 6.95   HEMOGLOBIN g/dL 13.4 13.3 14.5   HEMATOCRIT % 39.2 38.6 42.8   PLATELETS 10*3/mm3 226 207 217             Culture Data:   No results found for: BLOODCX  No results found for: URINECX  No results found for: RESPCX  No results found for: WOUNDCX  No results found for: STOOLCX  No components found for: BODYFLD    Radiology Data:   Imaging Results (Last 24 Hours)       Procedure Component Value Units Date/Time    CT Angiogram Chest [[id number]] Collected: 08/30/23 1820     Updated: 08/30/23 1856    Narrative:      INDICATION:  Pulmonary embolism (PE) suspected, unknown D-dimer.    COMPARISON:  None relevant.    TECHNIQUE:  Helical CT of the chest was performed with intravenous contrast in the pulmonary  arterial phase of enhancement.  Multiplanar and MIP reformations were provided  performed on an independent workstation.    FINDINGS:  No pulmonary embolism.  Thoracic aorta is atherosclerotic without aneurysm or  dissection.  Heart size normal with coronary artery calcifications.  Mediastinal  and  bilateral hilar lymphadenopathy, largest node right hilar region 2.2 cm  short axis diameter.    Chronic interstitial lung disease with diffuse intralobular and interlobular  septal thickening, centrilobular paraseptal cystic changes with honeycombing in  the lung bases.  Trace bilateral pleural fluid.      Impression:      No pulmonary embolism.    Chronic ILD with UIP appearance.    Prominent intrathoracic lymphadenopathy, presumably reactive.                I have utilized all available immediate resources to obtain, update, or review the patient's current medications (including all prescriptions, over-the-counter products, herbals, cannabis/cannabidiol products, and vitamin/mineral/dietary (nutritional) supplements).       Assessment/Plan     Active Hospital Problems    Diagnosis     **Ureterolithiasis        Plan:    1.  Ureterolithiasis: Continue gentle IV fluid hydration, Flomax, antiemetics, and pain control.  Appreciate Dr. Malone help.  2.  Hydronephrosis: Secondary to #1.  3.  Acute on chronic hypoxic respiratory failure: Patient currently requiring 10 L of oxygen per nasal cannula.  Baseline oxygen requirement is 3 L at rest and 4 L with exertion.  CTA negative for PE or pneumonia.  Patient does have significant weight gain over the last 2 weeks.  Likely fluid overload.  We will diurese.  4.  DVT prophylaxis: SCDs.    Medical Decision Making  Number and Complexity of problems: 3 highly complex medical problems    Conditions and Status:        Condition is unchanged.     Discussed with: Patient     Treatment Plan  As above    Care Planning  Shared decision making: Patient agreement plan of care  Code status and discussions: Patient request DNR/DNI    Disposition  Social Determinants of Health that impact treatment or disposition: None  I expect the patient to be discharged to home in 2-3 days.       The patient was evaluated during the global COVID-19 pandemic, and the diagnosis was suspected/considered  upon their initial presentation.  Evaluation, treatment, and testing were consistent with current guidelines for patients who present with complaints or symptoms that may be related to COVID-19.    I confirmed that the patient's Advance Care Plan is present, code status is documented, or surrogate decision maker is listed in the patient's medical record.        This document has been electronically signed by Carl Mendoza MD on August 31, 2023 10:57 CDT

## 2023-09-01 ENCOUNTER — APPOINTMENT (OUTPATIENT)
Dept: GENERAL RADIOLOGY | Facility: HOSPITAL | Age: 73
DRG: 694 | End: 2023-09-01
Payer: MEDICARE

## 2023-09-01 LAB
ANION GAP SERPL CALCULATED.3IONS-SCNC: 11 MMOL/L (ref 5–15)
BASOPHILS # BLD AUTO: 0.05 10*3/MM3 (ref 0–0.2)
BASOPHILS NFR BLD AUTO: 0.6 % (ref 0–1.5)
BUN SERPL-MCNC: 12 MG/DL (ref 8–23)
BUN/CREAT SERPL: 14.3 (ref 7–25)
CALCIUM SPEC-SCNC: 8.5 MG/DL (ref 8.6–10.5)
CHLORIDE SERPL-SCNC: 99 MMOL/L (ref 98–107)
CO2 SERPL-SCNC: 25 MMOL/L (ref 22–29)
CREAT SERPL-MCNC: 0.84 MG/DL (ref 0.76–1.27)
DEPRECATED RDW RBC AUTO: 40.3 FL (ref 37–54)
EGFRCR SERPLBLD CKD-EPI 2021: 90.9 ML/MIN/1.73
EOSINOPHIL # BLD AUTO: 0.12 10*3/MM3 (ref 0–0.4)
EOSINOPHIL NFR BLD AUTO: 1.5 % (ref 0.3–6.2)
ERYTHROCYTE [DISTWIDTH] IN BLOOD BY AUTOMATED COUNT: 13.4 % (ref 12.3–15.4)
GLUCOSE SERPL-MCNC: 133 MG/DL (ref 65–99)
HCT VFR BLD AUTO: 40.8 % (ref 37.5–51)
HGB BLD-MCNC: 14.5 G/DL (ref 13–17.7)
IMM GRANULOCYTES # BLD AUTO: 0.04 10*3/MM3 (ref 0–0.05)
IMM GRANULOCYTES NFR BLD AUTO: 0.5 % (ref 0–0.5)
LYMPHOCYTES # BLD AUTO: 0.86 10*3/MM3 (ref 0.7–3.1)
LYMPHOCYTES NFR BLD AUTO: 10.5 % (ref 19.6–45.3)
MCH RBC QN AUTO: 29.4 PG (ref 26.6–33)
MCHC RBC AUTO-ENTMCNC: 35.5 G/DL (ref 31.5–35.7)
MCV RBC AUTO: 82.8 FL (ref 79–97)
MONOCYTES # BLD AUTO: 0.98 10*3/MM3 (ref 0.1–0.9)
MONOCYTES NFR BLD AUTO: 11.9 % (ref 5–12)
NEUTROPHILS NFR BLD AUTO: 6.16 10*3/MM3 (ref 1.7–7)
NEUTROPHILS NFR BLD AUTO: 75 % (ref 42.7–76)
NRBC BLD AUTO-RTO: 0 /100 WBC (ref 0–0.2)
PLATELET # BLD AUTO: 253 10*3/MM3 (ref 140–450)
PMV BLD AUTO: 9.4 FL (ref 6–12)
POTASSIUM SERPL-SCNC: 3.4 MMOL/L (ref 3.5–5.2)
POTASSIUM SERPL-SCNC: 3.8 MMOL/L (ref 3.5–5.2)
QT INTERVAL: 330 MS
QTC INTERVAL: 545 MS
RBC # BLD AUTO: 4.93 10*6/MM3 (ref 4.14–5.8)
SODIUM SERPL-SCNC: 135 MMOL/L (ref 136–145)
WBC NRBC COR # BLD: 8.21 10*3/MM3 (ref 3.4–10.8)

## 2023-09-01 PROCEDURE — 80048 BASIC METABOLIC PNL TOTAL CA: CPT | Performed by: HOSPITALIST

## 2023-09-01 PROCEDURE — 84132 ASSAY OF SERUM POTASSIUM: CPT | Performed by: HOSPITALIST

## 2023-09-01 PROCEDURE — 93010 ELECTROCARDIOGRAM REPORT: CPT | Performed by: INTERNAL MEDICINE

## 2023-09-01 PROCEDURE — 85025 COMPLETE CBC W/AUTO DIFF WBC: CPT | Performed by: HOSPITALIST

## 2023-09-01 PROCEDURE — 74018 RADEX ABDOMEN 1 VIEW: CPT

## 2023-09-01 PROCEDURE — 25010000002 FUROSEMIDE PER 20 MG: Performed by: HOSPITALIST

## 2023-09-01 PROCEDURE — 93005 ELECTROCARDIOGRAM TRACING: CPT | Performed by: HOSPITALIST

## 2023-09-01 RX ORDER — POTASSIUM CHLORIDE 20 MEQ/1
40 TABLET, EXTENDED RELEASE ORAL EVERY 4 HOURS
Status: COMPLETED | OUTPATIENT
Start: 2023-09-01 | End: 2023-09-01

## 2023-09-01 RX ADMIN — PANTOPRAZOLE SODIUM 40 MG: 40 TABLET, DELAYED RELEASE ORAL at 05:43

## 2023-09-01 RX ADMIN — FUROSEMIDE 40 MG: 10 INJECTION, SOLUTION INTRAMUSCULAR; INTRAVENOUS at 08:32

## 2023-09-01 RX ADMIN — Medication 10 ML: at 20:25

## 2023-09-01 RX ADMIN — FLUTICASONE PROPIONATE 2 SPRAY: 50 SPRAY, METERED NASAL at 08:33

## 2023-09-01 RX ADMIN — POTASSIUM CHLORIDE 40 MEQ: 20 TABLET, EXTENDED RELEASE ORAL at 17:00

## 2023-09-01 RX ADMIN — FUROSEMIDE 40 MG: 10 INJECTION, SOLUTION INTRAMUSCULAR; INTRAVENOUS at 20:25

## 2023-09-01 RX ADMIN — Medication 10 ML: at 08:44

## 2023-09-01 RX ADMIN — POTASSIUM CHLORIDE 40 MEQ: 20 TABLET, EXTENDED RELEASE ORAL at 12:24

## 2023-09-01 NOTE — PROGRESS NOTES
[facility] Medicine Services  INPATIENT PROGRESS NOTE    Length of Stay: 3  Date of Admission: 8/29/2023  Primary Care Physician: Hi Herrera    Subjective   Chief Complaint: Flank pain  HPI: Patient states he is breathing a little bit better this morning.  Flank pain has improved.    As of today, 09/01/23  Review of Systems   Constitutional:  Negative for appetite change, chills, fatigue and fever.   Respiratory:  Positive for shortness of breath. Negative for chest tightness.    Cardiovascular:  Negative for chest pain, palpitations and leg swelling.   Gastrointestinal:  Negative for abdominal pain, constipation, diarrhea, nausea and vomiting.   Genitourinary:  Negative for flank pain.   Skin:  Negative for wound.   Neurological:  Negative for dizziness, weakness, light-headedness, numbness and headaches.      All pertinent negatives and positives are as above. All other systems have been reviewed and are negative unless otherwise stated.    Objective    Temp:  [97.5 °F (36.4 °C)-99.4 °F (37.4 °C)] 99.4 °F (37.4 °C)  Heart Rate:  [] (P) 93  Resp:  [16-22] 22  BP: ()/(47-77) (P) 116/57    AM-PAC 6 Clicks Score (PT): 24 (09/01/23 0900)    As of today, 09/01/23  Physical Exam  Vitals reviewed.   Constitutional:       Appearance: He is well-developed.   HENT:      Head: Normocephalic and atraumatic.   Eyes:      Pupils: Pupils are equal, round, and reactive to light.   Cardiovascular:      Rate and Rhythm: Normal rate and regular rhythm.      Heart sounds: Normal heart sounds. No murmur heard.    No friction rub. No gallop.   Pulmonary:      Effort: Pulmonary effort is normal. No respiratory distress.      Breath sounds: Examination of the right-lower field reveals rales. Rales present. No wheezing.      Comments: Patient with increased respiratory effort  Chest:      Chest wall: No tenderness.   Abdominal:      General: Bowel sounds are normal. There  is no distension.      Palpations: Abdomen is soft.      Tenderness: There is no abdominal tenderness.   Musculoskeletal:      Cervical back: Normal range of motion and neck supple.   Psychiatric:         Behavior: Behavior normal.         Results Review:  I have reviewed the labs, radiology results, and diagnostic studies.    Laboratory Data:   Results from last 7 days   Lab Units 09/01/23 0627 08/31/23  0555 08/30/23  0557   SODIUM mmol/L 135* 133* 133*   POTASSIUM mmol/L 3.4* 4.3 4.7   CHLORIDE mmol/L 99 102 102   CO2 mmol/L 25.0 23.0 22.0   BUN mg/dL 12 13 15   CREATININE mg/dL 0.84 0.80 1.19   GLUCOSE mg/dL 133* 132* 100*   CALCIUM mg/dL 8.5* 8.3* 8.1*   ANION GAP mmol/L 11.0 8.0 9.0       Estimated Creatinine Clearance: 96.1 mL/min (by C-G formula based on SCr of 0.84 mg/dL).          Results from last 7 days   Lab Units 09/01/23 0627 08/31/23  0555 08/30/23  0557 08/29/23  1853   WBC 10*3/mm3 8.21 6.86 7.02 6.95   HEMOGLOBIN g/dL 14.5 13.4 13.3 14.5   HEMATOCRIT % 40.8 39.2 38.6 42.8   PLATELETS 10*3/mm3 253 226 207 217             Culture Data:   No results found for: BLOODCX  No results found for: URINECX  No results found for: RESPCX  No results found for: WOUNDCX  No results found for: STOOLCX  No components found for: BODYFLD    Radiology Data:   Imaging Results (Last 24 Hours)       Procedure Component Value Units Date/Time    XR Abdomen KUB [[id number]] Collected: 09/01/23 0941     Updated: 09/01/23 1002    Narrative:        HISTORY:  Kidney stones    TECHNIQUE:  Two frontal films of the abdomen were obtained.    FINDINGS:  There is a tiny calculus overlying the left renal shadow.  No definite ureteral  calculi are seen.      Impression:      Tiny calculus overlying the upper pole of the left renal shadow.  No definite  ureteral calculi are seen.            I have utilized all available immediate resources to obtain, update, or review the patient's current medications (including all prescriptions,  over-the-counter products, herbals, cannabis/cannabidiol products, and vitamin/mineral/dietary (nutritional) supplements).       Assessment/Plan     Active Hospital Problems    Diagnosis     **Ureterolithiasis        Plan:    1.  Ureterolithiasis: Continue gentle IV fluid hydration, Flomax, antiemetics, and pain control.  Appreciate Dr. Malone help.  2.  Hydronephrosis: Secondary to #1.  3.  Acute on chronic hypoxic respiratory failure: Patient currently requiring 10 L of oxygen per nasal cannula.  Baseline oxygen requirement is 3 L at rest and 4 L with exertion.  CTA negative for PE or pneumonia.  Patient does have significant weight gain over the last 2 weeks.  Shortness of breath is most likely secondary to fluid overload.  Continue to diurese.  4.  DVT prophylaxis: SCDs.    Medical Decision Making  Number and Complexity of problems: 3 highly complex medical problems    Conditions and Status:        Condition is unchanged.     Discussed with: Patient     Treatment Plan  As above    Care Planning  Shared decision making: Patient agreement plan of care  Code status and discussions: Patient request DNR/DNI    Disposition  Social Determinants of Health that impact treatment or disposition: None  I expect the patient to be discharged to home in 2-3 days.       The patient was evaluated during the global COVID-19 pandemic, and the diagnosis was suspected/considered upon their initial presentation.  Evaluation, treatment, and testing were consistent with current guidelines for patients who present with complaints or symptoms that may be related to COVID-19.    I confirmed that the patient's Advance Care Plan is present, code status is documented, or surrogate decision maker is listed in the patient's medical record.        This document has been electronically signed by Carl Mendoza MD on September 1, 2023 11:39 CDT

## 2023-09-01 NOTE — PAYOR COMM NOTE
"Jeramie Anaya (73 y.o. Male)     [facility] Case Management  Phone [phone]  Fax [phone]        Date of Birth   1950    Social Security Number       Address   [address] [place] [address]    Home Phone       MRN   [id number]       [facility]    Marital Status                               Admission Date   8/29/23    Admission Type   Urgent    Admitting Provider   aCrl Mendoza MD    Attending Provider   Carl Mendoza MD    Department, Room/Bed   [address], 371/1       Discharge Date       Discharge Disposition       Discharge Destination                                 Attending Provider: Carl Mendoza MD    Allergies: Other, Statins    Isolation: None   Infection: None   Code Status: No CPR    Ht: 182.9 cm (72\")   Wt: 107 kg (235 lb)    Admission Cmt: None   Principal Problem: Ureterolithiasis [N20.1]                   Active Insurance as of 8/29/2023       Primary Coverage       Payor Plan Insurance Group Employer/Plan Group    AETNA MEDICARE REPLACEMENT AETNA MEDICARE REPLACEMENT [id number]       Payor Plan Address Payor Plan Phone Number Payor Plan Fax Number Effective Dates    [address] [phone]  4/1/2022 - None Entered    [facility] [id number]         Subscriber Name Subscriber Birth Date Member ID       JERAMIE ANAYA 1950 [id number]                     Emergency Contacts        (Rel.) Home Phone Work Phone Mobile Phone    Meme Ellis (Daughter) -- -- [phone]              Vital Signs (last day)       Date/Time Temp Temp src Pulse Resp BP Patient Position SpO2    09/01/23 0329 97.5 (36.4) Temporal 97 20 95/76 Lying 94    08/31/23 2000 -- -- -- -- -- -- 95    08/31/23 1907 97.5 (36.4) Temporal 75 18 131/77 Lying 94    08/31/23 1554 -- -- -- -- 137/61 Lying --    08/31/23 1551 97.8 (36.6) Oral 84 16 104/47 Lying 94    08/31/23 1123 -- -- -- -- -- -- 95    08/31/23 0752 99.1 (37.3) Oral 85 20 " 132/90 Lying 93    08/31/23 0725 -- -- -- -- -- -- 90    08/31/23 0723 -- -- -- -- -- -- 93    08/31/23 0353 97.9 (36.6) Oral 80 18 176/77 Lying 91          Oxygen Therapy (last day)       Date/Time SpO2 Device (Oxygen Therapy) Flow (L/min) Oxygen Concentration (%) ETCO2 (mmHg)    09/01/23 0329 94 humidified;high-flow nasal cannula 9 -- --    08/31/23 2000 95 humidified;high-flow nasal cannula 9 -- --    08/31/23 1907 94 humidified;nasal cannula 10 -- --    08/31/23 1551 94 room air -- -- --    08/31/23 1123 95 -- -- -- --    08/31/23 1030 -- high-flow nasal cannula;humidified 10 -- --    08/31/23 0752 93 humidified;high-flow nasal cannula 10 -- --    08/31/23 0725 90 humidified;high-flow nasal cannula 10 -- --    08/31/23 0723 93 humidified;high-flow nasal cannula 12 -- --    08/31/23 0353 91 humidified;high-flow nasal cannula 12 -- --          Current Facility-Administered Medications   Medication Dose Route Frequency Provider Last Rate Last Admin    albuterol (PROVENTIL) nebulizer solution 0.083% 2.5 mg/3mL  2.5 mg Nebulization Q6H PRN Carl Mendoza MD        sennosides-docusate (PERICOLACE) 8.6-50 MG per tablet 2 tablet  2 tablet Oral BID Carl Mendoza MD   2 tablet at 08/31/23 2002    And    polyethylene glycol (MIRALAX) packet 17 g  17 g Oral Daily PRN Carl Mendoza MD        And    bisacodyl (DULCOLAX) EC tablet 5 mg  5 mg Oral Daily PRN Carl Mendoza MD        And    bisacodyl (DULCOLAX) suppository 10 mg  10 mg Rectal Daily PRN Carl Mendoza MD        Calcium Replacement - Follow Nurse / BPA Driven Protocol   Does not apply PRN Carl Mendoza MD        fluticasone (FLONASE) 50 MCG/ACT nasal spray 2 spray  2 spray Each Nare Daily Carl Mendoza MD   2 spray at 08/31/23 0852    furosemide (LASIX) injection 40 mg  40 mg Intravenous Q12H Carl Mendoza MD   40 mg at 08/31/23 2016    Magnesium Standard Dose Replacement - Follow Nurse / BPA Driven Protocol   Does not apply PRN  "Carl Mendoza MD        morphine injection 4 mg  4 mg Intravenous Q3H PRN Huseyin Rodrigez MD   4 mg at 08/30/23 1705    And    naloxone (NARCAN) injection 0.4 mg  0.4 mg Intravenous Q5 Min PRN Huseyin Rodrigez MD        ondansetron (ZOFRAN) injection 4 mg  4 mg Intravenous Q6H PRN Carl Mendoza MD        pantoprazole (PROTONIX) EC tablet 40 mg  40 mg Oral Q AM Carl Mendoza MD   40 mg at 09/01/23 0543    Phosphorus Replacement - Follow Nurse / BPA Driven Protocol   Does not apply PRN Carl Mendoza MD        Potassium Replacement - Follow Nurse / BPA Driven Protocol   Does not apply PRN Carl Mendoza MD        sodium chloride 0.9 % flush 10 mL  10 mL Intravenous Q12H Carl Mendoza MD   10 mL at 08/31/23 2002    sodium chloride 0.9 % flush 10 mL  10 mL Intravenous PRN Carl Mendoza MD        sodium chloride 0.9 % infusion 40 mL  40 mL Intravenous PRN Carl Mendoza MD            Physician Progress Notes (last 24 hours)        Barbara Ivey APRN at 08/31/23 1619       Attestation signed by Krishna Malone MD at 08/31/23 2036    I have reviewed this documentation and agree.                     LOS: 2 days   Patient Care Team:  Hi Herrera as PCP - General (Physician Assistant)  Ryan Clay MD as Consulting Physician (Pulmonary Disease)  Yas, MIKE Smith as Nurse Practitioner (Pulmonary Disease)  [facility]     Subjective     Subjective:  Symptoms:  Stable.  He reports shortness of breath.    Pain:  He reports pain is improving.      History taken from: patient chart family    Objective     Vital Signs  Temp:  [97.5 °F (36.4 °C)-99.1 °F (37.3 °C)] 97.8 °F (36.6 °C)  Heart Rate:  [] 84  Resp:  [16-20] 16  BP: (104-176)/(47-90) 137/61    Objective:  General Appearance:  In no acute distress.    Vital signs: (most recent): Blood pressure 137/61, pulse 84, temperature 97.8 °F (36.6 °C), temperature source Oral, resp. rate 16, height 182.9 cm (72\"), " weight 107 kg (235 lb), SpO2 94 %.  Vital signs are normal.  No fever.    Output: Producing urine.    Lungs:  Tachypnea and increased effort.    Abdomen: Abdomen is soft and non-distended.  There is no abdominal tenderness.     Neurological: Patient is alert and oriented to person, place and time.    Pupils:  Pupils are equal, round, and reactive to light.    Skin:  Warm and dry.              Results Review:    Lab Results (last 24 hours)       Procedure Component Value Units Date/Time    Basic Metabolic Panel [[id number]]  (Abnormal) Collected: 08/31/23 0555    Specimen: Blood Updated: 08/31/23 0630     Glucose 132 mg/dL      BUN 13 mg/dL      Creatinine 0.80 mg/dL      Sodium 133 mmol/L      Potassium 4.3 mmol/L      Chloride 102 mmol/L      CO2 23.0 mmol/L      Calcium 8.3 mg/dL      BUN/Creatinine Ratio 16.3     Anion Gap 8.0 mmol/L      eGFR 92.3 mL/min/1.73     Narrative:      GFR Normal >60  Chronic Kidney Disease <60  Kidney Failure <15    The GFR formula is only valid for adults with stable renal function between ages 18 and 70.    CBC & Differential [[id number]]  (Abnormal) Collected: 08/31/23 0555    Specimen: Blood Updated: 08/31/23 0607    Narrative:      The following orders were created for panel order CBC & Differential.  Procedure                               Abnormality         Status                     ---------                               -----------         ------                     CBC Auto Differential[[id number]]        Abnormal            Final result                 Please view results for these tests on the individual orders.    CBC Auto Differential [[id number]]  (Abnormal) Collected: 08/31/23 0555    Specimen: Blood Updated: 08/31/23 0607     WBC 6.86 10*3/mm3      RBC 4.70 10*6/mm3      Hemoglobin 13.4 g/dL      Hematocrit 39.2 %      MCV 83.4 fL      MCH 28.5 pg      MCHC 34.2 g/dL      RDW 13.6 %      RDW-SD 42.1 fl      MPV 9.1 fL      Platelets 226 10*3/mm3      Neutrophil % 74.5 %       Lymphocyte % 8.5 %      Monocyte % 14.7 %      Eosinophil % 1.6 %      Basophil % 0.3 %      Immature Grans % 0.4 %      Neutrophils, Absolute 5.11 10*3/mm3      Lymphocytes, Absolute 0.58 10*3/mm3      Monocytes, Absolute 1.01 10*3/mm3      Eosinophils, Absolute 0.11 10*3/mm3      Basophils, Absolute 0.02 10*3/mm3      Immature Grans, Absolute 0.03 10*3/mm3      nRBC 0.0 /100 WBC            Imaging Results (Last 24 Hours)       Procedure Component Value Units Date/Time    CT Angiogram Chest [[id number]] Collected: 08/30/23 1820     Updated: 08/30/23 1856    Narrative:      INDICATION:  Pulmonary embolism (PE) suspected, unknown D-dimer.    COMPARISON:  None relevant.    TECHNIQUE:  Helical CT of the chest was performed with intravenous contrast in the pulmonary  arterial phase of enhancement.  Multiplanar and MIP reformations were provided  performed on an independent workstation.    FINDINGS:  No pulmonary embolism.  Thoracic aorta is atherosclerotic without aneurysm or  dissection.  Heart size normal with coronary artery calcifications.  Mediastinal  and bilateral hilar lymphadenopathy, largest node right hilar region 2.2 cm  short axis diameter.    Chronic interstitial lung disease with diffuse intralobular and interlobular  septal thickening, centrilobular paraseptal cystic changes with honeycombing in  the lung bases.  Trace bilateral pleural fluid.      Impression:      No pulmonary embolism.    Chronic ILD with UIP appearance.    Prominent intrathoracic lymphadenopathy, presumably reactive.                 I reviewed the patient's new clinical results.  I reviewed the patient's new imaging results and agree with the interpretation.  I reviewed the patient's other test results and agree with the interpretation      Assessment & Plan       Ureterolithiasis      Assessment:    Condition: In stable condition.       Consulted to Urology for mid right ureter stone with hydronephrosis, lithotripsy cancelled due  to respiratory function  Estimated Creatinine Clearance: 100.9 mL/min (by C-G formula based on SCr of 0.8 mg/dL).    Plan:  Continue medical management to facilitate stone's passage for now    MIKE Allan  08/31/23  16:19 CDT        Electronically signed by [place], Krishna TILLMAN MD at 08/31/23 2036       Carl Mendoza MD at 08/31/23 1057              [facility] Medicine Services  INPATIENT PROGRESS NOTE    Length of Stay: 2  Date of Admission: 8/29/2023  Primary Care Physician: Hi Herrera    Subjective   Chief Complaint: Flank pain  HPI: Developed significant shortness of breath yesterday.  Remains short of breath today, although he may be some better.    As of today, 08/31/23  Review of Systems   Constitutional:  Negative for appetite change, chills, fatigue and fever.   Respiratory:  Positive for shortness of breath. Negative for chest tightness.    Cardiovascular:  Negative for chest pain, palpitations and leg swelling.   Gastrointestinal:  Negative for abdominal pain, constipation, diarrhea, nausea and vomiting.   Genitourinary:  Positive for flank pain.   Skin:  Negative for wound.   Neurological:  Negative for dizziness, weakness, light-headedness, numbness and headaches.      All pertinent negatives and positives are as above. All other systems have been reviewed and are negative unless otherwise stated.    Objective    Temp:  [97.2 °F (36.2 °C)-99.1 °F (37.3 °C)] 99.1 °F (37.3 °C)  Heart Rate:  [] 85  Resp:  [18-20] 20  BP: (114-176)/(63-97) 132/90    AM-PAC 6 Clicks Score (PT): 24 (08/30/23 0803)    As of today, 08/31/23  Physical Exam  Vitals reviewed.   Constitutional:       Appearance: He is well-developed.   HENT:      Head: Normocephalic and atraumatic.   Eyes:      Pupils: Pupils are equal, round, and reactive to light.   Cardiovascular:      Rate and Rhythm: Normal rate and regular rhythm.      Heart sounds: Normal heart sounds. No  murmur heard.    No friction rub. No gallop.   Pulmonary:      Effort: Pulmonary effort is normal. No respiratory distress.      Breath sounds: Examination of the right-lower field reveals rales. Rales present. No wheezing.      Comments: Patient with increased respiratory effort  Chest:      Chest wall: No tenderness.   Abdominal:      General: Bowel sounds are normal. There is no distension.      Palpations: Abdomen is soft.      Tenderness: There is no abdominal tenderness.   Musculoskeletal:      Cervical back: Normal range of motion and neck supple.   Psychiatric:         Behavior: Behavior normal.         Results Review:  I have reviewed the labs, radiology results, and diagnostic studies.    Laboratory Data:   Results from last 7 days   Lab Units 08/31/23  0555 08/30/23  0557 08/29/23  1853   SODIUM mmol/L 133* 133* 133*   POTASSIUM mmol/L 4.3 4.7 4.1   CHLORIDE mmol/L 102 102 100   CO2 mmol/L 23.0 22.0 23.0   BUN mg/dL 13 15 16   CREATININE mg/dL 0.80 1.19 1.28*   GLUCOSE mg/dL 132* 100* 119*   CALCIUM mg/dL 8.3* 8.1* 8.5*   ANION GAP mmol/L 8.0 9.0 10.0       Estimated Creatinine Clearance: 100.9 mL/min (by C-G formula based on SCr of 0.8 mg/dL).          Results from last 7 days   Lab Units 08/31/23  0555 08/30/23  0557 08/29/23  1853   WBC 10*3/mm3 6.86 7.02 6.95   HEMOGLOBIN g/dL 13.4 13.3 14.5   HEMATOCRIT % 39.2 38.6 42.8   PLATELETS 10*3/mm3 226 207 217             Culture Data:   No results found for: BLOODCX  No results found for: URINECX  No results found for: RESPCX  No results found for: WOUNDCX  No results found for: STOOLCX  No components found for: BODYFLD    Radiology Data:   Imaging Results (Last 24 Hours)       Procedure Component Value Units Date/Time    CT Angiogram Chest [[id number]] Collected: 08/30/23 1820     Updated: 08/30/23 1856    Narrative:      INDICATION:  Pulmonary embolism (PE) suspected, unknown D-dimer.    COMPARISON:  None relevant.    TECHNIQUE:  Helical CT of the chest  was performed with intravenous contrast in the pulmonary  arterial phase of enhancement.  Multiplanar and MIP reformations were provided  performed on an independent workstation.    FINDINGS:  No pulmonary embolism.  Thoracic aorta is atherosclerotic without aneurysm or  dissection.  Heart size normal with coronary artery calcifications.  Mediastinal  and bilateral hilar lymphadenopathy, largest node right hilar region 2.2 cm  short axis diameter.    Chronic interstitial lung disease with diffuse intralobular and interlobular  septal thickening, centrilobular paraseptal cystic changes with honeycombing in  the lung bases.  Trace bilateral pleural fluid.      Impression:      No pulmonary embolism.    Chronic ILD with UIP appearance.    Prominent intrathoracic lymphadenopathy, presumably reactive.                I have utilized all available immediate resources to obtain, update, or review the patient's current medications (including all prescriptions, over-the-counter products, herbals, cannabis/cannabidiol products, and vitamin/mineral/dietary (nutritional) supplements).       Assessment/Plan     Active Hospital Problems    Diagnosis     **Ureterolithiasis        Plan:    1.  Ureterolithiasis: Continue gentle IV fluid hydration, Flomax, antiemetics, and pain control.  Appreciate Dr. Malone help.  2.  Hydronephrosis: Secondary to #1.  3.  Acute on chronic hypoxic respiratory failure: Patient currently requiring 10 L of oxygen per nasal cannula.  Baseline oxygen requirement is 3 L at rest and 4 L with exertion.  CTA negative for PE or pneumonia.  Patient does have significant weight gain over the last 2 weeks.  Likely fluid overload.  We will diurese.  4.  DVT prophylaxis: SCDs.    Medical Decision Making  Number and Complexity of problems: 3 highly complex medical problems    Conditions and Status:        Condition is unchanged.     Discussed with: Patient     Treatment Plan  As above    Care Planning  Shared  decision making: Patient agreement plan of care  Code status and discussions: Patient request DNR/DNI    Disposition  Social Determinants of Health that impact treatment or disposition: None  I expect the patient to be discharged to home in 2-3 days.       The patient was evaluated during the global COVID-19 pandemic, and the diagnosis was suspected/considered upon their initial presentation.  Evaluation, treatment, and testing were consistent with current guidelines for patients who present with complaints or symptoms that may be related to COVID-19.    I confirmed that the patient's Advance Care Plan is present, code status is documented, or surrogate decision maker is listed in the patient's medical record.        This document has been electronically signed by Carl Mendoza MD on August 31, 2023 10:57 CDT        Electronically signed by Carl Mendoza MD at 08/31/23 1100

## 2023-09-01 NOTE — PLAN OF CARE
Goal Outcome Evaluation:  Plan of Care Reviewed With: patient        Progress: improving  Outcome Evaluation: VSS. RRT lowered his O2 to 9L HFNC, stats 95%. No other new changes this shift, pt resting comfortable in bed. will continue to monitor.

## 2023-09-01 NOTE — PLAN OF CARE
Problem: Adult Inpatient Plan of Care  Goal: Plan of Care Review  Outcome: Ongoing, Progressing  Goal: Patient-Specific Goal (Individualized)  Outcome: Ongoing, Progressing  Goal: Absence of Hospital-Acquired Illness or Injury  Outcome: Ongoing, Progressing  Intervention: Identify and Manage Fall Risk  Recent Flowsheet Documentation  Taken 9/1/2023 0900 by Keane, Shea, Nursing Student  Safety Promotion/Fall Prevention: activity supervised  Intervention: Prevent and Manage VTE (Venous Thromboembolism) Risk  Recent Flowsheet Documentation  Taken 9/1/2023 0900 by Shea Keane Nursing Willie  Activity Management: ambulated in room  Range of Motion: active ROM (range of motion) encouraged  Goal: Optimal Comfort and Wellbeing  Outcome: Ongoing, Progressing  Intervention: Provide Person-Centered Care  Recent Flowsheet Documentation  Taken 9/1/2023 0900 by Keane, Shea, Nursing Student  Trust Relationship/Rapport:   care explained   questions answered  Goal: Readiness for Transition of Care  Outcome: Ongoing, Progressing     Problem: Pain Acute  Goal: Acceptable Pain Control and Functional Ability  Outcome: Ongoing, Progressing  Intervention: Optimize Psychosocial Wellbeing  Recent Flowsheet Documentation  Taken 9/1/2023 0900 by Keane, Shea, Nursing Student  Supportive Measures: active listening utilized     Problem: Fall Injury Risk  Goal: Absence of Fall and Fall-Related Injury  Outcome: Ongoing, Progressing  Intervention: Promote Injury-Free Environment  Recent Flowsheet Documentation  Taken 9/1/2023 0900 by Keane, Shea, Nursing Student  Safety Promotion/Fall Prevention: activity supervised     Problem: Gas Exchange Impaired  Goal: Optimal Gas Exchange  Outcome: Ongoing, Progressing  Intervention: Optimize Oxygenation and Ventilation  Recent Flowsheet Documentation  Taken 9/1/2023 0900 by Keane, Shea, Nursing Student  Airway/Ventilation Management:   airway patency maintained    humidification applied   calming measures promoted   Goal Outcome Evaluation:

## 2023-09-02 LAB
ANION GAP SERPL CALCULATED.3IONS-SCNC: 13 MMOL/L (ref 5–15)
BASOPHILS # BLD AUTO: 0.09 10*3/MM3 (ref 0–0.2)
BASOPHILS NFR BLD AUTO: 0.9 % (ref 0–1.5)
BUN SERPL-MCNC: 12 MG/DL (ref 8–23)
BUN/CREAT SERPL: 12.6 (ref 7–25)
CALCIUM SPEC-SCNC: 9 MG/DL (ref 8.6–10.5)
CHLORIDE SERPL-SCNC: 95 MMOL/L (ref 98–107)
CO2 SERPL-SCNC: 23 MMOL/L (ref 22–29)
CREAT SERPL-MCNC: 0.95 MG/DL (ref 0.76–1.27)
DEPRECATED RDW RBC AUTO: 40.4 FL (ref 37–54)
EGFRCR SERPLBLD CKD-EPI 2021: 83.5 ML/MIN/1.73
EOSINOPHIL # BLD AUTO: 0.12 10*3/MM3 (ref 0–0.4)
EOSINOPHIL NFR BLD AUTO: 1.2 % (ref 0.3–6.2)
ERYTHROCYTE [DISTWIDTH] IN BLOOD BY AUTOMATED COUNT: 13.5 % (ref 12.3–15.4)
GLUCOSE SERPL-MCNC: 132 MG/DL (ref 65–99)
HCT VFR BLD AUTO: 46.5 % (ref 37.5–51)
HGB BLD-MCNC: 16.4 G/DL (ref 13–17.7)
IMM GRANULOCYTES # BLD AUTO: 0.07 10*3/MM3 (ref 0–0.05)
IMM GRANULOCYTES NFR BLD AUTO: 0.7 % (ref 0–0.5)
LYMPHOCYTES # BLD AUTO: 1.16 10*3/MM3 (ref 0.7–3.1)
LYMPHOCYTES NFR BLD AUTO: 12 % (ref 19.6–45.3)
MCH RBC QN AUTO: 29.2 PG (ref 26.6–33)
MCHC RBC AUTO-ENTMCNC: 35.3 G/DL (ref 31.5–35.7)
MCV RBC AUTO: 82.9 FL (ref 79–97)
MONOCYTES # BLD AUTO: 1.21 10*3/MM3 (ref 0.1–0.9)
MONOCYTES NFR BLD AUTO: 12.5 % (ref 5–12)
NEUTROPHILS NFR BLD AUTO: 7.04 10*3/MM3 (ref 1.7–7)
NEUTROPHILS NFR BLD AUTO: 72.7 % (ref 42.7–76)
NRBC BLD AUTO-RTO: 0 /100 WBC (ref 0–0.2)
PLATELET # BLD AUTO: 313 10*3/MM3 (ref 140–450)
PMV BLD AUTO: 9.8 FL (ref 6–12)
POTASSIUM SERPL-SCNC: 3.6 MMOL/L (ref 3.5–5.2)
POTASSIUM SERPL-SCNC: 4.1 MMOL/L (ref 3.5–5.2)
RBC # BLD AUTO: 5.61 10*6/MM3 (ref 4.14–5.8)
SODIUM SERPL-SCNC: 131 MMOL/L (ref 136–145)
WBC NRBC COR # BLD: 9.69 10*3/MM3 (ref 3.4–10.8)

## 2023-09-02 PROCEDURE — 80048 BASIC METABOLIC PNL TOTAL CA: CPT | Performed by: HOSPITALIST

## 2023-09-02 PROCEDURE — 84132 ASSAY OF SERUM POTASSIUM: CPT | Performed by: HOSPITALIST

## 2023-09-02 PROCEDURE — 94799 UNLISTED PULMONARY SVC/PX: CPT

## 2023-09-02 PROCEDURE — 25010000002 FUROSEMIDE PER 20 MG: Performed by: HOSPITALIST

## 2023-09-02 PROCEDURE — 85025 COMPLETE CBC W/AUTO DIFF WBC: CPT | Performed by: HOSPITALIST

## 2023-09-02 PROCEDURE — 94760 N-INVAS EAR/PLS OXIMETRY 1: CPT

## 2023-09-02 RX ORDER — CALCIUM CARBONATE 500 MG/1
2 TABLET, CHEWABLE ORAL DAILY PRN
Status: DISCONTINUED | OUTPATIENT
Start: 2023-09-02 | End: 2023-09-06 | Stop reason: HOSPADM

## 2023-09-02 RX ORDER — POTASSIUM CHLORIDE 20 MEQ/1
40 TABLET, EXTENDED RELEASE ORAL EVERY 4 HOURS
Status: COMPLETED | OUTPATIENT
Start: 2023-09-02 | End: 2023-09-02

## 2023-09-02 RX ORDER — HYDROXYZINE HYDROCHLORIDE 25 MG/1
25 TABLET, FILM COATED ORAL EVERY 12 HOURS PRN
COMMUNITY

## 2023-09-02 RX ORDER — ACETAMINOPHEN 325 MG/1
650 TABLET ORAL EVERY 6 HOURS PRN
Status: DISCONTINUED | OUTPATIENT
Start: 2023-09-02 | End: 2023-09-06 | Stop reason: HOSPADM

## 2023-09-02 RX ADMIN — Medication 10 ML: at 21:00

## 2023-09-02 RX ADMIN — POTASSIUM CHLORIDE 40 MEQ: 20 TABLET, EXTENDED RELEASE ORAL at 12:05

## 2023-09-02 RX ADMIN — FUROSEMIDE 40 MG: 10 INJECTION, SOLUTION INTRAMUSCULAR; INTRAVENOUS at 08:22

## 2023-09-02 RX ADMIN — Medication 10 ML: at 08:43

## 2023-09-02 RX ADMIN — PANTOPRAZOLE SODIUM 40 MG: 40 TABLET, DELAYED RELEASE ORAL at 05:41

## 2023-09-02 RX ADMIN — ACETAMINOPHEN 650 MG: 325 TABLET, FILM COATED ORAL at 22:30

## 2023-09-02 RX ADMIN — FLUTICASONE PROPIONATE 2 SPRAY: 50 SPRAY, METERED NASAL at 10:37

## 2023-09-02 RX ADMIN — POTASSIUM CHLORIDE 40 MEQ: 20 TABLET, EXTENDED RELEASE ORAL at 08:22

## 2023-09-02 RX ADMIN — CALCIUM CARBONATE (ANTACID) CHEW TAB 500 MG 2 TABLET: 500 CHEW TAB at 18:12

## 2023-09-02 RX ADMIN — ACETAMINOPHEN 650 MG: 325 TABLET, FILM COATED ORAL at 12:05

## 2023-09-02 RX ADMIN — ACETAMINOPHEN 650 MG: 325 TABLET, FILM COATED ORAL at 18:12

## 2023-09-02 NOTE — PROGRESS NOTES
[facility] Medicine Services  INPATIENT PROGRESS NOTE    Length of Stay: 4  Date of Admission: 8/29/2023  Primary Care Physician: Hi Herrera    Subjective   Chief Complaint: Flank pain  HPI: Patient states his breathing is improved.  He does states he is having some hand cramps and his urine output has decreased.    As of today, 09/02/23  Review of Systems   Constitutional:  Negative for appetite change, chills, fatigue and fever.   Respiratory:  Positive for shortness of breath. Negative for chest tightness.    Cardiovascular:  Negative for chest pain, palpitations and leg swelling.   Gastrointestinal:  Negative for abdominal pain, constipation, diarrhea, nausea and vomiting.   Genitourinary:  Negative for flank pain.   Skin:  Negative for wound.   Neurological:  Negative for dizziness, weakness, light-headedness, numbness and headaches.      All pertinent negatives and positives are as above. All other systems have been reviewed and are negative unless otherwise stated.    Objective    Temp:  [96.4 °F (35.8 °C)-98 °F (36.7 °C)] 96.7 °F (35.9 °C)  Heart Rate:  [] 106  Resp:  [20] 20  BP: ()/(49-74) 114/71    AM-PAC 6 Clicks Score (PT): 24 (09/01/23 0900)    As of today, 09/02/23  Physical Exam  Vitals reviewed.   Constitutional:       Appearance: He is well-developed.   HENT:      Head: Normocephalic and atraumatic.   Eyes:      Pupils: Pupils are equal, round, and reactive to light.   Cardiovascular:      Rate and Rhythm: Normal rate and regular rhythm.      Heart sounds: Normal heart sounds. No murmur heard.    No friction rub. No gallop.   Pulmonary:      Effort: Pulmonary effort is normal. No respiratory distress.      Breath sounds: No wheezing or rales.      Comments: Patient with increased respiratory effort  Chest:      Chest wall: No tenderness.   Abdominal:      General: Bowel sounds are normal. There is no distension.      Palpations:  Abdomen is soft.      Tenderness: There is no abdominal tenderness.   Musculoskeletal:      Cervical back: Normal range of motion and neck supple.   Psychiatric:         Behavior: Behavior normal.         Results Review:  I have reviewed the labs, radiology results, and diagnostic studies.    Laboratory Data:   Results from last 7 days   Lab Units 09/02/23  0603 09/01/23 2119 09/01/23  0627 08/31/23  0555   SODIUM mmol/L 131*  --  135* 133*   POTASSIUM mmol/L 3.6 3.8 3.4* 4.3   CHLORIDE mmol/L 95*  --  99 102   CO2 mmol/L 23.0  --  25.0 23.0   BUN mg/dL 12  --  12 13   CREATININE mg/dL 0.95  --  0.84 0.80   GLUCOSE mg/dL 132*  --  133* 132*   CALCIUM mg/dL 9.0  --  8.5* 8.3*   ANION GAP mmol/L 13.0  --  11.0 8.0       Estimated Creatinine Clearance: 80.1 mL/min (by C-G formula based on SCr of 0.95 mg/dL).          Results from last 7 days   Lab Units 09/02/23  0603 09/01/23 0627 08/31/23  0555 08/30/23  0557 08/29/23  1853   WBC 10*3/mm3 9.69 8.21 6.86 7.02 6.95   HEMOGLOBIN g/dL 16.4 14.5 13.4 13.3 14.5   HEMATOCRIT % 46.5 40.8 39.2 38.6 42.8   PLATELETS 10*3/mm3 313 253 226 207 217             Culture Data:   No results found for: BLOODCX  No results found for: URINECX  No results found for: RESPCX  No results found for: WOUNDCX  No results found for: STOOLCX  No components found for: BODYFLD    Radiology Data:   Imaging Results (Last 24 Hours)       Procedure Component Value Units Date/Time    XR Abdomen KUB [[id number]] Collected: 09/01/23 0941     Updated: 09/01/23 1648    Narrative:        HISTORY:  Kidney stones    TECHNIQUE:  Two frontal films of the abdomen were obtained.    FINDINGS:  There is a tiny calculus overlying the left renal shadow.  No definite ureteral  calculi are seen.      Impression:      Tiny calculus overlying the upper pole of the left renal shadow.  No definite  ureteral calculi are seen.            I have utilized all available immediate resources to obtain, update, or review the  patient's current medications (including all prescriptions, over-the-counter products, herbals, cannabis/cannabidiol products, and vitamin/mineral/dietary (nutritional) supplements).       Assessment/Plan     Active Hospital Problems    Diagnosis     **Ureterolithiasis        Plan:    1.  Ureterolithiasis: Pain and has resolved.  No definite ureteral calculus was seen on x-ray.  2.  Hydronephrosis: Secondary to #1.  Plan ultrasound to reevaluate.  3.  Acute on chronic hypoxic respiratory failure: Patient currently requiring 8 L of oxygen per nasal cannula.  Baseline oxygen requirement is 3 L at rest and 4 L with exertion.  CTA negative for PE or pneumonia.  Patient did have significant weight gain over the last 2 weeks.  Patient is now back down to his dry weight.  Will discontinue diuretics.  4.  DVT prophylaxis: SCDs.    Medical Decision Making  Number and Complexity of problems: 3 highly complex medical problems    Conditions and Status:        Condition is unchanged.     Discussed with: Patient     Treatment Plan  As above    Care Planning  Shared decision making: Patient agreement plan of care  Code status and discussions: Patient request DNR/DNI    Disposition  Social Determinants of Health that impact treatment or disposition: None  I expect the patient to be discharged to home in 2-3 days.       The patient was evaluated during the global COVID-19 pandemic, and the diagnosis was suspected/considered upon their initial presentation.  Evaluation, treatment, and testing were consistent with current guidelines for patients who present with complaints or symptoms that may be related to COVID-19.    I confirmed that the patient's Advance Care Plan is present, code status is documented, or surrogate decision maker is listed in the patient's medical record.        This document has been electronically signed by Carl Mendoza MD on September 2, 2023 13:21 CDT

## 2023-09-02 NOTE — PROGRESS NOTES
LOS: 4 days     Patient Care Team:  Hi Herrera as PCP - General (Physician Assistant)  Ryan Clay MD as Consulting Physician (Pulmonary Disease)  Yas, MIKE Smith as Nurse Practitioner (Pulmonary Disease)  [place]       Subjective     Renal colic resolved    Objective       Vital Signs  Temp:  [96.4 °F (35.8 °C)-98 °F (36.7 °C)] 96.4 °F (35.8 °C)  Heart Rate:  [] 88  Resp:  [20] 20  BP: ()/(49-74) 100/74    Physical Exam:        General Appearance:  No distress     Respiratory:    UNLABORED RESPIRATIONS.     Abdomen:     SOFT.       Genitourinary: Urine clear     Rectal:     DEFERRED       Results Review:       Imaging Results (Last 24 Hours)       Procedure Component Value Units Date/Time    XR Abdomen KUB [[id number]] Collected: 09/01/23 0941     Updated: 09/01/23 1648    Narrative:        HISTORY:  Kidney stones    TECHNIQUE:  Two frontal films of the abdomen were obtained.    FINDINGS:  There is a tiny calculus overlying the left renal shadow.  No definite ureteral  calculi are seen.      Impression:      Tiny calculus overlying the upper pole of the left renal shadow.  No definite  ureteral calculi are seen.          Lab Results (last 24 hours)       Procedure Component Value Units Date/Time    Basic Metabolic Panel [[id number]]  (Abnormal) Collected: 09/02/23 0603    Specimen: Blood Updated: 09/02/23 0719     Glucose 132 mg/dL      BUN 12 mg/dL      Creatinine 0.95 mg/dL      Sodium 131 mmol/L      Potassium 3.6 mmol/L      Chloride 95 mmol/L      CO2 23.0 mmol/L      Calcium 9.0 mg/dL      BUN/Creatinine Ratio 12.6     Anion Gap 13.0 mmol/L      eGFR 83.5 mL/min/1.73     Narrative:      GFR Normal >60  Chronic Kidney Disease <60  Kidney Failure <15    The GFR formula is only valid for adults with stable renal function between ages 18 and 70.    CBC & Differential [[id number]]  (Abnormal) Collected: 09/02/23 0603    Specimen: Blood Updated: 09/02/23 0703    Narrative:      The  following orders were created for panel order CBC & Differential.  Procedure                               Abnormality         Status                     ---------                               -----------         ------                     CBC Auto Differential[[id number]]        Abnormal            Final result                 Please view results for these tests on the individual orders.    CBC Auto Differential [[id number]]  (Abnormal) Collected: 09/02/23 0603    Specimen: Blood Updated: 09/02/23 0703     WBC 9.69 10*3/mm3      RBC 5.61 10*6/mm3      Hemoglobin 16.4 g/dL      Hematocrit 46.5 %      MCV 82.9 fL      MCH 29.2 pg      MCHC 35.3 g/dL      RDW 13.5 %      RDW-SD 40.4 fl      MPV 9.8 fL      Platelets 313 10*3/mm3      Neutrophil % 72.7 %      Lymphocyte % 12.0 %      Monocyte % 12.5 %      Eosinophil % 1.2 %      Basophil % 0.9 %      Immature Grans % 0.7 %      Neutrophils, Absolute 7.04 10*3/mm3      Lymphocytes, Absolute 1.16 10*3/mm3      Monocytes, Absolute 1.21 10*3/mm3      Eosinophils, Absolute 0.12 10*3/mm3      Basophils, Absolute 0.09 10*3/mm3      Immature Grans, Absolute 0.07 10*3/mm3      nRBC 0.0 /100 WBC     Potassium [[id number]]  (Normal) Collected: 09/01/23 2119    Specimen: Blood Updated: 09/01/23 2155     Potassium 3.8 mmol/L               I reviewed the patient's new clinical results.  I reviewed the patient's new imaging results and agree with the interpretation.  I reviewed the patient's other test results and agree with the interpretation        Assessment:    Possibly passed right ureteral stone as no pain and KUB negative    Plan:     We will ultrasound to rule out any possible hydronephrosis  Continued attention regarding lungs    Krishna Malone MD  09/02/23  09:20 CDT

## 2023-09-03 ENCOUNTER — APPOINTMENT (OUTPATIENT)
Dept: ULTRASOUND IMAGING | Facility: HOSPITAL | Age: 73
DRG: 694 | End: 2023-09-03
Payer: MEDICARE

## 2023-09-03 ENCOUNTER — APPOINTMENT (OUTPATIENT)
Dept: CARDIOLOGY | Facility: HOSPITAL | Age: 73
DRG: 694 | End: 2023-09-03
Payer: MEDICARE

## 2023-09-03 LAB
ANION GAP SERPL CALCULATED.3IONS-SCNC: 12 MMOL/L (ref 5–15)
BH CV ECHO MEAS - ACS: 1.82 CM
BH CV ECHO MEAS - AO MAX PG: 4.5 MMHG
BH CV ECHO MEAS - AO MEAN PG: 2 MMHG
BH CV ECHO MEAS - AO ROOT DIAM: 3.2 CM
BH CV ECHO MEAS - AO V2 MAX: 106 CM/SEC
BH CV ECHO MEAS - AO V2 VTI: 20.6 CM
BH CV ECHO MEAS - AVA(I,D): 2.5 CM2
BH CV ECHO MEAS - EDV(CUBED): 93.3 ML
BH CV ECHO MEAS - EDV(MOD-SP2): 76.7 ML
BH CV ECHO MEAS - EDV(MOD-SP4): 81.6 ML
BH CV ECHO MEAS - EF(MOD-BP): 56 %
BH CV ECHO MEAS - EF(MOD-SP2): 56.3 %
BH CV ECHO MEAS - EF(MOD-SP4): 53.7 %
BH CV ECHO MEAS - ESV(CUBED): 25.7 ML
BH CV ECHO MEAS - ESV(MOD-SP2): 33.5 ML
BH CV ECHO MEAS - ESV(MOD-SP4): 37.8 ML
BH CV ECHO MEAS - FS: 35 %
BH CV ECHO MEAS - IVS/LVPW: 0.9 CM
BH CV ECHO MEAS - IVSD: 0.97 CM
BH CV ECHO MEAS - LA DIMENSION: 3.3 CM
BH CV ECHO MEAS - LAT PEAK E' VEL: 6 CM/SEC
BH CV ECHO MEAS - LV DIASTOLIC VOL/BSA (35-75): 37.7 CM2
BH CV ECHO MEAS - LV MASS(C)D: 159.8 GRAMS
BH CV ECHO MEAS - LV MAX PG: 2.8 MMHG
BH CV ECHO MEAS - LV MEAN PG: 1.21 MMHG
BH CV ECHO MEAS - LV SYSTOLIC VOL/BSA (12-30): 17.4 CM2
BH CV ECHO MEAS - LV V1 MAX: 83.6 CM/SEC
BH CV ECHO MEAS - LV V1 VTI: 17 CM
BH CV ECHO MEAS - LVIDD: 4.5 CM
BH CV ECHO MEAS - LVIDS: 2.9 CM
BH CV ECHO MEAS - LVOT AREA: 3.1 CM2
BH CV ECHO MEAS - LVOT DIAM: 1.98 CM
BH CV ECHO MEAS - LVPWD: 1.08 CM
BH CV ECHO MEAS - MED PEAK E' VEL: 4.6 CM/SEC
BH CV ECHO MEAS - MV A MAX VEL: 102 CM/SEC
BH CV ECHO MEAS - MV DEC SLOPE: 183.9 CM/SEC2
BH CV ECHO MEAS - MV DEC TIME: 0.26 MSEC
BH CV ECHO MEAS - MV E MAX VEL: 52.3 CM/SEC
BH CV ECHO MEAS - MV E/A: 0.51
BH CV ECHO MEAS - MV MAX PG: 3.4 MMHG
BH CV ECHO MEAS - MV MEAN PG: 1.24 MMHG
BH CV ECHO MEAS - MV P1/2T: 77.8 MSEC
BH CV ECHO MEAS - MV V2 VTI: 24.2 CM
BH CV ECHO MEAS - MVA(P1/2T): 2.8 CM2
BH CV ECHO MEAS - MVA(VTI): 2.17 CM2
BH CV ECHO MEAS - PA V2 MAX: 78.7 CM/SEC
BH CV ECHO MEAS - PI END-D VEL: 176.5 CM/SEC
BH CV ECHO MEAS - RAP SYSTOLE: 3 MMHG
BH CV ECHO MEAS - RVDD: 2.48 CM
BH CV ECHO MEAS - RVSP: 49.4 MMHG
BH CV ECHO MEAS - SI(MOD-SP2): 19.9 ML/M2
BH CV ECHO MEAS - SI(MOD-SP4): 20.2 ML/M2
BH CV ECHO MEAS - SV(LVOT): 52.3 ML
BH CV ECHO MEAS - SV(MOD-SP2): 43.2 ML
BH CV ECHO MEAS - SV(MOD-SP4): 43.8 ML
BH CV ECHO MEAS - TAPSE (>1.6): 1.8 CM
BH CV ECHO MEAS - TR MAX PG: 46.4 MMHG
BH CV ECHO MEAS - TR MAX VEL: 340.7 CM/SEC
BH CV ECHO MEASUREMENTS AVERAGE E/E' RATIO: 9.87
BUN SERPL-MCNC: 13 MG/DL (ref 8–23)
BUN/CREAT SERPL: 15.1 (ref 7–25)
CALCIUM SPEC-SCNC: 9.1 MG/DL (ref 8.6–10.5)
CHLORIDE SERPL-SCNC: 98 MMOL/L (ref 98–107)
CO2 SERPL-SCNC: 22 MMOL/L (ref 22–29)
CREAT SERPL-MCNC: 0.86 MG/DL (ref 0.76–1.27)
DEPRECATED RDW RBC AUTO: 41.4 FL (ref 37–54)
EGFRCR SERPLBLD CKD-EPI 2021: 90.3 ML/MIN/1.73
ERYTHROCYTE [DISTWIDTH] IN BLOOD BY AUTOMATED COUNT: 13.6 % (ref 12.3–15.4)
GLUCOSE SERPL-MCNC: 142 MG/DL (ref 65–99)
HCT VFR BLD AUTO: 47.6 % (ref 37.5–51)
HGB BLD-MCNC: 16.4 G/DL (ref 13–17.7)
LEFT ATRIUM VOLUME INDEX: 27.1 ML/M2
MAGNESIUM SERPL-MCNC: 1.6 MG/DL (ref 1.6–2.4)
MCH RBC QN AUTO: 28.8 PG (ref 26.6–33)
MCHC RBC AUTO-ENTMCNC: 34.5 G/DL (ref 31.5–35.7)
MCV RBC AUTO: 83.5 FL (ref 79–97)
PLATELET # BLD AUTO: 347 10*3/MM3 (ref 140–450)
PMV BLD AUTO: 9.2 FL (ref 6–12)
POTASSIUM SERPL-SCNC: 4.2 MMOL/L (ref 3.5–5.2)
QT INTERVAL: 370 MS
QTC INTERVAL: 445 MS
RBC # BLD AUTO: 5.7 10*6/MM3 (ref 4.14–5.8)
SODIUM SERPL-SCNC: 132 MMOL/L (ref 136–145)
TSH SERPL DL<=0.05 MIU/L-ACNC: 2.37 UIU/ML (ref 0.27–4.2)
WBC NRBC COR # BLD: 9.81 10*3/MM3 (ref 3.4–10.8)

## 2023-09-03 PROCEDURE — 85027 COMPLETE CBC AUTOMATED: CPT | Performed by: INTERNAL MEDICINE

## 2023-09-03 PROCEDURE — 84443 ASSAY THYROID STIM HORMONE: CPT | Performed by: INTERNAL MEDICINE

## 2023-09-03 PROCEDURE — 93010 ELECTROCARDIOGRAM REPORT: CPT | Performed by: INTERNAL MEDICINE

## 2023-09-03 PROCEDURE — 25010000002 MAGNESIUM SULFATE 2 GM/50ML SOLUTION: Performed by: INTERNAL MEDICINE

## 2023-09-03 PROCEDURE — 76775 US EXAM ABDO BACK WALL LIM: CPT

## 2023-09-03 PROCEDURE — 25510000001 PERFLUTREN (DEFINITY) 8.476 MG IN SODIUM CHLORIDE (PF) 0.9 % 10 ML INJECTION: Performed by: HOSPITALIST

## 2023-09-03 PROCEDURE — 93005 ELECTROCARDIOGRAM TRACING: CPT | Performed by: INTERNAL MEDICINE

## 2023-09-03 PROCEDURE — 80048 BASIC METABOLIC PNL TOTAL CA: CPT | Performed by: INTERNAL MEDICINE

## 2023-09-03 PROCEDURE — 83735 ASSAY OF MAGNESIUM: CPT | Performed by: INTERNAL MEDICINE

## 2023-09-03 PROCEDURE — 93306 TTE W/DOPPLER COMPLETE: CPT | Performed by: INTERNAL MEDICINE

## 2023-09-03 PROCEDURE — 93306 TTE W/DOPPLER COMPLETE: CPT

## 2023-09-03 RX ORDER — MAGNESIUM SULFATE HEPTAHYDRATE 40 MG/ML
2 INJECTION, SOLUTION INTRAVENOUS ONCE
Status: COMPLETED | OUTPATIENT
Start: 2023-09-03 | End: 2023-09-03

## 2023-09-03 RX ADMIN — MAGNESIUM SULFATE HEPTAHYDRATE 2 G: 40 INJECTION, SOLUTION INTRAVENOUS at 08:30

## 2023-09-03 RX ADMIN — PERFLUTREN 4 ML: 6.52 INJECTION, SUSPENSION INTRAVENOUS at 09:52

## 2023-09-03 RX ADMIN — FLUTICASONE PROPIONATE 2 SPRAY: 50 SPRAY, METERED NASAL at 08:31

## 2023-09-03 RX ADMIN — DOCUSATE SODIUM 50 MG AND SENNOSIDES 8.6 MG 2 TABLET: 8.6; 5 TABLET, FILM COATED ORAL at 21:32

## 2023-09-03 RX ADMIN — ACETAMINOPHEN 650 MG: 325 TABLET, FILM COATED ORAL at 21:32

## 2023-09-03 RX ADMIN — Medication 10 ML: at 08:29

## 2023-09-03 RX ADMIN — DOCUSATE SODIUM 50 MG AND SENNOSIDES 8.6 MG 2 TABLET: 8.6; 5 TABLET, FILM COATED ORAL at 08:30

## 2023-09-03 RX ADMIN — Medication 10 ML: at 21:32

## 2023-09-03 RX ADMIN — PANTOPRAZOLE SODIUM 40 MG: 40 TABLET, DELAYED RELEASE ORAL at 05:50

## 2023-09-03 NOTE — PROGRESS NOTES
LOS: 5 days     Patient Care Team:  Hi Herrera as PCP - General (Physician Assistant)  Ryan Clay MD as Consulting Physician (Pulmonary Disease)  Yas, MIKE Smith as Nurse Practitioner (Pulmonary Disease)  [place]       Subjective     Probable past right ureteral stone with negative ultrasound as well as negative KUB    Objective       Vital Signs  Temp:  [96.4 °F (35.8 °C)-99.3 °F (37.4 °C)] (P) 96.4 °F (35.8 °C)  Heart Rate:  [] (P) 75  Resp:  [18-20] (P) 20  BP: (114-138)/(58-71) (P) 124/66    Physical Exam:        General Appearance:  No acute distress     Respiratory:    UNLABORED RESPIRATIONS.     Abdomen:     SOFT.       Genitourinary: Urine clear     Rectal:     DEFERRED       Results Review:       Imaging Results (Last 24 Hours)       ** No results found for the last 24 hours. **          Lab Results (last 24 hours)       Procedure Component Value Units Date/Time    TSH [[id number]]  (Normal) Collected: 09/03/23 0631    Specimen: Blood Updated: 09/03/23 0716     TSH 2.370 uIU/mL     Basic Metabolic Panel [[id number]]  (Abnormal) Collected: 09/03/23 0631    Specimen: Blood Updated: 09/03/23 0711     Glucose 142 mg/dL      BUN 13 mg/dL      Creatinine 0.86 mg/dL      Sodium 132 mmol/L      Potassium 4.2 mmol/L      Chloride 98 mmol/L      CO2 22.0 mmol/L      Calcium 9.1 mg/dL      BUN/Creatinine Ratio 15.1     Anion Gap 12.0 mmol/L      eGFR 90.3 mL/min/1.73     Narrative:      GFR Normal >60  Chronic Kidney Disease <60  Kidney Failure <15    The GFR formula is only valid for adults with stable renal function between ages 18 and 70.    Magnesium [[id number]]  (Normal) Collected: 09/03/23 0631    Specimen: Blood Updated: 09/03/23 0711     Magnesium 1.6 mg/dL     CBC (No Diff) [[id number]]  (Normal) Collected: 09/03/23 0631    Specimen: Blood Updated: 09/03/23 0650     WBC 9.81 10*3/mm3      RBC 5.70 10*6/mm3      Hemoglobin 16.4 g/dL      Hematocrit 47.6 %      MCV 83.5 fL      MCH  28.8 pg      MCHC 34.5 g/dL      RDW 13.6 %      RDW-SD 41.4 fl      MPV 9.2 fL      Platelets 347 10*3/mm3     Potassium [[id number]]  (Normal) Collected: 09/02/23 1633    Specimen: Blood Updated: 09/02/23 1718     Potassium 4.1 mmol/L               I reviewed the patient's new clinical results.  I reviewed the patient's new imaging results and agree with the interpretation.  I reviewed the patient's other test results and agree with the interpretation        Assessment:    Probable passed right ureteral stone    Plan:     Continue work on pulmonary possibly home soon      Krishna Malone MD  09/03/23  09:39 CDT

## 2023-09-03 NOTE — PROGRESS NOTES
[facility] Medicine Services  INPATIENT PROGRESS NOTE    Length of Stay: 5  Date of Admission: 8/29/2023  Primary Care Physician: Hi Herrera    Subjective   Chief Complaint: Flank pain  HPI: Breathing is some better.  Flank pain resolved.    As of today, 09/03/23  Review of Systems   Constitutional:  Negative for appetite change, chills, fatigue and fever.   Respiratory:  Positive for shortness of breath. Negative for chest tightness.    Cardiovascular:  Negative for chest pain, palpitations and leg swelling.   Gastrointestinal:  Negative for abdominal pain, constipation, diarrhea, nausea and vomiting.   Genitourinary:  Negative for flank pain.   Skin:  Negative for wound.   Neurological:  Negative for dizziness, weakness, light-headedness, numbness and headaches.      All pertinent negatives and positives are as above. All other systems have been reviewed and are negative unless otherwise stated.    Objective    Temp:  [96.4 °F (35.8 °C)-99.3 °F (37.4 °C)] 97.7 °F (36.5 °C)  Heart Rate:  [] 75  Resp:  [18-20] 18  BP: (124-143)/(58-71) 143/71    AM-PAC 6 Clicks Score (PT): 24 (09/02/23 1326)    As of today, 09/03/23  Physical Exam  Vitals reviewed.   Constitutional:       Appearance: He is well-developed.   HENT:      Head: Normocephalic and atraumatic.   Eyes:      Pupils: Pupils are equal, round, and reactive to light.   Cardiovascular:      Rate and Rhythm: Normal rate and regular rhythm.      Heart sounds: Normal heart sounds. No murmur heard.    No friction rub. No gallop.   Pulmonary:      Effort: Pulmonary effort is normal. No respiratory distress.      Breath sounds: No wheezing or rales.      Comments: Patient with increased respiratory effort  Chest:      Chest wall: No tenderness.   Abdominal:      General: Bowel sounds are normal. There is no distension.      Palpations: Abdomen is soft.      Tenderness: There is no abdominal tenderness.    Musculoskeletal:      Cervical back: Normal range of motion and neck supple.   Psychiatric:         Behavior: Behavior normal.         Results Review:  I have reviewed the labs, radiology results, and diagnostic studies.    Laboratory Data:   Results from last 7 days   Lab Units 09/03/23  0631 09/02/23  1633 09/02/23  0603 09/01/23  2119 09/01/23  0627   SODIUM mmol/L 132*  --  131*  --  135*   POTASSIUM mmol/L 4.2 4.1 3.6   < > 3.4*   CHLORIDE mmol/L 98  --  95*  --  99   CO2 mmol/L 22.0  --  23.0  --  25.0   BUN mg/dL 13  --  12  --  12   CREATININE mg/dL 0.86  --  0.95  --  0.84   GLUCOSE mg/dL 142*  --  132*  --  133*   CALCIUM mg/dL 9.1  --  9.0  --  8.5*   ANION GAP mmol/L 12.0  --  13.0  --  11.0    < > = values in this interval not displayed.       Estimated Creatinine Clearance: 88.5 mL/min (by C-G formula based on SCr of 0.86 mg/dL).  Results from last 7 days   Lab Units 09/03/23  0631   MAGNESIUM mg/dL 1.6         Results from last 7 days   Lab Units 09/03/23  0631 09/02/23  0603 09/01/23  0627 08/31/23  0555 08/30/23  0557   WBC 10*3/mm3 9.81 9.69 8.21 6.86 7.02   HEMOGLOBIN g/dL 16.4 16.4 14.5 13.4 13.3   HEMATOCRIT % 47.6 46.5 40.8 39.2 38.6   PLATELETS 10*3/mm3 347 313 253 226 207             Culture Data:   No results found for: BLOODCX  No results found for: URINECX  No results found for: RESPCX  No results found for: WOUNDCX  No results found for: STOOLCX  No components found for: BODYFLD    Radiology Data:   Imaging Results (Last 24 Hours)       ** No results found for the last 24 hours. **            I have utilized all available immediate resources to obtain, update, or review the patient's current medications (including all prescriptions, over-the-counter products, herbals, cannabis/cannabidiol products, and vitamin/mineral/dietary (nutritional) supplements).       Assessment/Plan     Active Hospital Problems    Diagnosis     **Ureterolithiasis        Plan:    1.  Ureterolithiasis: Pain and  has resolved.  No definite ureteral calculus was seen on x-ray.  2.  Hydronephrosis: Secondary to #1.  Ultrasound pending to reevaluate hydronephrosis.  3.  Acute on chronic hypoxic respiratory failure: Patient currently on 8 L of oxygen per nasal cannula.  Baseline oxygen requirement is 3 L at rest and 4 L with exertion.  CTA negative for PE or pneumonia.  Patient did have significant weight gain over the last 2 weeks.  Patient is now back down to his dry weight.  Will discontinue diuretics.  Will wean supplemental oxygen to maintain oxygen saturations of 88 to 91%.  4.  Ventricular dysrhythmia: Patient with reported ventricular dysrhythmia overnight.  EKG shows normal sinus rhythm.  Echocardiogram pending.  Magnesium given.  5.  DVT prophylaxis: SCDs.    Medical Decision Making  Number and Complexity of problems: 3 highly complex medical problems    Conditions and Status:        Condition is unchanged.     Discussed with: Patient     Treatment Plan  As above    Care Planning  Shared decision making: Patient agreement plan of care  Code status and discussions: Patient request DNR/DNI    Disposition  Social Determinants of Health that impact treatment or disposition: None  I expect the patient to be discharged to home in 2-3 days.       The patient was evaluated during the global COVID-19 pandemic, and the diagnosis was suspected/considered upon their initial presentation.  Evaluation, treatment, and testing were consistent with current guidelines for patients who present with complaints or symptoms that may be related to COVID-19.    I confirmed that the patient's Advance Care Plan is present, code status is documented, or surrogate decision maker is listed in the patient's medical record.        This document has been electronically signed by Carl Mendoza MD on September 3, 2023 11:19 CDT

## 2023-09-03 NOTE — PLAN OF CARE
Problem: Adult Inpatient Plan of Care  Goal: Plan of Care Review  Outcome: Ongoing, Progressing  Goal: Patient-Specific Goal (Individualized)  Outcome: Ongoing, Progressing  Goal: Absence of Hospital-Acquired Illness or Injury  Outcome: Ongoing, Progressing  Goal: Optimal Comfort and Wellbeing  Outcome: Ongoing, Progressing  Goal: Readiness for Transition of Care  Outcome: Ongoing, Progressing     Problem: Pain Acute  Goal: Acceptable Pain Control and Functional Ability  Outcome: Ongoing, Progressing     Problem: Fall Injury Risk  Goal: Absence of Fall and Fall-Related Injury  Outcome: Ongoing, Progressing     Problem: Gas Exchange Impaired  Goal: Optimal Gas Exchange  Outcome: Ongoing, Progressing   Goal Outcome Evaluation:

## 2023-09-04 PROCEDURE — 94760 N-INVAS EAR/PLS OXIMETRY 1: CPT

## 2023-09-04 PROCEDURE — 94799 UNLISTED PULMONARY SVC/PX: CPT

## 2023-09-04 PROCEDURE — 25010000002 MORPHINE PER 10 MG

## 2023-09-04 RX ADMIN — ACETAMINOPHEN 650 MG: 325 TABLET, FILM COATED ORAL at 17:01

## 2023-09-04 RX ADMIN — PANTOPRAZOLE SODIUM 40 MG: 40 TABLET, DELAYED RELEASE ORAL at 06:13

## 2023-09-04 RX ADMIN — Medication 10 ML: at 21:00

## 2023-09-04 RX ADMIN — Medication 10 ML: at 09:32

## 2023-09-04 RX ADMIN — MORPHINE SULFATE 4 MG: 4 INJECTION, SOLUTION INTRAMUSCULAR; INTRAVENOUS at 19:50

## 2023-09-04 NOTE — PROGRESS NOTES
[facility] Medicine Services  INPATIENT PROGRESS NOTE    Length of Stay: 6  Date of Admission: 8/29/2023  Primary Care Physician: Hi Herrera    Subjective   Chief Complaint: Flank pain  HPI: Shortness of breath continuing to improve.    As of today, 09/04/23  Review of Systems   Constitutional:  Negative for appetite change, chills, fatigue and fever.   Respiratory:  Positive for shortness of breath. Negative for chest tightness.    Cardiovascular:  Negative for chest pain, palpitations and leg swelling.   Gastrointestinal:  Negative for abdominal pain, constipation, diarrhea, nausea and vomiting.   Genitourinary:  Negative for flank pain.   Skin:  Negative for wound.   Neurological:  Negative for dizziness, weakness, light-headedness, numbness and headaches.      All pertinent negatives and positives are as above. All other systems have been reviewed and are negative unless otherwise stated.    Objective    Temp:  [97.3 °F (36.3 °C)-97.7 °F (36.5 °C)] 97.7 °F (36.5 °C)  Heart Rate:  [] 85  Resp:  [18] 18  BP: (121-134)/(67-70) 127/68    AM-PAC 6 Clicks Score (PT): 24 (09/04/23 0845)    As of today, 09/04/23  Physical Exam  Vitals reviewed.   Constitutional:       Appearance: He is well-developed.   HENT:      Head: Normocephalic and atraumatic.   Eyes:      Pupils: Pupils are equal, round, and reactive to light.   Cardiovascular:      Rate and Rhythm: Normal rate and regular rhythm.      Heart sounds: Normal heart sounds. No murmur heard.    No friction rub. No gallop.   Pulmonary:      Effort: Pulmonary effort is normal. No respiratory distress.      Breath sounds: No wheezing or rales.      Comments: Patient with increased respiratory effort  Chest:      Chest wall: No tenderness.   Abdominal:      General: Bowel sounds are normal. There is no distension.      Palpations: Abdomen is soft.      Tenderness: There is no abdominal tenderness.    Musculoskeletal:      Cervical back: Normal range of motion and neck supple.   Psychiatric:         Behavior: Behavior normal.         Results Review:  I have reviewed the labs, radiology results, and diagnostic studies.    Laboratory Data:   Results from last 7 days   Lab Units 09/03/23  0631 09/02/23  1633 09/02/23  0603 09/01/23 2119 09/01/23 0627   SODIUM mmol/L 132*  --  131*  --  135*   POTASSIUM mmol/L 4.2 4.1 3.6   < > 3.4*   CHLORIDE mmol/L 98  --  95*  --  99   CO2 mmol/L 22.0  --  23.0  --  25.0   BUN mg/dL 13  --  12  --  12   CREATININE mg/dL 0.86  --  0.95  --  0.84   GLUCOSE mg/dL 142*  --  132*  --  133*   CALCIUM mg/dL 9.1  --  9.0  --  8.5*   ANION GAP mmol/L 12.0  --  13.0  --  11.0    < > = values in this interval not displayed.       Estimated Creatinine Clearance: 88.5 mL/min (by C-G formula based on SCr of 0.86 mg/dL).  Results from last 7 days   Lab Units 09/03/23  0631   MAGNESIUM mg/dL 1.6           Results from last 7 days   Lab Units 09/03/23  0631 09/02/23  0603 09/01/23  0627 08/31/23  0555 08/30/23  0557   WBC 10*3/mm3 9.81 9.69 8.21 6.86 7.02   HEMOGLOBIN g/dL 16.4 16.4 14.5 13.4 13.3   HEMATOCRIT % 47.6 46.5 40.8 39.2 38.6   PLATELETS 10*3/mm3 347 313 253 226 207             Culture Data:   No results found for: BLOODCX  No results found for: URINECX  No results found for: RESPCX  No results found for: WOUNDCX  No results found for: STOOLCX  No components found for: BODYFLD    Radiology Data:   Imaging Results (Last 24 Hours)       Procedure Component Value Units Date/Time    US Renal Bilateral [[id number]] Collected: 09/03/23 1654     Updated: 09/03/23 2116    Narrative:      INDICATION:  Kidney stones.    COMPARISON:  None relevant.    TECHNIQUE:  High-resolution grayscale and color Doppler ultrasound was performed of the  bilateral kidneys and the urinary bladder.    FINDINGS:  Both kidneys demonstrate mild prominence of the renal pelvises without calyceal  dilatation.  Right  kidney demonstrates an intrarenal calculus 0.4 cm.  Renal  lengths measure 10.9 cm on the right and 11.9 cm on the left.    Urinary bladder appears normal.      Impression:      Prominence of the bilateral renal pelvises.    0.4 cm right intrarenal calculus.                  I have utilized all available immediate resources to obtain, update, or review the patient's current medications (including all prescriptions, over-the-counter products, herbals, cannabis/cannabidiol products, and vitamin/mineral/dietary (nutritional) supplements).       Assessment/Plan     Active Hospital Problems    Diagnosis     **Ureterolithiasis        Plan:    1.  Ureterolithiasis: Pain and has resolved.  No definite ureteral calculus was seen on x-ray.  2.  Hydronephrosis: Secondary to #1.  Ultrasound negative for hydronephrosis.  3.  Acute on chronic hypoxic respiratory failure: Improving.  Patient currently on 5 L of oxygen per nasal cannula.  Baseline oxygen requirement is 3 L at rest and 4 L with exertion.  CTA negative for PE or pneumonia.  Patient did have significant weight gain over the last 2 weeks.  Patient is now back down to his dry weight.  Will discontinue diuretics.  Will wean supplemental oxygen to maintain oxygen saturations of 88 to 91%.  4.  Ventricular dysrhythmia: Patient with reported ventricular dysrhythmia overnight 9/2-9/3.  None since  EKG 9/3 showed normal sinus rhythm.  Echocardiogram shows moderate pulmonary hypertension.  Otherwise unremarkable.  Magnesium given.  5.  DVT prophylaxis: SCDs.    Medical Decision Making  Number and Complexity of problems: 3 highly complex medical problems    Conditions and Status:        Condition is unchanged.     Discussed with: Patient     Treatment Plan  As above    Care Planning  Shared decision making: Patient agreement plan of care  Code status and discussions: Patient request DNR/DNI    Disposition  Social Determinants of Health that impact treatment or disposition:  None  I expect the patient to be discharged to home in 2-3 days.       The patient was evaluated during the global COVID-19 pandemic, and the diagnosis was suspected/considered upon their initial presentation.  Evaluation, treatment, and testing were consistent with current guidelines for patients who present with complaints or symptoms that may be related to COVID-19.    I confirmed that the patient's Advance Care Plan is present, code status is documented, or surrogate decision maker is listed in the patient's medical record.        This document has been electronically signed by Carl Mendoza MD on September 4, 2023 11:15 CDT

## 2023-09-04 NOTE — PLAN OF CARE
Goal Outcome Evaluation:  Plan of Care Reviewed With: caregiver, patient        Progress: no change  Outcome Evaluation: Nutrition assessment:  pt visited for LOS.  He is being followed by Urology.  Intake ~75%.  Wt decline with pt on diurectics.  RD will add ice cream twice daily and monitor po intake and POC

## 2023-09-04 NOTE — CONSULTS
"Adult Nutrition  Assessment/PES    Patient Name:  Jeramie Anaya  YOB: 1950  MRN: [id number]  Admit Date:  8/29/2023    Assessment Date:  9/4/2023    Comments:  Nutrition Assessment for LOS.  Pt being managed for ureterolithiasis; Hydronephrosis; A/C Resp Failure; and COPD.  Currently on 8L.   Pt is being followed by Urology.  He has had a Significant wt gain in the past several weeks per MD and has received diuresis with back to Dry wt.  He reports he was weighing 216# and now 208 with diuresis.  Intake is ~75% and he reports that he is eating as much here as he was at home.  RD will add ice cream twice daily.  Pt reminded of menu options and alternatives and snacks that are available in the pantry prn.    Will monitor POC and po intake.      Reason for Assessment       Row Name 09/04/23 1432          Reason for Assessment    Reason For Assessment per organizational policy     Diagnosis infection/sepsis     Identified At Risk by Screening Criteria other (see comments)  LOS                    Nutrition/Diet History       Row Name 09/04/23 1435          Nutrition/Diet History    Typical Intake (Food/Fluid/EN/PN) Pt claims that his appetite is \"pretty good\" and he is eating as much here as he was at home, \"maybe more\"  he has lost wt but he has received diurectics.                    Labs/Tests/Procedures/Meds       Row Name 09/04/23 1436          Labs/Procedures/Meds    Lab Results Reviewed reviewed, pertinent        Diagnostic Tests/Procedures    Diagnostic Test/Procedure Reviewed reviewed, pertinent        Medications    Pertinent Medications Reviewed reviewed, pertinent                      Estimated/Assessed Needs - Anthropometrics       Row Name 09/04/23 1437          Anthropometrics    Height for Calculation 1.829 m (6')     Weight for Calculation 80.7 kg (178 lb)        Estimated/Assessed Needs    Additional Documentation Additional Requirements (Group);Fluid Requirements (Group);Modified [place] " State Equation (Group);Estimated Calorie Needs (Group);KCAL/KG (Group);[place]-St. Jeor Equation (Group);Protein Requirements (Group)        Estimated Calorie Needs    Estimated Calorie Need Method kcal/kg        KCAL/KG    KCAL/KG 25 Kcal/Kg (kcal)     25 Kcal/Kg (kcal) 2018.5        [place]-St. Jeor Equation    RMR ([place]-St. Jeor Equation) 1580.4        Protein Requirements    Weight Used For Protein Calculations 80.7 kg (178 lb)     Est Protein Requirement Amount (gms/kg) 1.0 gm protein     Estimated Protein Requirements (gms/day) 80.74        Fluid Requirements    Fluid Requirements (mL/day) 2000     Estimated Fluid Requirement Method RDA Method     RDA Method (mL) 2000                    Nutrition Prescription Ordered       Row Name 09/04/23 1438          Nutrition Prescription PO    Current PO Diet Regular     Common Modifiers --                    Evaluation of Received Nutrient/Fluid Intake       Row Name 09/04/23 1438          PO Evaluation    Number of Meals 9     % PO Intake 75% average                       Problem/Interventions:   Problem 1       Row Name 09/04/23 1439          Nutrition Diagnoses Problem 1    Problem 1 Increased Nutrient Needs     Macronutrient Protein;Kcal     Micronutrient Vitamin;Mineral     Etiology (related to) Medical Diagnosis     Pulmonary/Critical Care A/C respiratory failure;COPD     Other Ureterolithiasis and hydronephrosis     Other Comment Hypermetabolic state                          Intervention Goal       Row Name 09/04/23 1441          Intervention Goal    General Meet nutritional needs for age/condition;Reduce/improve symptoms     PO Tolerate PO;Increase intake;Meet estimated needs     Weight Maintain weight                    Nutrition Intervention       Row Name 09/04/23 1441          Nutrition Intervention    RD/Tech Action Follow Tx progress;Care plan reviewd;Encourage intake;Recommend/ordered;Advise alternate selection;Advise available snack;Interview for  preference;Menu provided     Recommended/Ordered Supplement                    Nutrition Prescription       Row Name 09/04/23 1442          Nutrition Prescription PO    PO Prescription Begin/change supplement     Supplement Ice Cream     Supplement Frequency 2 times a day     New PO Prescription Ordered? Yes                    Education/Evaluation       Row Name 09/04/23 1442          Education    Education Education topics;Advised regarding habits/behavior     Education Topics Basic nutrition;Protein     Advised Regarding Habits/Behavior Increased nutrient density;Snacks;Use supplement;Food choices        Monitor/Evaluation    Monitor I&O;Pertinent labs;Supplement intake;Per protocol;PO intake;Weight;Skin status;Symptoms     Education Follow-up Reinforce PRN                     Electronically signed by:  Yuridia Meléndez RD  09/04/23 14:44 CDT

## 2023-09-04 NOTE — PLAN OF CARE
Problem: Adult Inpatient Plan of Care  Goal: Plan of Care Review  Outcome: Ongoing, Progressing  Flowsheets (Taken 9/4/2023 1552)  Plan of Care Reviewed With: patient  Outcome Evaluation:  was alert and oriented. He is independent in his room. Patient was calm and cooperative. He is continent and he had 1 BM this morning. Patient was encouraged to use the call light if he needed anything. He quietly rested in his room. No new problems or concerns during the shift.  Goal: Patient-Specific Goal (Individualized)  Outcome: Ongoing, Progressing  Goal: Absence of Hospital-Acquired Illness or Injury  Outcome: Ongoing, Progressing  Intervention: Identify and Manage Fall Risk  Recent Flowsheet Documentation  Taken 9/4/2023 1400 by Tawnya Finch RN  Safety Promotion/Fall Prevention: safety round/check completed  Taken 9/4/2023 1200 by Tawnya Finch RN  Safety Promotion/Fall Prevention: safety round/check completed  Taken 9/4/2023 1000 by Tawnya Finch RN  Safety Promotion/Fall Prevention: safety round/check completed  Taken 9/4/2023 0800 by Tawnya Finch RN  Safety Promotion/Fall Prevention: safety round/check completed  Intervention: Prevent and Manage VTE (Venous Thromboembolism) Risk  Recent Flowsheet Documentation  Taken 9/4/2023 1400 by Tawnya Finch RN  Activity Management: up in chair  Taken 9/4/2023 1200 by Tawnya Finch RN  Activity Management: up ad alex  Taken 9/4/2023 1000 by Tawnya Finch RN  Activity Management: up ad alex  Taken 9/4/2023 0800 by Tawnya Finch RN  Activity Management: up ad alex  Goal: Optimal Comfort and Wellbeing  Outcome: Ongoing, Progressing  Goal: Readiness for Transition of Care  Outcome: Ongoing, Progressing     Problem: Pain Acute  Goal: Acceptable Pain Control and Functional Ability  Outcome: Ongoing, Progressing     Problem: Fall Injury Risk  Goal: Absence of Fall and Fall-Related Injury  Outcome: Ongoing, Progressing  Intervention: Promote Injury-Free  Environment  Recent Flowsheet Documentation  Taken 9/4/2023 1400 by Tawnya Finch RN  Safety Promotion/Fall Prevention: safety round/check completed  Taken 9/4/2023 1200 by Tawnya Finch RN  Safety Promotion/Fall Prevention: safety round/check completed  Taken 9/4/2023 1000 by Tawnya Finch RN  Safety Promotion/Fall Prevention: safety round/check completed  Taken 9/4/2023 0800 by Tawnya Finch RN  Safety Promotion/Fall Prevention: safety round/check completed     Problem: Gas Exchange Impaired  Goal: Optimal Gas Exchange  Outcome: Ongoing, Progressing   Goal Outcome Evaluation:  Plan of Care Reviewed With: patient           Outcome Evaluation:  was alert and oriented. He is independent in his room. Patient was calm and cooperative. He is continent and he had 1 BM this morning. Patient was encouraged to use the call light if he needed anything. He quietly rested in his room. No new problems or concerns during the shift.

## 2023-09-04 NOTE — PLAN OF CARE
Goal Outcome Evaluation:  Plan of Care Reviewed With: patient        Progress: improving    Patient rests well this shift. Monitor tech reports brief run of tachycardia with rate up to 170. Patient states he has no pain or other symptoms. Other vital signs stable. Echocardiogram performed on previous shift r/t this recurrent issue. Will continue to monitor.

## 2023-09-05 PROCEDURE — 25010000002 MORPHINE PER 10 MG

## 2023-09-05 PROCEDURE — 94640 AIRWAY INHALATION TREATMENT: CPT

## 2023-09-05 RX ADMIN — ALBUTEROL SULFATE 2.5 MG: 2.5 SOLUTION RESPIRATORY (INHALATION) at 20:29

## 2023-09-05 RX ADMIN — Medication 10 ML: at 20:17

## 2023-09-05 RX ADMIN — MORPHINE SULFATE 4 MG: 4 INJECTION, SOLUTION INTRAMUSCULAR; INTRAVENOUS at 14:27

## 2023-09-05 RX ADMIN — ACETAMINOPHEN 650 MG: 325 TABLET, FILM COATED ORAL at 01:33

## 2023-09-05 RX ADMIN — PANTOPRAZOLE SODIUM 40 MG: 40 TABLET, DELAYED RELEASE ORAL at 05:44

## 2023-09-05 RX ADMIN — MORPHINE SULFATE 4 MG: 4 INJECTION, SOLUTION INTRAMUSCULAR; INTRAVENOUS at 01:33

## 2023-09-05 RX ADMIN — Medication 10 ML: at 08:43

## 2023-09-05 RX ADMIN — DOCUSATE SODIUM 50 MG AND SENNOSIDES 8.6 MG 2 TABLET: 8.6; 5 TABLET, FILM COATED ORAL at 20:16

## 2023-09-05 NOTE — PLAN OF CARE
Problem: Adult Inpatient Plan of Care  Goal: Plan of Care Review  Outcome: Ongoing, Progressing  Flowsheets (Taken 9/5/2023 1631)  Plan of Care Reviewed With: patient  Outcome Evaluation:  was calm and pleasant. He is alert and oriented. The patient is independent in the room. He reports a headaache that is chronic. The patient was given morphine to help with the pain and discomfort. Patient stated the morphine took the edge off the headache, but didn't completely take the headache away. His oxygen was weaned down to 4 L's NC and his oxygen level was holding at 90%.  He has rested in bed and had no other concerns or problems during this shift.  Goal: Patient-Specific Goal (Individualized)  Outcome: Ongoing, Progressing  Goal: Absence of Hospital-Acquired Illness or Injury  Outcome: Ongoing, Progressing  Intervention: Identify and Manage Fall Risk  Recent Flowsheet Documentation  Taken 9/5/2023 1600 by Tawnya Finch RN  Safety Promotion/Fall Prevention: safety round/check completed  Taken 9/5/2023 1400 by Tawnya Finch RN  Safety Promotion/Fall Prevention: safety round/check completed  Taken 9/5/2023 1200 by Tawnya Finch RN  Safety Promotion/Fall Prevention: safety round/check completed  Taken 9/5/2023 1000 by Tawnya Finch RN  Safety Promotion/Fall Prevention: safety round/check completed  Taken 9/5/2023 0800 by Tawnya Finch RN  Safety Promotion/Fall Prevention: safety round/check completed  Intervention: Prevent and Manage VTE (Venous Thromboembolism) Risk  Recent Flowsheet Documentation  Taken 9/5/2023 1600 by Tawnya Finch RN  Activity Management: up ad alex  Taken 9/5/2023 1400 by Tawnya Finch RN  Activity Management: up ad alex  Taken 9/5/2023 1200 by Tawnya Finch RN  Activity Management: up ad alex  Taken 9/5/2023 1000 by Tawnya Finch RN  Activity Management: up ad alex  Taken 9/5/2023 0800 by Tawnya Finch RN  Activity Management: up ad alex  Goal: Optimal Comfort and  Wellbeing  Outcome: Ongoing, Progressing  Intervention: Monitor Pain and Promote Comfort  Recent Flowsheet Documentation  Taken 9/5/2023 1427 by Tawnya Finch RN  Pain Management Interventions:   see MAR   quiet environment facilitated  Goal: Readiness for Transition of Care  Outcome: Ongoing, Progressing     Problem: Pain Acute  Goal: Acceptable Pain Control and Functional Ability  Outcome: Ongoing, Progressing  Intervention: Develop Pain Management Plan  Recent Flowsheet Documentation  Taken 9/5/2023 1427 by Tawnya Finch RN  Pain Management Interventions:   see MAR   quiet environment facilitated     Problem: Fall Injury Risk  Goal: Absence of Fall and Fall-Related Injury  Outcome: Ongoing, Progressing  Intervention: Promote Injury-Free Environment  Recent Flowsheet Documentation  Taken 9/5/2023 1600 by Tawnya Finch RN  Safety Promotion/Fall Prevention: safety round/check completed  Taken 9/5/2023 1400 by Tawnya Finch RN  Safety Promotion/Fall Prevention: safety round/check completed  Taken 9/5/2023 1200 by Tawnya Finch RN  Safety Promotion/Fall Prevention: safety round/check completed  Taken 9/5/2023 1000 by Tawnya Finch RN  Safety Promotion/Fall Prevention: safety round/check completed  Taken 9/5/2023 0800 by Tawnya Finch RN  Safety Promotion/Fall Prevention: safety round/check completed     Problem: Gas Exchange Impaired  Goal: Optimal Gas Exchange  Outcome: Ongoing, Progressing   Goal Outcome Evaluation:  Plan of Care Reviewed With: patient           Outcome Evaluation:  was calm and pleasant. He is alert and oriented. The patient is independent in the room. He reports a headaache that is chronic. The patient was given morphine to help with the pain and discomfort. Patient stated the morphine took the edge off the headache, but didn't completely take the headache away. His oxygen was weaned down to 4 L's NC and his oxygen level was holding at 90%.  He has rested in bed and  had no other concerns or problems during this shift.

## 2023-09-05 NOTE — PROGRESS NOTES
"   LOS: 7 days   Patient Care Team:  Hi Herrera as PCP - General (Physician Assistant)  Ryan Clay MD as Consulting Physician (Pulmonary Disease)  Yas, MIKE Smith as Nurse Practitioner (Pulmonary Disease)  [facility]     Subjective     Subjective:  Symptoms:  Stable.      History taken from: patient chart    Objective     Vital Signs  Temp:  [97.3 °F (36.3 °C)-98.1 °F (36.7 °C)] 98.1 °F (36.7 °C)  Heart Rate:  [69-89] 74  Resp:  [18-20] 18  BP: (117-134)/(56-68) 130/56    Objective:  General Appearance:  In no acute distress.    Vital signs: (most recent): Blood pressure 130/56, pulse 74, temperature 98.1 °F (36.7 °C), temperature source Temporal, resp. rate 18, height 182.9 cm (72\"), weight 94.3 kg (208 lb), SpO2 90 %.  Vital signs are normal.  No fever.    Output: Producing urine.    Abdomen: Abdomen is soft and non-distended.  There is no abdominal tenderness.     Neurological: Patient is alert and oriented to person, place and time.    Skin:  Warm and dry.              Results Review:    Lab Results (last 24 hours)       ** No results found for the last 24 hours. **           Imaging Results (Last 24 Hours)       ** No results found for the last 24 hours. **             I reviewed the patient's new clinical results.  I reviewed the patient's new imaging results and agree with the interpretation.  I reviewed the patient's other test results and agree with the interpretation      Assessment & Plan       Ureterolithiasis      Assessment & Plan    Patient has resolved right flank pain, suspect right renal/ureteral stone has passed, no Urology surgical intervention planned at this time  Estimated Creatinine Clearance: 88.5 mL/min (by C-G formula based on SCr of 0.86 mg/dL).      MIKE Allan  09/05/23  17:07 CDT      "

## 2023-09-05 NOTE — PROGRESS NOTES
LOS: 6 days     Patient Care Team:  Hi Herrera as PCP - General (Physician Assistant)  Ryan Clay MD as Consulting Physician (Pulmonary Disease)  Yas, MIKE Smith as Nurse Practitioner (Pulmonary Disease)  [facility]       Subjective     Probable past right renal calculus    Objective       Vital Signs  Temp:  [97.3 °F (36.3 °C)-97.7 °F (36.5 °C)] 97.7 °F (36.5 °C)  Heart Rate:  [] 88  Resp:  [18-20] 20  BP: (121-134)/(62-70) 134/62    Physical Exam:        General Appearance:  No acute distress     Respiratory:    UNLABORED RESPIRATIONS.     Abdomen:     SOFT.       Genitourinary: Urine clear     Rectal:     DEFERRED       Results Review:       Imaging Results (Last 24 Hours)       Procedure Component Value Units Date/Time    US Renal Bilateral [[id number]] Collected: 09/03/23 1654     Updated: 09/03/23 2116    Narrative:      INDICATION:  Kidney stones.    COMPARISON:  None relevant.    TECHNIQUE:  High-resolution grayscale and color Doppler ultrasound was performed of the  bilateral kidneys and the urinary bladder.    FINDINGS:  Both kidneys demonstrate mild prominence of the renal pelvises without calyceal  dilatation.  Right kidney demonstrates an intrarenal calculus 0.4 cm.  Renal  lengths measure 10.9 cm on the right and 11.9 cm on the left.    Urinary bladder appears normal.      Impression:      Prominence of the bilateral renal pelvises.    0.4 cm right intrarenal calculus.                Lab Results (last 24 hours)       ** No results found for the last 24 hours. **              I reviewed the patient's new clinical results.  I reviewed the patient's new imaging results and agree with the interpretation.  I reviewed the patient's other test results and agree with the interpretation        Assessment:    Probable past renal calculus renal colic resolved    Plan:     Home once pulmonary status improves      Krishna Malone MD  09/04/23  20:53 CDT

## 2023-09-05 NOTE — PROGRESS NOTES
[facility] Medicine Services  INPATIENT PROGRESS NOTE    Length of Stay: 7  Date of Admission: 8/29/2023  Primary Care Physician: Hi Herrera    Subjective   Chief Complaint: Flank pain  HPI: States his breathing is getting better.  Not quite at baseline yet.    As of today, 09/05/23  Review of Systems   Constitutional:  Negative for appetite change, chills, fatigue and fever.   Respiratory:  Positive for shortness of breath. Negative for chest tightness.    Cardiovascular:  Negative for chest pain, palpitations and leg swelling.   Gastrointestinal:  Negative for abdominal pain, constipation, diarrhea, nausea and vomiting.   Genitourinary:  Negative for flank pain.   Skin:  Negative for wound.   Neurological:  Negative for dizziness, weakness, light-headedness, numbness and headaches.      All pertinent negatives and positives are as above. All other systems have been reviewed and are negative unless otherwise stated.    Objective    Temp:  [97.3 °F (36.3 °C)-98.1 °F (36.7 °C)] 98.1 °F (36.7 °C)  Heart Rate:  [69-89] 89  Resp:  [18-20] 20  BP: (117-134)/(57-68) 133/68    AM-PAC 6 Clicks Score (PT): 24 (09/05/23 0830)    As of today, 09/05/23  Physical Exam  Vitals reviewed.   Constitutional:       Appearance: He is well-developed.   HENT:      Head: Normocephalic and atraumatic.   Eyes:      Pupils: Pupils are equal, round, and reactive to light.   Cardiovascular:      Rate and Rhythm: Normal rate and regular rhythm.      Heart sounds: Normal heart sounds. No murmur heard.    No friction rub. No gallop.   Pulmonary:      Effort: Pulmonary effort is normal. No respiratory distress.      Breath sounds: No wheezing or rales.      Comments: Patient with increased respiratory effort  Chest:      Chest wall: No tenderness.   Abdominal:      General: Bowel sounds are normal. There is no distension.      Palpations: Abdomen is soft.      Tenderness: There is no abdominal  tenderness.   Musculoskeletal:      Cervical back: Normal range of motion and neck supple.   Psychiatric:         Behavior: Behavior normal.         Results Review:  I have reviewed the labs, radiology results, and diagnostic studies.    Laboratory Data:   Results from last 7 days   Lab Units 09/03/23  0631 09/02/23  1633 09/02/23  0603 09/01/23  2119 09/01/23  0627   SODIUM mmol/L 132*  --  131*  --  135*   POTASSIUM mmol/L 4.2 4.1 3.6   < > 3.4*   CHLORIDE mmol/L 98  --  95*  --  99   CO2 mmol/L 22.0  --  23.0  --  25.0   BUN mg/dL 13  --  12  --  12   CREATININE mg/dL 0.86  --  0.95  --  0.84   GLUCOSE mg/dL 142*  --  132*  --  133*   CALCIUM mg/dL 9.1  --  9.0  --  8.5*   ANION GAP mmol/L 12.0  --  13.0  --  11.0    < > = values in this interval not displayed.       Estimated Creatinine Clearance: 88.5 mL/min (by C-G formula based on SCr of 0.86 mg/dL).  Results from last 7 days   Lab Units 09/03/23  0631   MAGNESIUM mg/dL 1.6           Results from last 7 days   Lab Units 09/03/23  0631 09/02/23  0603 09/01/23  0627 08/31/23  0555 08/30/23  0557   WBC 10*3/mm3 9.81 9.69 8.21 6.86 7.02   HEMOGLOBIN g/dL 16.4 16.4 14.5 13.4 13.3   HEMATOCRIT % 47.6 46.5 40.8 39.2 38.6   PLATELETS 10*3/mm3 347 313 253 226 207             Culture Data:   No results found for: BLOODCX  No results found for: URINECX  No results found for: RESPCX  No results found for: WOUNDCX  No results found for: STOOLCX  No components found for: BODYFLD    Radiology Data:   Imaging Results (Last 24 Hours)       ** No results found for the last 24 hours. **            I have utilized all available immediate resources to obtain, update, or review the patient's current medications (including all prescriptions, over-the-counter products, herbals, cannabis/cannabidiol products, and vitamin/mineral/dietary (nutritional) supplements).       Assessment/Plan     Active Hospital Problems    Diagnosis     **Ureterolithiasis        Plan:    1.   Ureterolithiasis: Pain and has resolved.  No definite ureteral calculus was seen on x-ray.  Likely passed stone.  2.  Hydronephrosis: Resolved.  Secondary to #1.    3.  Acute on chronic hypoxic respiratory failure: Improving.  Patient currently on 5 L of oxygen per nasal cannula.  Baseline oxygen requirement is 3 L at rest and 4 L with exertion.  CTA negative for PE or pneumonia.  Patient did have significant weight gain over the last 2 weeks.  Patient is now back down to his dry weight.  Will discontinue diuretics.  Will wean supplemental oxygen to maintain oxygen saturations of 88 to 91%.  4.  Ventricular dysrhythmia: Patient with reported ventricular dysrhythmia overnight 9/2-9/3.  None since  EKG 9/3 showed normal sinus rhythm.  Echocardiogram shows moderate pulmonary hypertension.  Otherwise unremarkable.  Magnesium given.  5.  DVT prophylaxis: SCDs.    Medical Decision Making  Number and Complexity of problems: 3 highly complex medical problems    Conditions and Status:        Condition is unchanged.     Discussed with: Patient     Treatment Plan  As above    Care Planning  Shared decision making: Patient agreement plan of care  Code status and discussions: Patient request DNR/DNI    Disposition  Social Determinants of Health that impact treatment or disposition: None  I expect the patient to be discharged to home in 2-3 days.       The patient was evaluated during the global COVID-19 pandemic, and the diagnosis was suspected/considered upon their initial presentation.  Evaluation, treatment, and testing were consistent with current guidelines for patients who present with complaints or symptoms that may be related to COVID-19.    I confirmed that the patient's Advance Care Plan is present, code status is documented, or surrogate decision maker is listed in the patient's medical record.        This document has been electronically signed by Carl Mendoza MD on September 5, 2023 11:52 CDT

## 2023-09-05 NOTE — PLAN OF CARE
Goal Outcome Evaluation:  Plan of Care Reviewed With: patient        Progress: improving  Outcome Evaluation: Patient reports less shortness of breath and has no c/o back pain or other body pain. He has no c/o dysuria. He reports a headache that has persisted most of the day and night. PRN morphine and tylenol provided as ordered. VSS. Will continue to monitor.

## 2023-09-06 ENCOUNTER — READMISSION MANAGEMENT (OUTPATIENT)
Dept: CALL CENTER | Facility: HOSPITAL | Age: 73
End: 2023-09-06
Payer: COMMERCIAL

## 2023-09-06 VITALS
DIASTOLIC BLOOD PRESSURE: 62 MMHG | RESPIRATION RATE: 20 BRPM | HEART RATE: 85 BPM | OXYGEN SATURATION: 88 % | HEIGHT: 72 IN | WEIGHT: 208 LBS | SYSTOLIC BLOOD PRESSURE: 137 MMHG | TEMPERATURE: 98.1 F | BODY MASS INDEX: 28.17 KG/M2

## 2023-09-06 PROCEDURE — 25010000002 MORPHINE PER 10 MG

## 2023-09-06 RX ORDER — IPRATROPIUM BROMIDE AND ALBUTEROL SULFATE 2.5; .5 MG/3ML; MG/3ML
3 SOLUTION RESPIRATORY (INHALATION)
Status: DISCONTINUED | OUTPATIENT
Start: 2023-09-06 | End: 2023-09-06 | Stop reason: HOSPADM

## 2023-09-06 RX ADMIN — FLUTICASONE PROPIONATE 2 SPRAY: 50 SPRAY, METERED NASAL at 08:25

## 2023-09-06 RX ADMIN — MORPHINE SULFATE 4 MG: 4 INJECTION, SOLUTION INTRAMUSCULAR; INTRAVENOUS at 15:32

## 2023-09-06 RX ADMIN — Medication 10 ML: at 08:25

## 2023-09-06 RX ADMIN — PANTOPRAZOLE SODIUM 40 MG: 40 TABLET, DELAYED RELEASE ORAL at 05:25

## 2023-09-06 NOTE — OUTREACH NOTE
Prep Survey      Flowsheet Row Responses   [facility] facility patient discharged from? [facility]   Is LACE score < 7 ? No   Eligibility Readm Mgmt   Discharge diagnosis *Ureterolithiasis   Does the patient have one of the following disease processes/diagnoses(primary or secondary)? Other   Does the patient have Home health ordered? No   Is there a DME ordered? No   Prep survey completed? Yes            CAROL ANN SCHWARZ - Registered Nurse

## 2023-09-06 NOTE — PLAN OF CARE
Goal Outcome Evaluation:  Plan of Care Reviewed With: patient        Progress: improving  Outcome Evaluation: Is pleasant and calm alert and oriented x 4 voices needs independant in room Denies pain, had a short episode of vtac. vss with no acute distress noted.  O2 at 4 l via n/c and sats at 90%. Has rested during night without  problem.

## 2023-09-06 NOTE — DISCHARGE SUMMARY
[facility] Medicine Services  DISCHARGE SUMMARY       Date of Admission: 8/29/2023  Date of Discharge:  9/6/2023  Primary Care Physician: Hi Herrera    Presenting Problem/History of Present Illness:  Ureterolithiasis [N20.1]       Final Discharge Diagnoses:  Active Hospital Problems    Diagnosis     **Ureterolithiasis     Chronic respiratory failure with hypoxia     Pulmonary emphysema        Consults:   Consults       Date and Time Order Name Status Description    8/29/2023  5:12 PM Inpatient Urology Consult              Procedures Performed: None           Pertinent Test Results:   Lab Results (most recent)       Procedure Component Value Units Date/Time    TSH [[id number]]  (Normal) Collected: 09/03/23 0631    Specimen: Blood Updated: 09/03/23 0716     TSH 2.370 uIU/mL     Basic Metabolic Panel [[id number]]  (Abnormal) Collected: 09/03/23 0631    Specimen: Blood Updated: 09/03/23 0711     Glucose 142 mg/dL      BUN 13 mg/dL      Creatinine 0.86 mg/dL      Sodium 132 mmol/L      Potassium 4.2 mmol/L      Chloride 98 mmol/L      CO2 22.0 mmol/L      Calcium 9.1 mg/dL      BUN/Creatinine Ratio 15.1     Anion Gap 12.0 mmol/L      eGFR 90.3 mL/min/1.73     Narrative:      GFR Normal >60  Chronic Kidney Disease <60  Kidney Failure <15    The GFR formula is only valid for adults with stable renal function between ages 18 and 70.    Magnesium [[id number]]  (Normal) Collected: 09/03/23 0631    Specimen: Blood Updated: 09/03/23 0711     Magnesium 1.6 mg/dL     CBC (No Diff) [[id number]]  (Normal) Collected: 09/03/23 0631    Specimen: Blood Updated: 09/03/23 0650     WBC 9.81 10*3/mm3      RBC 5.70 10*6/mm3      Hemoglobin 16.4 g/dL      Hematocrit 47.6 %      MCV 83.5 fL      MCH 28.8 pg      MCHC 34.5 g/dL      RDW 13.6 %      RDW-SD 41.4 fl      MPV 9.2 fL      Platelets 347 10*3/mm3     Potassium [[id number]]  (Normal) Collected: 09/02/23 1633    Specimen: Blood Updated:  09/02/23 1718     Potassium 4.1 mmol/L     Basic Metabolic Panel [[id number]]  (Abnormal) Collected: 09/02/23 0603    Specimen: Blood Updated: 09/02/23 0719     Glucose 132 mg/dL      BUN 12 mg/dL      Creatinine 0.95 mg/dL      Sodium 131 mmol/L      Potassium 3.6 mmol/L      Chloride 95 mmol/L      CO2 23.0 mmol/L      Calcium 9.0 mg/dL      BUN/Creatinine Ratio 12.6     Anion Gap 13.0 mmol/L      eGFR 83.5 mL/min/1.73     Narrative:      GFR Normal >60  Chronic Kidney Disease <60  Kidney Failure <15    The GFR formula is only valid for adults with stable renal function between ages 18 and 70.    CBC & Differential [[id number]]  (Abnormal) Collected: 09/02/23 0603    Specimen: Blood Updated: 09/02/23 0703    Narrative:      The following orders were created for panel order CBC & Differential.  Procedure                               Abnormality         Status                     ---------                               -----------         ------                     CBC Auto Differential[[id number]]        Abnormal            Final result                 Please view results for these tests on the individual orders.    CBC Auto Differential [[id number]]  (Abnormal) Collected: 09/02/23 0603    Specimen: Blood Updated: 09/02/23 0703     WBC 9.69 10*3/mm3      RBC 5.61 10*6/mm3      Hemoglobin 16.4 g/dL      Hematocrit 46.5 %      MCV 82.9 fL      MCH 29.2 pg      MCHC 35.3 g/dL      RDW 13.5 %      RDW-SD 40.4 fl      MPV 9.8 fL      Platelets 313 10*3/mm3      Neutrophil % 72.7 %      Lymphocyte % 12.0 %      Monocyte % 12.5 %      Eosinophil % 1.2 %      Basophil % 0.9 %      Immature Grans % 0.7 %      Neutrophils, Absolute 7.04 10*3/mm3      Lymphocytes, Absolute 1.16 10*3/mm3      Monocytes, Absolute 1.21 10*3/mm3      Eosinophils, Absolute 0.12 10*3/mm3      Basophils, Absolute 0.09 10*3/mm3      Immature Grans, Absolute 0.07 10*3/mm3      nRBC 0.0 /100 WBC     Potassium [[id number]]  (Normal) Collected: 09/01/23 9914     Specimen: Blood Updated: 09/01/23 2155     Potassium 3.8 mmol/L     CBC & Differential [[id number]]  (Abnormal) Collected: 09/01/23 0627    Specimen: Blood Updated: 09/01/23 0657    Narrative:      The following orders were created for panel order CBC & Differential.  Procedure                               Abnormality         Status                     ---------                               -----------         ------                     CBC Auto Differential[[id number]]        Abnormal            Final result                 Please view results for these tests on the individual orders.    CBC Auto Differential [[id number]]  (Abnormal) Collected: 09/01/23 0627    Specimen: Blood Updated: 09/01/23 0657     WBC 8.21 10*3/mm3      RBC 4.93 10*6/mm3      Hemoglobin 14.5 g/dL      Hematocrit 40.8 %      MCV 82.8 fL      MCH 29.4 pg      MCHC 35.5 g/dL      RDW 13.4 %      RDW-SD 40.3 fl      MPV 9.4 fL      Platelets 253 10*3/mm3      Neutrophil % 75.0 %      Lymphocyte % 10.5 %      Monocyte % 11.9 %      Eosinophil % 1.5 %      Basophil % 0.6 %      Immature Grans % 0.5 %      Neutrophils, Absolute 6.16 10*3/mm3      Lymphocytes, Absolute 0.86 10*3/mm3      Monocytes, Absolute 0.98 10*3/mm3      Eosinophils, Absolute 0.12 10*3/mm3      Basophils, Absolute 0.05 10*3/mm3      Immature Grans, Absolute 0.04 10*3/mm3      nRBC 0.0 /100 WBC     Extra Tubes [[id number]] Collected: 08/29/23 1853    Specimen: Blood, Venous Line Updated: 08/29/23 2300    Narrative:      The following orders were created for panel order Extra Tubes.  Procedure                               Abnormality         Status                     ---------                               -----------         ------                     Gold Top - [facility][[id number]]                                   Final result               Gray Top[[id number]]                                         Final result               Light Blue Top[[id number]]                                    Final result                 Please view results for these tests on the individual orders.    Gray Top [[id number]] Collected: 08/29/23 1853    Specimen: Blood Updated: 08/29/23 2300     Extra Tube Hold for add-ons.     Comment: Auto resulted.       Gold Top - [facility] [[id number]] Collected: 08/29/23 1853    Specimen: Blood Updated: 08/29/23 2002     Extra Tube Hold for add-ons.     Comment: Auto resulted.       Light Blue Top [[id number]] Collected: 08/29/23 1853    Specimen: Blood Updated: 08/29/23 2002     Extra Tube Hold for add-ons.     Comment: Auto resulted             Imaging Results (Most Recent)       Procedure Component Value Units Date/Time    US Renal Bilateral [[id number]] Collected: 09/03/23 1654     Updated: 09/03/23 2116    Narrative:      INDICATION:  Kidney stones.    COMPARISON:  None relevant.    TECHNIQUE:  High-resolution grayscale and color Doppler ultrasound was performed of the  bilateral kidneys and the urinary bladder.    FINDINGS:  Both kidneys demonstrate mild prominence of the renal pelvises without calyceal  dilatation.  Right kidney demonstrates an intrarenal calculus 0.4 cm.  Renal  lengths measure 10.9 cm on the right and 11.9 cm on the left.    Urinary bladder appears normal.      Impression:      Prominence of the bilateral renal pelvises.    0.4 cm right intrarenal calculus.          XR Abdomen KUB [[id number]] Collected: 09/01/23 0941     Updated: 09/01/23 1648    Narrative:        HISTORY:  Kidney stones    TECHNIQUE:  Two frontal films of the abdomen were obtained.    FINDINGS:  There is a tiny calculus overlying the left renal shadow.  No definite ureteral  calculi are seen.      Impression:      Tiny calculus overlying the upper pole of the left renal shadow.  No definite  ureteral calculi are seen.    CT Angiogram Chest [[id number]] Collected: 08/30/23 1820     Updated: 08/30/23 1856    Narrative:      INDICATION:  Pulmonary embolism (PE) suspected, unknown  D-dimer.    COMPARISON:  None relevant.    TECHNIQUE:  Helical CT of the chest was performed with intravenous contrast in the pulmonary  arterial phase of enhancement.  Multiplanar and MIP reformations were provided  performed on an independent workstation.    FINDINGS:  No pulmonary embolism.  Thoracic aorta is atherosclerotic without aneurysm or  dissection.  Heart size normal with coronary artery calcifications.  Mediastinal  and bilateral hilar lymphadenopathy, largest node right hilar region 2.2 cm  short axis diameter.    Chronic interstitial lung disease with diffuse intralobular and interlobular  septal thickening, centrilobular paraseptal cystic changes with honeycombing in  the lung bases.  Trace bilateral pleural fluid.      Impression:      No pulmonary embolism.    Chronic ILD with UIP appearance.    Prominent intrathoracic lymphadenopathy, presumably reactive.                  Hospital Course:  The patient is a 73 y.o. male with COPD, chronic hypoxic respiratory failure on 3 L nasal cannula baseline followed by pulmonology in [place], admitted with ureterolithiasis with hydronephrosis as transfer from [facility] where he was for 5 days.  He received ongoing aggressive IV fluid resuscitation while in [facility], on arrival here had developed fluid overload with fluid retention and significant weight gain, requiring diuresis.  He was evaluated by urology.  His right flank pain self resolved and it appeared that ureteral stone had passed on its own without requiring any urologic intervention.  He did have some increased O2 requirements related to fluid retention, after adequate diuresis he improved, edema resolved with weight back to baseline.  Supplemental oxygen was weaned back to his baseline.  Medically stable for discharge home.  Instructed to follow-up with PCP within 1 week.      Condition on Discharge: Stable    Physical Exam on Discharge:  /64 (BP Location: Right arm, Patient  "Position: Sitting)   Pulse 74   Temp 98.1 °F (36.7 °C) (Tympanic)   Resp 20   Ht 182.9 cm (72\")   Wt 94.3 kg (208 lb)   SpO2 (!) 88%   BMI 28.21 kg/m²   Physical Exam  General: No acute distress  Cardiac: Normal rate, regular rhythm  Respiratory: No wheezes rales or rhonchi  Abdomen: Nondistended  Extremities: No edema      Discharge Disposition:  Home or Self Care    Discharge Medications:     Discharge Medications        Continue These Medications        Instructions Start Date   albuterol sulfate  (90 Base) MCG/ACT inhaler  Commonly known as: PROVENTIL HFA;VENTOLIN HFA;PROAIR HFA   2 puffs, Inhalation, Every 4 Hours PRN      albuterol (2.5 MG/3ML) 0.083% nebulizer solution  Commonly known as: PROVENTIL   2.5 mg, Nebulization, Every 4 Hours PRN      aspirin 81 MG tablet   81 mg, Oral      fluticasone 50 MCG/ACT nasal spray  Commonly known as: FLONASE   2 SPRAYS IN EACH NOSTRIL ONCE A DAY      hydrOXYzine 25 MG tablet  Commonly known as: ATARAX   25 mg, Oral, Every 12 Hours PRN      levocetirizine 5 MG tablet  Commonly known as: XYZAL   TAKE 1 TABLET BY MOUTH 2 TIMES DAILY      pantoprazole 20 MG EC tablet  Commonly known as: PROTONIX   20 mg, Oral      Yupelri 175 MCG/3ML nebulizer solution  Generic drug: revefenacin   175 mcg, Nebulization, Daily             Stop These Medications      predniSONE 10 MG tablet  Commonly known as: DELTASONE            ASK your doctor about these medications        Instructions Start Date   omeprazole 20 MG capsule  Commonly known as: priLOSEC   TAKE 1 CAPSULE TWICE A DAY               Discharge Diet: Regular    Activity at Discharge: As tolerated    Discharge Care Plan/Instructions: Take medications as directed.  Follow-up with PCP within 1 week.    Follow-up Appointments:   Future Appointments   Date Time Provider Department Center   10/12/2023  9:15 AM Danna Sorenson APRN [facility]       Test Results Pending at Discharge: None        Time: 33 " minutes

## 2023-09-06 NOTE — PLAN OF CARE
Goal Outcome Evaluation:  Plan of Care Reviewed With: patient      Patient being discharged home.  Problem: Adult Inpatient Plan of Care  Goal: Plan of Care Review  Outcome: Met  Goal: Patient-Specific Goal (Individualized)  Outcome: Met  Goal: Absence of Hospital-Acquired Illness or Injury  Outcome: Met  Intervention: Identify and Manage Fall Risk  Recent Flowsheet Documentation  Taken 9/6/2023 1000 by Tawnya Finch RN  Safety Promotion/Fall Prevention: safety round/check completed  Taken 9/6/2023 0800 by Tawnya Finch RN  Safety Promotion/Fall Prevention: safety round/check completed  Intervention: Prevent Skin Injury  Recent Flowsheet Documentation  Taken 9/6/2023 0800 by Tawnya Finch RN  Body Position: position changed independently  Intervention: Prevent and Manage VTE (Venous Thromboembolism) Risk  Recent Flowsheet Documentation  Taken 9/6/2023 1000 by Tawnay Finch RN  Activity Management: up ad alex  Taken 9/6/2023 0800 by Tawnya Finch RN  Activity Management: up ad alex  Goal: Optimal Comfort and Wellbeing  Outcome: Met  Intervention: Provide Person-Centered Care  Recent Flowsheet Documentation  Taken 9/6/2023 0830 by Tawnya Finch RN  Trust Relationship/Rapport: care explained  Goal: Readiness for Transition of Care  Outcome: Met     Problem: Pain Acute  Goal: Acceptable Pain Control and Functional Ability  Outcome: Met     Problem: Fall Injury Risk  Goal: Absence of Fall and Fall-Related Injury  Outcome: Met  Intervention: Promote Injury-Free Environment  Recent Flowsheet Documentation  Taken 9/6/2023 1000 by Tawnya Finch RN  Safety Promotion/Fall Prevention: safety round/check completed  Taken 9/6/2023 0800 by Tawnya Finch RN  Safety Promotion/Fall Prevention: safety round/check completed     Problem: Gas Exchange Impaired  Goal: Optimal Gas Exchange  Outcome: Met

## 2023-09-06 NOTE — PROGRESS NOTES
I spoke with Dr. Alaniz about patient only wanting neb at night.  Dr. Alaniz requests to continue QID order for nebs at this time.  We will continue to follow-up with the patient.

## 2023-09-07 NOTE — PAYOR COMM NOTE
"Ainsley Valentin  [facility]  Case Management Extender  [phone] phone  [phone] fax      Auth# [id number]     Jeramie Baker (73 y.o. Male)       Date of Birth   1950    Social Security Number       Address   [address] [place] [address]    Home Phone       MRN   [id number]       [employer]    Marital Status                               Admission Date   8/29/23    Admission Type   Urgent    Admitting Provider   Carl Mendoza MD    Attending Provider       Department, Room/Bed   [facility], 371/1       Discharge Date   9/6/2023    Discharge Disposition   Home or Self Care    Discharge Destination   Home                              Attending Provider: (none)   Allergies: Other, Statins    Isolation: None   Infection: None   Code Status: Prior    Ht: 182.9 cm (72\")   Wt: 94.3 kg (208 lb)    Admission Cmt: None   Principal Problem: Ureterolithiasis [N20.1]                   Active Insurance as of 8/29/2023       Primary Coverage       Payor Plan Insurance Group Employer/Plan Group    AETNA MEDICARE REPLACEMENT AETNA MEDICARE REPLACEMENT [id number]       Payor Plan Address Payor Plan Phone Number Payor Plan Fax Number Effective Dates    [address] [phone]  4/1/2022 - None Entered    [facility] [id number]         Subscriber Name Subscriber Birth Date Member ID       JERAMIE BAKER 1950 [id number]                     Emergency Contacts        (Rel.) Home Phone Work Phone Mobile Phone    Meme Ellis (Daughter) -- -- [phone]                 Discharge Summary        Dung Alaniz MD at 09/06/23 1138              [facility] Medicine Services  DISCHARGE SUMMARY       Date of Admission: 8/29/2023  Date of Discharge:  9/6/2023  Primary Care Physician: Hi Herrera    Presenting Problem/History of Present Illness:  Ureterolithiasis " [N20.1]       Final Discharge Diagnoses:  Active Hospital Problems    Diagnosis     **Ureterolithiasis     Chronic respiratory failure with hypoxia     Pulmonary emphysema        Consults:   Consults       Date and Time Order Name Status Description    8/29/2023  5:12 PM Inpatient Urology Consult              Procedures Performed: None           Pertinent Test Results:   Lab Results (most recent)       Procedure Component Value Units Date/Time    TSH [[id number]]  (Normal) Collected: 09/03/23 0631    Specimen: Blood Updated: 09/03/23 0716     TSH 2.370 uIU/mL     Basic Metabolic Panel [[id number]]  (Abnormal) Collected: 09/03/23 0631    Specimen: Blood Updated: 09/03/23 0711     Glucose 142 mg/dL      BUN 13 mg/dL      Creatinine 0.86 mg/dL      Sodium 132 mmol/L      Potassium 4.2 mmol/L      Chloride 98 mmol/L      CO2 22.0 mmol/L      Calcium 9.1 mg/dL      BUN/Creatinine Ratio 15.1     Anion Gap 12.0 mmol/L      eGFR 90.3 mL/min/1.73     Narrative:      GFR Normal >60  Chronic Kidney Disease <60  Kidney Failure <15    The GFR formula is only valid for adults with stable renal function between ages 18 and 70.    Magnesium [[id number]]  (Normal) Collected: 09/03/23 0631    Specimen: Blood Updated: 09/03/23 0711     Magnesium 1.6 mg/dL     CBC (No Diff) [[id number]]  (Normal) Collected: 09/03/23 0631    Specimen: Blood Updated: 09/03/23 0650     WBC 9.81 10*3/mm3      RBC 5.70 10*6/mm3      Hemoglobin 16.4 g/dL      Hematocrit 47.6 %      MCV 83.5 fL      MCH 28.8 pg      MCHC 34.5 g/dL      RDW 13.6 %      RDW-SD 41.4 fl      MPV 9.2 fL      Platelets 347 10*3/mm3     Potassium [[id number]]  (Normal) Collected: 09/02/23 1633    Specimen: Blood Updated: 09/02/23 1718     Potassium 4.1 mmol/L     Basic Metabolic Panel [[id number]]  (Abnormal) Collected: 09/02/23 0603    Specimen: Blood Updated: 09/02/23 0719     Glucose 132 mg/dL      BUN 12 mg/dL      Creatinine 0.95 mg/dL      Sodium 131 mmol/L      Potassium 3.6 mmol/L       Chloride 95 mmol/L      CO2 23.0 mmol/L      Calcium 9.0 mg/dL      BUN/Creatinine Ratio 12.6     Anion Gap 13.0 mmol/L      eGFR 83.5 mL/min/1.73     Narrative:      GFR Normal >60  Chronic Kidney Disease <60  Kidney Failure <15    The GFR formula is only valid for adults with stable renal function between ages 18 and 70.    CBC & Differential [[id number]]  (Abnormal) Collected: 09/02/23 0603    Specimen: Blood Updated: 09/02/23 0703    Narrative:      The following orders were created for panel order CBC & Differential.  Procedure                               Abnormality         Status                     ---------                               -----------         ------                     CBC Auto Differential[[id number]]        Abnormal            Final result                 Please view results for these tests on the individual orders.    CBC Auto Differential [[id number]]  (Abnormal) Collected: 09/02/23 0603    Specimen: Blood Updated: 09/02/23 0703     WBC 9.69 10*3/mm3      RBC 5.61 10*6/mm3      Hemoglobin 16.4 g/dL      Hematocrit 46.5 %      MCV 82.9 fL      MCH 29.2 pg      MCHC 35.3 g/dL      RDW 13.5 %      RDW-SD 40.4 fl      MPV 9.8 fL      Platelets 313 10*3/mm3      Neutrophil % 72.7 %      Lymphocyte % 12.0 %      Monocyte % 12.5 %      Eosinophil % 1.2 %      Basophil % 0.9 %      Immature Grans % 0.7 %      Neutrophils, Absolute 7.04 10*3/mm3      Lymphocytes, Absolute 1.16 10*3/mm3      Monocytes, Absolute 1.21 10*3/mm3      Eosinophils, Absolute 0.12 10*3/mm3      Basophils, Absolute 0.09 10*3/mm3      Immature Grans, Absolute 0.07 10*3/mm3      nRBC 0.0 /100 WBC     Potassium [[id number]]  (Normal) Collected: 09/01/23 2119    Specimen: Blood Updated: 09/01/23 2155     Potassium 3.8 mmol/L     CBC & Differential [[id number]]  (Abnormal) Collected: 09/01/23 0627    Specimen: Blood Updated: 09/01/23 0657    Narrative:      The following orders were created for panel order CBC &  Differential.  Procedure                               Abnormality         Status                     ---------                               -----------         ------                     CBC Auto Differential[[id number]]        Abnormal            Final result                 Please view results for these tests on the individual orders.    CBC Auto Differential [[id number]]  (Abnormal) Collected: 09/01/23 0627    Specimen: Blood Updated: 09/01/23 0657     WBC 8.21 10*3/mm3      RBC 4.93 10*6/mm3      Hemoglobin 14.5 g/dL      Hematocrit 40.8 %      MCV 82.8 fL      MCH 29.4 pg      MCHC 35.5 g/dL      RDW 13.4 %      RDW-SD 40.3 fl      MPV 9.4 fL      Platelets 253 10*3/mm3      Neutrophil % 75.0 %      Lymphocyte % 10.5 %      Monocyte % 11.9 %      Eosinophil % 1.5 %      Basophil % 0.6 %      Immature Grans % 0.5 %      Neutrophils, Absolute 6.16 10*3/mm3      Lymphocytes, Absolute 0.86 10*3/mm3      Monocytes, Absolute 0.98 10*3/mm3      Eosinophils, Absolute 0.12 10*3/mm3      Basophils, Absolute 0.05 10*3/mm3      Immature Grans, Absolute 0.04 10*3/mm3      nRBC 0.0 /100 WBC     Extra Tubes [[id number]] Collected: 08/29/23 1853    Specimen: Blood, Venous Line Updated: 08/29/23 2300    Narrative:      The following orders were created for panel order Extra Tubes.  Procedure                               Abnormality         Status                     ---------                               -----------         ------                     [facility][[id number]]                                   Final result               Gray Top[[id number]]                                         Final result               Light Blue Top[[id number]]                                   Final result                 Please view results for these tests on the individual orders.    Gray Top [[id number]] Collected: 08/29/23 1853    Specimen: Blood Updated: 08/29/23 2300     Extra Tube Hold for add-ons.     Comment: Auto resulted.        Gold Top - [facility] [[id number]] Collected: 08/29/23 1853    Specimen: Blood Updated: 08/29/23 2002     Extra Tube Hold for add-ons.     Comment: Auto resulted.       Light Blue Top [[id number]] Collected: 08/29/23 1853    Specimen: Blood Updated: 08/29/23 2002     Extra Tube Hold for add-ons.     Comment: Auto resulted             Imaging Results (Most Recent)       Procedure Component Value Units Date/Time    US Renal Bilateral [[id number]] Collected: 09/03/23 1654     Updated: 09/03/23 2116    Narrative:      INDICATION:  Kidney stones.    COMPARISON:  None relevant.    TECHNIQUE:  High-resolution grayscale and color Doppler ultrasound was performed of the  bilateral kidneys and the urinary bladder.    FINDINGS:  Both kidneys demonstrate mild prominence of the renal pelvises without calyceal  dilatation.  Right kidney demonstrates an intrarenal calculus 0.4 cm.  Renal  lengths measure 10.9 cm on the right and 11.9 cm on the left.    Urinary bladder appears normal.      Impression:      Prominence of the bilateral renal pelvises.    0.4 cm right intrarenal calculus.          XR Abdomen KUB [[id number]] Collected: 09/01/23 0941     Updated: 09/01/23 1648    Narrative:        HISTORY:  Kidney stones    TECHNIQUE:  Two frontal films of the abdomen were obtained.    FINDINGS:  There is a tiny calculus overlying the left renal shadow.  No definite ureteral  calculi are seen.      Impression:      Tiny calculus overlying the upper pole of the left renal shadow.  No definite  ureteral calculi are seen.    CT Angiogram Chest [[id number]] Collected: 08/30/23 1820     Updated: 08/30/23 1856    Narrative:      INDICATION:  Pulmonary embolism (PE) suspected, unknown D-dimer.    COMPARISON:  None relevant.    TECHNIQUE:  Helical CT of the chest was performed with intravenous contrast in the pulmonary  arterial phase of enhancement.  Multiplanar and MIP reformations were provided  performed on an independent workstation.    FINDINGS:  No  "pulmonary embolism.  Thoracic aorta is atherosclerotic without aneurysm or  dissection.  Heart size normal with coronary artery calcifications.  Mediastinal  and bilateral hilar lymphadenopathy, largest node right hilar region 2.2 cm  short axis diameter.    Chronic interstitial lung disease with diffuse intralobular and interlobular  septal thickening, centrilobular paraseptal cystic changes with honeycombing in  the lung bases.  Trace bilateral pleural fluid.      Impression:      No pulmonary embolism.    Chronic ILD with UIP appearance.    Prominent intrathoracic lymphadenopathy, presumably reactive.                  Hospital Course:  The patient is a 73 y.o. male with COPD, chronic hypoxic respiratory failure on 3 L nasal cannula baseline followed by pulmonology in [place], admitted with ureterolithiasis with hydronephrosis as transfer from [facility] where he was for 5 days.  He received ongoing aggressive IV fluid resuscitation while in [facility], on arrival here had developed fluid overload with fluid retention and significant weight gain, requiring diuresis.  He was evaluated by urology.  His right flank pain self resolved and it appeared that ureteral stone had passed on its own without requiring any urologic intervention.  He did have some increased O2 requirements related to fluid retention, after adequate diuresis he improved, edema resolved with weight back to baseline.  Supplemental oxygen was weaned back to his baseline.  Medically stable for discharge home.  Instructed to follow-up with PCP within 1 week.      Condition on Discharge: Stable    Physical Exam on Discharge:  /64 (BP Location: Right arm, Patient Position: Sitting)   Pulse 74   Temp 98.1 °F (36.7 °C) (Tympanic)   Resp 20   Ht 182.9 cm (72\")   Wt 94.3 kg (208 lb)   SpO2 (!) 88%   BMI 28.21 kg/m²   Physical Exam  General: No acute distress  Cardiac: Normal rate, regular rhythm  Respiratory: No wheezes rales or " rhonchi  Abdomen: Nondistended  Extremities: No edema      Discharge Disposition:  Home or Self Care    Discharge Medications:     Discharge Medications        Continue These Medications        Instructions Start Date   albuterol sulfate  (90 Base) MCG/ACT inhaler  Commonly known as: PROVENTIL HFA;VENTOLIN HFA;PROAIR HFA   2 puffs, Inhalation, Every 4 Hours PRN      albuterol (2.5 MG/3ML) 0.083% nebulizer solution  Commonly known as: PROVENTIL   2.5 mg, Nebulization, Every 4 Hours PRN      aspirin 81 MG tablet   81 mg, Oral      fluticasone 50 MCG/ACT nasal spray  Commonly known as: FLONASE   2 SPRAYS IN EACH NOSTRIL ONCE A DAY      hydrOXYzine 25 MG tablet  Commonly known as: ATARAX   25 mg, Oral, Every 12 Hours PRN      levocetirizine 5 MG tablet  Commonly known as: XYZAL   TAKE 1 TABLET BY MOUTH 2 TIMES DAILY      pantoprazole 20 MG EC tablet  Commonly known as: PROTONIX   20 mg, Oral      Yupelri 175 MCG/3ML nebulizer solution  Generic drug: revefenacin   175 mcg, Nebulization, Daily             Stop These Medications      predniSONE 10 MG tablet  Commonly known as: DELTASONE            ASK your doctor about these medications        Instructions Start Date   omeprazole 20 MG capsule  Commonly known as: priLOSEC   TAKE 1 CAPSULE TWICE A DAY               Discharge Diet: Regular    Activity at Discharge: As tolerated    Discharge Care Plan/Instructions: Take medications as directed.  Follow-up with PCP within 1 week.    Follow-up Appointments:   Future Appointments   Date Time Provider Department Center   10/12/2023  9:15 AM Danna Sorenson APRN [facility]       Test Results Pending at Discharge: None        Time: 33 minutes                Electronically signed by Dung Alaniz MD at 09/06/23 1250       Discharge Order (From admission, onward)       Start     Ordered    09/06/23 1136  Discharge patient  Once        Expected Discharge Date: 09/06/23   Expected Discharge Time: Afternoon    Discharge Disposition: Home or Self Care   Physician of Record for Attribution - Please select from Treatment Team: CARINE SONG [[id number]]   Review needed by CMO to determine Physician of Record: No      Question Answer Comment   Physician of Record for Attribution - Please select from Treatment Team CARINE SONG    Review needed by CMO to determine Physician of Record No        09/06/23 1138

## 2023-09-12 ENCOUNTER — READMISSION MANAGEMENT (OUTPATIENT)
Dept: CALL CENTER | Facility: HOSPITAL | Age: 73
End: 2023-09-12
Payer: COMMERCIAL

## 2023-09-12 NOTE — OUTREACH NOTE
Medical Week 1 Survey      Flowsheet Row Responses   [facility] patient discharged from? [place]   Does the patient have one of the following disease processes/diagnoses(primary or secondary)? Other   Week 1 attempt successful? Yes   Call start time 1432   Call end time 1434   Discharge diagnosis *Ureterolithiasis   Meds reviewed with patient/caregiver? Yes   Is the patient having any side effects they believe may be caused by any medication additions or changes? No   Does the patient have all medications ordered at discharge? N/A   Is the patient taking all medications as directed (includes completed medication regime)? Yes   Does the patient have a primary care provider?  Yes   Does the patient have an appointment with their PCP within 7 days of discharge? Yes   Comments tomorrow at 10:30 with PCP   Psychosocial issues? No   Did the patient receive a copy of their discharge instructions? Yes   Nursing interventions Reviewed instructions with patient   What is the patient's perception of their health status since discharge? Improving   Is the patient/caregiver able to teach back signs and symptoms related to disease process for when to call PCP? Yes   Is the patient/caregiver able to teach back signs and symptoms related to disease process for when to call 911? Yes   Is the patient/caregiver able to teach back the hierarchy of who to call/visit for symptoms/problems? PCP, Specialist, Home health nurse, Urgent Care, ED, 911 Yes   If the patient is a current smoker, are they able to teach back resources for cessation? Not a smoker   Additional teach back comments He is doing well.   Week 1 call completed? Yes   Graduated Yes   Graduated/Revoked comments Denies questions or needs at this time.   Call end time 1434            Alycia WALLER - Licensed Nurse

## 2023-10-11 NOTE — PROGRESS NOTES
" Danna Sorenson, MIKE  [facility]  [address]            [place] [address]  Phone: [phone]  Fax: [phone]          Chief Complaint  Emphysema and Recurrent spontaneous pneumothorax    Subjective    History of Present Illness  Jeramie Anaya presents to [facility] for appointment.  The patient has known severe emphysema, ILD with UIP appearance, small airway disease, GERD, chronic respiratory with hypoxemia, and allergic rhinitis.  History of left ptx and thoracoscopy with resection of bleb and pleurodesis in [facility] in July 2019. He is using albuterol HFA as needed.  He has tried multiple inhalers without benefit (symbicort, incruse, stiolto, breo, and spiriva). He is compliant with portable oxygen.  He declines pulmonary rehab. He has most recently tried yupelri neb. He states that he might have benefited from it some. He was hospitalized at [facility] 8/29/23-9/6/23 for ureterolithiasis. The patient states that while he was hospitalized he had respiratory problems 2' \"fluid build up\". He reports that he is some better now and the swelling has improved, but he is still having significant shortness of breath and O2 sats dropping with exertion despite 4L NC. He states that he also has a cough with milky sputum production. He denies fever. He did not resume yupelri post hospitalization as he did not know if he should. CTA from 8/30/23 showed chronic ILD with probable UIP appearance, prominent intrathoracic lymphadenopathy, presumably reactive. Rest/Exercise sat obtained. Patient now qualifies for 10L exertion/4L pd is sufficient with rest. Obtain CXR. Will treat for ILD exacerbation. The patient reports that he benefits from steroid. May consider low dose chronic prednisone therapy. Obtain CTD labs, HP panel, CBC, and CMP. Briefly discussed Ofev. Patient does not feel that he would be interested 2' GI side effects. He states that he " "struggles with GI upset frequently. 2d echo reviewed noted diastolic dysfunction and elevates RVSP. Will make referral to Dr. Erwin.   Objective   Vital Signs:   /78   Pulse 82   Ht 182.9 cm (72.01\")   Wt 98.9 kg (218 lb)   SpO2 (!) 88% Comment: 4L  BMI 29.56 kg/mý     Physical Exam  Vitals reviewed.   Constitutional:       General: He is not in acute distress.     Appearance: He is well-developed and overweight.      Interventions: Nasal cannula in place.   HENT:      Head: Normocephalic and atraumatic.   Eyes:      General: No scleral icterus.     Conjunctiva/sclera: Conjunctivae normal.      Pupils: Pupils are equal, round, and reactive to light.   Cardiovascular:      Rate and Rhythm: Normal rate.   Pulmonary:      Effort: Pulmonary effort is normal. No respiratory distress.      Breath sounds: Decreased breath sounds present. No wheezing or rales.   Musculoskeletal:         General: Normal range of motion.      Cervical back: Normal range of motion and neck supple.      Right lower leg: Edema present.      Left lower leg: Edema present.      Comments: Left arm sling    Skin:     General: Skin is warm and dry.   Neurological:      Mental Status: He is alert and oriented to person, place, and time.   Psychiatric:         Behavior: Behavior normal.         Thought Content: Thought content normal.         Judgment: Judgment normal.        Result Review :  The following data was reviewed by: MIKE Fuentes on 10/12/2023:    XR Chest 2 View (08/01/2023 11:15)   IMPRESSION:  1. There is an 8 cm lucent appearance of the RIGHT lateral chest,  favored to represent a bone lesion. Gas in the pleura considered less  likely. Recommend CT chest for further evaluation.  2. Diffuse interstitial opacities could be seen with chronic lung  disease or atypical/viral infection.     [facility] Medicine [facility]  DISCHARGE SUMMARY         Date of Admission: " 8/29/2023  Date of Discharge:  9/6/2023  Primary Care Physician: Hi Herrera     Presenting Problem/History of Present Illness:  Ureterolithiasis [N20.1]                   Final Discharge Diagnoses:       Active Hospital Problems     Diagnosis      **Ureterolithiasis      Chronic respiratory failure with hypoxia      Pulmonary emphysema        CT Angiogram Chest (08/30/2023 18:09)   IMPRESSION:  No pulmonary embolism.     Chronic ILD with UIP appearance.     Prominent intrathoracic lymphadenopathy, presumably reactive.     Adult Transthoracic Echo Complete W/ Cont if Necessary Per Protocol (09/03/2023 09:51)     Left ventricular systolic function is normal. Left ventricular ejection fraction appears to be 51 - 55%.    Left ventricular diastolic function is consistent with (grade I) impaired relaxation.    Estimated right ventricular systolic pressure from tricuspid regurgitation is moderately elevated (45-55 mmHg).    There is a trivial pericardial effusion.  RVSP 49.4mmHg     Rest/Exercise Pulse Ox Values          11/1/2021    15:15 8/1/2023    10:00 10/12/2023    09:15   Rest/Exercise Pulse Ox Results   Rest room air SAT % 88     Exercise room air SAT % 86     Rest on O2 @ Liters 2 3 4L PD   Rest on O2 SAT % 94 92 93       4L CONT 97%   Exercise on O2 @ Liters 2 3 6L CONT   Exercise on O2 SAT % 86 71 79     PFT Values          11/30/2021    09:15 1/20/2023    10:45   Pre Drug PFT Results   FVC 90 91   FEV1 82 79   FEF 25-75% 55 45   FEV1/FVC 71.62 67.9   Other Tests PFT Results   DLCO 42 35   D/VAsb 51 45       Results for orders placed in visit on 01/20/23    Pulmonary Function Test    Narrative  Pulmonary Function Test  Performed by: Silvino Kevin CMA  Authorized by: Danna Sorenson APRN    Pre Drug % Predicted  FVC: 91%  FEV1: 79%  FEF 25-75%: 45%  FEV1/FVC: 67.9%  DLCO: 35%  D/VAsb: 45%    Interpretation  Spirometry  Spirometry shows mild obstruction. There is reduced midflow suggesting small  airway/airflow obstruction.  Review of FVL curve  Patient's effort is normal.  Diffusion Capacity  The patient's diffusion capacity is severely reduced.  Diffusion capacity is severely reduced when corrected for alveolar volume.      Results for orders placed in visit on 21    Pulmonary Function Test    Narrative  Pulmonary Function Test  Performed by: Luan Sanford RRT  Authorized by: Danna Sorenson APRN    Pre Drug % Predicted  FVC: 90%  FEV1: 82%  FEF 25-75%: 55%  FEV1/FVC: 71.62%  DLCO: 42%  D/VAsb: 51%    Interpretation  Spirometry  Spirometry shows normal results. There is reduced midflow suggesting small airway/airflow obstruction.  Diffusion Capacity  The patient's diffusion capacity is severely reduced.  Diffusion capacity is moderately reduced when corrected for alveolar volume.      Results for orders placed in visit on 10/17/19    Pulmonary Function Test    Narrative  Pulmonary Function Test  Performed by: Danna Sorenson APRN  Authorized by: Danna Sorenson APRN    Pre Drug  FVC: 76%  FEV1: 76%  FEF 25-75%: 70%  FEV1/FVC: 78.71%  T%  RV: 67%  DLCO: 41%  D/VAsb: 48%           Assessment and Plan  Diagnoses and all orders for this visit:    1. Shortness of breath (Primary)  Assessment & Plan:  He was hospitalized at [facility] 23-23 for ureterolithiasis. The patient states that while he was hospitalized he had respiratory problems 2' fluid build up. He reports that he is some better now and the swelling has improved, but he is still having significant shortness of breath and O2 sats dropping with exertion despite 4L NC. He states that he also has a cough with milky sputum production. He denies fever. He did not resume yupelri post hospitalization as he did not know if he should. CTA from 23 showed chronic ILD with probable UIP appearance, prominent intrathoracic lymphadenopathy, presumably reactive. Rest/Exercise sat obtained. Patient now qualifies for 10L  exertion/4L pd is sufficient with rest. Obtain CXR. Will treat for ILD exacerbation.    Orders:  -     XR Chest 2 View; Future  -     Walking Oximetry; Future  -     BNP; Future  -     predniSONE (DELTASONE) 10 MG tablet; Take 4 tabs daily x 3 days, then take 3 tabs daily x 3 days, then take 2 tabs daily x 3 days, then take 1 tab daily x 3 days  Dispense: 31 tablet; Refill: 0  -     cefdinir (OMNICEF) 300 MG capsule; Take 1 capsule by mouth 2 (Two) Times a Day for 7 days.  Dispense: 14 capsule; Refill: 0  -     Walking Oximetry    2. ILD (interstitial lung disease)  Overview:  CT Angiogram Chest (08/30/2023 18:09)  Chronic interstitial lung disease with diffuse intralobular and interlobular septal thickening, centrilobular paraseptal cystic changes with honeycombing in  the lung bases.    Assessment & Plan:  The patient reports that he benefits from steroid. May consider low dose chronic prednisone therapy. Obtain CTD labs, HP panel, CBC, and CMP. Briefly discussed Ofev. Patient does not feel that he would be interested 2' GI side effects. He states that he struggles with GI upset frequently. Recommend HRCT in Nov. 2023.     Orders:  -     Cancel: Rheumatoid Factor; Future  -     Cancel: Cyclic Citrul Peptide Antibody, IgG / IgA; Future  -     Cancel: ANCA Panel; Future  -     Cancel: PERICO With / DsDNA, RNP, Sjogrens A / B, Estrada; Future  -     Cancel: Hypersensitivity Pneumonitis Profile; Future  -     Cancel: BNP; Future  -     Cancel: Comprehensive Metabolic Panel; Future  -     Cancel: CBC & Differential; Future  -     Cancel: PERICO With / DsDNA, RNP, Sjogrens A / B, Estrada; Future  -     Cancel: ANCA Panel; Future  -     Rheumatoid Factor; Future  -     Cyclic Citrul Peptide Antibody, IgG / IgA; Future  -     ANCA Panel; Future  -     PERICO With / DsDNA, RNP, Sjogrens A / B, Estrada; Future  -     Hypersensitivity Pneumonitis Profile; Future  -     CBC & Differential; Future  -     Comprehensive Metabolic Panel;  Future  -     predniSONE (DELTASONE) 10 MG tablet; Take 4 tabs daily x 3 days, then take 3 tabs daily x 3 days, then take 2 tabs daily x 3 days, then take 1 tab daily x 3 days  Dispense: 31 tablet; Refill: 0  -     cefdinir (OMNICEF) 300 MG capsule; Take 1 capsule by mouth 2 (Two) Times a Day for 7 days.  Dispense: 14 capsule; Refill: 0  -     Oxygen Therapy    3. Allergic rhinitis, unspecified seasonality, unspecified trigger  Overview:  Xyzal, Flonase    Assessment & Plan:  Continue current treatment regimen.    Discussed using flonase 3 days a week.         4. Chronic respiratory failure with hypoxia  Overview:  O2 10L exertion/4L rest     Assessment & Plan:  Rest/Exercise sat obtained. Patient now qualifies for 10L exertion/4L pd is sufficient with rest.     New order placed.       5. Pulmonary emphysema, unspecified emphysema type  Overview:  8/20/19 alpha 1 MM  Albuterol HFA/neb    Assessment & Plan:  Resume yupelri neb.         6. Diastolic dysfunction  Assessment & Plan:  Adult Transthoracic Echo Complete W/ Cont if Necessary Per Protocol (09/03/2023 09:51)  Left ventricular diastolic function is consistent with (grade I) impaired relaxation.    Referral to heart failure clinic.     Orders:  -     Ambulatory Referral to Heart Failure Clinic    7. Pulmonary hypertension  Overview:  Adult Transthoracic Echo Complete W/ Cont if Necessary Per Protocol (09/03/2023 09:51)    Left ventricular systolic function is normal. Left ventricular ejection fraction appears to be 51 - 55%.    Left ventricular diastolic function is consistent with (grade I) impaired relaxation.    Estimated right ventricular systolic pressure from tricuspid regurgitation is moderately elevated (45-55 mmHg).    There is a trivial pericardial effusion.  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    Referral to Dr. Erwin.     Orders:  -     Ambulatory Referral to Heart Failure Clinic    8. Lymphadenopathy  Overview:  CT  Angiogram Chest (08/30/2023 18:09)  Prominent intrathoracic lymphadenopathy, presumably reactive    Assessment & Plan:  Obtain f/u CT in Nov. 2023.           Follow Up  Danna Sorenson, APRN  10/12/2023  15:30 CDT    Return in about 3 weeks (around 11/2/2023) for 3-4 weeks add on.    Patient was given instructions and counseling regarding his condition or for health maintenance advice. Please see specific information pulled into the AVS if appropriate.     Please note that portions of this note were completed with a voice recognition program.

## 2023-10-12 ENCOUNTER — HOSPITAL ENCOUNTER (OUTPATIENT)
Dept: GENERAL RADIOLOGY | Facility: HOSPITAL | Age: 73
Discharge: HOME | End: 2023-10-12
Payer: MEDICARE

## 2023-10-12 ENCOUNTER — OFFICE VISIT (OUTPATIENT)
Dept: PULMONOLOGY | Facility: CLINIC | Age: 73
End: 2023-10-12
Payer: COMMERCIAL

## 2023-10-12 ENCOUNTER — LAB (OUTPATIENT)
Dept: LAB | Facility: HOSPITAL | Age: 73
End: 2023-10-12
Payer: MEDICARE

## 2023-10-12 VITALS
SYSTOLIC BLOOD PRESSURE: 126 MMHG | BODY MASS INDEX: 29.53 KG/M2 | HEART RATE: 82 BPM | DIASTOLIC BLOOD PRESSURE: 78 MMHG | WEIGHT: 218 LBS | OXYGEN SATURATION: 88 % | HEIGHT: 72 IN

## 2023-10-12 DIAGNOSIS — J84.9 ILD (INTERSTITIAL LUNG DISEASE): ICD-10-CM

## 2023-10-12 DIAGNOSIS — J43.9 PULMONARY EMPHYSEMA, UNSPECIFIED EMPHYSEMA TYPE: Chronic | ICD-10-CM

## 2023-10-12 DIAGNOSIS — R06.02 SHORTNESS OF BREATH: ICD-10-CM

## 2023-10-12 DIAGNOSIS — R06.02 SHORTNESS OF BREATH: Primary | ICD-10-CM

## 2023-10-12 DIAGNOSIS — J30.9 ALLERGIC RHINITIS, UNSPECIFIED SEASONALITY, UNSPECIFIED TRIGGER: Chronic | ICD-10-CM

## 2023-10-12 DIAGNOSIS — R59.1 LYMPHADENOPATHY: ICD-10-CM

## 2023-10-12 DIAGNOSIS — J96.11 CHRONIC RESPIRATORY FAILURE WITH HYPOXIA: Chronic | ICD-10-CM

## 2023-10-12 DIAGNOSIS — I51.89 DIASTOLIC DYSFUNCTION: ICD-10-CM

## 2023-10-12 DIAGNOSIS — I27.20 PULMONARY HYPERTENSION: ICD-10-CM

## 2023-10-12 LAB
ALBUMIN SERPL-MCNC: 4.2 G/DL (ref 3.5–5)
ALBUMIN/GLOB SERPL: 1 G/DL (ref 1.1–2.5)
ALP SERPL-CCNC: 102 U/L (ref 24–120)
ALT SERPL W P-5'-P-CCNC: 19 U/L (ref 0–50)
ANION GAP SERPL CALCULATED.3IONS-SCNC: 11 MMOL/L (ref 4–13)
AST SERPL-CCNC: 22 U/L (ref 7–45)
AUTO MIXED CELLS #: 1.2 10*3/MM3 (ref 0.1–2.6)
AUTO MIXED CELLS %: 10.4 % (ref 0.1–24)
BILIRUB SERPL-MCNC: 1 MG/DL (ref 0.1–1)
BNP SERPL-MCNC: 92.4 PG/ML
BUN SERPL-MCNC: 16 MG/DL (ref 5–21)
BUN/CREAT SERPL: 16
CALCIUM SPEC-SCNC: 9.6 MG/DL (ref 8.4–10.4)
CHLORIDE SERPL-SCNC: 100 MMOL/L (ref 98–110)
CO2 SERPL-SCNC: 26 MMOL/L (ref 24–31)
CREAT SERPL-MCNC: 1 MG/DL (ref 0.5–1.4)
EGFRCR SERPLBLD CKD-EPI 2021: 78.5 ML/MIN/1.73
ERYTHROCYTE [DISTWIDTH] IN BLOOD BY AUTOMATED COUNT: 15.3 % (ref 12.3–15.4)
GLOBULIN UR ELPH-MCNC: 4.1 GM/DL
GLUCOSE SERPL-MCNC: 106 MG/DL (ref 70–100)
HCT VFR BLD AUTO: 49.7 % (ref 37.5–51)
HGB BLD-MCNC: 16.7 G/DL (ref 13–17.7)
LYMPHOCYTES # BLD AUTO: 1.6 10*3/MM3 (ref 0.7–3.1)
LYMPHOCYTES NFR BLD AUTO: 13.7 % (ref 19.6–45.3)
MCH RBC QN AUTO: 28.6 PG (ref 26.6–33)
MCHC RBC AUTO-ENTMCNC: 33.6 G/DL (ref 31.5–35.7)
MCV RBC AUTO: 85.1 FL (ref 79–97)
NEUTROPHILS NFR BLD AUTO: 75.9 % (ref 42.7–76)
NEUTROPHILS NFR BLD AUTO: 8.7 10*3/MM3 (ref 1.7–7)
PLATELET # BLD AUTO: 301 10*3/MM3 (ref 140–450)
PMV BLD AUTO: 8.5 FL (ref 6–12)
POTASSIUM SERPL-SCNC: 4.1 MMOL/L (ref 3.5–5.3)
PROT SERPL-MCNC: 8.3 G/DL (ref 6.3–8.7)
RBC # BLD AUTO: 5.84 10*6/MM3 (ref 4.14–5.8)
SODIUM SERPL-SCNC: 137 MMOL/L (ref 135–145)
WBC NRBC COR # BLD: 11.5 10*3/MM3 (ref 3.4–10.8)

## 2023-10-12 PROCEDURE — 86037 ANCA TITER EACH ANTIBODY: CPT

## 2023-10-12 PROCEDURE — 86431 RHEUMATOID FACTOR QUANT: CPT

## 2023-10-12 PROCEDURE — 86606 ASPERGILLUS ANTIBODY: CPT

## 2023-10-12 PROCEDURE — 83880 ASSAY OF NATRIURETIC PEPTIDE: CPT

## 2023-10-12 PROCEDURE — 86200 CCP ANTIBODY: CPT

## 2023-10-12 PROCEDURE — 86602 ANTINOMYCES ANTIBODY: CPT

## 2023-10-12 PROCEDURE — 36415 COLL VENOUS BLD VENIPUNCTURE: CPT

## 2023-10-12 PROCEDURE — 86331 IMMUNODIFFUSION OUCHTERLONY: CPT

## 2023-10-12 PROCEDURE — 86671 FUNGUS NES ANTIBODY: CPT

## 2023-10-12 PROCEDURE — 85025 COMPLETE CBC W/AUTO DIFF WBC: CPT

## 2023-10-12 PROCEDURE — 80053 COMPREHEN METABOLIC PANEL: CPT

## 2023-10-12 PROCEDURE — 71046 X-RAY EXAM CHEST 2 VIEWS: CPT

## 2023-10-12 PROCEDURE — 86609 BACTERIUM ANTIBODY: CPT

## 2023-10-12 PROCEDURE — 83516 IMMUNOASSAY NONANTIBODY: CPT

## 2023-10-12 PROCEDURE — 86038 ANTINUCLEAR ANTIBODIES: CPT

## 2023-10-12 RX ORDER — PREDNISONE 10 MG/1
TABLET ORAL
Qty: 31 TABLET | Refills: 0 | Status: SHIPPED | OUTPATIENT
Start: 2023-10-12

## 2023-10-12 RX ORDER — POTASSIUM CHLORIDE 1500 MG/1
20 TABLET, EXTENDED RELEASE ORAL DAILY
COMMUNITY
Start: 2023-10-07

## 2023-10-12 RX ORDER — CEFDINIR 300 MG/1
300 CAPSULE ORAL 2 TIMES DAILY
Qty: 14 CAPSULE | Refills: 0 | Status: SHIPPED | OUTPATIENT
Start: 2023-10-12 | End: 2023-10-19

## 2023-10-12 RX ORDER — FUROSEMIDE 40 MG/1
40 TABLET ORAL DAILY
COMMUNITY
Start: 2023-10-07

## 2023-10-12 NOTE — PROCEDURES
Walking Oximetry    Performed by: Luna Sanford, RRT  Authorized by: Danna Sorenson APRN    Supplemental Oxygen: continuous  Rest on O2 @ Liters:  4L PD  SAT %:  93 (4L CONT 97%)  Exercise on O2 @ Liters:  6L CONT  SAT %:  79  2nd Exercise on O2 @ Liters:  8L  SAT %: 81  3rd Exercise on O2 @ Liters: 10L  SAT %: 95 (WHEN PT WALKED FAR DISTANCE, SAT WAS 82%)      Discussed with MIKE Clay.  When patient walked distance that  he typically walks in house his sats were 97% on 10L high flow cannula.  If he walked double that distance, sats dropped to 82%.  Danna would like patient to be on 10L and monitor sats at home.  Patient is aware to stop to rest and increase saturation if he feels that it's dropping.  He is agreeable.

## 2023-10-12 NOTE — ASSESSMENT & PLAN NOTE
Adult Transthoracic Echo Complete W/ Cont if Necessary Per Protocol (09/03/2023 09:51)  Left ventricular diastolic function is consistent with (grade I) impaired relaxation.    Referral to heart failure clinic.

## 2023-10-12 NOTE — ASSESSMENT & PLAN NOTE
The patient reports that he benefits from steroid. May consider low dose chronic prednisone therapy. Obtain CTD labs, HP panel, CBC, and CMP. Briefly discussed Ofev. Patient does not feel that he would be interested 2' GI side effects. He states that he struggles with GI upset frequently. Recommend HRCT in Nov. 2023.

## 2023-10-12 NOTE — ASSESSMENT & PLAN NOTE
Rest/Exercise sat obtained. Patient now qualifies for 10L exertion/4L pd is sufficient with rest.     New order placed.

## 2023-10-12 NOTE — PATIENT INSTRUCTIONS
Pulmonary Hypertension  Pulmonary hypertension is a long-term (chronic) condition in which there is high blood pressure in the blood vessels of the lungs (pulmonary arteries). This condition occurs when pulmonary arteries become narrow and tight, making it harder for blood to flow through the lungs. This in turn makes the heart work harder to pump blood through the lungs, making it harder for you to breathe.  Over time, pulmonary hypertension can weaken and damage the heart muscle, specifically the right side of the heart. Pulmonary hypertension is a serious condition that can be life-threatening.  What are the causes?  This condition may be caused by different medical conditions. It can be categorized by cause into five groups:  Group 1: Pulmonary hypertension that is caused when the arteries in the lungs become narrowed, thickened, or stiff (pulmonary arterial hypertension). This may happen with no known cause, or it may be:  Passed from parent to child (hereditary).  Caused by another disease, such as a connective tissue disease (including lupus or scleroderma), congenital heart disease, liver disease, or HIV (human immunodeficiency virus).  Caused by certain medicines or poisons.  Group 2: Pulmonary hypertension that is caused by weakness of the left chamber of the heart (left ventricle) or heart valve disease.  Group 3: Pulmonary hypertension that is caused by chronic lung disease or low oxygen levels. Causes in this group include:  Emphysema or chronic obstructive pulmonary disease (COPD).  Untreated sleep apnea.  Pulmonary fibrosis.  Long-term exposure to high altitudes in certain people who may already be at higher risk for pulmonary hypertension.  Group 4: Pulmonary hypertension that is caused by blood clots in the lungs.  Group 5: Other causes of pulmonary hypertension, such as sickle cell anemia, sarcoidosis, abnormal growths of tissue (tumors) pressing on the pulmonary arteries, and various other  diseases.  What are the signs or symptoms?  Symptoms of this condition include:  Shortness of breath. You may notice shortness of breath with:  Activity, such as walking.  Minimal activity, such as getting dressed.  No activity, like when you are sitting still.  A cough. Sometimes, bloody mucus from the lungs may be coughed up.  Tiredness.  Dizziness, light-headedness, or fainting, especially with physical activity.  Rapid heartbeat, or feeling your heart flutter or skip a beat (palpitations).  Veins in the neck getting larger.  Swelling of the lower legs, abdomen, or both.  Bluish color of the lips and fingertips.  Chest pain or tightness.  Abdominal pain, especially in the upper abdomen.  How is this diagnosed?  This condition may be diagnosed based on one or more of the following tests:  Blood tests.  Imaging tests, such as:  Chest X-ray.  CT scan.  Echocardiogram. This test uses sound waves (ultrasound) to produce an image of the heart.  Ventilation-perfusion scan. This test uses radioactive material to test how well air moves and blood flows in and out of the lungs.  Pulmonary function test. This test measures how much air your lungs can hold. It also tests how well air moves in and out of your lungs.  6-minute walk test. This tests how severe your condition is in relation to your activity levels.  ECG (electrocardiogram). This test records the electrical impulses of the heart.  Cardiac catheterization. This is a procedure in which a thin tube (catheter) is passed into the pulmonary artery and used to test the pressure in your pulmonary artery and the right side of your heart.  Lung biopsy. This involves having a procedure to remove a small sample of lung tissue for testing. This may help determine an underlying cause of your pulmonary hypertension.  How is this treated?  There is no cure for this condition, but treatment can help to relieve symptoms and slow the progress of the condition. Treatment may  include:  Cardiac rehabilitation. This is a treatment program that includes exercise training, education, and counseling to help you get stronger and return to an active lifestyle.  Oxygen therapy.  Medicines that:  Lower blood pressure.  Relax (dilate) the pulmonary blood vessels.  Help the heart beat more efficiently and pump more blood.  Help the body get rid of extra fluid.  Thin the blood in order to prevent blood clots in the lungs.  Lung surgery to relieve pressure on the heart. This may be needed for severe cases that do not respond to medical treatment.  Heart-lung transplant, or lung transplant. This may be done in very severe cases.  Follow these instructions at home:  Eating and drinking    Eat a healthy diet that includes plenty of fresh fruits and vegetables, whole grains, and beans.  Limit your salt (sodium) intake to less than 2,300 mg a day.  Activity  Get plenty of rest.  Exercise as directed. Talk with your health care provider about what type of exercise is safe for you.  Avoid sitting in hot tubs or saunas for long periods of time.  Avoid high altitudes.  Lifestyle  Do not use any products that contain nicotine or tobacco. These products include cigarettes, chewing tobacco, and vaping devices, such as e-cigarettes. If you need help quitting, ask your health care provider.  Avoid secondhand smoke.  General instructions  Take over-the-counter and prescription medicines only as told by your health care provider. Do not change or stop medicines without checking with your health care provider.  Stay up to date on your vaccines, especially yearly flu (influenza) and pneumonia vaccines.  If you are a woman of child-bearing age, avoid becoming pregnant. Talk with your health care provider about birth control.  Consider ways to get support for the anxiety and stress of living with pulmonary hypertension. Talk with your health care provider about support groups and online resources.  Use oxygen therapy at  home as directed.  Keep track of your weight. Weight gain could be a sign that your condition is getting worse.  Keep all follow-up visits. This is important.  Contact a health care provider if:  Your cough gets worse.  You have more shortness of breath than usual, or you start to have trouble doing activities that you could do before.  You need to use medicines or oxygen more frequently or in higher dosages than usual.  Get help right away if:  You have severe shortness of breath.  You have chest pain or pressure.  You cough up blood.  You have swelling of your feet or legs that gets worse.  You have rapid weight gain over a period of 1-2 days.  Your medicines or oxygen do not provide relief.  These symptoms may represent a serious problem that is an emergency. Do not wait to see if the symptoms will go away. Get medical help right away. Call your local emergency services (911 in the [place] Do not drive yourself to the hospital.  Summary  Pulmonary hypertension is a long-term (chronic) condition in which there is high blood pressure in the blood vessels of the lungs (pulmonary arteries).  Pulmonary hypertension is a serious condition that can be life-threatening. It can be caused by a variety of illnesses.  Treatment may involve taking medicines and using oxygen therapy. Severe cases may require surgery or a transplant.  This information is not intended to replace advice given to you by your health care provider. Make sure you discuss any questions you have with your health care provider.  Document Revised: 11/01/2021 Document Reviewed: 11/01/2021  [employer] Patient Education c 2023 [employer]  Allergic Rhinitis, Adult    Allergic rhinitis is an allergic reaction that affects the mucous membrane inside the nose. The mucous membrane is the tissue that produces mucus.  There are two types of allergic rhinitis:  Seasonal. This type is also called hay fever and happens only during certain seasons.  Perennial. This  type can happen at any time of the year.  Allergic rhinitis cannot be spread from person to person. This condition can be mild, moderate, or severe. It can develop at any age and may be outgrown.  What are the causes?  This condition is caused by allergens. These are things that can cause an allergic reaction. Allergens may differ for seasonal allergic rhinitis and perennial allergic rhinitis.  Seasonal allergic rhinitis is triggered by pollen. Pollen can come from grasses, trees, and weeds.  Perennial allergic rhinitis may be triggered by:  Dust mites.  Proteins in a pet's urine, saliva, or dander. Dander is dead skin cells from a pet.  Smoke, mold, or car fumes.  What increases the risk?  You are more likely to develop this condition if you have a family history of allergies or other conditions related to allergies, including:  Allergic conjunctivitis. This is inflammation of parts of the eyes and eyelids.  Asthma. This condition affects the lungs and makes it hard to breathe.  Atopic dermatitis or eczema. This is long term (chronic) inflammation of the skin.  Food allergies.  What are the signs or symptoms?  Symptoms of this condition include:  Sneezing or coughing.  A stuffy nose (nasal congestion), itchy nose, or nasal discharge.  Itchy eyes and tearing of the eyes.  A feeling of mucus dripping down the back of your throat (postnasal drip).  Trouble sleeping.  Tiredness or fatigue.  Headache.  Sore throat.  How is this diagnosed?  This condition may be diagnosed with your symptoms, medical history, and physical exam. Your health care provider may check for related conditions, such as:  Asthma.  Pink eye. This is eye inflammation caused by infection (conjunctivitis).  Ear infection.  Upper respiratory infection. This is an infection in the nose, throat, or upper airways.  You may also have tests to find out which allergens trigger your symptoms. These may include skin tests or blood tests.  How is this  treated?  There is no cure for this condition, but treatment can help control symptoms. Treatment may include:  Taking medicines that block allergy symptoms, such as corticosteroids and antihistamines. Medicine may be given as a shot, nasal spray, or pill.  Avoiding any allergens.  Being exposed again and again to tiny amounts of allergens to help you build a defense against allergens (immunotherapy). This is done if other treatments have not helped. It may include:  Allergy shots. These are injected medicines that have small amounts of allergen in them.  Sublingual immunotherapy. This involves taking small doses of a medicine with allergen in it under your tongue.  If these treatments do not work, your health care provider may prescribe newer, stronger medicines.  Follow these instructions at home:  Avoiding allergens  Find out what you are allergic to and avoid those allergens. These are some things you can do to help avoid allergens:  If you have perennial allergies:  Replace carpet with wood, tile, or vinyl hipolito. Carpet can trap dander and dust.  Do not smoke. Do not allow smoking in your home.  Change your heating and air conditioning filters at least once a month.  If you have seasonal allergies, take these steps during allergy season:  Keep windows closed as much as possible.  Plan outdoor activities when pollen counts are lowest. Check pollen counts before you plan outdoor activities.  When coming indoors, change clothing and shower before sitting on furniture or bedding.  If you have a pet in the house that produces allergens:  Keep the pet out of the bedroom.  Vacuum, sweep, and dust regularly.  General instructions  Take over-the-counter and prescription medicines only as told by your health care provider.  Drink enough fluid to keep your urine pale yellow.  Keep all follow-up visits as told by your health care provider. This is important.  Where to find more information  American Academy of Allergy,  Asthma & Immunology: www.aaaai.org  Contact a health care provider if:  You have a fever.  You develop a cough that does not go away.  You make whistling sounds when you breathe (wheeze).  Your symptoms slow you down or stop you from doing your normal activities each day.  Get help right away if:  You have shortness of breath.  This symptom may represent a serious problem that is an emergency. Do not wait to see if the symptom will go away. Get medical help right away. Call your local emergency services (911 in the [place] Do not drive yourself to the hospital.  Summary  Allergic rhinitis may be managed by taking medicines as directed and avoiding allergens.  If you have seasonal allergies, keep windows closed as much as possible during allergy season.  Contact your health care provider if you develop a fever or a cough that does not go away.  This information is not intended to replace advice given to you by your health care provider. Make sure you discuss any questions you have with your health care provider.  Document Revised: 02/05/2021 Document Reviewed: 12/15/2020  [employer] Patient Education c 2023 [employer]  Pulmonary Fibrosis    Pulmonary fibrosis is a type of lung disease that causes scarring. Over time, the scar tissue builds up in the air sacs of your lungs (alveoli). This makes it hard for you to breathe because less oxygen gets into your bloodstream.  Scarring from pulmonary fibrosis is permanent and may lead to other serious health problems.  What are the causes?  There are many different causes of pulmonary fibrosis. In some cases, the cause is not known. This is called idiopathic pulmonary fibrosis. Other causes include:  Exposure to chemicals and substances found in agricultural, farm, construction, or factory work. These include mold, asbestos, silica, metal dusts, and toxic fumes.  Sarcoidosis. In this disease, areas of inflammatory cells (granulomas) form and most often affect the  lungs.  Autoimmune diseases. These include diseases such as rheumatoid arthritis, systemic sclerosis, or connective tissue disease.  Taking certain medicines. These include drugs used in radiation therapy or used to treat seizures, heart problems, and some infections.  What increases the risk?  You are more likely to develop this condition if:  You have a family history of the disease.  You are an older person. The condition is more common in older adults.  You have a history of smoking.  You have a job that exposes you to certain chemicals.  You have gastroesophageal reflux disease (GERD).  What are the signs or symptoms?  Symptoms of this condition include:  Difficulty breathing that gets worse with activity.  Shortness of breath (dyspnea).  Dry, hacking cough.  Rapid, shallow breathing during exercise or while at rest.  Other symptoms may include:  Loss of appetite or weight loss  Tiredness (fatigue) or weakness.  Bluish skin and lips.  Rounded and enlarged fingertips (clubbing).  How is this diagnosed?  This condition may be diagnosed based on:  Your symptoms and medical history.  A physical exam.  You may also have tests, including:  A test that involves looking inside your lungs with an instrument (bronchoscopy).  Imaging studies of your lungs and heart.  Tests to measure how well you are breathing (pulmonary function tests).  Blood tests.  Tests to see how well your lungs work while you are walking (pulmonary stress test).  A procedure to remove a lung tissue sample to look at it under a microscope (biopsy).  How is this treated?  There is no cure for pulmonary fibrosis. Treatment focuses on managing symptoms and preventing scarring from getting worse. This may include:  Medicines, such as:  Steroids to prevent permanent lung changes.  Medicines to suppress your body's defense system (immune system).  Medicines to help with lung function by reducing inflammation or scarring.  Ongoing monitoring with X-rays  and lab work.  Oxygen therapy.  Pulmonary rehabilitation.  Surgery. In some cases, a lung transplant is possible.  Follow these instructions at home:    Medicines  Take over-the-counter and prescription medicines only as told by your health care provider.  Keep your vaccinations up to date as recommended by your health care provider.  Activity  Get regular exercise, but do not pick activities that are too strenuous for you. Ask your health care provider what activities are safe for you.  If you have physical limitations, you may get exercise by walking, using a stationary bike, or doing chair exercises.  Ask your health care provider about using oxygen while exercising.  Do breathing exercises as told by your health care provider.  Plan rest periods when you get tired.  General instructions  Do not use any products that contain nicotine or tobacco. These products include cigarettes, chewing tobacco, and vaping devices, such as e-cigarettes. If you need help quitting, ask your health care provider.  If you are exposed to chemicals and substances at work, make sure that you wear a mask or respirator at all times.  Learn to manage stress. If you need help to do this, ask your health care provider.  Join a pulmonary rehabilitation program or a support group for people with pulmonary fibrosis.  Eat small meals often so you do not get too full. Overeating can make breathing trouble worse.  Maintain a healthy weight. Lose weight if you need to.  Keep all follow-up visits. This is important.  Where to find more information  American Lung Association: www.lung.org  National Heart, Lung, and Blood [place]: www.nhlbi.nih.gov  Pulmonary Fibrosis Foundation: pulmonaryfibrosis.org  Contact a health care provider if:  You have symptoms that do not get better with medicines.  You are not able to be as active as usual.  You have trouble taking a deep breath.  You have a fever or chills.  You have blue lips or skin.  You have a lot  of headaches.  You cough up mucus that is dark in color.  You have feelings of depression or sadness.  You are unable to sleep because it is hard to breathe.  Get help right away if:  Your symptoms suddenly worsen.  You have chest pain.  You cough up blood.  You get very confused or sleepy.  These symptoms may be an emergency. Get help right away. Call 911.  Do not wait to see if the symptoms will go away.  Do not drive yourself to the hospital.  Summary  Pulmonary fibrosis is a type of lung disease that causes scar tissue to build up in the air sacs of your lungs (alveoli) over time. This makes it hard for you to breathe because less oxygen gets into your bloodstream.  Scarring from pulmonary fibrosis is permanent and may lead to other serious health problems.  You are more likely to develop this condition if you have a family history of the condition or a job that exposes you to certain chemicals.  There is no cure for pulmonary fibrosis. Treatment focuses on managing symptoms and preventing scarring from getting worse.  This information is not intended to replace advice given to you by your health care provider. Make sure you discuss any questions you have with your health care provider.  Document Revised: 08/09/2022 Document Reviewed: 08/09/2022  [employer] Patient Education c 2023 [employer]

## 2023-10-12 NOTE — ASSESSMENT & PLAN NOTE
He was hospitalized at [facility] 8/29/23-9/6/23 for ureterolithiasis. The patient states that while he was hospitalized he had respiratory problems 2' fluid build up. He reports that he is some better now and the swelling has improved, but he is still having significant shortness of breath and O2 sats dropping with exertion despite 4L NC. He states that he also has a cough with milky sputum production. He denies fever. He did not resume yupelri post hospitalization as he did not know if he should. CTA from 8/30/23 showed chronic ILD with probable UIP appearance, prominent intrathoracic lymphadenopathy, presumably reactive. Rest/Exercise sat obtained. Patient now qualifies for 10L exertion/4L pd is sufficient with rest. Obtain CXR. Will treat for ILD exacerbation.

## 2023-10-13 LAB
ANA SER QL: NEGATIVE
C-ANCA TITR SER IF: NORMAL TITER
CCP IGA+IGG SERPL IA-ACNC: 3 UNITS (ref 0–19)
CHROMATIN AB SERPL-ACNC: <10 IU/ML (ref 0–14)
MYELOPEROXIDASE AB SER IA-ACNC: <0.2 UNITS (ref 0–0.9)
P-ANCA ATYPICAL TITR SER IF: NORMAL TITER
P-ANCA TITR SER IF: NORMAL TITER
PROTEINASE3 AB SER IA-ACNC: <0.2 UNITS (ref 0–0.9)

## 2023-10-18 LAB
A FUMIGATUS IGG SER QL: NEGATIVE
A PULLULANS IGG SER QL: NEGATIVE
LACEYELLA SACCHARI AB SER QL ID: NEGATIVE
PIGEON SERUM IGG QL: NEGATIVE
S RECTIVIRGULA IGG SER QL ID: NEGATIVE
T VULGARIS IGG SER QL: NEGATIVE

## 2023-10-25 ENCOUNTER — HOSPITAL ENCOUNTER (OUTPATIENT)
Dept: CARDIOLOGY | Facility: HOSPITAL | Age: 73
Discharge: HOME | End: 2023-10-25
Payer: MEDICARE

## 2023-10-25 VITALS
WEIGHT: 223 LBS | HEART RATE: 73 BPM | BODY MASS INDEX: 29.55 KG/M2 | DIASTOLIC BLOOD PRESSURE: 59 MMHG | HEIGHT: 73 IN | OXYGEN SATURATION: 94 % | SYSTOLIC BLOOD PRESSURE: 114 MMHG

## 2023-10-25 DIAGNOSIS — I27.20 PULMONARY HYPERTENSION: Primary | ICD-10-CM

## 2023-10-25 DIAGNOSIS — J96.11 CHRONIC RESPIRATORY FAILURE WITH HYPOXIA: Chronic | ICD-10-CM

## 2023-10-25 DIAGNOSIS — I51.89 DIASTOLIC DYSFUNCTION: ICD-10-CM

## 2023-10-25 DIAGNOSIS — J84.9 ILD (INTERSTITIAL LUNG DISEASE): Chronic | ICD-10-CM

## 2023-10-25 DIAGNOSIS — I48.0 PAROXYSMAL ATRIAL FIBRILLATION: ICD-10-CM

## 2023-10-25 DIAGNOSIS — J43.9 PULMONARY EMPHYSEMA, UNSPECIFIED EMPHYSEMA TYPE: Chronic | ICD-10-CM

## 2023-10-25 LAB
ANION GAP SERPL CALCULATED.3IONS-SCNC: 6 MMOL/L (ref 5–15)
BUN SERPL-MCNC: 12 MG/DL (ref 8–23)
BUN/CREAT SERPL: 15.8 (ref 7–25)
CALCIUM SPEC-SCNC: 9.3 MG/DL (ref 8.6–10.5)
CHLORIDE SERPL-SCNC: 101 MMOL/L (ref 98–107)
CO2 SERPL-SCNC: 29 MMOL/L (ref 22–29)
CREAT SERPL-MCNC: 0.76 MG/DL (ref 0.76–1.27)
EGFRCR SERPLBLD CKD-EPI 2021: 93.7 ML/MIN/1.73
GLUCOSE SERPL-MCNC: 120 MG/DL (ref 65–99)
POTASSIUM SERPL-SCNC: 4 MMOL/L (ref 3.5–5.2)
SODIUM SERPL-SCNC: 136 MMOL/L (ref 136–145)

## 2023-10-25 PROCEDURE — 80048 BASIC METABOLIC PNL TOTAL CA: CPT | Performed by: HOSPITALIST

## 2023-10-25 RX ORDER — DOCUSATE SODIUM 100 MG/1
100 CAPSULE, LIQUID FILLED ORAL 3 TIMES DAILY PRN
COMMUNITY

## 2023-10-25 RX ORDER — SPIRONOLACTONE 25 MG/1
25 TABLET ORAL DAILY
Qty: 30 TABLET | Refills: 11 | Status: SHIPPED | OUTPATIENT
Start: 2023-10-25

## 2023-10-25 NOTE — PATIENT INSTRUCTIONS
Medication Changes  Please make the following changes to your medications:  Spironolactone - Take 1 (one) 25 mg once daily.

## 2023-10-25 NOTE — PROGRESS NOTES
Reason For Visit:  Pulmonary hypertension and diastolic dysfunction    Subjective        Jeramie Anaya is a 73 y.o. male with the below pertinent PMH who is referred for pulmonary hypertension and diastolic dysfunction.    The patient had been admitted in August in [facility] for urolithiasis with hydronephrosis he reportedly received aggressive IVF resuscitation.  He subsequently was admitted at [facility] having developed fluid overload requiring diuresis; his ureteral stone reportedly had passed without urologic intervention.  He did reportedly have increased oxygen requirement related to the fluid retention, which improved with diuresis.  He did have an episode of A-fib with RVR while in the hospital that he believes is related to all the fluid shifts and electrolyte issues; he states that this was adequate palpitations and resolved relatively quickly in the hospital.  He subsequently followed up with pulmonology 10/12 and reportedly was still having significant shortness of breath and oxygen desaturation with exertion in the setting of a productive cough.  He was prescribed prednisone and cefdinir with suspicion for ILD exacerbation.  He also was referred to the heart failure clinic based on his recent fluid overload and echo findings.  At the time of that visit, BNP was normal at 92.4.  Connective tissue work-up including PERICO, rheumatoid factor, c-ANCA, CCP antibodies, p-ANCA were unremarkable.    Today, the patient reports ongoing dyspnea on exertion that he feels is back to his baseline after his recent steroid course.  He denies any swelling; he states that he has never had any lower extremity edema but that his swelling has been primarily in his abdomen.  He denies any chest pain, lightheadedness, syncope, or orthopnea.  He has not had any notable palpitations other than the episode of A-fib that he had during his recent admission.    ROS: Pertinent findings are included  above.    Cardiac Studies  Echo 9/30/2023: LVEF 51-55%, G1 DD, normal RV size/function, normal biatrial size, mild TR with RVSP 49.4 mmHg, no other significant valve disease, trivial pericardial effusion  On my personal review of the images, I do agree that the LV systolic function is overall preserved and likely in the low normal range, RV appears borderline dilated with normal systolic function no significant septal flattening, LA is upper normal in size, RA appears borderline to mildly dilated, RVSP is mildly to moderately elevated.    Pertinent PMH  ILD with UIP appearance  Severe emphysema  Chronic hypoxemia  History of left pneumothorax and thoracoscopy with resection of bleb and pleurodesis (2019)  GERD    Pertinent past medical, surgical, family, and social history were reviewed and updated in the EMR.      Current Outpatient Medications:     albuterol sulfate  (90 Base) MCG/ACT inhaler, Inhale 2 puffs Every 4 (Four) Hours As Needed for Wheezing., Disp: , Rfl:     aspirin 81 MG tablet, Take 1 tablet by mouth. (Patient not taking: Reported on 10/12/2023), Disp: , Rfl:     fluticasone (FLONASE) 50 MCG/ACT nasal spray, 2 SPRAYS IN EACH NOSTRIL ONCE A DAY, Disp: , Rfl: 2    furosemide (LASIX) 40 MG tablet, Take 1 tablet by mouth Daily., Disp: , Rfl:     levocetirizine (XYZAL) 5 MG tablet, TAKE 1 TABLET BY MOUTH 2 TIMES DAILY, Disp: , Rfl:     pantoprazole (PROTONIX) 20 MG EC tablet, Take 1 tablet by mouth., Disp: , Rfl:     potassium chloride ER (K-TAB) 20 MEQ tablet controlled-release ER tablet, Take 1 tablet by mouth Daily., Disp: , Rfl:     predniSONE (DELTASONE) 10 MG tablet, Take 4 tabs daily x 3 days, then take 3 tabs daily x 3 days, then take 2 tabs daily x 3 days, then take 1 tab daily x 3 days, Disp: 31 tablet, Rfl: 0    revefenacin (Yupelri) 175 MCG/3ML nebulizer solution, Take 3 mL by nebulization Daily., Disp: 90 mL, Rfl: 11     Objective   Vital Signs:  /59 (BP Location: Right arm,  "Patient Position: Sitting)   Pulse 73   Ht 185.4 cm (73\")   Wt 101 kg (223 lb)   SpO2 94%   BMI 29.42 kg/m²   Estimated body mass index is 29.42 kg/m² as calculated from the following:    Height as of this encounter: 185.4 cm (73\").    Weight as of this encounter: 101 kg (223 lb).      Constitutional:       Appearance: Healthy appearance. Not in distress.   Neck:      Vascular: JVD normal.   Pulmonary:      Comments: Diffuse bilateral crackles with normal work of breathing  Cardiovascular:      Normal rate. Regular rhythm.      Murmurs: There is a grade 1/6 systolic murmur at the URSB and LLSB.      No gallop.  No click. No rub.   Edema:     Peripheral edema absent.   Abdominal:      General: There is no distension.      Palpations: Abdomen is soft.      Tenderness: There is no abdominal tenderness.   Skin:     General: Skin is warm and dry.   Neurological:      Mental Status: Alert and oriented to person, place and time.        Result Review :  The following data was reviewed by: Earl Erwin MD on 10/25/2023:  CBC   CBC          9/2/2023    06:03 9/3/2023    06:31 10/12/2023    11:38   CBC   WBC 9.69  9.81  11.50    RBC 5.61  5.70  5.84    Hemoglobin 16.4  16.4  16.7    Hematocrit 46.5  47.6  49.7    MCV 82.9  83.5  85.1    MCH 29.2  28.8  28.6    MCHC 35.3  34.5  33.6    RDW 13.5  13.6  15.3    Platelets 313  347  301      Lipid Panel   BMP   BMP          9/3/2023    06:31 10/12/2023    11:38 10/25/2023    09:58   BMP   BUN 13  16  12    Creatinine 0.86  1.00  0.76    Sodium 132  137  136    Potassium 4.2  4.1  4.0    Chloride 98  100  101    CO2 22.0  26.0  29.0    Calcium 9.1  9.6  9.3      BNP 92.4 on 10/12/2023           Pulmonary Function Test 1/20/2023   Pre Drug % Predicted    FVC: 91%   FEV1: 79%   FEF 25-75%: 45%   FEV1/FVC: 67.9%   DLCO: 35%   D/VAsb: 45%  Interpretation   Spirometry   Spirometry shows mild obstruction. There is reduced midflow suggesting small airway/airflow obstruction. "   Review of FVL curve   Patient's effort is normal.   Diffusion Capacity  The patient's diffusion capacity is severely reduced.  Diffusion capacity is severely reduced when corrected for alveolar volume.          Assessment and Plan   Diagnoses and all orders for this visit:    1. Pulmonary hypertension (Primary)  2. Pulmonary emphysema, unspecified emphysema type  3. ILD (interstitial lung disease)  4. Chronic respiratory failure with hypoxia  5. Diastolic dysfunction  Recent echo does show some pulmonary hypertension, and the patient had issues with decompensated CHF and volume overload (in the setting of significant IVF) during his recent hospitalization; unclear if this was predominantly left-sided versus right-sided heart failure.  Echo does show some diastolic dysfunction, and on my assessment I do believe that there is probably some borderline to mild right heart dilation.  Right heart failure would not be unexpected in the setting of some degree of pulmonary hypertension and his significant underlying pulmonary disease with chronic hypoxia.  I do think it is reasonable to further evaluate pulmonary hypertension with consideration to a RHC to help clarify whether or not pulmonary hypertension is primarily WHO group 3 and if there is a component of WHO group 2 PAH in the setting of his diastolic dysfunction (although left atrium is not significantly dilated, which is reassuring).  If left-sided filling pressures are normal and pulmonary hypertension is confirmed, it may be reasonable to consider treatment with Tyvaso for ILD-PAH, but this would need to be done cautiously in the setting of his emphysema.  Patient would like to consider options moving forward, so for now we will start with a VQ scan as part of his work-up and further discuss RHC at his follow-up visit.  Regarding medications, he will continue Lasix 20 mg daily, and I will add spironolactone 25 mg daily to help some with diuresis/potassium  balance as well as to help promote positive RV remodeling.    6. Paroxysmal atrial fibrillation  He did have A-fib with RVR during his recent hospitalization.  I did discuss the potential stroke risks associated with atrial fibrillation, but he is reluctant about taking an anticoagulant.  I am providing him atrial fibrillation and Eliquis information, and he will consider anticoagulation options between now and his next visit.    Other orders  -     spironolactone (ALDACTONE) 25 MG tablet; Take 1 tablet by mouth Daily.  Dispense: 30 tablet; Refill: 11    Follow Up   Return in about 3 weeks (around 11/15/2023).  Patient was given instructions and counseling regarding his condition or for health maintenance advice. Please see specific information pulled into the AVS if appropriate.       EMR Dragon/Transcription disclaimer: Much of this encounter note is an electronic transcription/translation of spoken language to printed text. The electronic translation of spoken language may permit erroneous, or at times, nonsensical words or phrases to be inadvertently transcribed; although I have reviewed the note for such errors, some may still exist.

## 2023-11-02 ENCOUNTER — TELEPHONE (OUTPATIENT)
Dept: CARDIOLOGY | Facility: HOSPITAL | Age: 73
End: 2023-11-02

## 2023-11-02 DIAGNOSIS — I27.20 PULMONARY HYPERTENSION: Primary | ICD-10-CM

## 2023-11-02 NOTE — TELEPHONE ENCOUNTER
Mr. Anaya has called and states that since starting Sprionolactone 25 he is having severe hand cramps. Please advise.

## 2023-11-03 ENCOUNTER — LAB (OUTPATIENT)
Dept: LAB | Facility: HOSPITAL | Age: 73
End: 2023-11-03
Payer: MEDICARE

## 2023-11-03 ENCOUNTER — OFFICE VISIT (OUTPATIENT)
Dept: PULMONOLOGY | Facility: CLINIC | Age: 73
End: 2023-11-03
Payer: MEDICARE

## 2023-11-03 VITALS
HEIGHT: 73 IN | HEART RATE: 98 BPM | SYSTOLIC BLOOD PRESSURE: 126 MMHG | BODY MASS INDEX: 29.03 KG/M2 | OXYGEN SATURATION: 92 % | WEIGHT: 219 LBS | DIASTOLIC BLOOD PRESSURE: 70 MMHG

## 2023-11-03 DIAGNOSIS — J84.9 ILD (INTERSTITIAL LUNG DISEASE): Primary | ICD-10-CM

## 2023-11-03 DIAGNOSIS — J30.9 ALLERGIC RHINITIS, UNSPECIFIED SEASONALITY, UNSPECIFIED TRIGGER: Chronic | ICD-10-CM

## 2023-11-03 DIAGNOSIS — I27.20 PULMONARY HYPERTENSION: ICD-10-CM

## 2023-11-03 DIAGNOSIS — Z23 NEED FOR IMMUNIZATION AGAINST INFLUENZA: ICD-10-CM

## 2023-11-03 DIAGNOSIS — J43.9 PULMONARY EMPHYSEMA, UNSPECIFIED EMPHYSEMA TYPE: Chronic | ICD-10-CM

## 2023-11-03 DIAGNOSIS — J96.11 CHRONIC RESPIRATORY FAILURE WITH HYPOXIA: Chronic | ICD-10-CM

## 2023-11-03 LAB
ANION GAP SERPL CALCULATED.3IONS-SCNC: 10 MMOL/L (ref 4–13)
BUN SERPL-MCNC: 19 MG/DL (ref 5–21)
BUN/CREAT SERPL: 20
CALCIUM SPEC-SCNC: 9.8 MG/DL (ref 8.4–10.4)
CHLORIDE SERPL-SCNC: 97 MMOL/L (ref 98–110)
CO2 SERPL-SCNC: 26 MMOL/L (ref 24–31)
CREAT SERPL-MCNC: 0.95 MG/DL (ref 0.5–1.4)
EGFRCR SERPLBLD CKD-EPI 2021: 83.5 ML/MIN/1.73
GLUCOSE SERPL-MCNC: 127 MG/DL (ref 70–100)
POTASSIUM SERPL-SCNC: 4.6 MMOL/L (ref 3.5–5.3)
SODIUM SERPL-SCNC: 133 MMOL/L (ref 135–145)

## 2023-11-03 PROCEDURE — 80048 BASIC METABOLIC PNL TOTAL CA: CPT

## 2023-11-03 PROCEDURE — 83735 ASSAY OF MAGNESIUM: CPT

## 2023-11-03 PROCEDURE — 1159F MED LIST DOCD IN RCRD: CPT | Performed by: NURSE PRACTITIONER

## 2023-11-03 PROCEDURE — 1160F RVW MEDS BY RX/DR IN RCRD: CPT | Performed by: NURSE PRACTITIONER

## 2023-11-03 PROCEDURE — G0008 ADMIN INFLUENZA VIRUS VAC: HCPCS | Performed by: NURSE PRACTITIONER

## 2023-11-03 PROCEDURE — 90662 IIV NO PRSV INCREASED AG IM: CPT | Performed by: NURSE PRACTITIONER

## 2023-11-03 PROCEDURE — 99214 OFFICE O/P EST MOD 30 MIN: CPT | Performed by: NURSE PRACTITIONER

## 2023-11-03 PROCEDURE — 36415 COLL VENOUS BLD VENIPUNCTURE: CPT

## 2023-11-03 RX ORDER — PREDNISONE 10 MG/1
10 TABLET ORAL DAILY
Qty: 30 TABLET | Refills: 3 | Status: SHIPPED | OUTPATIENT
Start: 2023-11-03

## 2023-11-03 RX ORDER — POTASSIUM CHLORIDE 750 MG/1
10 TABLET, FILM COATED, EXTENDED RELEASE ORAL EVERY OTHER DAY
Qty: 30 TABLET | Refills: 5 | Status: SHIPPED | OUTPATIENT
Start: 2023-11-03

## 2023-11-03 RX ORDER — SPIRONOLACTONE 25 MG/1
12.5 TABLET ORAL DAILY
Start: 2023-11-03

## 2023-11-03 NOTE — ASSESSMENT & PLAN NOTE
He states that he feels the best when he is on prednisone. He states that he cleaned his entire house while he was on the last sterid taper. He really wants to consider low dose chronic prednisone therapy. Risk/benefits were discussed. He states that he does have a hard time tolerating vit D/calcium supplements, but is willing to try another brand.    Begin prednisone 10mg orally daily.

## 2023-11-03 NOTE — ASSESSMENT & PLAN NOTE
He is not longer sure if he is benefiting from yupleri. He states that he will continue the supply he has and then consider refilling.

## 2023-11-03 NOTE — PROGRESS NOTES
MIKE Song  [facility]  [address]            [place] [address]  Phone: [phone]  Fax: [phone]          Chief Complaint  Pulmonary emphysema, unspecified emphysema type (Walked back on 10 L and it was 86% after sitting on 10L it went up to 92%/After sitting on 4L on POC 02 was 92%)    Subjective    History of Present Illness  Jeramie Anaya presents to [facility] for appointment.  The patient has known severe emphysema, ILD with UIP appearance, small airway disease, GERD, chronic respiratory with hypoxemia, and allergic rhinitis. History of left ptx and thoracoscopy with resection of bleb and pleurodesis in [facility] in July 2019. He is using albuterol HFA as needed.  He has tried multiple inhalers without benefit (symbicort, incruse, stiolto, breo, and spiriva). He is compliant with portable oxygen. He was referred to Dr. Erwin last OV 10/12/23 2' recent hospitalization with volume OL, episode of afib with RVR, and 2d echo from 9/3/23 identifying mild to moderately elevated RVSP.     Office note from Dr. Erwin 10/25/23 reviewed:    Recent echo does show some pulmonary hypertension, and the patient had issues with decompensated CHF and volume overload (in the setting of significant IVF) during his recent hospitalization; unclear if this was predominantly left-sided versus right-sided heart failure.  Echo does show some diastolic dysfunction, and on my assessment I do believe that there is probably some borderline to mild right heart dilation.  Right heart failure would not be unexpected in the setting of some degree of pulmonary hypertension and his significant underlying pulmonary disease with chronic hypoxia.  I do think it is reasonable to further evaluate pulmonary hypertension with consideration to a RHC to help clarify whether or not pulmonary hypertension is primarily WHO group 3 and if there is a component of  "WHO group 2 PAH in the setting of his diastolic dysfunction (although left atrium is not significantly dilated, which is reassuring).  If left-sided filling pressures are normal and pulmonary hypertension is confirmed, it may be reasonable to consider treatment with Tyvaso for ILD-PAH, but this would need to be done cautiously in the setting of his emphysema.  Patient would like to consider options moving forward, so for now we will start with a VQ scan as part of his work-up and further discuss RHC at his follow-up visit.  Regarding medications, he will continue Lasix 20 mg daily, and I will add spironolactone 25 mg daily to help some with diuresis/potassium balance as well as to help promote positive RV remodeling.     The patient reports that he did not tolerate spironolactone and has stopped taking it. He states that he did call Dr. Erwin's office to let them know that he has stopped taking the medication. He is hesitant to complete RHC, but after discussing he will likely consider it. VQ scan is pending. He states that he feels the best when he is on prednisone. He states that he cleaned his entire house while he was on the last sterid taper. He really wants to consider low dose chronic prednisone therapy. Risk/benefits were discussed. He states that he does have a hard time tolerating vit D/calcium supplements, but is willing to try another brand.  He is not longer sure if he is benefiting from yupleri. He states that he will continue the supply he has and then consider refilling. No other aggravating or alleviating factors.     Objective   Vital Signs:   /70   Pulse 98   Ht 185.4 cm (73\")   Wt 99.3 kg (219 lb)   SpO2 92% Comment: 10L  BMI 28.89 kg/m²     Physical Exam  Vitals reviewed.   Constitutional:       General: He is not in acute distress.     Appearance: He is well-developed.      Interventions: Nasal cannula in place.   HENT:      Head: Normocephalic and atraumatic.   Eyes:      General: " No scleral icterus.     Conjunctiva/sclera: Conjunctivae normal.      Pupils: Pupils are equal, round, and reactive to light.   Cardiovascular:      Rate and Rhythm: Normal rate.   Pulmonary:      Effort: Pulmonary effort is normal. No respiratory distress.      Breath sounds: Decreased breath sounds present. No wheezing or rales.   Musculoskeletal:         General: Normal range of motion.      Cervical back: Normal range of motion and neck supple.      Comments: Left arm sling    Skin:     General: Skin is warm and dry.   Neurological:      Mental Status: He is alert and oriented to person, place, and time.   Psychiatric:         Behavior: Behavior normal.         Thought Content: Thought content normal.         Judgment: Judgment normal.        Result Review :  The following data was reviewed by: MIKE Fuentes on 11/03/2023:    XR Chest 2 View (10/12/2023 11:41)   IMPRESSION:  1.  Stable chest exam without acute process. Underlying pulmonary  fibrosis.  This report was signed and finalized on 10/12/2023 3:17 PM CDT by Dr Jhon Mahajan.      Rest/Exercise Pulse Ox Values          8/1/2023    10:00 10/12/2023    09:15   Rest/Exercise Pulse Ox Results   Rest on O2 @ Liters 3 4L PD   Rest on O2 SAT % 92 93       4L CONT 97%   Exercise on O2 @ Liters 3 6L CONT   Exercise on O2 SAT % 71 79     PFT Values          11/30/2021    09:15 1/20/2023    10:45   Pre Drug PFT Results   FVC 90 91   FEV1 82 79   FEF 25-75% 55 45   FEV1/FVC 71.62 67.9   Other Tests PFT Results   DLCO 42 35   D/VAsb 51 45       Results for orders placed in visit on 01/20/23    Pulmonary Function Test    Narrative  Pulmonary Function Test  Performed by: Silvino Kevin CMA  Authorized by: Danna Sorenson APRN    Pre Drug % Predicted  FVC: 91%  FEV1: 79%  FEF 25-75%: 45%  FEV1/FVC: 67.9%  DLCO: 35%  D/VAsb: 45%    Interpretation  Spirometry  Spirometry shows mild obstruction. There is reduced midflow suggesting small airway/airflow  obstruction.  Review of FVL curve  Patient's effort is normal.  Diffusion Capacity  The patient's diffusion capacity is severely reduced.  Diffusion capacity is severely reduced when corrected for alveolar volume.      Results for orders placed in visit on 21    Pulmonary Function Test    Narrative  Pulmonary Function Test  Performed by: Luna Sanford RRT  Authorized by: Danna Sorenson APRN    Pre Drug % Predicted  FVC: 90%  FEV1: 82%  FEF 25-75%: 55%  FEV1/FVC: 71.62%  DLCO: 42%  D/VAsb: 51%    Interpretation  Spirometry  Spirometry shows normal results. There is reduced midflow suggesting small airway/airflow obstruction.  Diffusion Capacity  The patient's diffusion capacity is severely reduced.  Diffusion capacity is moderately reduced when corrected for alveolar volume.      Results for orders placed in visit on 10/17/19    Pulmonary Function Test    Narrative  Pulmonary Function Test  Performed by: Danna Sorenson APRN  Authorized by: Danna Sorenson APRN    Pre Drug  FVC: 76%  FEV1: 76%  FEF 25-75%: 70%  FEV1/FVC: 78.71%  T%  RV: 67%  DLCO: 41%  D/VAsb: 48%           Assessment and Plan  Diagnoses and all orders for this visit:    1. ILD (interstitial lung disease) (Primary)  Overview:  CT Angiogram Chest (2023 18:09)  Chronic interstitial lung disease with diffuse intralobular and interlobular septal thickening, centrilobular paraseptal cystic changes with honeycombing in the lung bases.    10/13/13 PERICO, ANCA, RF, CCP WNL       Assessment & Plan:  He states that he feels the best when he is on prednisone. He states that he cleaned his entire house while he was on the last sterid taper. He really wants to consider low dose chronic prednisone therapy. Risk/benefits were discussed. He states that he does have a hard time tolerating vit D/calcium supplements, but is willing to try another brand.    Begin prednisone 10mg orally daily.     Orders:  -     predniSONE  (DELTASONE) 10 MG tablet; Take 1 tablet by mouth Daily.  Dispense: 30 tablet; Refill: 3    2. Need for immunization against influenza  -     Fluzone High-Dose 65+yrs    3. Allergic rhinitis, unspecified seasonality, unspecified trigger  Overview:  Xyzal Flonase    Assessment & Plan:  Continue current treatment regimen.       4. Chronic respiratory failure with hypoxia  Overview:  O2 10L exertion/4L rest     Assessment & Plan:  Continue chronic oxygen therapy.  The patient is benefiting from it and wishes to continue it.        5. Pulmonary emphysema, unspecified emphysema type  Overview:  8/20/19 alpha 1 MM    Albuterol HFA/neb, yupelri     Assessment & Plan:  He is not longer sure if he is benefiting from yupleri. He states that he will continue the supply he has and then consider refilling.          6. Pulmonary hypertension  Overview:  Adult Transthoracic Echo Complete W/ Cont if Necessary Per Protocol (09/03/2023 09:51)    Left ventricular systolic function is normal. Left ventricular ejection fraction appears to be 51 - 55%.    Left ventricular diastolic function is consistent with (grade I) impaired relaxation.    Estimated right ventricular systolic pressure from tricuspid regurgitation is moderately elevated (45-55 mmHg).    There is a trivial pericardial effusion.  RVSP 49.4mmHg    VQ scan pending     Assessment & Plan:  The patient reports that he did not tolerate spironolactone and has stopped taking it. He states that he did call Dr. Erwin's office to let them know that he has stopped taking the medication. He is hesitant to complete RHC, but after discussing he will likely consider it. VQ scan is pending.    Keep upcoming appt with Dr. Erwin.           Follow Up  MIKE Fuentes  11/3/2023  15:50 CDT    Return in about 4 weeks (around 12/1/2023) for ok to add on .    Patient was given instructions and counseling regarding his condition or for health maintenance advice. Please see specific  information pulled into the AVS if appropriate.     Please note that portions of this note were completed with a voice recognition program.

## 2023-11-03 NOTE — ASSESSMENT & PLAN NOTE
The patient reports that he did not tolerate spironolactone and has stopped taking it. He states that he did call Dr. Erwin's office to let them know that he has stopped taking the medication. He is hesitant to complete RHC, but after discussing he will likely consider it. VQ scan is pending.    Keep upcoming appt with Dr. Erwin.

## 2023-11-03 NOTE — PATIENT INSTRUCTIONS
Pulmonary Fibrosis    Pulmonary fibrosis is a type of lung disease that causes scarring. Over time, the scar tissue builds up in the air sacs of your lungs (alveoli). This makes it hard for you to breathe because less oxygen gets into your bloodstream.  Scarring from pulmonary fibrosis is permanent and may lead to other serious health problems.  What are the causes?  There are many different causes of pulmonary fibrosis. In some cases, the cause is not known. This is called idiopathic pulmonary fibrosis. Other causes include:  Exposure to chemicals and substances found in agricultural, farm, construction, or factory work. These include mold, asbestos, silica, metal dusts, and toxic fumes.  Sarcoidosis. In this disease, areas of inflammatory cells (granulomas) form and most often affect the lungs.  Autoimmune diseases. These include diseases such as rheumatoid arthritis, systemic sclerosis, or connective tissue disease.  Taking certain medicines. These include drugs used in radiation therapy or used to treat seizures, heart problems, and some infections.  What increases the risk?  You are more likely to develop this condition if:  You have a family history of the disease.  You are an older person. The condition is more common in older adults.  You have a history of smoking.  You have a job that exposes you to certain chemicals.  You have gastroesophageal reflux disease (GERD).  What are the signs or symptoms?  Symptoms of this condition include:  Difficulty breathing that gets worse with activity.  Shortness of breath (dyspnea).  Dry, hacking cough.  Rapid, shallow breathing during exercise or while at rest.  Other symptoms may include:  Loss of appetite or weight loss  Tiredness (fatigue) or weakness.  Bluish skin and lips.  Rounded and enlarged fingertips (clubbing).  How is this diagnosed?  This condition may be diagnosed based on:  Your symptoms and medical history.  A physical exam.  You may also have tests,  including:  A test that involves looking inside your lungs with an instrument (bronchoscopy).  Imaging studies of your lungs and heart.  Tests to measure how well you are breathing (pulmonary function tests).  Blood tests.  Tests to see how well your lungs work while you are walking (pulmonary stress test).  A procedure to remove a lung tissue sample to look at it under a microscope (biopsy).  How is this treated?  There is no cure for pulmonary fibrosis. Treatment focuses on managing symptoms and preventing scarring from getting worse. This may include:  Medicines, such as:  Steroids to prevent permanent lung changes.  Medicines to suppress your body's defense system (immune system).  Medicines to help with lung function by reducing inflammation or scarring.  Ongoing monitoring with X-rays and lab work.  Oxygen therapy.  Pulmonary rehabilitation.  Surgery. In some cases, a lung transplant is possible.  Follow these instructions at home:    Medicines  Take over-the-counter and prescription medicines only as told by your health care provider.  Keep your vaccinations up to date as recommended by your health care provider.  Activity  Get regular exercise, but do not pick activities that are too strenuous for you. Ask your health care provider what activities are safe for you.  If you have physical limitations, you may get exercise by walking, using a stationary bike, or doing chair exercises.  Ask your health care provider about using oxygen while exercising.  Do breathing exercises as told by your health care provider.  Plan rest periods when you get tired.  General instructions  Do not use any products that contain nicotine or tobacco. These products include cigarettes, chewing tobacco, and vaping devices, such as e-cigarettes. If you need help quitting, ask your health care provider.  If you are exposed to chemicals and substances at work, make sure that you wear a mask or respirator at all times.  Learn to manage  stress. If you need help to do this, ask your health care provider.  Join a pulmonary rehabilitation program or a support group for people with pulmonary fibrosis.  Eat small meals often so you do not get too full. Overeating can make breathing trouble worse.  Maintain a healthy weight. Lose weight if you need to.  Keep all follow-up visits. This is important.  Where to find more information  American Lung Association: www.lung.org  National Heart, Lung, and Blood [place]: www.nhlbi.nih.gov  Pulmonary Fibrosis Foundation: pulmonaryfibrosis.org  Contact a health care provider if:  You have symptoms that do not get better with medicines.  You are not able to be as active as usual.  You have trouble taking a deep breath.  You have a fever or chills.  You have blue lips or skin.  You have a lot of headaches.  You cough up mucus that is dark in color.  You have feelings of depression or sadness.  You are unable to sleep because it is hard to breathe.  Get help right away if:  Your symptoms suddenly worsen.  You have chest pain.  You cough up blood.  You get very confused or sleepy.  These symptoms may be an emergency. Get help right away. Call 911.  Do not wait to see if the symptoms will go away.  Do not drive yourself to the hospital.  Summary  Pulmonary fibrosis is a type of lung disease that causes scar tissue to build up in the air sacs of your lungs (alveoli) over time. This makes it hard for you to breathe because less oxygen gets into your bloodstream.  Scarring from pulmonary fibrosis is permanent and may lead to other serious health problems.  You are more likely to develop this condition if you have a family history of the condition or a job that exposes you to certain chemicals.  There is no cure for pulmonary fibrosis. Treatment focuses on managing symptoms and preventing scarring from getting worse.  This information is not intended to replace advice given to you by your health care provider. Make sure  you discuss any questions you have with your health care provider.  Document Revised: 08/09/2022 Document Reviewed: 08/09/2022  Elsevier Patient Education © 2023 [employer]  Pulmonary Hypertension  Pulmonary hypertension is a long-term (chronic) condition in which there is high blood pressure in the blood vessels of the lungs (pulmonary arteries). This condition occurs when pulmonary arteries become narrow and tight, making it harder for blood to flow through the lungs. This in turn makes the heart work harder to pump blood through the lungs, making it harder for you to breathe.  Over time, pulmonary hypertension can weaken and damage the heart muscle, specifically the right side of the heart. Pulmonary hypertension is a serious condition that can be life-threatening.  What are the causes?  This condition may be caused by different medical conditions. It can be categorized by cause into five groups:  Group 1: Pulmonary hypertension that is caused when the arteries in the lungs become narrowed, thickened, or stiff (pulmonary arterial hypertension). This may happen with no known cause, or it may be:  Passed from parent to child (hereditary).  Caused by another disease, such as a connective tissue disease (including lupus or scleroderma), congenital heart disease, liver disease, or HIV (human immunodeficiency virus).  Caused by certain medicines or poisons.  Group 2: Pulmonary hypertension that is caused by weakness of the left chamber of the heart (left ventricle) or heart valve disease.  Group 3: Pulmonary hypertension that is caused by chronic lung disease or low oxygen levels. Causes in this group include:  Emphysema or chronic obstructive pulmonary disease (COPD).  Untreated sleep apnea.  Pulmonary fibrosis.  Long-term exposure to high altitudes in certain people who may already be at higher risk for pulmonary hypertension.  Group 4: Pulmonary hypertension that is caused by blood clots in the lungs.  Group 5:  Other causes of pulmonary hypertension, such as sickle cell anemia, sarcoidosis, abnormal growths of tissue (tumors) pressing on the pulmonary arteries, and various other diseases.  What are the signs or symptoms?  Symptoms of this condition include:  Shortness of breath. You may notice shortness of breath with:  Activity, such as walking.  Minimal activity, such as getting dressed.  No activity, like when you are sitting still.  A cough. Sometimes, bloody mucus from the lungs may be coughed up.  Tiredness.  Dizziness, light-headedness, or fainting, especially with physical activity.  Rapid heartbeat, or feeling your heart flutter or skip a beat (palpitations).  Veins in the neck getting larger.  Swelling of the lower legs, abdomen, or both.  Bluish color of the lips and fingertips.  Chest pain or tightness.  Abdominal pain, especially in the upper abdomen.  How is this diagnosed?  This condition may be diagnosed based on one or more of the following tests:  Blood tests.  Imaging tests, such as:  Chest X-ray.  CT scan.  Echocardiogram. This test uses sound waves (ultrasound) to produce an image of the heart.  Ventilation-perfusion scan. This test uses radioactive material to test how well air moves and blood flows in and out of the lungs.  Pulmonary function test. This test measures how much air your lungs can hold. It also tests how well air moves in and out of your lungs.  6-minute walk test. This tests how severe your condition is in relation to your activity levels.  ECG (electrocardiogram). This test records the electrical impulses of the heart.  Cardiac catheterization. This is a procedure in which a thin tube (catheter) is passed into the pulmonary artery and used to test the pressure in your pulmonary artery and the right side of your heart.  Lung biopsy. This involves having a procedure to remove a small sample of lung tissue for testing. This may help determine an underlying cause of your pulmonary  hypertension.  How is this treated?  There is no cure for this condition, but treatment can help to relieve symptoms and slow the progress of the condition. Treatment may include:  Cardiac rehabilitation. This is a treatment program that includes exercise training, education, and counseling to help you get stronger and return to an active lifestyle.  Oxygen therapy.  Medicines that:  Lower blood pressure.  Relax (dilate) the pulmonary blood vessels.  Help the heart beat more efficiently and pump more blood.  Help the body get rid of extra fluid.  Thin the blood in order to prevent blood clots in the lungs.  Lung surgery to relieve pressure on the heart. This may be needed for severe cases that do not respond to medical treatment.  Heart-lung transplant, or lung transplant. This may be done in very severe cases.  Follow these instructions at home:  Eating and drinking    Eat a healthy diet that includes plenty of fresh fruits and vegetables, whole grains, and beans.  Limit your salt (sodium) intake to less than 2,300 mg a day.  Activity  Get plenty of rest.  Exercise as directed. Talk with your health care provider about what type of exercise is safe for you.  Avoid sitting in hot tubs or saunas for long periods of time.  Avoid high altitudes.  Lifestyle  Do not use any products that contain nicotine or tobacco. These products include cigarettes, chewing tobacco, and vaping devices, such as e-cigarettes. If you need help quitting, ask your health care provider.  Avoid secondhand smoke.  General instructions  Take over-the-counter and prescription medicines only as told by your health care provider. Do not change or stop medicines without checking with your health care provider.  Stay up to date on your vaccines, especially yearly flu (influenza) and pneumonia vaccines.  If you are a woman of child-bearing age, avoid becoming pregnant. Talk with your health care provider about birth control.  Consider ways to get  support for the anxiety and stress of living with pulmonary hypertension. Talk with your health care provider about support groups and online resources.  Use oxygen therapy at home as directed.  Keep track of your weight. Weight gain could be a sign that your condition is getting worse.  Keep all follow-up visits. This is important.  Contact a health care provider if:  Your cough gets worse.  You have more shortness of breath than usual, or you start to have trouble doing activities that you could do before.  You need to use medicines or oxygen more frequently or in higher dosages than usual.  Get help right away if:  You have severe shortness of breath.  You have chest pain or pressure.  You cough up blood.  You have swelling of your feet or legs that gets worse.  You have rapid weight gain over a period of 1-2 days.  Your medicines or oxygen do not provide relief.  These symptoms may represent a serious problem that is an emergency. Do not wait to see if the symptoms will go away. Get medical help right away. Call your local emergency services (911 in the [place] Do not drive yourself to the hospital.  Summary  Pulmonary hypertension is a long-term (chronic) condition in which there is high blood pressure in the blood vessels of the lungs (pulmonary arteries).  Pulmonary hypertension is a serious condition that can be life-threatening. It can be caused by a variety of illnesses.  Treatment may involve taking medicines and using oxygen therapy. Severe cases may require surgery or a transplant.  This information is not intended to replace advice given to you by your health care provider. Make sure you discuss any questions you have with your health care provider.  Document Revised: 11/01/2021 Document Reviewed: 11/01/2021  Elsevier Patient Education © 2023 [employer]

## 2023-11-04 LAB — MAGNESIUM SERPL-MCNC: 2.1 MG/DL (ref 1.6–2.4)

## 2023-11-13 ENCOUNTER — HOSPITAL ENCOUNTER (OUTPATIENT)
Dept: NUCLEAR MEDICINE | Facility: HOSPITAL | Age: 73
Discharge: HOME | End: 2023-11-13
Payer: MEDICARE

## 2023-11-13 ENCOUNTER — HOSPITAL ENCOUNTER (OUTPATIENT)
Dept: GENERAL RADIOLOGY | Facility: HOSPITAL | Age: 73
Discharge: HOME | End: 2023-11-13
Admitting: HOSPITALIST
Payer: MEDICARE

## 2023-11-13 DIAGNOSIS — I27.20 PULMONARY HYPERTENSION: ICD-10-CM

## 2023-11-13 PROCEDURE — 0 TECHNETIUM ALBUMIN AGGREGATED: Performed by: HOSPITALIST

## 2023-11-13 PROCEDURE — 78580 LUNG PERFUSION IMAGING: CPT

## 2023-11-13 PROCEDURE — 71046 X-RAY EXAM CHEST 2 VIEWS: CPT

## 2023-11-13 PROCEDURE — A9540 TC99M MAA: HCPCS | Performed by: HOSPITALIST

## 2023-11-13 RX ADMIN — KIT FOR THE PREPARATION OF TECHNETIUM TC 99M ALBUMIN AGGREGATED 1 DOSE: 2.5 INJECTION, POWDER, FOR SOLUTION INTRAVENOUS at 09:42

## 2023-11-16 ENCOUNTER — HOSPITAL ENCOUNTER (OUTPATIENT)
Dept: CARDIOLOGY | Facility: HOSPITAL | Age: 73
Discharge: HOME | End: 2023-11-16
Payer: MEDICARE

## 2023-11-16 ENCOUNTER — PREP FOR SURGERY (OUTPATIENT)
Dept: OTHER | Facility: HOSPITAL | Age: 73
End: 2023-11-16
Payer: MEDICARE

## 2023-11-16 VITALS
DIASTOLIC BLOOD PRESSURE: 63 MMHG | WEIGHT: 222.4 LBS | SYSTOLIC BLOOD PRESSURE: 139 MMHG | BODY MASS INDEX: 29.34 KG/M2 | HEART RATE: 83 BPM | OXYGEN SATURATION: 90 %

## 2023-11-16 DIAGNOSIS — J96.11 CHRONIC RESPIRATORY FAILURE WITH HYPOXIA: Chronic | ICD-10-CM

## 2023-11-16 DIAGNOSIS — I27.20 PULMONARY HYPERTENSION: Primary | ICD-10-CM

## 2023-11-16 DIAGNOSIS — I48.0 PAROXYSMAL ATRIAL FIBRILLATION: ICD-10-CM

## 2023-11-16 DIAGNOSIS — I51.89 DIASTOLIC DYSFUNCTION: ICD-10-CM

## 2023-11-16 DIAGNOSIS — J84.9 ILD (INTERSTITIAL LUNG DISEASE): Chronic | ICD-10-CM

## 2023-11-16 DIAGNOSIS — J43.9 PULMONARY EMPHYSEMA, UNSPECIFIED EMPHYSEMA TYPE: Chronic | ICD-10-CM

## 2023-11-16 LAB
ANION GAP SERPL CALCULATED.3IONS-SCNC: 9 MMOL/L (ref 5–15)
BUN SERPL-MCNC: 11 MG/DL (ref 8–23)
BUN/CREAT SERPL: 13.6 (ref 7–25)
CALCIUM SPEC-SCNC: 9.2 MG/DL (ref 8.6–10.5)
CHLORIDE SERPL-SCNC: 96 MMOL/L (ref 98–107)
CO2 SERPL-SCNC: 27 MMOL/L (ref 22–29)
CREAT SERPL-MCNC: 0.81 MG/DL (ref 0.76–1.27)
EGFRCR SERPLBLD CKD-EPI 2021: 91.9 ML/MIN/1.73
GLUCOSE SERPL-MCNC: 268 MG/DL (ref 65–99)
POTASSIUM SERPL-SCNC: 3.7 MMOL/L (ref 3.5–5.2)
SODIUM SERPL-SCNC: 132 MMOL/L (ref 136–145)

## 2023-11-16 PROCEDURE — G0463 HOSPITAL OUTPT CLINIC VISIT: HCPCS | Performed by: HOSPITALIST

## 2023-11-16 PROCEDURE — 80048 BASIC METABOLIC PNL TOTAL CA: CPT

## 2023-11-16 RX ORDER — CEFDINIR 300 MG/1
300 CAPSULE ORAL 2 TIMES DAILY
COMMUNITY
Start: 2023-11-12 | End: 2023-11-22

## 2023-11-16 RX ORDER — SODIUM CHLORIDE 9 MG/ML
40 INJECTION, SOLUTION INTRAVENOUS AS NEEDED
OUTPATIENT
Start: 2023-11-16

## 2023-11-16 RX ORDER — SODIUM CHLORIDE 0.9 % (FLUSH) 0.9 %
10 SYRINGE (ML) INJECTION EVERY 12 HOURS SCHEDULED
OUTPATIENT
Start: 2023-11-16

## 2023-11-16 RX ORDER — SODIUM CHLORIDE 0.9 % (FLUSH) 0.9 %
10 SYRINGE (ML) INJECTION AS NEEDED
OUTPATIENT
Start: 2023-11-16

## 2023-11-16 NOTE — PROGRESS NOTES
Reason For Visit:  Pulmonary hypertension    Subjective        Jeramie Anaya is a 73 y.o. male with the below pertinent PMH who presents for follow-up of pulmonary hypertension.    The patient was initially seen by me 10/25/2023 for evaluation of pulmonary hypertension.  At that time, he had just completed a course of steroids for ILD exacerbation.  He reported that his breathing was back to baseline since the steroids, but he had ongoing issues with dyspnea on exertion.  He was started on spironolactone 25 mg daily with discontinuation of his KCl supplements.  Plan to obtain VQ scan and allow time for him to think about a possible RHC.  Subsequently followed up with pulmonology who agreed with RHC for further evaluation.:  To clinic with concerns about cramping in his hands after starting spironolactone; electrolytes were normal at that time.  He was advised to decrease spironolactone to 12.5 mg daily and was started on KCl 10 mEq every other day.    Today, the patient reports that his hand cramping has resolved, and he has not had any other adverse effects since his medication adjustments.  He continues to deny chest pain, lightheadedness, palpitations, orthopnea, or peripheral edema.  He has been dealing with upper respiratory issues for the last couple of weeks and has been treated with steroids that now are tapered down to 10 mg daily for maintenance.  He has felt a little more short of breath in the last 2 days and has an ongoing cough that has not significantly worsened.  He denies any fever/chills, or other new symptoms.  He was doing better when he was on the higher doses of steroids.    ROS: Pertinent findings are included above.    Cardiac Studies  Echo 9/30/2023: LVEF 51-55%, G1 DD, normal RV size/function, normal biatrial size, mild TR with RVSP 49.4 mmHg, no other significant valve disease, trivial pericardial effusion  On my personal review of the images, I do agree that the LV systolic function is  overall preserved and likely in the low normal range, RV appears borderline dilated with normal systolic function no significant septal flattening, LA is upper normal in size, RA appears borderline to mildly dilated, RVSP is mildly to moderately elevated.     Pertinent PMH  ILD with UIP appearance  Severe emphysema  Chronic hypoxemia  History of left pneumothorax and thoracoscopy with resection of bleb and pleurodesis (2019)  GERD    Pertinent past medical, surgical, family, and social history were reviewed.      Current Outpatient Medications:     albuterol sulfate  (90 Base) MCG/ACT inhaler, Inhale 2 puffs Every 4 (Four) Hours As Needed for Wheezing., Disp: , Rfl:     cefdinir (OMNICEF) 300 MG capsule, Take 1 capsule by mouth 2 (Two) Times a Day., Disp: , Rfl:     docusate sodium (COLACE) 100 MG capsule, Take 1 capsule by mouth 2 (Two) Times a Day., Disp: , Rfl:     fluticasone (FLONASE) 50 MCG/ACT nasal spray, 2 sprays by Each Nare route Every Other Day., Disp: , Rfl: 2    furosemide (LASIX) 40 MG tablet, Take 0.5 tablets by mouth Daily., Disp: , Rfl:     levocetirizine (XYZAL) 5 MG tablet, Take 1 tablet by mouth 2 (Two) Times a Day., Disp: , Rfl:     O2 (OXYGEN), Inhale 4 L/min Continuous. 4L at rest, 10L during movement, Disp: , Rfl:     pantoprazole (PROTONIX) 20 MG EC tablet, Take 1 tablet by mouth 2 (Two) Times a Day., Disp: , Rfl:     potassium chloride 10 MEQ CR tablet, Take 1 tablet by mouth Every Other Day., Disp: 30 tablet, Rfl: 5    predniSONE (DELTASONE) 10 MG tablet, Take 1 tablet by mouth Daily., Disp: 30 tablet, Rfl: 3    spironolactone (ALDACTONE) 25 MG tablet, Take 0.5 tablets by mouth Daily., Disp: , Rfl:     revefenacin (Yupelri) 175 MCG/3ML nebulizer solution, Take 3 mL by nebulization Daily. (Patient not taking: Reported on 11/16/2023), Disp: 90 mL, Rfl: 11     Objective   Vital Signs:  /63 (BP Location: Right arm, Patient Position: Sitting)   Pulse 83   Wt 101 kg (222 lb 6.4  "oz)   SpO2 90%   BMI 29.34 kg/m²   Estimated body mass index is 29.34 kg/m² as calculated from the following:    Height as of 11/3/23: 185.4 cm (73\").    Weight as of this encounter: 101 kg (222 lb 6.4 oz).      Constitutional:       Appearance: Healthy appearance. Not in distress.   Neck:      Vascular: JVD normal.   Pulmonary:      Comments: Bilateral lower lobe crackles with scattered expiratory wheezes in the lower lobes.  Normal work of breathing on 4 L nasal cannula oxygen.  Cardiovascular:      Normal rate. Regular rhythm.      Murmurs: There is a grade 1/6 systolic murmur at the LLSB.      No gallop.  No click. No rub.   Edema:     Peripheral edema absent.   Abdominal:      General: There is no distension.      Palpations: Abdomen is soft.      Tenderness: There is no abdominal tenderness.   Skin:     General: Skin is warm and dry.   Neurological:      Mental Status: Alert and oriented to person, place and time.        Result Review :  The following data was reviewed by: Earl Erwin MD on 11/16/2023:  BMP   BMP          10/25/2023    09:58 11/3/2023    11:11 11/16/2023    10:48   BMP   BUN 12  19  11    Creatinine 0.76  0.95  0.81    Sodium 136  133  132    Potassium 4.0  4.6  3.7    Chloride 101  97  96    CO2 29.0  26.0  27.0    Calcium 9.3  9.8  9.2                Assessment and Plan   Diagnoses and all orders for this visit:    1. Pulmonary hypertension (Primary)  2. Pulmonary emphysema, unspecified emphysema type  3. ILD (interstitial lung disease)  4. Chronic respiratory failure with hypoxia  5. Diastolic dysfunction  He does have mildly increased shortness of breath the last couple days although in the setting of some upper respiratory infection and possible ILD exacerbation as he has been tapering off of steroids.  He appears euvolemic on exam.  He is currently completing antibiotics, and I encouraged him to follow-up with pulmonology if his breathing continues to worsen or fails to improve.  " Regarding pulmonary hypertension, we discussed a possible RHC again; the patient is open to proceeding.  - RHC 12/1/2023  - Spironolactone 12.5 mg daily  - Lasix 20 mg daily  - KCl 10 mEq every other day  - Continue with close pulmonology follow-up    6.  Paroxysmal atrial fibrillation  As noted previously, the patient did have A-fib with RVR during hospitalization.  We have discussed potential stroke risks, but the patient has not been interested in anticoagulation.  He has no palpitations or evidence of known A-fib recurrence.    Follow Up   Return in about 8 weeks (around 1/11/2024).  Patient was given instructions and counseling regarding his condition or for health maintenance advice. Please see specific information pulled into the AVS if appropriate.       EMR Dragon/Transcription disclaimer: Much of this encounter note is an electronic transcription/translation of spoken language to printed text. The electronic translation of spoken language may permit erroneous, or at times, nonsensical words or phrases to be inadvertently transcribed; although I have reviewed the note for such errors, some may still exist.

## 2023-12-01 ENCOUNTER — HOSPITAL ENCOUNTER (OUTPATIENT)
Facility: HOSPITAL | Age: 73
Setting detail: HOSPITAL OUTPATIENT SURGERY
Discharge: HOME | End: 2023-12-01
Attending: HOSPITALIST | Admitting: HOSPITALIST
Payer: MEDICARE

## 2023-12-01 VITALS
HEART RATE: 66 BPM | TEMPERATURE: 97.6 F | OXYGEN SATURATION: 98 % | HEIGHT: 73 IN | DIASTOLIC BLOOD PRESSURE: 71 MMHG | SYSTOLIC BLOOD PRESSURE: 112 MMHG | RESPIRATION RATE: 14 BRPM | BODY MASS INDEX: 28.68 KG/M2 | WEIGHT: 216.4 LBS

## 2023-12-01 DIAGNOSIS — I27.20 PULMONARY HYPERTENSION: ICD-10-CM

## 2023-12-01 LAB
ANION GAP SERPL CALCULATED.3IONS-SCNC: 8 MMOL/L (ref 5–15)
ATMOSPHERIC PRESS: 745 MMHG
ATMOSPHERIC PRESS: 745 MMHG
BASE EXCESS BLDV CALC-SCNC: 3.1 MMOL/L (ref 0–2)
BASE EXCESS BLDV CALC-SCNC: 3.7 MMOL/L (ref 0–2)
BDY SITE: ABNORMAL
BDY SITE: ABNORMAL
BODY TEMPERATURE: 37
BODY TEMPERATURE: 37
BUN SERPL-MCNC: 13 MG/DL (ref 8–23)
BUN/CREAT SERPL: 17.8 (ref 7–25)
CALCIUM SPEC-SCNC: 8.8 MG/DL (ref 8.6–10.5)
CHLORIDE SERPL-SCNC: 97 MMOL/L (ref 98–107)
CO2 SERPL-SCNC: 28 MMOL/L (ref 22–29)
COHGB MFR BLD: 2 % (ref 0–5)
COHGB MFR BLD: 2.1 % (ref 0–5)
CREAT SERPL-MCNC: 0.73 MG/DL (ref 0.76–1.27)
EGFRCR SERPLBLD CKD-EPI 2021: 94.9 ML/MIN/1.73
GAS FLOW AIRWAY: 4 LPM
GAS FLOW AIRWAY: 4 LPM
GLUCOSE SERPL-MCNC: 176 MG/DL (ref 65–99)
HCO3 BLDV-SCNC: 26.9 MMOL/L (ref 22–28)
HCO3 BLDV-SCNC: 27.6 MMOL/L (ref 22–28)
HGB BLDA-MCNC: 16.4 G/DL (ref 14–18)
HGB BLDA-MCNC: 16.6 G/DL (ref 14–18)
Lab: ABNORMAL
Lab: ABNORMAL
METHGB BLD QL: 0.5 % (ref 0–3)
METHGB BLD QL: 0.5 % (ref 0–3)
MODALITY: ABNORMAL
MODALITY: ABNORMAL
OXYHGB MFR BLDV: 70.2 % (ref 60–85)
OXYHGB MFR BLDV: 70.4 % (ref 60–85)
PCO2 BLDV: 37.7 MM HG (ref 41–51)
PCO2 BLDV: 38.5 MM HG (ref 41–51)
PH BLDV: 7.46 PH UNITS (ref 7.32–7.42)
PH BLDV: 7.46 PH UNITS (ref 7.32–7.42)
PO2 BLDV: 35.1 MM HG (ref 27–53)
PO2 BLDV: 35.2 MM HG (ref 27–53)
POTASSIUM BLDV-SCNC: 3.9 MMOL/L (ref 3.5–5.2)
POTASSIUM BLDV-SCNC: 4 MMOL/L (ref 3.5–5.2)
POTASSIUM SERPL-SCNC: 4.3 MMOL/L (ref 3.5–5.2)
SAO2 % BLDCOV: 72.1 % (ref 45–75)
SAO2 % BLDCOV: 72.2 % (ref 45–75)
SODIUM BLDV-SCNC: 132 MMOL/L (ref 136–145)
SODIUM BLDV-SCNC: 133 MMOL/L (ref 136–145)
SODIUM SERPL-SCNC: 133 MMOL/L (ref 136–145)
VENTILATOR MODE: ABNORMAL
VENTILATOR MODE: ABNORMAL

## 2023-12-01 PROCEDURE — C1894 INTRO/SHEATH, NON-LASER: HCPCS | Performed by: HOSPITALIST

## 2023-12-01 PROCEDURE — C1769 GUIDE WIRE: HCPCS | Performed by: HOSPITALIST

## 2023-12-01 PROCEDURE — 93005 ELECTROCARDIOGRAM TRACING: CPT | Performed by: HOSPITALIST

## 2023-12-01 PROCEDURE — 99152 MOD SED SAME PHYS/QHP 5/>YRS: CPT | Performed by: HOSPITALIST

## 2023-12-01 PROCEDURE — 93010 ELECTROCARDIOGRAM REPORT: CPT | Performed by: INTERNAL MEDICINE

## 2023-12-01 PROCEDURE — 25010000002 MIDAZOLAM PER 1 MG: Performed by: HOSPITALIST

## 2023-12-01 PROCEDURE — 93451 RIGHT HEART CATH: CPT | Performed by: HOSPITALIST

## 2023-12-01 PROCEDURE — 80048 BASIC METABOLIC PNL TOTAL CA: CPT | Performed by: HOSPITALIST

## 2023-12-01 PROCEDURE — 25010000002 FENTANYL CITRATE (PF) 50 MCG/ML SOLUTION: Performed by: HOSPITALIST

## 2023-12-01 PROCEDURE — 82820 HEMOGLOBIN-OXYGEN AFFINITY: CPT

## 2023-12-01 PROCEDURE — 82805 BLOOD GASES W/O2 SATURATION: CPT

## 2023-12-01 PROCEDURE — 99153 MOD SED SAME PHYS/QHP EA: CPT | Performed by: HOSPITALIST

## 2023-12-01 PROCEDURE — 25810000003 SODIUM CHLORIDE 0.9 % SOLUTION 500 ML FLEX CONT: Performed by: HOSPITALIST

## 2023-12-01 RX ORDER — FENTANYL CITRATE 50 UG/ML
INJECTION, SOLUTION INTRAMUSCULAR; INTRAVENOUS
Status: DISCONTINUED | OUTPATIENT
Start: 2023-12-01 | End: 2023-12-01 | Stop reason: HOSPADM

## 2023-12-01 RX ORDER — SODIUM CHLORIDE 9 MG/ML
40 INJECTION, SOLUTION INTRAVENOUS AS NEEDED
Status: DISCONTINUED | OUTPATIENT
Start: 2023-12-01 | End: 2023-12-01 | Stop reason: HOSPADM

## 2023-12-01 RX ORDER — SODIUM CHLORIDE 0.9 % (FLUSH) 0.9 %
10 SYRINGE (ML) INJECTION AS NEEDED
Status: DISCONTINUED | OUTPATIENT
Start: 2023-12-01 | End: 2023-12-01 | Stop reason: HOSPADM

## 2023-12-01 RX ORDER — MIDAZOLAM HYDROCHLORIDE 1 MG/ML
INJECTION INTRAMUSCULAR; INTRAVENOUS
Status: DISCONTINUED | OUTPATIENT
Start: 2023-12-01 | End: 2023-12-01 | Stop reason: HOSPADM

## 2023-12-01 RX ORDER — SODIUM CHLORIDE 0.9 % (FLUSH) 0.9 %
10 SYRINGE (ML) INJECTION EVERY 12 HOURS SCHEDULED
Status: DISCONTINUED | OUTPATIENT
Start: 2023-12-01 | End: 2023-12-01 | Stop reason: HOSPADM

## 2023-12-01 RX ORDER — LIDOCAINE HYDROCHLORIDE 20 MG/ML
INJECTION, SOLUTION INFILTRATION; PERINEURAL
Status: DISCONTINUED | OUTPATIENT
Start: 2023-12-01 | End: 2023-12-01 | Stop reason: HOSPADM

## 2023-12-01 RX ADMIN — SODIUM CHLORIDE 40 ML: 9 INJECTION, SOLUTION INTRAVENOUS at 09:13

## 2023-12-01 NOTE — Clinical Note
Right internal jugular site manual pressure applied, dressing applied to site. No bleeding , no hematoma.

## 2023-12-01 NOTE — Clinical Note
A 7 fr sheath was  inserted with ultrasound guidance into the right internal jugular vein. Sheath insertion not delayed.

## 2023-12-01 NOTE — Clinical Note
Manual pressure applied to vessel. Manual pressure was held by CATALINA Cannon RN. Manual pressure was held for 5 min. Hemostasis achieved successfully.

## 2023-12-01 NOTE — INTERVAL H&P NOTE
H&P updated. The patient was examined and the following changes are noted:  No changes to exam. The patient was admitted for pneumonia and was treated with antibiotics. He reports breathing now is actually much better.  Proceeding with RHC as planned; risks/benefits of the procedure were discussed with the patient and informed consent obtained.

## 2023-12-01 NOTE — Clinical Note
Allergies reviewed.  H&P note has been confirmed for the patient. Procedural consent has been signed.  Staff has reviewed the patient's labs.  Labs have been reviewed and are in within normal limits, Physician is aware of labs.

## 2023-12-03 LAB
QT INTERVAL: 388 MS
QTC INTERVAL: 444 MS

## 2023-12-04 NOTE — PROGRESS NOTES
" MIKE Sogn  [facility]  [address]            [place] [address]  Phone: [phone]  Fax: [phone]          Chief Complaint  ILD    Subjective    History of Present Illness  Jeramie Anaya presents to [facility] for appointment.  The patient has known severe emphysema, ILD with UIP appearance, small airway disease, GERD, chronic respiratory with hypoxemia (O2 10L exertion/4L rest), and allergic rhinitis. History of left ptx and thoracoscopy with resection of bleb and pleurodesis in [facility] in July 2019. He is using albuterol HFA as needed.  He has tried multiple inhalers without benefit (symbicort, incruse, stiolto, breo, and spiriva, yupelri neb). He is compliant with portable oxygen. He was referred to Dr. Erwin last OV 10/12/23 2' recent hospitalization with volume OL, episode of afib with RVR, and 2d echo from 9/3/23 identifying mild to moderately elevated RVSP. RHC completed 12/1/23 and showed pre-capillary pulmonary hypertension with preserved cardiac output, RA (mean) 7, RV (s/edp): 51/7, PA (s/d/mean) 56/23/34, PCWP (mean) 12, Sultana CO/CI 6/2.7 with PVR 3.7 HAHN. The patient states that Dr. Erwin is initiating Tyvaso for treatment of ILD-PAH.  The patient reports a recent hospitalization at [facility].  He states that he was treated with antibiotics and steroids for pneumonia.  He reports that he is breathing and feeling better than he has in a long time posthospitalization.  He is currently finishing a steroid taper and will continue on prednisone 10 mg daily.  He denies fever, chills, cough with purulent sputum.  Objective   Vital Signs:   /76   Pulse 68   Ht 185.4 cm (73\")   Wt 102 kg (225 lb)   SpO2 97% Comment: 10 L  BMI 29.69 kg/m²     Physical Exam  Vitals reviewed.   Constitutional:       General: He is not in acute distress.     Appearance: He is well-developed.      " Interventions: Nasal cannula in place.   HENT:      Head: Normocephalic and atraumatic.   Eyes:      General: No scleral icterus.     Conjunctiva/sclera: Conjunctivae normal.      Pupils: Pupils are equal, round, and reactive to light.   Cardiovascular:      Rate and Rhythm: Normal rate.   Pulmonary:      Effort: Pulmonary effort is normal. No respiratory distress.      Breath sounds: Normal breath sounds. No wheezing or rales.   Musculoskeletal:         General: Normal range of motion.      Cervical back: Normal range of motion and neck supple.      Comments: Left arm sling   Skin:     General: Skin is warm and dry.   Neurological:      Mental Status: He is alert and oriented to person, place, and time.   Psychiatric:         Behavior: Behavior normal.         Thought Content: Thought content normal.         Judgment: Judgment normal.        Result Review :  The following data was reviewed by: MIKE Fuentes on 12/05/2023:    NM Lung Scan Perfusion Particulate (11/13/2023 10:18)   IMPRESSION:  1. Low probability of pulmonary embolism.      CARDIAC CATHETERIZATION REPORT  Date of Procedure: 12/1/2023   Findings:  Pre-capillary pulmonary hypertension with preserved cardiac output.  RA (mean) 7, RV (s/edp): 51/7, PA (s/d/mean) 56/23/34, PCWP (mean) 12, Sultana CO/CI 6/2.7 with PVR 3.7 HAHN  Recommendations:  Follow-up in CHF clinic and with pulmonology for consideration of treatment for ILD-PAH  Complications:  None    CT Angiogram Chest (08/30/2023 18:09)   IMPRESSION:  No pulmonary embolism.     Chronic ILD with UIP appearance.     Prominent intrathoracic lymphadenopathy, presumably reactive.    Rest/Exercise Pulse Ox Values          8/1/2023    10:00 10/12/2023    09:15   Rest/Exercise Pulse Ox Results   Rest on O2 @ Liters 3 4L PD   Rest on O2 SAT % 92 93       4L CONT 97%   Exercise on O2 @ Liters 3 6L CONT   Exercise on O2 SAT % 71 79     PFT Values          1/20/2023    10:45   Pre Drug PFT Results   FVC  91   FEV1 79   FEF 25-75% 45   FEV1/FVC 67.9   Other Tests PFT Results   DLCO 35   D/VAsb 45       Results for orders placed in visit on 23    Pulmonary Function Test    Narrative  Pulmonary Function Test  Performed by: Silvino Kevin CMA  Authorized by: Danna Sorenson APRN    Pre Drug % Predicted  FVC: 91%  FEV1: 79%  FEF 25-75%: 45%  FEV1/FVC: 67.9%  DLCO: 35%  D/VAsb: 45%    Interpretation  Spirometry  Spirometry shows mild obstruction. There is reduced midflow suggesting small airway/airflow obstruction.  Review of FVL curve  Patient's effort is normal.  Diffusion Capacity  The patient's diffusion capacity is severely reduced.  Diffusion capacity is severely reduced when corrected for alveolar volume.      Results for orders placed in visit on 21    Pulmonary Function Test    Narrative  Pulmonary Function Test  Performed by: Luna Sanford RRT  Authorized by: Danna Sorenson APRN    Pre Drug % Predicted  FVC: 90%  FEV1: 82%  FEF 25-75%: 55%  FEV1/FVC: 71.62%  DLCO: 42%  D/VAsb: 51%    Interpretation  Spirometry  Spirometry shows normal results. There is reduced midflow suggesting small airway/airflow obstruction.  Diffusion Capacity  The patient's diffusion capacity is severely reduced.  Diffusion capacity is moderately reduced when corrected for alveolar volume.      Results for orders placed in visit on 10/17/19    Pulmonary Function Test    Narrative  Pulmonary Function Test  Performed by: Danna Sorenson APRN  Authorized by: Danna Sorenson APRN    Pre Drug  FVC: 76%  FEV1: 76%  FEF 25-75%: 70%  FEV1/FVC: 78.71%  T%  RV: 67%  DLCO: 41%  D/VAsb: 48%           Assessment and Plan  Diagnoses and all orders for this visit:    1. ILD (interstitial lung disease) (Primary)  Overview:  CT Angiogram Chest (2023 18:09)  Chronic interstitial lung disease with diffuse intralobular and interlobular septal thickening, centrilobular paraseptal cystic changes with  honeycombing in the lung bases.    10/13/13 PERICO, ANCA, RF, CCP WNL       Assessment & Plan:  Continue to monitor. Per patient Dr. Erwin initiating Tyvaso.       2. Allergic rhinitis, unspecified seasonality, unspecified trigger  Overview:  Xyzal, Flonase    Assessment & Plan:  Continue current treatment regimen.       3. Chronic respiratory failure with hypoxia  Overview:  O2 10L exertion/4L rest     Assessment & Plan:  Continue chronic oxygen therapy.  The patient is benefiting from it and wishes to continue it.        4. Pulmonary emphysema, unspecified emphysema type  Overview:  8/20/19 alpha 1 MM    Albuterol HFA/neb    Assessment & Plan:  Continue to monitor DLCO.           5. Pulmonary hypertension  Overview:  Adult Transthoracic Echo Complete W/ Cont if Necessary Per Protocol (09/03/2023 09:51)    Left ventricular systolic function is normal. Left ventricular ejection fraction appears to be 51 - 55%.    Left ventricular diastolic function is consistent with (grade I) impaired relaxation.    Estimated right ventricular systolic pressure from tricuspid regurgitation is moderately elevated (45-55 mmHg).    There is a trivial pericardial effusion.  RVSP 49.4mmHg    NM Lung Scan Perfusion Particulate (11/13/2023 10:18)   IMPRESSION:  1. Low probability of pulmonary embolism.    CARDIAC CATHETERIZATION REPORT  Date of Procedure: 12/1/2023   Findings:  Pre-capillary pulmonary hypertension with preserved cardiac output.  RA (mean) 7, RV (s/edp): 51/7, PA (s/d/mean) 56/23/34, PCWP (mean) 12, Sultana CO/CI 6/2.7 with PVR 3.7 HAHN  Recommendations:  Follow-up in CHF clinic and with pulmonology for consideration of treatment for ILD-PAH  Complications:  None      Assessment & Plan:  The patient states that Dr. Erwin is initiating Tyvaso for treatment of ILD-PAH.    Consider 6MWT.           Follow Up  Danna Sorenson, MIKE  12/5/2023  14:36 CST    Return in about 2 months (around 2/5/2024).    Patient was given  instructions and counseling regarding his condition or for health maintenance advice. Please see specific information pulled into the AVS if appropriate.     Please note that portions of this note were completed with a voice recognition program.

## 2023-12-05 ENCOUNTER — OFFICE VISIT (OUTPATIENT)
Dept: PULMONOLOGY | Facility: CLINIC | Age: 73
End: 2023-12-05
Payer: MEDICARE

## 2023-12-05 VITALS
DIASTOLIC BLOOD PRESSURE: 76 MMHG | SYSTOLIC BLOOD PRESSURE: 138 MMHG | WEIGHT: 225 LBS | OXYGEN SATURATION: 97 % | BODY MASS INDEX: 29.82 KG/M2 | HEART RATE: 68 BPM | HEIGHT: 73 IN

## 2023-12-05 DIAGNOSIS — J43.9 PULMONARY EMPHYSEMA, UNSPECIFIED EMPHYSEMA TYPE: Chronic | ICD-10-CM

## 2023-12-05 DIAGNOSIS — I27.20 PULMONARY HYPERTENSION: Chronic | ICD-10-CM

## 2023-12-05 DIAGNOSIS — J84.9 ILD (INTERSTITIAL LUNG DISEASE): Primary | Chronic | ICD-10-CM

## 2023-12-05 DIAGNOSIS — J96.11 CHRONIC RESPIRATORY FAILURE WITH HYPOXIA: Chronic | ICD-10-CM

## 2023-12-05 DIAGNOSIS — J30.9 ALLERGIC RHINITIS, UNSPECIFIED SEASONALITY, UNSPECIFIED TRIGGER: Chronic | ICD-10-CM

## 2023-12-05 PROBLEM — R06.02 SHORTNESS OF BREATH: Status: RESOLVED | Noted: 2023-10-12 | Resolved: 2023-12-05

## 2023-12-05 PROCEDURE — 1159F MED LIST DOCD IN RCRD: CPT | Performed by: NURSE PRACTITIONER

## 2023-12-05 PROCEDURE — 99214 OFFICE O/P EST MOD 30 MIN: CPT | Performed by: NURSE PRACTITIONER

## 2023-12-05 PROCEDURE — 1160F RVW MEDS BY RX/DR IN RCRD: CPT | Performed by: NURSE PRACTITIONER

## 2023-12-05 NOTE — PATIENT INSTRUCTIONS
Pulmonary Hypertension  Pulmonary hypertension is a long-term (chronic) condition in which there is high blood pressure in the blood vessels of the lungs (pulmonary arteries). This condition occurs when pulmonary arteries become narrow and tight, making it harder for blood to flow through the lungs. This in turn makes the heart work harder to pump blood through the lungs, making it harder for you to breathe.  Over time, pulmonary hypertension can weaken and damage the heart muscle, specifically the right side of the heart. Pulmonary hypertension is a serious condition that can be life-threatening.  What are the causes?  This condition may be caused by different medical conditions. It can be categorized by cause into five groups:  Group 1: Pulmonary hypertension that is caused when the arteries in the lungs become narrowed, thickened, or stiff (pulmonary arterial hypertension). This may happen with no known cause, or it may be:  Passed from parent to child (hereditary).  Caused by another disease, such as a connective tissue disease (including lupus or scleroderma), congenital heart disease, liver disease, or HIV (human immunodeficiency virus).  Caused by certain medicines or poisons.  Group 2: Pulmonary hypertension that is caused by weakness of the left chamber of the heart (left ventricle) or heart valve disease.  Group 3: Pulmonary hypertension that is caused by chronic lung disease or low oxygen levels. Causes in this group include:  Emphysema or chronic obstructive pulmonary disease (COPD).  Untreated sleep apnea.  Pulmonary fibrosis.  Long-term exposure to high altitudes in certain people who may already be at higher risk for pulmonary hypertension.  Group 4: Pulmonary hypertension that is caused by blood clots in the lungs.  Group 5: Other causes of pulmonary hypertension, such as sickle cell anemia, sarcoidosis, abnormal growths of tissue (tumors) pressing on the pulmonary arteries, and various other  diseases.  What are the signs or symptoms?  Symptoms of this condition include:  Shortness of breath. You may notice shortness of breath with:  Activity, such as walking.  Minimal activity, such as getting dressed.  No activity, like when you are sitting still.  A cough. Sometimes, bloody mucus from the lungs may be coughed up.  Tiredness.  Dizziness, light-headedness, or fainting, especially with physical activity.  Rapid heartbeat, or feeling your heart flutter or skip a beat (palpitations).  Veins in the neck getting larger.  Swelling of the lower legs, abdomen, or both.  Bluish color of the lips and fingertips.  Chest pain or tightness.  Abdominal pain, especially in the upper abdomen.  How is this diagnosed?  This condition may be diagnosed based on one or more of the following tests:  Blood tests.  Imaging tests, such as:  Chest X-ray.  CT scan.  Echocardiogram. This test uses sound waves (ultrasound) to produce an image of the heart.  Ventilation-perfusion scan. This test uses radioactive material to test how well air moves and blood flows in and out of the lungs.  Pulmonary function test. This test measures how much air your lungs can hold. It also tests how well air moves in and out of your lungs.  6-minute walk test. This tests how severe your condition is in relation to your activity levels.  ECG (electrocardiogram). This test records the electrical impulses of the heart.  Cardiac catheterization. This is a procedure in which a thin tube (catheter) is passed into the pulmonary artery and used to test the pressure in your pulmonary artery and the right side of your heart.  Lung biopsy. This involves having a procedure to remove a small sample of lung tissue for testing. This may help determine an underlying cause of your pulmonary hypertension.  How is this treated?  There is no cure for this condition, but treatment can help to relieve symptoms and slow the progress of the condition. Treatment may  include:  Cardiac rehabilitation. This is a treatment program that includes exercise training, education, and counseling to help you get stronger and return to an active lifestyle.  Oxygen therapy.  Medicines that:  Lower blood pressure.  Relax (dilate) the pulmonary blood vessels.  Help the heart beat more efficiently and pump more blood.  Help the body get rid of extra fluid.  Thin the blood in order to prevent blood clots in the lungs.  Lung surgery to relieve pressure on the heart. This may be needed for severe cases that do not respond to medical treatment.  Heart-lung transplant, or lung transplant. This may be done in very severe cases.  Follow these instructions at home:  Eating and drinking    Eat a healthy diet that includes plenty of fresh fruits and vegetables, whole grains, and beans.  Limit your salt (sodium) intake to less than 2,300 mg a day.  Activity  Get plenty of rest.  Exercise as directed. Talk with your health care provider about what type of exercise is safe for you.  Avoid sitting in hot tubs or saunas for long periods of time.  Avoid high altitudes.  Lifestyle  Do not use any products that contain nicotine or tobacco. These products include cigarettes, chewing tobacco, and vaping devices, such as e-cigarettes. If you need help quitting, ask your health care provider.  Avoid secondhand smoke.  General instructions  Take over-the-counter and prescription medicines only as told by your health care provider. Do not change or stop medicines without checking with your health care provider.  Stay up to date on your vaccines, especially yearly flu (influenza) and pneumonia vaccines.  If you are a woman of child-bearing age, avoid becoming pregnant. Talk with your health care provider about birth control.  Consider ways to get support for the anxiety and stress of living with pulmonary hypertension. Talk with your health care provider about support groups and online resources.  Use oxygen therapy at  home as directed.  Keep track of your weight. Weight gain could be a sign that your condition is getting worse.  Keep all follow-up visits. This is important.  Contact a health care provider if:  Your cough gets worse.  You have more shortness of breath than usual, or you start to have trouble doing activities that you could do before.  You need to use medicines or oxygen more frequently or in higher dosages than usual.  Get help right away if:  You have severe shortness of breath.  You have chest pain or pressure.  You cough up blood.  You have swelling of your feet or legs that gets worse.  You have rapid weight gain over a period of 1-2 days.  Your medicines or oxygen do not provide relief.  These symptoms may represent a serious problem that is an emergency. Do not wait to see if the symptoms will go away. Get medical help right away. Call your local emergency services (911 in the [place] Do not drive yourself to the hospital.  Summary  Pulmonary hypertension is a long-term (chronic) condition in which there is high blood pressure in the blood vessels of the lungs (pulmonary arteries).  Pulmonary hypertension is a serious condition that can be life-threatening. It can be caused by a variety of illnesses.  Treatment may involve taking medicines and using oxygen therapy. Severe cases may require surgery or a transplant.  This information is not intended to replace advice given to you by your health care provider. Make sure you discuss any questions you have with your health care provider.  Document Revised: 11/01/2021 Document Reviewed: 11/01/2021  Elsevier Patient Education © 2023 Else[employer] Inc[employer]  Chronic Obstructive Pulmonary Disease    Chronic obstructive pulmonary disease (COPD) is a long-term (chronic) lung problem. When you have COPD, it is hard for air to get in and out of your lungs.  Usually the condition gets worse over time, and your lungs will never return to normal. There are things you can do to keep  yourself as healthy as possible.  What are the causes?  Smoking. This is the most common cause.  Certain genes passed from parent to child (inherited).  What increases the risk?  Being exposed to secondhand smoke from cigarettes, pipes, or cigars.  Being exposed to chemicals and other irritants, such as fumes and dust in the work environment.  Having chronic lung conditions or infections.  What are the signs or symptoms?  Shortness of breath, especially during physical activity.  A long-term cough with a large amount of thick mucus. Sometimes, the cough may not have any mucus (dry cough).  Wheezing.  Breathing quickly.  Skin that looks gray or blue, especially in the fingers, toes, or lips.  Feeling tired (fatigue).  Weight loss.  Chest tightness.  Having infections often.  Episodes when breathing symptoms become much worse (exacerbations).  At the later stages of this disease, you may have swelling in the ankles, feet, or legs.  How is this treated?  Taking medicines.  Quitting smoking, if you smoke.  Rehabilitation. This includes steps to make your body work better. It may involve a team of specialists.  Doing exercises.  Making changes to your diet.  Using oxygen.  Lung surgery.  Lung transplant.  Comfort measures (palliative care).  Follow these instructions at home:  Medicines  Take over-the-counter and prescription medicines only as told by your doctor.  Talk to your doctor before taking any cough or allergy medicines. You may need to avoid medicines that cause your lungs to be dry.  Lifestyle  If you smoke, stop smoking. Smoking makes the problem worse.  Do not smoke or use any products that contain nicotine or tobacco. If you need help quitting, ask your doctor.  Avoid being around things that make your breathing worse. This may include smoke, chemicals, and fumes.  Stay active, but remember to rest as well.  Learn and use tips on how to manage stress and control your breathing.  Make sure you get enough  sleep. Most adults need at least 7 hours of sleep every night.  Eat healthy foods. Eat smaller meals more often. Rest before meals.  Controlled breathing  Learn and use tips on how to control your breathing as told by your doctor. Try:  Breathing in (inhaling) through your nose for 1 second. Then, pucker your lips and breath out (exhale) through your lips for 2 seconds.  Putting one hand on your belly (abdomen). Breathe in slowly through your nose for 1 second. Your hand on your belly should move out. Pucker your lips and breathe out slowly through your lips. Your hand on your belly should move in as you breathe out.    Controlled coughing  Learn and use controlled coughing to clear mucus from your lungs. Follow these steps:  Lean your head a little forward.  Breathe in deeply.  Try to hold your breath for 3 seconds.  Keep your mouth slightly open while coughing 2 times.  Spit any mucus out into a tissue.  Rest and do the steps again 1 or 2 times as needed.  General instructions  Make sure you get all the shots (vaccines) that your doctor recommends. Ask your doctor about a flu shot and a pneumonia shot.  Use oxygen therapy and pulmonary rehabilitation if told by your doctor. If you need home oxygen therapy, ask your doctor if you should buy a tool to measure your oxygen level (oximeter).  Make a COPD action plan with your doctor. This helps you to know what to do if you feel worse than usual.  Manage any other conditions you have as told by your doctor.  Avoid going outside when it is very hot, cold, or humid.  Avoid people who have a sickness you can catch (contagious).  Keep all follow-up visits.  Contact a doctor if:  You cough up more mucus than usual.  There is a change in the color or thickness of the mucus.  It is harder to breathe than usual.  Your breathing is faster than usual.  You have trouble sleeping.  You need to use your medicines more often than usual.  You have trouble doing your normal activities  such as getting dressed or walking around the house.  Get help right away if:  You have shortness of breath while resting.  You have shortness of breath that stops you from:  Being able to talk.  Doing normal activities.  Your chest hurts for longer than 5 minutes.  Your skin color is more blue than usual.  Your pulse oximeter shows that you have low oxygen for longer than 5 minutes.  You have a fever.  You feel too tired to breathe normally.  These symptoms may represent a serious problem that is an emergency. Do not wait to see if the symptoms will go away. Get medical help right away. Call your local emergency services (911 in the [place] Do not drive yourself to the hospital.  Summary  Chronic obstructive pulmonary disease (COPD) is a long-term lung problem.  The way your lungs work will never return to normal. Usually the condition gets worse over time. There are things you can do to keep yourself as healthy as possible.  Take over-the-counter and prescription medicines only as told by your doctor.  If you smoke, stop. Smoking makes the problem worse.  This information is not intended to replace advice given to you by your health care provider. Make sure you discuss any questions you have with your health care provider.  Document Revised: 10/26/2021 Document Reviewed: 10/26/2021  Elsevier Patient Education © 2023 Elsevier Inc.

## 2023-12-05 NOTE — ASSESSMENT & PLAN NOTE
The patient states that Dr. Erwin is initiating Tyvaso for treatment of ILD-PAH.    Consider 6MWT.

## 2023-12-12 ENCOUNTER — HOSPITAL ENCOUNTER (OUTPATIENT)
Dept: CARDIOLOGY | Facility: HOSPITAL | Age: 73
Discharge: HOME | End: 2023-12-12
Payer: MEDICARE

## 2023-12-12 DIAGNOSIS — I50.9 HEART FAILURE, UNSPECIFIED HF CHRONICITY, UNSPECIFIED HEART FAILURE TYPE: ICD-10-CM

## 2023-12-12 DIAGNOSIS — J84.9 ILD (INTERSTITIAL LUNG DISEASE): Primary | ICD-10-CM

## 2023-12-19 ENCOUNTER — TELEPHONE (OUTPATIENT)
Dept: CARDIOLOGY | Facility: HOSPITAL | Age: 73
End: 2023-12-19
Payer: MEDICARE

## 2023-12-19 NOTE — TELEPHONE ENCOUNTER
Patient called in stating the spironolactone is starting to make his hands cramp really bad again. He is currently taking potassium 10meq every other day.     Please advise

## 2023-12-20 NOTE — TELEPHONE ENCOUNTER
Earl Erwin MD Estrada, Mercedes C, [facility]  Caller: Unspecified (Yesterday,  4:07 PM)  Have him take the Kcl 10 mEq every day and we'll see if the cramping resolves. If he is still having cramping after doing this for several days have him call to let us know. Thanks!      Pt notified.

## 2024-01-03 ENCOUNTER — HOSPITAL ENCOUNTER (OUTPATIENT)
Dept: CT IMAGING | Facility: HOSPITAL | Age: 74
Discharge: HOME | End: 2024-01-03
Payer: MEDICARE

## 2024-01-03 ENCOUNTER — LAB (OUTPATIENT)
Dept: LAB | Facility: HOSPITAL | Age: 74
End: 2024-01-03
Payer: MEDICARE

## 2024-01-03 ENCOUNTER — TELEPHONE (OUTPATIENT)
Dept: CARDIOLOGY | Facility: HOSPITAL | Age: 74
End: 2024-01-03

## 2024-01-03 DIAGNOSIS — J84.9 ILD (INTERSTITIAL LUNG DISEASE): ICD-10-CM

## 2024-01-03 DIAGNOSIS — I50.9 HEART FAILURE, UNSPECIFIED HF CHRONICITY, UNSPECIFIED HEART FAILURE TYPE: Primary | ICD-10-CM

## 2024-01-03 DIAGNOSIS — I50.9 HEART FAILURE, UNSPECIFIED HF CHRONICITY, UNSPECIFIED HEART FAILURE TYPE: ICD-10-CM

## 2024-01-03 LAB
ANION GAP SERPL CALCULATED.3IONS-SCNC: 13 MMOL/L (ref 5–15)
BUN SERPL-MCNC: 11 MG/DL (ref 8–23)
BUN/CREAT SERPL: 12.5 (ref 7–25)
CALCIUM SPEC-SCNC: 9.2 MG/DL (ref 8.6–10.5)
CHLORIDE SERPL-SCNC: 96 MMOL/L (ref 98–107)
CO2 SERPL-SCNC: 24 MMOL/L (ref 22–29)
CREAT SERPL-MCNC: 0.88 MG/DL (ref 0.76–1.27)
EGFRCR SERPLBLD CKD-EPI 2021: 89.7 ML/MIN/1.73
GLUCOSE SERPL-MCNC: 312 MG/DL (ref 65–99)
POTASSIUM SERPL-SCNC: 4.7 MMOL/L (ref 3.5–5.2)
SODIUM SERPL-SCNC: 133 MMOL/L (ref 136–145)

## 2024-01-03 PROCEDURE — 80048 BASIC METABOLIC PNL TOTAL CA: CPT

## 2024-01-03 PROCEDURE — 36415 COLL VENOUS BLD VENIPUNCTURE: CPT

## 2024-01-03 PROCEDURE — 71250 CT THORAX DX C-: CPT

## 2024-01-03 NOTE — TELEPHONE ENCOUNTER
Mr. Anaya has called and states that he is having bouts of dizziness and palpitations. He asking if he needs labs today since he will be [facility] for [facility] res [facility].

## 2024-01-05 ENCOUNTER — TELEPHONE (OUTPATIENT)
Dept: PULMONOLOGY | Facility: CLINIC | Age: 74
End: 2024-01-05
Payer: MEDICARE

## 2024-01-05 DIAGNOSIS — J84.9 ILD (INTERSTITIAL LUNG DISEASE): Primary | ICD-10-CM

## 2024-01-05 RX ORDER — PREDNISONE 20 MG/1
20 TABLET ORAL DAILY
Qty: 30 TABLET | Refills: 2 | Status: SHIPPED | OUTPATIENT
Start: 2024-01-05 | End: 2024-02-04

## 2024-01-05 NOTE — TELEPHONE ENCOUNTER
Will try prednisone 20mg daily for the time being. Hopefully getting started on Tyvaso will be helpful and we can titrate that down.

## 2024-01-05 NOTE — TELEPHONE ENCOUNTER
Patient called in and stated at his last office visit you discussed increasing his prednisone to 20 mg if his breathing does not improve. Patient stated he would like to try the 20 mg dose as his breathing has still not improved.     Assessment & Plan:  He states that he feels the best when he is on prednisone. He states that he cleaned his entire house while he was on the last sterid taper. He really wants to consider low dose chronic prednisone therapy. Risk/benefits were discussed. He states that he does have a hard time tolerating vit D/calcium supplements, but is willing to try another brand.     Begin prednisone 10mg orally daily.

## 2024-01-11 ENCOUNTER — HOSPITAL ENCOUNTER (OUTPATIENT)
Dept: CARDIOLOGY | Facility: HOSPITAL | Age: 74
Discharge: HOME | End: 2024-01-11
Admitting: HOSPITALIST
Payer: MEDICARE

## 2024-01-11 VITALS
HEART RATE: 69 BPM | WEIGHT: 224.4 LBS | BODY MASS INDEX: 29.61 KG/M2 | OXYGEN SATURATION: 99 % | DIASTOLIC BLOOD PRESSURE: 60 MMHG | SYSTOLIC BLOOD PRESSURE: 127 MMHG

## 2024-01-11 DIAGNOSIS — J84.9 PULMONARY HYPERTENSION DUE TO INTERSTITIAL LUNG DISEASE: Primary | ICD-10-CM

## 2024-01-11 DIAGNOSIS — E11.9 TYPE 2 DIABETES MELLITUS WITHOUT COMPLICATION, UNSPECIFIED WHETHER LONG TERM INSULIN USE: ICD-10-CM

## 2024-01-11 DIAGNOSIS — J84.9 ILD (INTERSTITIAL LUNG DISEASE): ICD-10-CM

## 2024-01-11 DIAGNOSIS — I27.23 PULMONARY HYPERTENSION DUE TO INTERSTITIAL LUNG DISEASE: Primary | ICD-10-CM

## 2024-01-11 DIAGNOSIS — J43.9 PULMONARY EMPHYSEMA, UNSPECIFIED EMPHYSEMA TYPE: ICD-10-CM

## 2024-01-11 DIAGNOSIS — J96.11 CHRONIC RESPIRATORY FAILURE WITH HYPOXIA: ICD-10-CM

## 2024-01-11 LAB
ANION GAP SERPL CALCULATED.3IONS-SCNC: 9 MMOL/L (ref 5–15)
BUN SERPL-MCNC: 14 MG/DL (ref 8–23)
BUN/CREAT SERPL: 16.3 (ref 7–25)
CALCIUM SPEC-SCNC: 9.4 MG/DL (ref 8.6–10.5)
CHLORIDE SERPL-SCNC: 98 MMOL/L (ref 98–107)
CO2 SERPL-SCNC: 26 MMOL/L (ref 22–29)
CREAT SERPL-MCNC: 0.86 MG/DL (ref 0.76–1.27)
EGFRCR SERPLBLD CKD-EPI 2021: 90.3 ML/MIN/1.73
GLUCOSE SERPL-MCNC: 296 MG/DL (ref 65–99)
NT-PROBNP SERPL-MCNC: 425.9 PG/ML (ref 0–1800)
POTASSIUM SERPL-SCNC: 4.3 MMOL/L (ref 3.5–5.2)
SODIUM SERPL-SCNC: 133 MMOL/L (ref 136–145)

## 2024-01-11 PROCEDURE — 80048 BASIC METABOLIC PNL TOTAL CA: CPT | Performed by: HOSPITALIST

## 2024-01-11 PROCEDURE — G0463 HOSPITAL OUTPT CLINIC VISIT: HCPCS | Performed by: HOSPITALIST

## 2024-01-11 PROCEDURE — 83880 ASSAY OF NATRIURETIC PEPTIDE: CPT | Performed by: HOSPITALIST

## 2024-01-11 RX ORDER — SPIRONOLACTONE 25 MG/1
12.5 TABLET ORAL 3 TIMES WEEKLY
Start: 2024-01-12

## 2024-01-11 RX ORDER — INSULIN GLARGINE 100 [IU]/ML
10 INJECTION, SOLUTION SUBCUTANEOUS DAILY
COMMUNITY
Start: 2024-01-10

## 2024-01-11 RX ORDER — POTASSIUM CHLORIDE 750 MG/1
10 TABLET, FILM COATED, EXTENDED RELEASE ORAL DAILY
Qty: 90 TABLET | Refills: 3 | Status: SHIPPED | OUTPATIENT
Start: 2024-01-11

## 2024-01-11 NOTE — PATIENT INSTRUCTIONS
Ask your primary care provider about potentially starting Jardiance or Farxiga as this will help lower your blood sugar some, but it also may provide some benefit for your heart.    Medication Changes  Please make the following changes to your medications:  Potassium Chloride - Take 10 mEq once daily  Spironolactone - Take 1/2 (a half) 25 mg three times weekly (Monday, Wednesday, and Friday).

## 2024-01-11 NOTE — PROGRESS NOTES
Reason For Visit:  Pulmonary Hypertension    Subjective        Jeramie Anaay is a 73 y.o. male with the below pertinent PMH who presents for follow-up of pulmonary hypertension.    Mr. Anaya was most recently seen in CHF clinic on 11/16/2023 at which time he was recovering from an upper respiratory infection that he had been dealing with for a couple of weeks.  He was tolerating spironolactone with KCl well.  He was scheduled for an RHC included below that showed evidence of ILD associated pulmonary hypertension; we coordinated with pulmonology to get a high-resolution CT and initiate the process for starting Tyvaso.  In the interim, he did have persistent issues with hand cramping and ultimately had to stop taking spironolactone.    Today, Mr. Anaya states that he has been doing well recently.  He has not had a viral illness for a while.  He also has been taking 20 mg of prednisone, which has been more effective.  He did recently start insulin due to diabetes.  He is scheduled to start Tyvaso next week.  He has continued to take spironolactone but states that he will take it for 2-3 days and then stop taking it for a few days when he gets cramping.  Breathing is at baseline.  He denies any chest pain, lightheadedness, palpitations, or peripheral edema.    ROS: Pertinent findings are included above.     Cardiac Studies  RHC 12/1/2023: RA (mean) 7, RV (s/edp): 51/7, PA (s/d/mean) 56/23/34, PCWP (mean) 12, S[place] CO/CI 6/2.7 with PVR 3.7 HAHN   Echo 9/30/2023: LVEF 51-55%, G1 DD, normal RV size/function, normal biatrial size, mild TR with RVSP 49.4 mmHg, no other significant valve disease, trivial pericardial effusion  On my personal review of the images, I do agree that the LV systolic function is overall preserved and likely in the low normal range, RV appears borderline dilated with normal systolic function no significant septal flattening, LA is upper normal in size, RA appears borderline to mildly dilated, RVSP  "is mildly to moderately elevated.     Pertinent PMH  ILD with UIP appearance  Pulmonary hypertension  Severe emphysema  Chronic hypoxemia  History of left pneumothorax and thoracoscopy with resection of bleb and pleurodesis (2019)  GERD    Pertinent past medical, surgical, family, and social history were reviewed.      Current Outpatient Medications:     albuterol sulfate  (90 Base) MCG/ACT inhaler, Inhale 2 puffs Every 4 (Four) Hours As Needed for Wheezing., Disp: , Rfl:     docusate sodium (COLACE) 100 MG capsule, Take 1 capsule by mouth 2 (Two) Times a Day., Disp: , Rfl:     fluticasone (FLONASE) 50 MCG/ACT nasal spray, 2 sprays by Each Nare route Daily., Disp: , Rfl: 2    furosemide (LASIX) 40 MG tablet, Take 0.5 tablets by mouth Daily., Disp: , Rfl:     insulin glargine (LANTUS, SEMGLEE) 100 UNIT/ML injection, Inject 10 Units under the skin into the appropriate area as directed Daily., Disp: , Rfl:     levocetirizine (XYZAL) 5 MG tablet, Take 1 tablet by mouth 2 (Two) Times a Day., Disp: , Rfl:     O2 (OXYGEN), Inhale 4 L/min Continuous. 4L at rest, 10L during movement, Disp: , Rfl:     pantoprazole (PROTONIX) 20 MG EC tablet, Take 1 tablet by mouth 2 (Two) Times a Day., Disp: , Rfl:     potassium chloride 10 MEQ CR tablet, Take 1 tablet by mouth Every Other Day. (Patient taking differently: Take 1 tablet by mouth Daily.), Disp: 30 tablet, Rfl: 5    predniSONE (DELTASONE) 20 MG tablet, Take 1 tablet by mouth Daily for 30 days., Disp: 30 tablet, Rfl: 2    spironolactone (ALDACTONE) 25 MG tablet, Take 0.5 tablets by mouth Daily., Disp: , Rfl:      Objective   Vital Signs:  /60 (BP Location: Right arm, Patient Position: Sitting)   Pulse 69   Wt 102 kg (224 lb 6.4 oz)   SpO2 99%   BMI 29.61 kg/m²   Estimated body mass index is 29.61 kg/m² as calculated from the following:    Height as of 12/5/23: 185.4 cm (73\").    Weight as of this encounter: 102 kg (224 lb 6.4 oz).      Constitutional:       " Appearance: Healthy appearance. Not in distress.   Neck:      Vascular: JVD normal.   Pulmonary:      Comments: Bilateral crackles predominantly in the lower lobes with normal work of breathing on supplemental oxygen  Cardiovascular:      Normal rate. Regular rhythm.      Murmurs: There is a grade 1/6 systolic murmur at the LLSB.      No gallop.  No click. No rub.   Edema:     Peripheral edema absent.   Abdominal:      General: There is no distension.      Palpations: Abdomen is soft.      Tenderness: There is no abdominal tenderness.   Skin:     General: Skin is warm and dry.   Neurological:      Mental Status: Alert and oriented to person, place and time.        Result Review :  The following data was reviewed by: Earl Erwin MD on 01/11/2024:  BMP   BMP          12/1/2023    09:30 12/1/2023    12:36 12/1/2023    12:37 1/3/2024    14:01 1/11/2024    10:40   BMP   BUN 13    11  14    Creatinine 0.73    0.88  0.86    Sodium 133  133  132  133  133    Potassium 4.3  3.9  4.0  4.7  4.3    Chloride 97    96  98    CO2 28.0    24.0  26.0    Calcium 8.8    9.2  9.4      Pro-.9            Assessment and Plan   Diagnoses and all orders for this visit:    1. Pulmonary hypertension due to interstitial lung disease (Primary)  -     Basic Metabolic Panel; Future  -     BNP; Future  -     Basic Metabolic Panel; Standing  -     Basic Metabolic Panel  -     BNP; Standing  -     BNP    2. Pulmonary emphysema, unspecified emphysema type    3. ILD (interstitial lung disease)    4. Chronic respiratory failure with hypoxia    5. Type 2 diabetes mellitus without complication, unspecified whether long term insulin use    Other orders  -     potassium chloride 10 MEQ CR tablet; Take 1 tablet by mouth Daily.  Dispense: 90 tablet; Refill: 3  -     spironolactone (ALDACTONE) 25 MG tablet; Take 0.5 tablets by mouth 3 (Three) Times a Week. Take on Monday, Wednesday, and Friday    - Currently remains stable and appears relatively  euvolemic.  Unfortunately, proBNP is a little more elevated than when last checked in October.  This hopefully will improve with initiating Tyvaso.  - Extensive time spent during today's visit counseling the patient on Tyvaso initiation and the importance of taking this medication consistently including taking it with him to the hospital if he is sick.  - Given that the patient would like to stay on spironolactone, we will adjust it to 3 times weekly on M/W/F  - Continue KCl 10 mill equivalents daily  - Continue furosemide 20 mg daily  - I do think he may benefit some from an SGLT2 inhibitor and have recommended that he discuss this with his PCP in light of his recent diabetes diagnosis and insulin initiation  - Continue steroids per pulmonology with regular pulmonology follow-up.    Follow Up   Return in about 3 weeks (around 2/1/2024).  Patient was given instructions and counseling regarding his condition or for health maintenance advice. Please see specific information pulled into the AVS if appropriate.       EMR Dragon/Transcription disclaimer: Much of this encounter note is an electronic transcription/translation of spoken language to printed text. The electronic translation of spoken language may permit erroneous, or at times, nonsensical words or phrases to be inadvertently transcribed; although I have reviewed the note for such errors, some may still exist.

## 2024-01-18 ENCOUNTER — TELEPHONE (OUTPATIENT)
Dept: PULMONOLOGY | Facility: CLINIC | Age: 74
End: 2024-01-18
Payer: MEDICARE

## 2024-01-18 ENCOUNTER — TELEPHONE (OUTPATIENT)
Dept: CARDIOLOGY | Facility: HOSPITAL | Age: 74
End: 2024-01-18

## 2024-01-18 NOTE — TELEPHONE ENCOUNTER
Not sure this is related to prednisone. Maybe more so related to tyvaso after reading cardiology note. Will defer to Dr. Erwin, but if he worsens he needs to go to ER.

## 2024-01-18 NOTE — TELEPHONE ENCOUNTER
Spoke with patient and provided Danna's message. He voiced understanding. Will wait to hear from Dr. Erwin.

## 2024-01-18 NOTE — TELEPHONE ENCOUNTER
Patient called in c/o his oxygen is worsening with exertion. It started decreasing when he increased his steroids. He runs in the upper 90's at rest but drops quickly into the 80's when up and moving around.

## 2024-01-23 ENCOUNTER — TELEPHONE (OUTPATIENT)
Dept: CARDIOLOGY | Facility: HOSPITAL | Age: 74
End: 2024-01-23

## 2024-01-23 NOTE — TELEPHONE ENCOUNTER
"Side Effects/Complaints: chest pain between his shoulder blades; reportedly lasting for hours with pain severe; tums did not relieve     Physical Assessment/Report: I spoke to patient on Friday 1/19/24 at approximately 2:15pm and he reported his side effects had subsided and he was \"back to normal.\" Patient reached out to our 24/7 pharmacy number that evening as I was out of office and he was unable to reach me, reporting chest pain \"between his shoulder blades\" after his 4:30pm CST dose of Tyvaso 2 breaths. Per our pharmacist, they recommended he take tums as he was describing it as possible heartburn or chest pain and recommended he drop down to 1 breath QID (based on my last report). Patient expressed his wish to discontinue the medication and the pharmacist told him he should not experience any rebound PH if he did since he was on such a low dose. Patient wished to discontinue and preferred to talk to myself or your office before considering restarting the Tyvaso at 1 breath QID. Pharmacy sent me an email detailing their conversation and I reached out to him this afternoon to follow up. Pt states he did not contact your office yet. Pt reports the chest pain discontinued on 1/20 and his last dose of Tyvaso was at 4:30pm on 1/19. He was adamant that he did not wish to re-start this therapy. I encouraged him to have a conversation with Dr. Erwin about his concerns.    This is the report I received from LEE Dumont with [facility]. I spoke with Mr. Anaya and asked if he would consider restarting at 1 breath QID. He said at this time he is not going to consider that since his pain was so severe at such a low dose.     Please advise, thank you!      "

## 2024-02-01 ENCOUNTER — HOSPITAL ENCOUNTER (OUTPATIENT)
Dept: CARDIOLOGY | Facility: HOSPITAL | Age: 74
Discharge: HOME | End: 2024-02-01
Payer: MEDICARE

## 2024-02-01 VITALS
HEART RATE: 77 BPM | WEIGHT: 224.6 LBS | SYSTOLIC BLOOD PRESSURE: 140 MMHG | BODY MASS INDEX: 29.63 KG/M2 | DIASTOLIC BLOOD PRESSURE: 66 MMHG | OXYGEN SATURATION: 98 %

## 2024-02-01 DIAGNOSIS — J84.9 ILD (INTERSTITIAL LUNG DISEASE): ICD-10-CM

## 2024-02-01 DIAGNOSIS — J43.9 PULMONARY EMPHYSEMA, UNSPECIFIED EMPHYSEMA TYPE: ICD-10-CM

## 2024-02-01 DIAGNOSIS — E11.9 TYPE 2 DIABETES MELLITUS WITHOUT COMPLICATION, UNSPECIFIED WHETHER LONG TERM INSULIN USE: ICD-10-CM

## 2024-02-01 DIAGNOSIS — I27.23 WHO GROUP 3 PULMONARY ARTERIAL HYPERTENSION: Primary | ICD-10-CM

## 2024-02-01 DIAGNOSIS — I27.23 PULMONARY HYPERTENSION DUE TO INTERSTITIAL LUNG DISEASE: ICD-10-CM

## 2024-02-01 DIAGNOSIS — J96.11 CHRONIC RESPIRATORY FAILURE WITH HYPOXIA: ICD-10-CM

## 2024-02-01 DIAGNOSIS — J84.9 PULMONARY HYPERTENSION DUE TO INTERSTITIAL LUNG DISEASE: ICD-10-CM

## 2024-02-01 LAB
ANION GAP SERPL CALCULATED.3IONS-SCNC: 11 MMOL/L (ref 5–15)
BUN SERPL-MCNC: 10 MG/DL (ref 8–23)
BUN/CREAT SERPL: 13.3 (ref 7–25)
CALCIUM SPEC-SCNC: 9.3 MG/DL (ref 8.6–10.5)
CHLORIDE SERPL-SCNC: 97 MMOL/L (ref 98–107)
CO2 SERPL-SCNC: 25 MMOL/L (ref 22–29)
CREAT SERPL-MCNC: 0.75 MG/DL (ref 0.76–1.27)
EGFRCR SERPLBLD CKD-EPI 2021: 94.1 ML/MIN/1.73
GLUCOSE SERPL-MCNC: 196 MG/DL (ref 65–99)
POTASSIUM SERPL-SCNC: 4 MMOL/L (ref 3.5–5.2)
SODIUM SERPL-SCNC: 133 MMOL/L (ref 136–145)

## 2024-02-01 PROCEDURE — 80048 BASIC METABOLIC PNL TOTAL CA: CPT | Performed by: HOSPITALIST

## 2024-02-01 PROCEDURE — G0463 HOSPITAL OUTPT CLINIC VISIT: HCPCS | Performed by: HOSPITALIST

## 2024-02-01 RX ORDER — POTASSIUM CHLORIDE 750 MG/1
10 TABLET, FILM COATED, EXTENDED RELEASE ORAL DAILY
Start: 2024-02-01

## 2024-02-01 RX ORDER — PREDNISONE 10 MG/1
10 TABLET ORAL DAILY
COMMUNITY

## 2024-02-01 NOTE — PROGRESS NOTES
Reason For Visit:  Pulmonary hypertension    Subjective        Jeramie Anaya is a 73 y.o. male with the below pertinent PMH who presents for follow-up of pulmonary hypertension.    Mr. Anaya most recently followed up in CHF clinic 1/11/24 at which time he was relatively stable with breathing at his baseline and plan to start Tyvaso the following week. He was taking spironolactone intermittently but did not want to discontinue it, so it was adjusted to 3x/wk on M/W/F. I recommended that he discuss an SGLT2i with his PCP.     He ultimately started Tyvaso but had issues with side effects including chest pain with using the medication despite decreasing dose to 1 breath QID, so it ultimately was discontinued.    Today, Mr. Anaya states that his breathing is at his recent baseline.  He has not had any chest discomfort since stopping Tyvaso; this was occurring a little while after each treatment and then would resolve before the next treatment.  He denies any significant palpitations, lightheadedness, or peripheral edema.  Prior issues with cramping have resolved.    ROS: Pertinent findings are included above.      Cardiac Studies  RHC 12/1/2023: RA (mean) 7, RV (s/edp): 51/7, PA (s/d/mean) 56/23/34, PCWP (mean) 12, Sultana CO/CI 6/2.7 with PVR 3.7 HAHN   Echo 9/30/2023: LVEF 51-55%, G1 DD, normal RV size/function, normal biatrial size, mild TR with RVSP 49.4 mmHg, no other significant valve disease, trivial pericardial effusion  On my personal review of the images, I do agree that the LV systolic function is overall preserved and likely in the low normal range, RV appears borderline dilated with normal systolic function no significant septal flattening, LA is upper normal in size, RA appears borderline to mildly dilated, RVSP is mildly to moderately elevated.     Pertinent PMH  ILD with UIP appearance  Pulmonary hypertension  Severe emphysema  Chronic hypoxemia  History of left pneumothorax and thoracoscopy with  "resection of bleb and pleurodesis (2019)  GERD    Pertinent past medical, surgical, family, and social history were reviewed.      Current Outpatient Medications:     albuterol sulfate  (90 Base) MCG/ACT inhaler, Inhale 2 puffs Every 4 (Four) Hours As Needed for Wheezing., Disp: , Rfl:     docusate sodium (COLACE) 100 MG capsule, Take 1 capsule by mouth 2 (Two) Times a Day., Disp: , Rfl:     fluticasone (FLONASE) 50 MCG/ACT nasal spray, 2 sprays by Each Nare route Daily., Disp: , Rfl: 2    furosemide (LASIX) 40 MG tablet, Take 0.5 tablets by mouth Daily., Disp: , Rfl:     insulin glargine (LANTUS, SEMGLEE) 100 UNIT/ML injection, Inject 10 Units under the skin into the appropriate area as directed Daily., Disp: , Rfl:     levocetirizine (XYZAL) 5 MG tablet, Take 1 tablet by mouth 2 (Two) Times a Day., Disp: , Rfl:     O2 (OXYGEN), Inhale 4 L/min Continuous. 4L at rest, 10L during movement, Disp: , Rfl:     pantoprazole (PROTONIX) 20 MG EC tablet, Take 1 tablet by mouth 2 (Two) Times a Day., Disp: , Rfl:     potassium chloride 10 MEQ CR tablet, Take 1 tablet by mouth Daily., Disp: 90 tablet, Rfl: 3    predniSONE (DELTASONE) 20 MG tablet, Take 1 tablet by mouth Daily for 30 days., Disp: 30 tablet, Rfl: 2    spironolactone (ALDACTONE) 25 MG tablet, Take 0.5 tablets by mouth 3 (Three) Times a Week. Take on Monday, Wednesday, and Friday, Disp: , Rfl:      Objective   Vital Signs:  /66 (BP Location: Right arm, Patient Position: Sitting)   Pulse 77   Wt 102 kg (224 lb 9.6 oz)   SpO2 98% Comment: 4L  BMI 29.63 kg/m²   Estimated body mass index is 29.63 kg/m² as calculated from the following:    Height as of 12/5/23: 185.4 cm (73\").    Weight as of this encounter: 102 kg (224 lb 9.6 oz).      Constitutional:       Appearance: Healthy appearance. Not in distress.   Neck:      Vascular: JVD normal.   Pulmonary:      Effort: Pulmonary effort is normal.      Breath sounds: Normal breath sounds.   Cardiovascular: "      Normal rate. Regular rhythm.      Murmurs: There is a grade 1/6 systolic murmur at the LLSB.      No gallop.  No click. No rub.   Edema:     Peripheral edema absent.   Abdominal:      General: There is no distension.      Palpations: Abdomen is soft.      Tenderness: There is no abdominal tenderness.   Skin:     General: Skin is warm and dry.   Neurological:      Mental Status: Alert and oriented to person, place and time.        Result Review :  The following data was reviewed by: Earl Erwin MD on 02/01/2024:  BMP   BMP          1/3/2024    14:01 1/11/2024    10:40 2/1/2024    10:25   BMP   BUN 11  14  10    Creatinine 0.88  0.86  0.75    Sodium 133  133  133    Potassium 4.7  4.3  4.0    Chloride 96  98  97    CO2 24.0  26.0  25.0    Calcium 9.2  9.4  9.3                Assessment and Plan   Diagnoses and all orders for this visit:    1. WHO group 3 pulmonary arterial hypertension (Primary)  2. Pulmonary hypertension due to interstitial lung disease  3. Chronic respiratory failure with hypoxia  4. Pulmonary emphysema, unspecified emphysema type  5. ILD (interstitial lung disease)  6. Type 2 diabetes mellitus without complication, unspecified whether long term insulin use  - Did not tolerate Tyvaso inhaler.  We discussed the possibility of trying Tyvaso DPI to see if he may tolerate this better, but he is not interested at this time.  He will let me know if he reconsiders.  I do not think he is a good candidate for other therapies given his WHO group 3 pulmonary hypertension without disproportionately elevated PA pressures to suggest any component of WHO Group 1 pulmonary hypertension.  - Continue KCl 10 mEq daily  - Continue furosemide 20 mg daily  - Stop spironolactone 3 times weekly  - Start Jardiance 10 mg daily    Follow Up   Return in about 1 month (around 3/1/2024).  Patient was given instructions and counseling regarding his condition or for health maintenance advice. Please see specific  information pulled into the AVS if appropriate.       EMR Dragon/Transcription disclaimer: Much of this encounter note is an electronic transcription/translation of spoken language to printed text. The electronic translation of spoken language may permit erroneous, or at times, nonsensical words or phrases to be inadvertently transcribed; although I have reviewed the note for such errors, some may still exist.

## 2024-02-03 DIAGNOSIS — J43.9 EMPHYSEMA, UNSPECIFIED: ICD-10-CM

## 2024-02-05 RX ORDER — ALBUTEROL SULFATE 90 UG/1
2 AEROSOL, METERED RESPIRATORY (INHALATION) EVERY 4 HOURS PRN
Qty: 18 G | Refills: 11 | Status: SHIPPED | OUTPATIENT
Start: 2024-02-05

## 2024-02-05 NOTE — PROGRESS NOTES
" MIKE Song  [facility] [place] [facility]  [address]            [place] [address]  Phone: [phone]  Fax: [phone]          Chief Complaint  ILD (interstitial lung disease), Shortness of Breath, Emphysema, and Recurrent spontaneous pneumothorax    Subjective    History of Present Illness  Jeramie Anaya presents to [facility] for appointment.  The patient has known severe emphysema, ILD with UIP appearance, small airway disease, GERD, chronic respiratory with hypoxemia (O2 10L exertion/4L rest), and allergic rhinitis. History of left ptx and thoracoscopy with resection of bleb and pleurodesis in [facility] in July 2019. He uses albuterol HFA as needed and prednisone 10mg daily. He has tried multiple inhalers without benefit (symbicort, incruse, stiolto, breo, and spiriva, yupelri neb).  He is compliant with portable oxygen. He was referred to Dr. Erwin OV 10/12/23 2' recent hospitalization with volume OL, episode of afib with RVR, and 2d echo from 9/3/23 identifying mild to moderately elevated RVSP. RHC completed 12/1/23 and showed pre-capillary pulmonary hypertension with preserved cardiac output, RA (mean) 7, RV (s/edp): 51/7, PA (s/d/mean) 56/23/34, PCWP (mean) 12, Sultana CO/CI 6/2.7 with PVR 3.7 HAHN. The patient was trailed on Tyvaso for ILD-PAH. The patient was unable to tolerate Tyvaso 2' chest pain. He reports that he is actually doing better at this time.  He states that he figured out his home concentrator was not working properly.  He reports that his DME has replaced concentrator.  He has no other new pulmonary complaints today. No other aggravating or alleviating factors.     Objective   Vital Signs:   /74   Pulse 81   Ht 185.4 cm (72.99\")   Wt 102 kg (224 lb)   SpO2 96% Comment: 10L  BMI 29.56 kg/m²     Physical Exam  Vitals reviewed.   Constitutional:       General: He is not in acute distress.     Appearance: He is " well-developed.      Interventions: Nasal cannula in place.   HENT:      Head: Normocephalic and atraumatic.   Eyes:      General: No scleral icterus.     Conjunctiva/sclera: Conjunctivae normal.      Pupils: Pupils are equal, round, and reactive to light.   Cardiovascular:      Rate and Rhythm: Normal rate.   Pulmonary:      Effort: Pulmonary effort is normal. No respiratory distress.      Breath sounds: Decreased breath sounds present. No wheezing or rales.   Musculoskeletal:         General: Normal range of motion.      Cervical back: Normal range of motion and neck supple.      Comments: Left arm sling   Skin:     General: Skin is warm and dry.   Neurological:      Mental Status: He is alert and oriented to person, place, and time.   Psychiatric:         Behavior: Behavior normal.         Thought Content: Thought content normal.         Judgment: Judgment normal.        Result Review :  The following data was reviewed by: MIKE Fuentes on 02/06/2024:  CT Chest Hi Resolution Diagnostic (01/03/2024 13:50)   IMPRESSION:  1. Severe emphysematous changes. At the lung bases, there is  bronchiectasis and stacked cystic changes. Honeycombing such as seen with usual interstitial pneumonia (UIP) is not excluded.  2. Heavily calcified coronary arteries versus stent material.  3. Similar chronic mild height loss of T12. Old LEFT anterior rib  fracture.  Rest/Exercise Pulse Ox Values          8/1/2023    10:00 10/12/2023    09:15   Rest/Exercise Pulse Ox Results   Rest on O2 @ Liters 3 4L PD   Rest on O2 SAT % 92 93       4L CONT 97%   Exercise on O2 @ Liters 3 6L CONT   Exercise on O2 SAT % 71 79     PFT Values          1/20/2023    10:45   Pre Drug PFT Results   FVC 91   FEV1 79   FEF 25-75% 45   FEV1/FVC 67.9   Other Tests PFT Results   DLCO 35   D/VAsb 45       Results for orders placed in visit on 01/20/23    Pulmonary Function Test    Narrative  Pulmonary Function Test  Performed by: Silvino Kevin  CMA  Authorized by: Danna Sorenson APRN    Pre Drug % Predicted  FVC: 91%  FEV1: 79%  FEF 25-75%: 45%  FEV1/FVC: 67.9%  DLCO: 35%  D/VAsb: 45%    Interpretation  Spirometry  Spirometry shows mild obstruction. There is reduced midflow suggesting small airway/airflow obstruction.  Review of FVL curve  Patient's effort is normal.  Diffusion Capacity  The patient's diffusion capacity is severely reduced.  Diffusion capacity is severely reduced when corrected for alveolar volume.      Results for orders placed in visit on 21    Pulmonary Function Test    Narrative  Pulmonary Function Test  Performed by: Luna Sanford, RRT  Authorized by: Danna Sorenson APRN    Pre Drug % Predicted  FVC: 90%  FEV1: 82%  FEF 25-75%: 55%  FEV1/FVC: 71.62%  DLCO: 42%  D/VAsb: 51%    Interpretation  Spirometry  Spirometry shows normal results. There is reduced midflow suggesting small airway/airflow obstruction.  Diffusion Capacity  The patient's diffusion capacity is severely reduced.  Diffusion capacity is moderately reduced when corrected for alveolar volume.      Results for orders placed in visit on 10/17/19    Pulmonary Function Test    Narrative  Pulmonary Function Test  Performed by: Danna Sorenson APRN  Authorized by: Danna Sorenson APRN    Pre Drug  FVC: 76%  FEV1: 76%  FEF 25-75%: 70%  FEV1/FVC: 78.71%  T%  RV: 67%  DLCO: 41%  D/VAsb: 48%           Assessment and Plan  Diagnoses and all orders for this visit:    1. ILD (interstitial lung disease) (Primary)  Overview:  CT Angiogram Chest (2023 18:09)  Chronic interstitial lung disease with diffuse intralobular and interlobular septal thickening, centrilobular paraseptal cystic changes with honeycombing in the lung bases.    10/12/23 Discussed antifibrotic. Patient does not feel that he would be interested 2' GI side effects. He states that he struggles with GI upset frequently.     10/13/13 PERICO, ANCA, RF, CCP WNL     CT Chest Hi  Resolution Diagnostic (01/03/2024 13:50)  Severe emphysematous changes. At the lung bases, there is  bronchiectasis and stacked cystic changes. Honeycombing such as seen with usual interstitial pneumonia (UIP) is not excluded.    Did not tolerate Tyvaso 2' chest pain.     Prednisone 10mg po daily      Assessment & Plan:  Continue prednisone daily. Patient states that this has been the only thing to help with his breathing. May try to taper in the future.       2. Pulmonary hypertension  Overview:  Adult Transthoracic Echo Complete W/ Cont if Necessary Per Protocol (09/03/2023 09:51)    Left ventricular systolic function is normal. Left ventricular ejection fraction appears to be 51 - 55%.    Left ventricular diastolic function is consistent with (grade I) impaired relaxation.    Estimated right ventricular systolic pressure from tricuspid regurgitation is moderately elevated (45-55 mmHg).    There is a trivial pericardial effusion.  RVSP 49.4mmHg    NM Lung Scan Perfusion Particulate (11/13/2023 10:18)   IMPRESSION:  1. Low probability of pulmonary embolism.    CARDIAC CATHETERIZATION REPORT  Date of Procedure: 12/1/2023   Findings:  Pre-capillary pulmonary hypertension with preserved cardiac output.  RA (mean) 7, RV (s/edp): 51/7, PA (s/d/mean) 56/23/34, PCWP (mean) 12, Sultana CO/CI 6/2.7 with PVR 3.7 HAHN  Recommendations:  Follow-up in CHF clinic and with pulmonology for consideration of treatment for ILD-PAH  Complications:  None    Follows with Dr. Erwin  Did not tolerate Tyvaso        Assessment & Plan:  Keep f/u with Dr. Erwin.       3. Allergic rhinitis, unspecified seasonality, unspecified trigger  Overview:  Xyzal, Flonase    Assessment & Plan:  Continue current treatment regimen.        4. Chronic respiratory failure with hypoxia  Overview:  O2 10L exertion/4L rest     Assessment & Plan:  Continue chronic oxygen therapy.  The patient is benefiting from it and wishes to continue it.        5. Pulmonary  emphysema, unspecified emphysema type  Overview:  8/20/19 alpha 1 MM    Albuterol HFA/neb    Assessment & Plan:  Continue current treatment regimen. Continue to monitor PFT.             6. Gastroesophageal reflux disease, unspecified whether esophagitis present  Overview:  PPI-Prilosec    Assessment & Plan:  Continue current treatment regimen.            Follow Up  Danna Sorenson, APRN  2/6/2024  13:48 CST    Return in about 4 weeks (around 3/5/2024).    Patient was given instructions and counseling regarding his condition or for health maintenance advice. Please see specific information pulled into the AVS if appropriate.     Please note that portions of this note were completed with a voice recognition program.

## 2024-02-05 NOTE — TELEPHONE ENCOUNTER
Rx Refill Note  Requested Prescriptions     Pending Prescriptions Disp Refills    albuterol sulfate  (90 Base) MCG/ACT inhaler [Pharmacy Med Name: ALBUTEROL HFA (VENTOLIN) INH]  11     Sig: INHALE 2 PUFFS EVERY 4 (FOUR) HOURS AS NEEDED FOR WHEEZING OR SHORTNESS OF AIR FOR UP TO 30 DAYS.      Last office visit with prescribing clinician: 12/5/2023   Last telemedicine visit with prescribing clinician: Visit date not found   Next office visit with prescribing clinician: 2/6/2024                         Would you like a call back once the refill request has been completed: [] Yes [] No    If the office needs to give you a call back, can they leave a voicemail: [] Yes [] No    Luna Patten MA  02/05/24, 09:02 CST

## 2024-02-06 ENCOUNTER — OFFICE VISIT (OUTPATIENT)
Dept: PULMONOLOGY | Facility: CLINIC | Age: 74
End: 2024-02-06
Payer: MEDICARE

## 2024-02-06 VITALS
WEIGHT: 224 LBS | HEART RATE: 81 BPM | HEIGHT: 73 IN | DIASTOLIC BLOOD PRESSURE: 74 MMHG | OXYGEN SATURATION: 96 % | SYSTOLIC BLOOD PRESSURE: 136 MMHG | BODY MASS INDEX: 29.69 KG/M2

## 2024-02-06 DIAGNOSIS — J84.9 ILD (INTERSTITIAL LUNG DISEASE): Primary | Chronic | ICD-10-CM

## 2024-02-06 DIAGNOSIS — I27.20 PULMONARY HYPERTENSION: Chronic | ICD-10-CM

## 2024-02-06 DIAGNOSIS — K21.9 GASTROESOPHAGEAL REFLUX DISEASE, UNSPECIFIED WHETHER ESOPHAGITIS PRESENT: Chronic | ICD-10-CM

## 2024-02-06 DIAGNOSIS — J96.11 CHRONIC RESPIRATORY FAILURE WITH HYPOXIA: Chronic | ICD-10-CM

## 2024-02-06 DIAGNOSIS — J30.9 ALLERGIC RHINITIS, UNSPECIFIED SEASONALITY, UNSPECIFIED TRIGGER: Chronic | ICD-10-CM

## 2024-02-06 DIAGNOSIS — J43.9 PULMONARY EMPHYSEMA, UNSPECIFIED EMPHYSEMA TYPE: Chronic | ICD-10-CM

## 2024-02-06 PROCEDURE — 1160F RVW MEDS BY RX/DR IN RCRD: CPT | Performed by: NURSE PRACTITIONER

## 2024-02-06 PROCEDURE — 1159F MED LIST DOCD IN RCRD: CPT | Performed by: NURSE PRACTITIONER

## 2024-02-06 PROCEDURE — 99214 OFFICE O/P EST MOD 30 MIN: CPT | Performed by: NURSE PRACTITIONER

## 2024-02-06 NOTE — PATIENT INSTRUCTIONS
Pulmonary Fibrosis    Pulmonary fibrosis is a type of lung disease that causes scarring. Over time, the scar tissue builds up in the air sacs of your lungs (alveoli). This makes it hard for you to breathe because less oxygen gets into your bloodstream.  Scarring from pulmonary fibrosis is permanent and may lead to other serious health problems.  What are the causes?  There are many different causes of pulmonary fibrosis. In some cases, the cause is not known. This is called idiopathic pulmonary fibrosis. Other causes include:  Exposure to chemicals and substances found in agricultural, farm, construction, or factory work. These include mold, asbestos, silica, metal dusts, and toxic fumes.  Sarcoidosis. In this disease, areas of inflammatory cells (granulomas) form and most often affect the lungs.  Autoimmune diseases. These include diseases such as rheumatoid arthritis, systemic sclerosis, or connective tissue disease.  Taking certain medicines. These include drugs used in radiation therapy or used to treat seizures, heart problems, and some infections.  What increases the risk?  You are more likely to develop this condition if:  You have a family history of the disease.  You are an older person. The condition is more common in older adults.  You have a history of smoking.  You have a job that exposes you to certain chemicals.  You have gastroesophageal reflux disease (GERD).  What are the signs or symptoms?  Symptoms of this condition include:  Difficulty breathing that gets worse with activity.  Shortness of breath (dyspnea).  Dry, hacking cough.  Rapid, shallow breathing during exercise or while at rest.  Other symptoms may include:  Loss of appetite or weight loss  Tiredness (fatigue) or weakness.  Bluish skin and lips.  Rounded and enlarged fingertips (clubbing).  How is this diagnosed?  This condition may be diagnosed based on:  Your symptoms and medical history.  A physical exam.  You may also have tests,  including:  A test that involves looking inside your lungs with an instrument (bronchoscopy).  Imaging studies of your lungs and heart.  Tests to measure how well you are breathing (pulmonary function tests).  Blood tests.  Tests to see how well your lungs work while you are walking (pulmonary stress test).  A procedure to remove a lung tissue sample to look at it under a microscope (biopsy).  How is this treated?  There is no cure for pulmonary fibrosis. Treatment focuses on managing symptoms and preventing scarring from getting worse. This may include:  Medicines, such as:  Steroids to prevent permanent lung changes.  Medicines to suppress your body's defense system (immune system).  Medicines to help with lung function by reducing inflammation or scarring.  Ongoing monitoring with X-rays and lab work.  Oxygen therapy.  Pulmonary rehabilitation.  Surgery. In some cases, a lung transplant is possible.  Follow these instructions at home:    Medicines  Take over-the-counter and prescription medicines only as told by your health care provider.  Keep your vaccinations up to date as recommended by your health care provider.  Activity  Get regular exercise, but do not pick activities that are too strenuous for you. Ask your health care provider what activities are safe for you.  If you have physical limitations, you may get exercise by walking, using a stationary bike, or doing chair exercises.  Ask your health care provider about using oxygen while exercising.  Do breathing exercises as told by your health care provider.  Plan rest periods when you get tired.  General instructions  Do not use any products that contain nicotine or tobacco. These products include cigarettes, chewing tobacco, and vaping devices, such as e-cigarettes. If you need help quitting, ask your health care provider.  If you are exposed to chemicals and substances at work, make sure that you wear a mask or respirator at all times.  Learn to manage  stress. If you need help to do this, ask your health care provider.  Join a pulmonary rehabilitation program or a support group for people with pulmonary fibrosis.  Eat small meals often so you do not get too full. Overeating can make breathing trouble worse.  Maintain a healthy weight. Lose weight if you need to.  Keep all follow-up visits. This is important.  Where to find more information  American Lung Association: www.lung.org  National Heart, Lung, and Blood [place]: www.nhlbi.nih.gov  Pulmonary Fibrosis Foundation: pulmonaryfibrosis.org  Contact a health care provider if:  You have symptoms that do not get better with medicines.  You are not able to be as active as usual.  You have trouble taking a deep breath.  You have a fever or chills.  You have blue lips or skin.  You have a lot of headaches.  You cough up mucus that is dark in color.  You have feelings of depression or sadness.  You are unable to sleep because it is hard to breathe.  Get help right away if:  Your symptoms suddenly worsen.  You have chest pain.  You cough up blood.  You get very confused or sleepy.  These symptoms may be an emergency. Get help right away. Call 911.  Do not wait to see if the symptoms will go away.  Do not drive yourself to the hospital.  Summary  Pulmonary fibrosis is a type of lung disease that causes scar tissue to build up in the air sacs of your lungs (alveoli) over time. This makes it hard for you to breathe because less oxygen gets into your bloodstream.  Scarring from pulmonary fibrosis is permanent and may lead to other serious health problems.  You are more likely to develop this condition if you have a family history of the condition or a job that exposes you to certain chemicals.  There is no cure for pulmonary fibrosis. Treatment focuses on managing symptoms and preventing scarring from getting worse.  This information is not intended to replace advice given to you by your health care provider. Make sure  you discuss any questions you have with your health care provider.  Document Revised: 08/09/2022 Document Reviewed: 08/09/2022  Elsevier Patient Education © 2023 [employer] Inc[employer]

## 2024-02-06 NOTE — ASSESSMENT & PLAN NOTE
Continue prednisone daily. Patient states that this has been the only thing to help with his breathing. May try to taper in the future.

## 2024-03-03 DIAGNOSIS — J84.9 INTERSTITIAL PULMONARY DISEASE, UNSPECIFIED: ICD-10-CM

## 2024-03-04 ENCOUNTER — HOSPITAL ENCOUNTER (OUTPATIENT)
Dept: CARDIOLOGY | Facility: HOSPITAL | Age: 74
Discharge: HOME | End: 2024-03-04
Payer: MEDICARE

## 2024-03-04 VITALS
OXYGEN SATURATION: 99 % | SYSTOLIC BLOOD PRESSURE: 134 MMHG | DIASTOLIC BLOOD PRESSURE: 65 MMHG | WEIGHT: 223.4 LBS | BODY MASS INDEX: 29.48 KG/M2 | HEART RATE: 62 BPM

## 2024-03-04 DIAGNOSIS — Z79.4 TYPE 2 DIABETES MELLITUS WITHOUT COMPLICATION, WITH LONG-TERM CURRENT USE OF INSULIN: ICD-10-CM

## 2024-03-04 DIAGNOSIS — I27.23 WHO GROUP 3 PULMONARY ARTERIAL HYPERTENSION: ICD-10-CM

## 2024-03-04 DIAGNOSIS — I27.20 PULMONARY HYPERTENSION: Primary | Chronic | ICD-10-CM

## 2024-03-04 DIAGNOSIS — J84.9 ILD (INTERSTITIAL LUNG DISEASE): ICD-10-CM

## 2024-03-04 DIAGNOSIS — E11.9 TYPE 2 DIABETES MELLITUS WITHOUT COMPLICATION, WITH LONG-TERM CURRENT USE OF INSULIN: ICD-10-CM

## 2024-03-04 DIAGNOSIS — J96.11 CHRONIC RESPIRATORY FAILURE WITH HYPOXIA: ICD-10-CM

## 2024-03-04 LAB
ANION GAP SERPL CALCULATED.3IONS-SCNC: 9 MMOL/L (ref 5–15)
BUN SERPL-MCNC: 19 MG/DL (ref 8–23)
BUN/CREAT SERPL: 23.5 (ref 7–25)
CALCIUM SPEC-SCNC: 8.8 MG/DL (ref 8.6–10.5)
CHLORIDE SERPL-SCNC: 101 MMOL/L (ref 98–107)
CO2 SERPL-SCNC: 24 MMOL/L (ref 22–29)
CREAT SERPL-MCNC: 0.81 MG/DL (ref 0.76–1.27)
EGFRCR SERPLBLD CKD-EPI 2021: 91.9 ML/MIN/1.73
GLUCOSE SERPL-MCNC: 211 MG/DL (ref 65–99)
POTASSIUM SERPL-SCNC: 4.2 MMOL/L (ref 3.5–5.2)
QT INTERVAL: 376 MS
QTC INTERVAL: 397 MS
SODIUM SERPL-SCNC: 134 MMOL/L (ref 136–145)

## 2024-03-04 PROCEDURE — 93005 ELECTROCARDIOGRAM TRACING: CPT | Performed by: HOSPITALIST

## 2024-03-04 PROCEDURE — 93010 ELECTROCARDIOGRAM REPORT: CPT | Performed by: HOSPITALIST

## 2024-03-04 PROCEDURE — G0463 HOSPITAL OUTPT CLINIC VISIT: HCPCS | Performed by: HOSPITALIST

## 2024-03-04 PROCEDURE — 99214 OFFICE O/P EST MOD 30 MIN: CPT | Performed by: HOSPITALIST

## 2024-03-04 PROCEDURE — 80048 BASIC METABOLIC PNL TOTAL CA: CPT

## 2024-03-04 NOTE — PROGRESS NOTES
Reason For Visit:  Longitudinal care of pulmonary hypertension    Subjective        Jeramie Anaya is a 73 y.o. male with the below pertinent PMH who presents for follow-up of pulmonary hypertension.    Mr. Anaya has followed in the CHF clinic since 10/25/2023 and was most recently evaluated 2/1/2024.  He had been trialed on Tyvaso, but was unable to tolerate it due to side effects leading up to his visit and had stopped use. We discussed a potential trial of Tyvaso DPI, which he was not interested in at the time.  His spironolactone was discontinued as he was only taking it 3 times weekly due to cramping side effects; he was started on Jardiance 10 mg daily.    Since his last visit, Mr. Anaya states that he has remained relatively stable regarding his breathing.  He does still have chronic dyspnea on exertion without recent exacerbation.  He has not noticed any significant issues with his cardiac medication adjustments.  He denies any issues with cramping since stopping spironolactone.    ROS: Pertinent findings are included above.      Cardiac Studies  RHC 12/1/2023: RA (mean) 7, RV (s/edp): 51/7, PA (s/d/mean) 56/23/34, PCWP (mean) 12, Sultana CO/CI 6/2.7 with PVR 3.7 HAHN   Echo 9/30/2023: LVEF 51-55%, G1 DD, normal RV size/function, normal biatrial size, mild TR with RVSP 49.4 mmHg, no other significant valve disease, trivial pericardial effusion  On my personal review of the images, I do agree that the LV systolic function is overall preserved and likely in the low normal range, RV appears borderline dilated with normal systolic function no significant septal flattening, LA is upper normal in size, RA appears borderline to mildly dilated, RVSP is mildly to moderately elevated.     Pertinent PMH  ILD with UIP appearance  Pulmonary hypertension  Severe emphysema  Chronic hypoxemia  History of left pneumothorax and thoracoscopy with resection of bleb and pleurodesis (2019)  GERD    Pertinent past medical,  "surgical, family, and social history were reviewed.      Current Outpatient Medications:     albuterol sulfate  (90 Base) MCG/ACT inhaler, INHALE 2 PUFFS EVERY 4 (FOUR) HOURS AS NEEDED FOR WHEEZING OR SHORTNESS OF AIR FOR UP TO 30 DAYS., Disp: 18 g, Rfl: 11    docusate sodium (COLACE) 100 MG capsule, Take 1 capsule by mouth 2 (Two) Times a Day., Disp: , Rfl:     empagliflozin (Jardiance) 10 MG tablet tablet, Take 1 tablet by mouth Daily., Disp: 30 tablet, Rfl: 11    fluticasone (FLONASE) 50 MCG/ACT nasal spray, 2 sprays by Each Nare route Daily., Disp: , Rfl: 2    furosemide (LASIX) 40 MG tablet, Take 0.5 tablets by mouth Daily., Disp: , Rfl:     insulin glargine (LANTUS, SEMGLEE) 100 UNIT/ML injection, Inject 12 Units under the skin into the appropriate area as directed Daily., Disp: , Rfl:     levocetirizine (XYZAL) 5 MG tablet, Take 1 tablet by mouth 2 (Two) Times a Day., Disp: , Rfl:     O2 (OXYGEN), Inhale 4 L/min Continuous. 4L at rest, 10L during movement, Disp: , Rfl:     pantoprazole (PROTONIX) 20 MG EC tablet, Take 1 tablet by mouth 2 (Two) Times a Day., Disp: , Rfl:     potassium chloride 10 MEQ CR tablet, Take 1 tablet by mouth Daily., Disp: , Rfl:     predniSONE (DELTASONE) 10 MG tablet, Take 1 tablet by mouth Daily., Disp: , Rfl:      Objective   Vital Signs:  /65 (BP Location: Right arm, Patient Position: Sitting)   Pulse 62   Wt 101 kg (223 lb 6.4 oz)   SpO2 99% Comment: 4L  BMI 29.48 kg/m²   Estimated body mass index is 29.48 kg/m² as calculated from the following:    Height as of 2/6/24: 185.4 cm (72.99\").    Weight as of this encounter: 101 kg (223 lb 6.4 oz).      Constitutional:       Appearance: Healthy appearance. Not in distress.   Neck:      Vascular: JVD normal.   Pulmonary:      Comments: Bilateral, lower lobe predominant pulmonary crackles with normal work of breathing  Cardiovascular:      Normal rate. Sinus rhythm with occasional extrasystoles      Murmurs: There is a " grade 1/6 systolic murmur.      No gallop.  No click. No rub.   Edema:     Peripheral edema absent.   Abdominal:      General: There is no distension.      Palpations: Abdomen is soft.      Tenderness: There is no abdominal tenderness.   Skin:     General: Skin is warm and dry.   Neurological:      Mental Status: Alert and oriented to person, place and time.        Result Review :  The following data was reviewed by: Earl Erwin MD on 03/04/2024:  BMP   BMP          1/11/2024    10:40 2/1/2024    10:25 3/4/2024    09:10   BMP   BUN 14  10  19    Creatinine 0.86  0.75  0.81    Sodium 133  133  134    Potassium 4.3  4.0  4.2    Chloride 98  97  101    CO2 26.0  25.0  24.0    Calcium 9.4  9.3  8.8                Assessment and Plan   Diagnoses and all orders for this visit:    1. Pulmonary hypertension (Primary)  2. WHO group 3 pulmonary arterial hypertension  3. Chronic respiratory failure with hypoxia  4. ILD (interstitial lung disease)  5. Type 2 diabetes mellitus without complication, with long-term current use of insulin  - Stable symptoms to be atrial functional class III  - Appears euvolemic tolerating Jardiance well with overall stable labs and no further cramping issues.  - We again discussed the possibility of a trial of Tyvaso DPI (did not tolerate other formulation), but he would like to hold off on this for now.  - Jardiance 10 mg daily  - KCl 10 mEq daily  - Lasix 20 mg daily  - Continue supplemental oxygen and pulmonology follow-up    Follow Up   Return in about 4 months (around 7/4/2024).  Patient was given instructions and counseling regarding his condition or for health maintenance advice. Please see specific information pulled into the AVS if appropriate.       EMR Dragon/Transcription disclaimer: Much of this encounter note is an electronic transcription/translation of spoken language to printed text. The electronic translation of spoken language may permit erroneous, or at times, nonsensical  words or phrases to be inadvertently transcribed; although I have reviewed the note for such errors, some may still exist.

## 2024-03-04 NOTE — TELEPHONE ENCOUNTER
Rx Refill Note  Requested Prescriptions     Pending Prescriptions Disp Refills    predniSONE (DELTASONE) 10 MG tablet [Pharmacy Med Name: PREDNISONE 10 MG TABLET] 30 tablet 3     Sig: TAKE 1 TABLET BY MOUTH EVERY DAY      Last office visit with prescribing clinician: 2/6/2024   Last telemedicine visit with prescribing clinician: Visit date not found   Next office visit with prescribing clinician: 3/7/2024                         Would you like a call back once the refill request has been completed: [] Yes [] No    If the office needs to give you a call back, can they leave a voicemail: [] Yes [] No    Luna Patten MA  03/04/24, 09:08 CST

## 2024-03-05 RX ORDER — PREDNISONE 10 MG/1
10 TABLET ORAL DAILY
Qty: 30 TABLET | Refills: 3 | Status: SHIPPED | OUTPATIENT
Start: 2024-03-05

## 2024-03-06 NOTE — PROGRESS NOTES
" MIKE Song  [facility]  [address] [address]  Phone: [phone]  Fax: [phone]          Chief Complaint  ILD (interstitial lung disease)    Subjective    History of Present Illness  Jeramie Anaya presents to [facility] for appointment.  The patient has known severe emphysema, ILD with UIP appearance, small airway disease, GERD, chronic respiratory with hypoxemia (O2 10L exertion/4L rest), and allergic rhinitis. History of left ptx and thoracoscopy with resection of bleb and pleurodesis in [facility] in July 2019. He uses albuterol HFA as needed and prednisone 10mg daily. He has tried multiple inhalers without benefit (symbicort, incruse, stiolto, breo, and spiriva, yupelri neb).  He is compliant with portable oxygen. He was referred to Dr. Erwin OV 10/12/23 2' recent hospitalization with volume OL, episode of afib with RVR, and 2d echo from 9/3/23 identifying mild to moderately elevated RVSP. RHC completed 12/1/23 and showed pre-capillary pulmonary hypertension with preserved cardiac output, RA (mean) 7, RV (s/edp): 51/7, PA (s/d/mean) 56/23/34, PCWP (mean) 12, Sultana CO/CI 6/2.7 with PVR 3.7 HAHN. The patient was trailed on Tyvaso for ILD-PAH. The patient was unable to tolerate Tyvaso 2' chest pain.     He presents today on a super handy electric scooter.  He reports no new pulmonary complaints.  He remains on prednisone 10 mg daily and wishes to continue.  The patient reports insurance may possibly cover a home generator due to his oxygen requirement.  He would like more information obtained for that.  He denies fever, chills, cough with purulent sputum.  Objective   Vital Signs:   /76   Pulse 71   Ht 185.4 cm (72.99\")   Wt 98 kg (216 lb)   SpO2 93% Comment: 4L  BMI 28.51 kg/m²        Physical Exam  Vitals reviewed.   Constitutional:       General: He is not in acute distress.     Appearance: " He is well-developed and overweight.      Interventions: Nasal cannula in place.   HENT:      Head: Normocephalic and atraumatic.   Eyes:      General: No scleral icterus.     Conjunctiva/sclera: Conjunctivae normal.      Pupils: Pupils are equal, round, and reactive to light.   Cardiovascular:      Rate and Rhythm: Normal rate.   Pulmonary:      Effort: Pulmonary effort is normal. No respiratory distress.      Breath sounds: Normal breath sounds. No wheezing or rales.   Musculoskeletal:         General: Normal range of motion.      Cervical back: Normal range of motion and neck supple.   Skin:     General: Skin is warm and dry.   Neurological:      Mental Status: He is alert and oriented to person, place, and time.   Psychiatric:         Behavior: Behavior normal.         Thought Content: Thought content normal.         Judgment: Judgment normal.        Result Review :  The following data was reviewed by: MIKE Fuentes on 03/07/2024:  CT Chest Hi Resolution Diagnostic (01/03/2024 13:50)   IMPRESSION:  1. Severe emphysematous changes. At the lung bases, there is  bronchiectasis and stacked cystic changes. Honeycombing such as seen with usual interstitial pneumonia (UIP) is not excluded.  2. Heavily calcified coronary arteries versus stent material.  3. Similar chronic mild height loss of T12. Old LEFT anterior rib  fracture.     Rest/Exercise Pulse Ox Values          8/1/2023    10:00 10/12/2023    09:15   Rest/Exercise Pulse Ox Results   Rest on O2 @ Liters 3 4L PD   Rest on O2 SAT % 92 93       4L CONT 97%   Exercise on O2 @ Liters 3 6L CONT   Exercise on O2 SAT % 71 79     PFT Values          1/20/2023    10:45   Pre Drug PFT Results   FVC 91   FEV1 79   FEF 25-75% 45   FEV1/FVC 67.9   Other Tests PFT Results   DLCO 35   D/VAsb 45       Results for orders placed in visit on 01/20/23    Pulmonary Function Test    Narrative  Pulmonary Function Test  Performed by: Silvino Kevin CMA  Authorized by:  Danna Sorenson APRN    Pre Drug % Predicted  FVC: 91%  FEV1: 79%  FEF 25-75%: 45%  FEV1/FVC: 67.9%  DLCO: 35%  D/VAsb: 45%    Interpretation  Spirometry  Spirometry shows mild obstruction. There is reduced midflow suggesting small airway/airflow obstruction.  Review of FVL curve  Patient's effort is normal.  Diffusion Capacity  The patient's diffusion capacity is severely reduced.  Diffusion capacity is severely reduced when corrected for alveolar volume.      Results for orders placed in visit on 21    Pulmonary Function Test    Narrative  Pulmonary Function Test  Performed by: Luna Sanford, RRT  Authorized by: Danna Sorenson APRN    Pre Drug % Predicted  FVC: 90%  FEV1: 82%  FEF 25-75%: 55%  FEV1/FVC: 71.62%  DLCO: 42%  D/VAsb: 51%    Interpretation  Spirometry  Spirometry shows normal results. There is reduced midflow suggesting small airway/airflow obstruction.  Diffusion Capacity  The patient's diffusion capacity is severely reduced.  Diffusion capacity is moderately reduced when corrected for alveolar volume.      Results for orders placed in visit on 10/17/19    Pulmonary Function Test    Narrative  Pulmonary Function Test  Performed by: Danna Sorenson APRN  Authorized by: Danna Sorenson APRN    Pre Drug  FVC: 76%  FEV1: 76%  FEF 25-75%: 70%  FEV1/FVC: 78.71%  T%  RV: 67%  DLCO: 41%  D/VAsb: 48%           Assessment and Plan  Diagnoses and all orders for this visit:    1. ILD (interstitial lung disease) (Primary)  Overview:  CT Angiogram Chest (2023 18:09)  Chronic interstitial lung disease with diffuse intralobular and interlobular septal thickening, centrilobular paraseptal cystic changes with honeycombing in the lung bases.    10/12/23 Discussed antifibrotic. Patient does not feel that he would be interested 2' GI side effects. He states that he struggles with GI upset frequently.     10/13/13 PERICO, ANCA, RF, CCP WNL     CT Chest Hi Resolution  Diagnostic (01/03/2024 13:50)  Severe emphysematous changes. At the lung bases, there is  bronchiectasis and stacked cystic changes. Honeycombing such as seen with usual interstitial pneumonia (UIP) is not excluded.    Did not tolerate Tyvaso 2' chest pain.     Prednisone 10mg po daily      Assessment & Plan:  Continue current treatment regimen.        2. Allergic rhinitis, unspecified seasonality, unspecified trigger  Overview:  Xyzal, Flonase    Assessment & Plan:  Continue current treatment regimen.      Orders:  -     fluticasone (FLONASE) 50 MCG/ACT nasal spray; 2 sprays by Each Nare route Daily for 30 days.  Dispense: 16 g; Refill: 11    3. Chronic respiratory failure with hypoxia  Overview:  O2 10L exertion/4L rest     Assessment & Plan:  Continue chronic oxygen therapy.  The patient is benefiting from it and wishes to continue it.        4. Pulmonary emphysema, unspecified emphysema type  Overview:  8/20/19 alpha 1 MM    Continue albuterol HFA as needed, continue chronic oxygen therapy, continue to monitor DLCO.      5. Gastroesophageal reflux disease, unspecified whether esophagitis present  Overview:  PPI-Prilosec    Assessment & Plan:  Continue current treatment regimen.        6. Pulmonary hypertension  Overview:  Adult Transthoracic Echo Complete W/ Cont if Necessary Per Protocol (09/03/2023 09:51)    Left ventricular systolic function is normal. Left ventricular ejection fraction appears to be 51 - 55%.    Left ventricular diastolic function is consistent with (grade I) impaired relaxation.    Estimated right ventricular systolic pressure from tricuspid regurgitation is moderately elevated (45-55 mmHg).    There is a trivial pericardial effusion.  RVSP 49.4mmHg    NM Lung Scan Perfusion Particulate (11/13/2023 10:18)   IMPRESSION:  1. Low probability of pulmonary embolism.    CARDIAC CATHETERIZATION REPORT  Date of Procedure: 12/1/2023   Findings:  Pre-capillary pulmonary hypertension with preserved  cardiac output.  RA (mean) 7, RV (s/edp): 51/7, PA (s/d/mean) 56/23/34, PCWP (mean) 12, Sultana CO/CI 6/2.7 with PVR 3.7 HAHN  Recommendations:  Follow-up in CHF clinic and with pulmonology for consideration of treatment for ILD-PAH  Complications:  None    Follows with Dr. Erwin  Did not tolerate Tyvaso          7. Bronchiectasis without acute exacerbation  Overview:  Identified 1/3/24-there is  bronchiectasis and stacked cystic changes    10/13/13 PERICO, ANCA, RF, CCP WNL            Assessment & Plan:  Consider obtaining immunoglobulins, flutter, CPT if worsening respiratory infections.           Follow Up  Danna Sorenson, MIKE  3/7/2024  17:33 CST    Return in about 2 months (around 5/7/2024).    Patient was given instructions and counseling regarding his condition or for health maintenance advice. Please see specific information pulled into the AVS if appropriate.     Please note that portions of this note were completed with a voice recognition program.

## 2024-03-07 ENCOUNTER — OFFICE VISIT (OUTPATIENT)
Dept: PULMONOLOGY | Facility: CLINIC | Age: 74
End: 2024-03-07
Payer: MEDICARE

## 2024-03-07 VITALS
SYSTOLIC BLOOD PRESSURE: 108 MMHG | HEIGHT: 73 IN | DIASTOLIC BLOOD PRESSURE: 76 MMHG | WEIGHT: 216 LBS | OXYGEN SATURATION: 93 % | HEART RATE: 71 BPM | BODY MASS INDEX: 28.63 KG/M2

## 2024-03-07 DIAGNOSIS — J84.9 ILD (INTERSTITIAL LUNG DISEASE): Primary | Chronic | ICD-10-CM

## 2024-03-07 DIAGNOSIS — K21.9 GASTROESOPHAGEAL REFLUX DISEASE, UNSPECIFIED WHETHER ESOPHAGITIS PRESENT: Chronic | ICD-10-CM

## 2024-03-07 DIAGNOSIS — J43.9 PULMONARY EMPHYSEMA, UNSPECIFIED EMPHYSEMA TYPE: Chronic | ICD-10-CM

## 2024-03-07 DIAGNOSIS — J30.9 ALLERGIC RHINITIS, UNSPECIFIED SEASONALITY, UNSPECIFIED TRIGGER: ICD-10-CM

## 2024-03-07 DIAGNOSIS — I27.20 PULMONARY HYPERTENSION: Chronic | ICD-10-CM

## 2024-03-07 DIAGNOSIS — J96.11 CHRONIC RESPIRATORY FAILURE WITH HYPOXIA: Chronic | ICD-10-CM

## 2024-03-07 DIAGNOSIS — J47.9 BRONCHIECTASIS WITHOUT ACUTE EXACERBATION: ICD-10-CM

## 2024-03-07 PROCEDURE — 99214 OFFICE O/P EST MOD 30 MIN: CPT | Performed by: NURSE PRACTITIONER

## 2024-03-07 PROCEDURE — 1160F RVW MEDS BY RX/DR IN RCRD: CPT | Performed by: NURSE PRACTITIONER

## 2024-03-07 PROCEDURE — 1159F MED LIST DOCD IN RCRD: CPT | Performed by: NURSE PRACTITIONER

## 2024-03-07 RX ORDER — TRIAMCINOLONE ACETONIDE 1 MG/G
1 CREAM TOPICAL
COMMUNITY
Start: 2024-02-16

## 2024-03-07 RX ORDER — FLUTICASONE PROPIONATE 50 MCG
2 SPRAY, SUSPENSION (ML) NASAL DAILY
Qty: 16 G | Refills: 11 | Status: SHIPPED | OUTPATIENT
Start: 2024-03-07 | End: 2024-04-06

## 2024-03-07 NOTE — PATIENT INSTRUCTIONS
Gastroesophageal Reflux Disease, Adult    Gastroesophageal reflux (JEREMY) happens when acid from the stomach flows up into the tube that connects the mouth and the stomach (esophagus). Normally, food travels down the esophagus and stays in the stomach to be digested. With JEREMY, food and stomach acid sometimes move back up into the esophagus.  You may have a disease called gastroesophageal reflux disease (GERD) if the reflux:  Happens often.  Causes frequent or very bad symptoms.  Causes problems such as damage to the esophagus.  When this happens, the esophagus becomes sore and swollen. Over time, GERD can make small holes (ulcers) in the lining of the esophagus.  What are the causes?  This condition is caused by a problem with the muscle between the esophagus and the stomach. When this muscle is weak or not normal, it does not close properly to keep food and acid from coming back up from the stomach.  The muscle can be weak because of:  Tobacco use.  Pregnancy.  Having a certain type of hernia (hiatal hernia).  Alcohol use.  Certain foods and drinks, such as coffee, chocolate, onions, and peppermint.  What increases the risk?  Being overweight.  Having a disease that affects your connective tissue.  Taking NSAIDs, such a ibuprofen.  What are the signs or symptoms?  Heartburn.  Difficult or painful swallowing.  The feeling of having a lump in the throat.  A bitter taste in the mouth.  Bad breath.  Having a lot of saliva.  Having an upset or bloated stomach.  Burping.  Chest pain. Different conditions can cause chest pain. Make sure you see your doctor if you have chest pain.  Shortness of breath or wheezing.  A long-term cough or a cough at night.  Wearing away of the surface of teeth (tooth enamel).  Weight loss.  How is this treated?  Making changes to your diet.  Taking medicine.  Having surgery.  Treatment will depend on how bad your symptoms are.  Follow these instructions at home:  Eating and drinking    Follow a  diet as told by your doctor. You may need to avoid foods and drinks such as:  Coffee and tea, with or without caffeine.  Drinks that contain alcohol.  Energy drinks and sports drinks.  Bubbly (carbonated) drinks or sodas.  Chocolate and cocoa.  Peppermint and mint flavorings.  Garlic and onions.  Horseradish.  Spicy and acidic foods. These include peppers, chili powder, magaña powder, vinegar, hot sauces, and BBQ sauce.  Citrus fruit juices and citrus fruits, such as oranges, leydi, and limes.  Tomato-based foods. These include red sauce, chili, salsa, and pizza with red sauce.  Fried and fatty foods. These include donuts, french fries, potato chips, and high-fat dressings.  High-fat meats. These include hot dogs, rib eye steak, sausage, ham, and mendes.  High-fat dairy items, such as whole milk, butter, and cream cheese.  Eat small meals often. Avoid eating large meals.  Avoid drinking large amounts of liquid with your meals.  Avoid eating meals during the 2-3 hours before bedtime.  Avoid lying down right after you eat.  Do not exercise right after you eat.  Lifestyle    Do not smoke or use any products that contain nicotine or tobacco. If you need help quitting, ask your doctor.  Try to lower your stress. If you need help doing this, ask your doctor.  If you are overweight, lose an amount of weight that is healthy for you. Ask your doctor about a safe weight loss goal.  General instructions  Pay attention to any changes in your symptoms.  Take over-the-counter and prescription medicines only as told by your doctor.  Do not take aspirin, ibuprofen, or other NSAIDs unless your doctor says it is okay.  Wear loose clothes. Do not wear anything tight around your waist.  Raise (elevate) the head of your bed about 6 inches (15 cm). You may need to use a wedge to do this.  Avoid bending over if this makes your symptoms worse.  Keep all follow-up visits.  Contact a doctor if:  You have new symptoms.  You lose weight and you  do not know why.  You have trouble swallowing or it hurts to swallow.  You have wheezing or a cough that keeps happening.  You have a hoarse voice.  Your symptoms do not get better with treatment.  Get help right away if:  You have sudden pain in your arms, neck, jaw, teeth, or back.  You suddenly feel sweaty, dizzy, or light-headed.  You have chest pain or shortness of breath.  You vomit and the vomit is green, yellow, or black, or it looks like blood or coffee grounds.  You faint.  Your poop (stool) is red, bloody, or black.  You cannot swallow, drink, or eat.  These symptoms may represent a serious problem that is an emergency. Do not wait to see if the symptoms will go away. Get medical help right away. Call your local emergency services (911 in the [place] Do not drive yourself to the hospital.  Summary  If a person has gastroesophageal reflux disease (GERD), food and stomach acid move back up into the esophagus and cause symptoms or problems such as damage to the esophagus.  Treatment will depend on how bad your symptoms are.  Follow a diet as told by your doctor.  Take all medicines only as told by your doctor.  This information is not intended to replace advice given to you by your health care provider. Make sure you discuss any questions you have with your health care provider.  Document Revised: 06/28/2021 Document Reviewed: 06/28/2021  [employer] Patient Education © 2023 [employer]

## 2024-05-09 ENCOUNTER — OFFICE VISIT (OUTPATIENT)
Dept: PULMONOLOGY | Facility: CLINIC | Age: 74
End: 2024-05-09
Payer: MEDICARE

## 2024-05-09 VITALS
HEART RATE: 82 BPM | SYSTOLIC BLOOD PRESSURE: 108 MMHG | WEIGHT: 214 LBS | BODY MASS INDEX: 28.36 KG/M2 | DIASTOLIC BLOOD PRESSURE: 64 MMHG | HEIGHT: 73 IN | OXYGEN SATURATION: 90 %

## 2024-05-09 DIAGNOSIS — J30.9 ALLERGIC RHINITIS, UNSPECIFIED SEASONALITY, UNSPECIFIED TRIGGER: Chronic | ICD-10-CM

## 2024-05-09 DIAGNOSIS — I27.20 PULMONARY HYPERTENSION: Chronic | ICD-10-CM

## 2024-05-09 DIAGNOSIS — J96.11 CHRONIC RESPIRATORY FAILURE WITH HYPOXIA: Chronic | ICD-10-CM

## 2024-05-09 DIAGNOSIS — J47.9 BRONCHIECTASIS WITHOUT ACUTE EXACERBATION: Chronic | ICD-10-CM

## 2024-05-09 DIAGNOSIS — Z79.899 HIGH RISK MEDICATION USE: ICD-10-CM

## 2024-05-09 DIAGNOSIS — J84.9 ILD (INTERSTITIAL LUNG DISEASE): Primary | Chronic | ICD-10-CM

## 2024-05-09 DIAGNOSIS — J43.9 PULMONARY EMPHYSEMA, UNSPECIFIED EMPHYSEMA TYPE: Chronic | ICD-10-CM

## 2024-05-09 DIAGNOSIS — K21.9 GASTROESOPHAGEAL REFLUX DISEASE, UNSPECIFIED WHETHER ESOPHAGITIS PRESENT: Chronic | ICD-10-CM

## 2024-05-09 PROCEDURE — 1160F RVW MEDS BY RX/DR IN RCRD: CPT | Performed by: NURSE PRACTITIONER

## 2024-05-09 PROCEDURE — 99214 OFFICE O/P EST MOD 30 MIN: CPT | Performed by: NURSE PRACTITIONER

## 2024-05-09 PROCEDURE — 1159F MED LIST DOCD IN RCRD: CPT | Performed by: NURSE PRACTITIONER

## 2024-05-09 RX ORDER — AZITHROMYCIN 250 MG/1
TABLET, FILM COATED ORAL
Qty: 15 TABLET | Refills: 11 | Status: SHIPPED | OUTPATIENT
Start: 2024-05-20

## 2024-05-09 RX ORDER — DOXYCYCLINE HYCLATE 100 MG/1
100 CAPSULE ORAL
COMMUNITY
Start: 2024-04-10 | End: 2024-05-18

## 2024-05-09 RX ORDER — SPIRONOLACTONE 25 MG/1
12.5 TABLET ORAL DAILY
COMMUNITY
Start: 2024-05-08

## 2024-05-10 DIAGNOSIS — J84.9 ILD (INTERSTITIAL LUNG DISEASE): Primary | ICD-10-CM

## 2024-05-10 DIAGNOSIS — J84.9 ILD (INTERSTITIAL LUNG DISEASE): ICD-10-CM

## 2024-05-10 RX ORDER — PIRFENIDONE 267 MG/1
801 CAPSULE ORAL 3 TIMES DAILY
Qty: 270 CAPSULE | Refills: 6 | Status: SHIPPED | OUTPATIENT
Start: 2024-05-27

## 2024-05-10 RX ORDER — PIRFENIDONE 267 MG/1
270 TABLET, FILM COATED ORAL 3 TIMES DAILY
Qty: 207 TABLET | Refills: 0 | Status: SHIPPED | OUTPATIENT
Start: 2024-05-10

## 2024-05-10 RX ORDER — PREDNISONE 10 MG/1
10 TABLET ORAL DAILY
Qty: 30 TABLET | Refills: 3 | Status: SHIPPED | OUTPATIENT
Start: 2024-05-10

## 2024-05-13 ENCOUNTER — OFFICE VISIT (OUTPATIENT)
Dept: UROLOGY | Age: 74
End: 2024-05-13
Payer: MEDICARE

## 2024-05-13 VITALS — TEMPERATURE: 97.6 F | BODY MASS INDEX: 28.36 KG/M2 | HEIGHT: 73 IN | WEIGHT: 214 LBS

## 2024-05-13 DIAGNOSIS — R97.20 ELEVATED PSA: Primary | ICD-10-CM

## 2024-05-13 LAB
APPEARANCE FLUID: CLEAR
BILIRUBIN, POC: NORMAL
BLOOD URINE, POC: NORMAL
CLARITY, POC: CLEAR
COLOR, POC: YELLOW
GLUCOSE URINE, POC: NORMAL
KETONES, POC: NORMAL
LEUKOCYTE EST, POC: NORMAL
NITRITE, POC: NORMAL
PH, POC: 5.5
POST VOID RESIDUAL (PVR): 37 ML
PROTEIN, POC: NORMAL
SPECIFIC GRAVITY, POC: 1.02
UROBILINOGEN, POC: 0.2

## 2024-05-13 PROCEDURE — 51798 US URINE CAPACITY MEASURE: CPT | Performed by: NURSE PRACTITIONER

## 2024-05-13 PROCEDURE — 1123F ACP DISCUSS/DSCN MKR DOCD: CPT | Performed by: NURSE PRACTITIONER

## 2024-05-13 PROCEDURE — 99203 OFFICE O/P NEW LOW 30 MIN: CPT | Performed by: NURSE PRACTITIONER

## 2024-05-13 PROCEDURE — 81002 URINALYSIS NONAUTO W/O SCOPE: CPT | Performed by: NURSE PRACTITIONER

## 2024-05-13 RX ORDER — PANTOPRAZOLE SODIUM 20 MG/1
20 TABLET, DELAYED RELEASE ORAL 2 TIMES DAILY
COMMUNITY
Start: 2023-08-22

## 2024-05-13 RX ORDER — AZITHROMYCIN 250 MG/1
TABLET, FILM COATED ORAL
COMMUNITY
Start: 2024-05-09

## 2024-05-13 RX ORDER — SPIRONOLACTONE 25 MG/1
TABLET ORAL
COMMUNITY
Start: 2024-05-08

## 2024-05-13 RX ORDER — FLUTICASONE PROPIONATE 50 MCG
2 SPRAY, SUSPENSION (ML) NASAL DAILY
COMMUNITY
Start: 2024-03-07

## 2024-05-13 RX ORDER — INSULIN GLARGINE 100 [IU]/ML
INJECTION, SOLUTION SUBCUTANEOUS
COMMUNITY
Start: 2024-04-16

## 2024-05-13 RX ORDER — FUROSEMIDE 20 MG/1
10 TABLET ORAL
COMMUNITY
Start: 2024-05-08

## 2024-05-13 NOTE — PROGRESS NOTES
Philip Escobedo is a 74 y.o. male who presents today   Chief Complaint   Patient presents with    New Patient     I am here today for elevated PSA       Elevated PSA: Patient is here with an elevated PSA.  Patient with a family history of prostate cancer including his older brother.  He has no prior genitourinary history of hematuria, prostatitis, UTI, previous  surgery.   Previous PSA values are :  PSA  12.1  4/25/24  PSA  3.05  11/27/17     PSA is overall elevated at 12.1.  I have no other PSAs to compare this to.  Denies any lower urinary tract symptoms.      Past Medical History:   Diagnosis Date    Acid reflux disease        Past Surgical History:   Procedure Laterality Date    SHOULDER SURGERY         Current Outpatient Medications   Medication Sig Dispense Refill    azithromycin (ZITHROMAX) 250 MG tablet TAKE 1 TABLET MONDAY, WEDNESDAY, AND FRIDAY      pantoprazole (PROTONIX) 20 MG tablet Take 1 tablet by mouth 2 times daily      LASIX 20 MG tablet Take 0.5 tablets by mouth      spironolactone (ALDACTONE) 25 MG tablet See Instructions, 0.5 tab Oral Daily, 0 Refill(s)      fluticasone (FLONASE) 50 MCG/ACT nasal spray 2 sprays by Each Nostril route daily      empagliflozin (JARDIANCE) 10 MG tablet Take 1 tablet by mouth      LANTUS SOLOSTAR 100 UNIT/ML injection pen INJECT 10 UNITS EVERY DAY BY SUBCUTANEOUS ROUTE AT BEDTIME.      levocetirizine (XYZAL) 5 MG tablet TAKE 1 TABLET BY MOUTH 2 TIMES DAILY  3    aspirin 81 MG tablet Take 81 mg by mouth daily (Patient not taking: Reported on 5/13/2024)      omeprazole (PRILOSEC) 20 MG delayed release capsule TAKE 1 CAPSULE TWICE A DAY (Patient not taking: Reported on 5/13/2024)  2     No current facility-administered medications for this visit.       Allergies   Allergen Reactions    Statins Other (See Comments)     Outside Source Comment: Pain all over body and joint pain all over body.    Other        Social History     Socioeconomic History    Marital status:

## 2024-05-15 ASSESSMENT — ENCOUNTER SYMPTOMS
VOMITING: 0
ABDOMINAL PAIN: 0
ABDOMINAL DISTENTION: 0
BACK PAIN: 0
NAUSEA: 0

## 2024-05-16 ENCOUNTER — TELEPHONE (OUTPATIENT)
Dept: PULMONOLOGY | Facility: CLINIC | Age: 74
End: 2024-05-16
Payer: MEDICARE

## 2024-05-16 DIAGNOSIS — J84.9 ILD (INTERSTITIAL LUNG DISEASE): ICD-10-CM

## 2024-05-16 RX ORDER — PIRFENIDONE 267 MG/1
270 TABLET, FILM COATED ORAL 3 TIMES DAILY
Qty: 207 TABLET | Refills: 0 | Status: SHIPPED | OUTPATIENT
Start: 2024-05-16

## 2024-05-16 RX ORDER — PIRFENIDONE 267 MG/1
801 CAPSULE ORAL 3 TIMES DAILY
Qty: 270 CAPSULE | Refills: 6 | Status: SHIPPED | OUTPATIENT
Start: 2024-05-27

## 2024-05-16 NOTE — TELEPHONE ENCOUNTER
"Message relayed and patient voiced understanding. He was told that the Esbriet is at the pharmacy waiting on \"prior approval from our office\".  "

## 2024-05-16 NOTE — TELEPHONE ENCOUNTER
Rx Refill Note  Requested Prescriptions     Pending Prescriptions Disp Refills    Pirfenidone (Esbriet) 267 MG tablet 207 tablet 0     Sig: Take 1 tablet by mouth 3 times a day. DAYS 1-7 TAKE 1 TABLET BY MOUTH THREE TIMES DAILY DAYS 8-14 TAKE 2 TABLETS BY MOUTH THREE TIMES DAILY DAYS 15+ TAKE THREE TABLETS BY MOUTH THREE TIMES DAILY FOR INITIAL LOADING DOSE    Pirfenidone (Esbriet) 267 MG capsule 270 capsule 6     Sig: Take 801 mg by mouth 3 times a day. After day 15 continue maintenance dose of 3 tablets three times daily      Last office visit with prescribing clinician: 5/9/2024   Last telemedicine visit with prescribing clinician: Visit date not found   Next office visit with prescribing clinician: 7/26/2024                         Would you like a call back once the refill request has been completed: [] Yes [] No    If the office needs to give you a call back, can they leave a voicemail: [] Yes [] No    Luna Patten MA  05/16/24, 15:32 CDT    Rx needs sent to [facility] specialty pharmacy [place] location.

## 2024-05-16 NOTE — TELEPHONE ENCOUNTER
Please let the patient know that his liver enzymes are within normal limits and would be okay to start Esbriet.

## 2024-05-16 NOTE — TELEPHONE ENCOUNTER
Requested CMP that was done on 5/8/24 to be faxed to the office from [facility].  (Phone number is [phone])

## 2024-05-16 NOTE — TELEPHONE ENCOUNTER
Yas, Danna Mock, Luna Saxena MA  Can you request lab work that was completed recently at [facility].  I specifically need his liver function test.  If they do not have any available then I need to order these before he starts Esbriet.

## 2024-05-16 NOTE — TELEPHONE ENCOUNTER
PA for esbriet done and approved on 5/10. Copies faxed to [facility]. Will also fax a copy of approval to [facility] specialty pharmacy.

## 2024-06-17 ENCOUNTER — TELEPHONE (OUTPATIENT)
Dept: UROLOGY | Age: 74
End: 2024-06-17

## 2024-06-17 NOTE — TELEPHONE ENCOUNTER
attempted to contact patient regarding labs. Patient stated he did his lab work at [facility]. I advised I would contact them for results.

## 2024-06-20 ENCOUNTER — OFFICE VISIT (OUTPATIENT)
Dept: UROLOGY | Age: 74
End: 2024-06-20
Payer: MEDICARE

## 2024-06-20 VITALS — TEMPERATURE: 98 F | WEIGHT: 214 LBS | BODY MASS INDEX: 28.36 KG/M2 | HEIGHT: 73 IN

## 2024-06-20 DIAGNOSIS — R97.20 ELEVATED PSA: Primary | ICD-10-CM

## 2024-06-20 LAB
APPEARANCE FLUID: CLEAR
BILIRUBIN, POC: NORMAL
BLOOD URINE, POC: NORMAL
CLARITY, POC: CLEAR
COLOR, POC: YELLOW
GLUCOSE URINE, POC: NORMAL
KETONES, POC: NORMAL
LEUKOCYTE EST, POC: NORMAL
NITRITE, POC: NORMAL
PH, POC: 6
PROTEIN, POC: NORMAL
SPECIFIC GRAVITY, POC: 1.01
UROBILINOGEN, POC: 0.2

## 2024-06-20 PROCEDURE — 81002 URINALYSIS NONAUTO W/O SCOPE: CPT | Performed by: NURSE PRACTITIONER

## 2024-06-20 PROCEDURE — 99214 OFFICE O/P EST MOD 30 MIN: CPT | Performed by: NURSE PRACTITIONER

## 2024-06-20 PROCEDURE — 1123F ACP DISCUSS/DSCN MKR DOCD: CPT | Performed by: NURSE PRACTITIONER

## 2024-06-20 RX ORDER — PIRFENIDONE 267 MG/1
267 TABLET, FILM COATED ORAL 3 TIMES DAILY
COMMUNITY
Start: 2024-05-10

## 2024-06-20 ASSESSMENT — ENCOUNTER SYMPTOMS
ABDOMINAL DISTENTION: 0
BACK PAIN: 0
NAUSEA: 0
ABDOMINAL PAIN: 0
VOMITING: 0

## 2024-07-25 ENCOUNTER — TELEPHONE (OUTPATIENT)
Dept: PULMONOLOGY | Facility: CLINIC | Age: 74
End: 2024-07-25
Payer: MEDICARE

## 2024-07-25 DIAGNOSIS — Z79.899 HIGH RISK MEDICATION USE: ICD-10-CM

## 2024-07-25 NOTE — TELEPHONE ENCOUNTER
----- Message from Danna Sorenson sent at 7/25/2024  8:34 AM CDT -----  Please remind patient to have EKG completed prior to office visit tomorrow.

## 2024-07-25 NOTE — PROGRESS NOTES
MIKE Song  [facility]  [address] [place] [address]  Phone: [phone]  Fax: [phone]          Chief Complaint  ILD (interstitial lung disease)    Subjective     Jeramie Anaya presents to [facility] for appointment.  History of Present Illness  The patient has known severe emphysema, ILD with UIP appearance, small airway disease, GERD, chronic respiratory with hypoxemia (O2 10L exertion/4L rest), and allergic rhinitis. History of left ptx and thoracoscopy with resection of bleb and pleurodesis in [facility] in July 2019. He uses albuterol HFA as needed and prednisone 10mg daily. He has tried multiple inhalers without benefit (symbicort, incruse, stiolto, breo, and spiriva, yupelri neb).  He is compliant with portable oxygen.     He was referred to Dr. Erwin OV 10/12/23 2' recent hospitalization with volume OL, episode of afib with RVR, and 2d echo from 9/3/23 identifying mild to moderately elevated RVSP. RHC completed 12/1/23 and showed pre-capillary pulmonary hypertension with preserved cardiac output, RA (mean) 7, RV (s/edp): 51/7, PA (s/d/mean) 56/23/34, PCWP (mean) 12, Sultana CO/CI 6/2.7 with PVR 3.7 HAHN. The patient was trailed on Tyvaso for ILD-PAH. The patient was unable to tolerate Tyvaso 2' chest pain.     He reports feeling unwell and experiencing difficulty breathing since Monday. His cough produces a small amount of cloudy mucus, but he denies any wheezing. Despite trying Esbriet three times, he experienced a burning sensation. He increased his dosage to two pills until Wednesday, but had to stop due to the burning sensation. After a week and a half, the burning sensation subsided. He took his last dose of Zithromax today and has a refill left. He denies any swelling and has been monitoring his weight regularly. No other aggravating or alleviating factors.       Objective   Vital Signs:   /78  "(BP Location: Left arm, Patient Position: Sitting, Cuff Size: Adult)   Pulse 87   Ht 185.4 cm (72.99\")   Wt 95.7 kg (211 lb) Comment: Patient reported, cannot stand on scale  SpO2 90% Comment: 5LPD  BMI 27.85 kg/m²        Physical Exam  Vitals reviewed.   Constitutional:       General: He is not in acute distress.     Appearance: He is well-developed and overweight.   HENT:      Head: Normocephalic and atraumatic.   Eyes:      General: No scleral icterus.     Conjunctiva/sclera: Conjunctivae normal.      Pupils: Pupils are equal, round, and reactive to light.   Cardiovascular:      Rate and Rhythm: Normal rate.   Pulmonary:      Effort: Pulmonary effort is normal. No respiratory distress.      Breath sounds: Normal breath sounds. No wheezing or rales.   Musculoskeletal:         General: Normal range of motion.      Cervical back: Normal range of motion and neck supple.   Skin:     General: Skin is warm and dry.   Neurological:      Mental Status: He is alert and oriented to person, place, and time.   Psychiatric:         Behavior: Behavior normal.         Thought Content: Thought content normal.         Judgment: Judgment normal.        Result Review :  The following data was reviewed by: MIKE Fuentes on 07/26/2024:    CT Chest Hi Resolution Diagnostic (01/03/2024 13:50)   IMPRESSION:  1. Severe emphysematous changes. At the lung bases, there is  bronchiectasis and stacked cystic changes. Honeycombing such as seen  with usual interstitial pneumonia (UIP) is not excluded.  2. Heavily calcified coronary arteries versus stent material.  3. Similar chronic mild height loss of T12. Old LEFT anterior rib  fracture.    Rest/Exercise Pulse Ox Values          8/1/2023    10:00 10/12/2023    09:15   Rest/Exercise Pulse Ox Results   Rest on O2 @ Liters 3 4L PD   Rest on O2 SAT % 92 93       4L CONT 97%   Exercise on O2 @ Liters 3 6L CONT   Exercise on O2 SAT % 71 79     PFT Values          1/20/2023    10:45 "   Pre Drug PFT Results   FVC 91   FEV1 79   FEF 25-75% 45   FEV1/FVC 67.9   Other Tests PFT Results   DLCO 35   D/VAsb 45       Results for orders placed in visit on 23    Pulmonary Function Test    Narrative  Pulmonary Function Test  Performed by: Silvino Kevin CMA  Authorized by: Danna Sorenson APRN    Pre Drug % Predicted  FVC: 91%  FEV1: 79%  FEF 25-75%: 45%  FEV1/FVC: 67.9%  DLCO: 35%  D/VAsb: 45%    Interpretation  Spirometry  Spirometry shows mild obstruction. There is reduced midflow suggesting small airway/airflow obstruction.  Review of FVL curve  Patient's effort is normal.  Diffusion Capacity  The patient's diffusion capacity is severely reduced.  Diffusion capacity is severely reduced when corrected for alveolar volume.      Results for orders placed in visit on 21    Pulmonary Function Test    Narrative  Pulmonary Function Test  Performed by: Luna Sanford RRT  Authorized by: Danna Sorenson APRN    Pre Drug % Predicted  FVC: 90%  FEV1: 82%  FEF 25-75%: 55%  FEV1/FVC: 71.62%  DLCO: 42%  D/VAsb: 51%    Interpretation  Spirometry  Spirometry shows normal results. There is reduced midflow suggesting small airway/airflow obstruction.  Diffusion Capacity  The patient's diffusion capacity is severely reduced.  Diffusion capacity is moderately reduced when corrected for alveolar volume.      Results for orders placed in visit on 10/17/19    Pulmonary Function Test    Narrative  Pulmonary Function Test  Performed by: Danna Sorenson APRN  Authorized by: Danna Sorenson APRN    Pre Drug  FVC: 76%  FEV1: 76%  FEF 25-75%: 70%  FEV1/FVC: 78.71%  T%  RV: 67%  DLCO: 41%  D/VAsb: 48%             Assessment and Plan  Diagnoses and all orders for this visit:    1. ILD (interstitial lung disease) (Primary)  Overview:  CT Angiogram Chest (2023 18:09)  Chronic interstitial lung disease with diffuse intralobular and interlobular septal thickening, centrilobular  paraseptal cystic changes with honeycombing in the lung bases.    10/12/23 Discussed antifibrotic. Patient does not feel that he would be interested 2' GI side effects. He states that he struggles with GI upset frequently.     10/13/13 PERICO, ANCA, RF, CCP WNL     CT Chest Hi Resolution Diagnostic (01/03/2024 13:50)  Severe emphysematous changes. At the lung bases, there is  bronchiectasis and stacked cystic changes. Honeycombing such as seen with usual interstitial pneumonia (UIP) is not excluded.    Did not tolerate Tyvaso 2' chest pain.     Did not tolerate esbreit.     Prednisone 10mg po daily      Assessment & Plan:  Continue current treatment regimen.        2. Bronchiectasis without acute exacerbation  Overview:  Identified 1/3/24-there is  bronchiectasis and stacked cystic changes    10/13/13 PERICO, ANCA, RF, CCP WNL    Continue bronchodilators.       Assessment & Plan:  He reports feeling unwell and experiencing difficulty breathing since Monday. His cough produces a small amount of cloudy mucus, but he denies any wheezing.     Reports that he has prednisone 20 mg tablets at home.  He will begin a prednisone taper with 40 mg x 3 days, then 20 mg x 3 days, then 10 mg daily.      3. High risk medication use  -     ECG 12 Lead; Future    4. Allergic rhinitis, unspecified seasonality, unspecified trigger  Overview:  Xyzal, Flonase    Assessment & Plan:  Stable. Continue current treatment regimen.        5. Chronic respiratory failure with hypoxia  Overview:  O2 10L exertion/4L rest     Assessment & Plan:  Continue chronic oxygen therapy.  The patient is benefiting from it and wishes to continue it.        6. Pulmonary emphysema, unspecified emphysema type  Overview:  8/20/19 alpha 1 MM    5/9/24 zithromax MWF started   3/4/24 Qtc 397   6/13/24 Qtc 425    Continue albuterol HFA as needed, continue chronic oxygen therapy, continue to monitor DLCO, continue to monitor Qtc    Assessment & Plan:  Continue zithromax MWF.  Obtain EKG in 3 months.         7. Gastroesophageal reflux disease, unspecified whether esophagitis present  Overview:  PPI-Prilosec    Assessment & Plan:  Continue current treatment regimen.        8. Pulmonary hypertension  Overview:  Adult Transthoracic Echo Complete W/ Cont if Necessary Per Protocol (09/03/2023 09:51)    Left ventricular systolic function is normal. Left ventricular ejection fraction appears to be 51 - 55%.    Left ventricular diastolic function is consistent with (grade I) impaired relaxation.    Estimated right ventricular systolic pressure from tricuspid regurgitation is moderately elevated (45-55 mmHg).    There is a trivial pericardial effusion.  RVSP 49.4mmHg    NM Lung Scan Perfusion Particulate (11/13/2023 10:18)   IMPRESSION:  1. Low probability of pulmonary embolism.    CARDIAC CATHETERIZATION REPORT  Date of Procedure: 12/1/2023   Findings:  Pre-capillary pulmonary hypertension with preserved cardiac output.  RA (mean) 7, RV (s/edp): 51/7, PA (s/d/mean) 56/23/34, PCWP (mean) 12, Sultana CO/CI 6/2.7 with PVR 3.7 HAHN  Recommendations:  Follow-up in CHF clinic and with pulmonology for consideration of treatment for ILD-PAH  Complications:  None    Follows with Dr. Erwin  Did not tolerate Tyvaso        Assessment & Plan:  No increasing edema, chest pain, syncope, or palpitations.            Follow Up  MIKE Fuentes  7/26/2024  16:12 CDT    Return in about 3 months (around 10/26/2024) for f/u ekg .    Patient was given instructions and counseling regarding his condition or for health maintenance advice. Please see specific information pulled into the AVS if appropriate.     Please note that portions of this note were completed with a voice recognition program.    Patient or patient representative verbalized consent for the use of Ambient Listening during the visit with  MIKE Fuentes for chart documentation. 7/26/2024  16:02 CDT

## 2024-07-26 ENCOUNTER — OFFICE VISIT (OUTPATIENT)
Dept: PULMONOLOGY | Facility: CLINIC | Age: 74
End: 2024-07-26
Payer: MEDICARE

## 2024-07-26 VITALS
WEIGHT: 211 LBS | DIASTOLIC BLOOD PRESSURE: 78 MMHG | HEIGHT: 73 IN | SYSTOLIC BLOOD PRESSURE: 120 MMHG | BODY MASS INDEX: 27.96 KG/M2 | HEART RATE: 87 BPM | OXYGEN SATURATION: 90 %

## 2024-07-26 DIAGNOSIS — J30.9 ALLERGIC RHINITIS, UNSPECIFIED SEASONALITY, UNSPECIFIED TRIGGER: Chronic | ICD-10-CM

## 2024-07-26 DIAGNOSIS — J43.9 PULMONARY EMPHYSEMA, UNSPECIFIED EMPHYSEMA TYPE: Chronic | ICD-10-CM

## 2024-07-26 DIAGNOSIS — J96.11 CHRONIC RESPIRATORY FAILURE WITH HYPOXIA: Chronic | ICD-10-CM

## 2024-07-26 DIAGNOSIS — J47.9 BRONCHIECTASIS WITHOUT ACUTE EXACERBATION: Chronic | ICD-10-CM

## 2024-07-26 DIAGNOSIS — K21.9 GASTROESOPHAGEAL REFLUX DISEASE, UNSPECIFIED WHETHER ESOPHAGITIS PRESENT: Chronic | ICD-10-CM

## 2024-07-26 DIAGNOSIS — J84.9 ILD (INTERSTITIAL LUNG DISEASE): Primary | Chronic | ICD-10-CM

## 2024-07-26 DIAGNOSIS — Z79.899 HIGH RISK MEDICATION USE: ICD-10-CM

## 2024-07-26 DIAGNOSIS — I27.20 PULMONARY HYPERTENSION: Chronic | ICD-10-CM

## 2024-07-26 PROCEDURE — 99214 OFFICE O/P EST MOD 30 MIN: CPT | Performed by: NURSE PRACTITIONER

## 2024-07-26 PROCEDURE — 1159F MED LIST DOCD IN RCRD: CPT | Performed by: NURSE PRACTITIONER

## 2024-07-26 PROCEDURE — 1160F RVW MEDS BY RX/DR IN RCRD: CPT | Performed by: NURSE PRACTITIONER

## 2024-07-26 NOTE — PATIENT INSTRUCTIONS
Bronchiectasis    Bronchiectasis is a condition in which the airways in the lungs (bronchi) are damaged and widened. The condition makes it hard for the lungs to get rid of mucus, and it causes mucus to gather in the bronchi. This condition often leads to lung infections, which can make the condition worse.  What are the causes?  You can be born with this condition, or you can develop it later in life. Common causes of this condition include:  Cystic fibrosis.  Repeated lung infections, such as pneumonia or tuberculosis.  An object or other blockage in the lungs.  Breathing in fluid, food, or other objects (aspiration).  A problem with the body's defense system (immune system) and lung structure that is present at birth (congenital).  Sometimes the cause is not known.  What are the signs or symptoms?  Common symptoms of this condition include:  A daily cough that brings up mucus and lasts for more than 3 weeks.  Lung infections that happen often.  Shortness of breath and wheezing.  Weakness and feeling tired (fatigue).  How is this diagnosed?  This condition is diagnosed with tests, such as:  Chest X-rays or CT scans. These are done to check for changes in the lungs.  Breathing tests. These are done to check how well your lungs are working.  A test of a sample of your saliva (sputum culture). This test is done to check for infection.  Blood tests and other tests. These are done to check for related diseases or causes.  How is this treated?  Treatment for this condition depends on the severity of the illness and its cause. Treatment may include:  Medicines that loosen mucus so it can be coughed up (mucolytics).  Medicines that relax the muscles of the bronchi (bronchodilators).  Antibiotic medicines to prevent or treat infection.  Physical therapy to help clear mucus from the lungs. Techniques may include:  Postural drainage. This is when you sit or lie in certain positions so that mucus can drain by gravity.  Chest  percussion. This involves tapping the chest or back with a cupped hand.  Chest vibration. For this therapy, a hand or special equipment vibrates your chest and back.  Surgery to remove the affected part of the lung. This may be done in severe cases.  Follow these instructions at home:  Medicines  Take over-the-counter and prescription medicines only as told by your health care provider.  If you were prescribed an antibiotic medicine, take it as told by your health care provider. Do not stop taking the antibiotic even if you start to feel better.  Avoid taking sedatives and antihistamines unless your health care provider tells you to take them. These medicines tend to thicken the mucus in the lungs.  Managing symptoms  Do breathing exercises or techniques to clear your lungs as told by your health care provider.  Consider using a cold steam vaporizer or humidifier in your room or home to help loosen secretions.  If you have a cough that gets worse at night, try sleeping in a semi-upright position.  General instructions  Get plenty of rest.  Drink enough fluid to keep your urine pale yellow.  Stay inside when pollution and ozone levels are high.  Stay up to date with vaccinations and immunizations.  Avoid cigarette smoke and other lung irritants.  Do not use any products that contain nicotine or tobacco. These products include cigarettes, chewing tobacco, and vaping devices, such as e-cigarettes. If you need help quitting, ask your health care provider.  Keep all follow-up visits. This is important.  Contact a health care provider if:  You cough up more sputum than before and the sputum is yellow or green in color.  You have a fever or chills.  You cannot control your cough and are losing sleep.  Get help right away if:  You cough up blood.  You have chest pain.  You have increasing shortness of breath.  You have pain that gets worse or is not controlled with medicines.  You have a fever and your symptoms suddenly get  worse.  These symptoms may be an emergency. Get help right away. Call 911.  Do not wait to see if the symptoms will go away.  Do not drive yourself to the hospital.  Summary  Bronchiectasis is a condition in which the airways in the lungs (bronchi) are damaged and widened. The condition makes it hard for the lungs to get rid of mucus, and it causes mucus to gather in the bronchi.  Treatment usually includes therapy to help clear mucus from the lungs.  Avoid cigarette smoke and other lung irritants.  Stay up to date with vaccinations and immunizations.  This information is not intended to replace advice given to you by your health care provider. Make sure you discuss any questions you have with your health care provider.  Document Revised: 08/31/2022 Document Reviewed: 07/19/2022  [employer] Patient Education © 2024 [employer]  Pulmonary Fibrosis    Pulmonary fibrosis is a type of lung disease that causes scarring. Over time, the scar tissue builds up in the air sacs of your lungs (alveoli). This makes it hard for you to breathe because less oxygen gets into your bloodstream.  Scarring from pulmonary fibrosis is permanent and may lead to other serious health problems.  What are the causes?  There are many different causes of pulmonary fibrosis. In some cases, the cause is not known. This is called idiopathic pulmonary fibrosis. Other causes include:  Exposure to chemicals and substances found in agricultural, farm, construction, or factory work. These include mold, asbestos, silica, metal dusts, and toxic fumes.  Sarcoidosis. In this disease, areas of inflammatory cells (granulomas) form and most often affect the lungs.  Autoimmune diseases. These include diseases such as rheumatoid arthritis, systemic sclerosis, or connective tissue disease.  Taking certain medicines. These include drugs used in radiation therapy or used to treat seizures, heart problems, and some infections.  What increases the risk?  You are more  likely to develop this condition if:  You have a family history of the disease.  You are an older person. The condition is more common in older adults.  You have a history of smoking.  You have a job that exposes you to certain chemicals.  You have gastroesophageal reflux disease (GERD).  What are the signs or symptoms?  Symptoms of this condition include:  Difficulty breathing that gets worse with activity.  Shortness of breath (dyspnea).  Dry, hacking cough.  Rapid, shallow breathing during exercise or while at rest.  Other symptoms may include:  Loss of appetite or weight loss  Tiredness (fatigue) or weakness.  Bluish skin and lips.  Rounded and enlarged fingertips (clubbing).  How is this diagnosed?  This condition may be diagnosed based on:  Your symptoms and medical history.  A physical exam.  You may also have tests, including:  A test that involves looking inside your lungs with an instrument (bronchoscopy).  Imaging studies of your lungs and heart.  Tests to measure how well you are breathing (pulmonary function tests).  Blood tests.  Tests to see how well your lungs work while you are walking (pulmonary stress test).  A procedure to remove a lung tissue sample to look at it under a microscope (biopsy).  How is this treated?  There is no cure for pulmonary fibrosis. Treatment focuses on managing symptoms and preventing scarring from getting worse. This may include:  Medicines, such as:  Steroids to prevent permanent lung changes.  Medicines to suppress your body's defense system (immune system).  Medicines to help with lung function by reducing inflammation or scarring.  Ongoing monitoring with X-rays and lab work.  Oxygen therapy.  Pulmonary rehabilitation.  Surgery. In some cases, a lung transplant is possible.  Follow these instructions at home:    Medicines  Take over-the-counter and prescription medicines only as told by your health care provider.  Keep your vaccinations up to date as recommended by  your health care provider.  Activity  Get regular exercise, but do not pick activities that are too strenuous for you. Ask your health care provider what activities are safe for you.  If you have physical limitations, you may get exercise by walking, using a stationary bike, or doing chair exercises.  Ask your health care provider about using oxygen while exercising.  Do breathing exercises as told by your health care provider.  Plan rest periods when you get tired.  General instructions  Do not use any products that contain nicotine or tobacco. These products include cigarettes, chewing tobacco, and vaping devices, such as e-cigarettes. If you need help quitting, ask your health care provider.  If you are exposed to chemicals and substances at work, make sure that you wear a mask or respirator at all times.  Learn to manage stress. If you need help to do this, ask your health care provider.  Join a pulmonary rehabilitation program or a support group for people with pulmonary fibrosis.  Eat small meals often so you do not get too full. Overeating can make breathing trouble worse.  Maintain a healthy weight. Lose weight if you need to.  Keep all follow-up visits. This is important.  Where to find more information  American Lung Association: www.lung.org  National Heart, Lung, and [employer] [place]: www.nhlbi.nih.gov  Pulmonary Fibrosis Foundation: pulmonaryfibrosis.org  Contact a health care provider if:  You have symptoms that do not get better with medicines.  You are not able to be as active as usual.  You have trouble taking a deep breath.  You have a fever or chills.  You have blue lips or skin.  You have a lot of headaches.  You cough up mucus that is dark in color.  You have feelings of depression or sadness.  You are unable to sleep because it is hard to breathe.  Get help right away if:  Your symptoms suddenly worsen.  You have chest pain.  You cough up blood.  You get very confused or sleepy.  These symptoms  may be an emergency. Get help right away. Call 911.  Do not wait to see if the symptoms will go away.  Do not drive yourself to the hospital.  Summary  Pulmonary fibrosis is a type of lung disease that causes scar tissue to build up in the air sacs of your lungs (alveoli) over time. This makes it hard for you to breathe because less oxygen gets into your bloodstream.  Scarring from pulmonary fibrosis is permanent and may lead to other serious health problems.  You are more likely to develop this condition if you have a family history of the condition or a job that exposes you to certain chemicals.  There is no cure for pulmonary fibrosis. Treatment focuses on managing symptoms and preventing scarring from getting worse.  This information is not intended to replace advice given to you by your health care provider. Make sure you discuss any questions you have with your health care provider.  Document Revised: 08/09/2022 Document Reviewed: 08/09/2022  Elsevier Patient Education © 2024 [employer] Inc.

## 2024-07-26 NOTE — ASSESSMENT & PLAN NOTE
He reports feeling unwell and experiencing difficulty breathing since Monday. His cough produces a small amount of cloudy mucus, but he denies any wheezing.     Reports that he has prednisone 20 mg tablets at home.  He will begin a prednisone taper with 40 mg x 3 days, then 20 mg x 3 days, then 10 mg daily.

## 2024-10-15 ENCOUNTER — TELEPHONE (OUTPATIENT)
Dept: PULMONOLOGY | Facility: CLINIC | Age: 74
End: 2024-10-15
Payer: MEDICARE

## 2024-10-15 NOTE — TELEPHONE ENCOUNTER
Patient called stating that he thought he was supposed to have an EKG done before he sees Danna Sorenson in the office. If so he would like to have it done at [facility]. Let me know if he is supposed to complete this or not.

## 2024-10-15 NOTE — TELEPHONE ENCOUNTER
Called [facility] to see what needs to be done for the patient to complete this. Left message for someone to call back.

## 2024-10-15 NOTE — TELEPHONE ENCOUNTER
Yes.  He is correct that he does need an EKG completed prior to his next office visit.  The order is already pending and can be faxed to [facility].

## 2024-10-15 NOTE — TELEPHONE ENCOUNTER
[facility] stated that the patient didn't have to have an appointment. She said I could fax the order to them and the patient could just walk in to get completed.  Fax # [phone]    I let patient know this information and he voiced understanding.

## 2024-10-17 DIAGNOSIS — Z79.899 HIGH RISK MEDICATION USE: ICD-10-CM

## 2024-10-28 NOTE — PROGRESS NOTES
MIKE Song  [facility]  [address] [place] [place] [address]  Phone: [phone]  Fax: [phone]          Chief Complaint  ILD (interstitial lung disease)    Subjective     Jeramie Anaya presents to [facility] for appointment.  History of Present Illness  The patient has known severe emphysema, ILD with UIP appearance, small airway disease, GERD, chronic respiratory with hypoxemia (O2 10L exertion/4L rest), and allergic rhinitis. History of left ptx and thoracoscopy with resection of bleb and pleurodesis in [facility] in July 2019. He uses albuterol HFA as needed and prednisone 10mg daily. He has tried multiple inhalers without benefit (symbicort, incruse, stiolto, breo, and spiriva, yupelri neb).  He is compliant with portable oxygen.      He was referred to Dr. Erwin at [facility] 10/12/23 2' recent hospitalization with volume OL, episode of afib with RVR, and 2d echo from 9/3/23 identifying mild to moderately elevated RVSP. RHC completed 12/1/23 and showed pre-capillary pulmonary hypertension with preserved cardiac output, RA (mean) 7, RV (s/edp): 51/7, PA (s/d/mean) 56/23/34, PCWP (mean) 12, [place] CO/CI 6/2.7 with PVR 3.7 HAHN. The patient was trailed on Tyvaso for ILD-PAH. The patient was unable to tolerate Tyvaso 2' chest pain.     The patient presents for s/p treatment of a lung infection per PCP.    Two weeks ago, he sought emergency care due to low O2 sat. Despite the absence of pneumonia on his chest x-ray, he was diagnosed with a bacterial lung infection, similar to one he had a year prior.     His recent blood work showed no signs of infection, leading to the discontinuation of his antibiotics. He was treated with three different antibiotics, with Rocephin proving most effective. He also received a steroid injection, which provided temporary relief. Currently, he is on a regimen of prednisone 10 mg, having just  "completed at taper. However, his oxygen levels are deteriorating again and he reports doing better on 20mg of prednisone.     He continues on Zithromax Monday Wednesday Friday.  He has been using albuterol a few times daily and has noticed an increase in clear mucus production. His breathing was particularly difficult last night, and he noticed a decline in his condition upon waking this morning. His oxygen levels fluctuate, ranging from 93 to 94 at rest.  He denies fever or chills.     Objective   Vital Signs:   /60   Pulse 75   Ht 185.4 cm (72.99\")   Wt 98.9 kg (218 lb)   SpO2 (!) 89% Comment: 5 L  BMI 28.77 kg/m²        Physical Exam  Vitals reviewed.   Constitutional:       General: He is not in acute distress.     Appearance: He is well-developed and overweight.      Interventions: Nasal cannula in place.      Comments: Follow-up O2 sat 90% on 5L POC.  Motorized scooter chair   HENT:      Head: Normocephalic and atraumatic.   Eyes:      General: No scleral icterus.     Conjunctiva/sclera: Conjunctivae normal.      Pupils: Pupils are equal, round, and reactive to light.   Cardiovascular:      Rate and Rhythm: Normal rate.   Pulmonary:      Effort: Pulmonary effort is normal. No respiratory distress.      Breath sounds: Decreased breath sounds present. No wheezing or rales.   Musculoskeletal:         General: Normal range of motion.      Cervical back: Normal range of motion and neck supple.   Skin:     General: Skin is warm and dry.   Neurological:      Mental Status: He is alert and oriented to person, place, and time.   Psychiatric:         Behavior: Behavior normal.         Thought Content: Thought content normal.         Judgment: Judgment normal.          Result Review :  The following data was reviewed by: MIKE Fuentes on 10/29/2024:  CT Chest Hi Resolution Diagnostic (01/03/2024 13:50)   IMPRESSION:  1. Severe emphysematous changes. At the lung bases, there is bronchiectasis and " stacked cystic changes. Honeycombing such as seen with usual interstitial pneumonia (UIP) is not excluded.  2. Heavily calcified coronary arteries versus stent material.  3. Similar chronic mild height loss of T12. Old LEFT anterior rib fracture.    Requested records from [facility].    Chest x-ray reviewed from 10/18/2024 shows no acute intrathoracic findings or significant changes.    EKG reviewed from October 17, 2024 and shows QTc of 449, normal sinus rhythm, right bundle branch block    ABG reviewed from 10/18/2024 and shows pH 7.48, pCO2 29, PaO2 69, HCO3 22    Labs reviewed from 10/19/2024 and shows WBC 9.7 hemoglobin 18.3, platelets 204, creatinine 1.2    Respiratory culture reviewed from 10/3/2024 and was noted to be routine respiratory mariel.    Rest/Exercise Pulse Ox Values          8/1/2023    10:00 10/12/2023    09:15   Rest/Exercise Pulse Ox Results   Rest on O2 @ Liters 3 4L PD   Rest on O2 SAT % 92 93       4L CONT 97%   Exercise on O2 @ Liters 3 6L CONT   Exercise on O2 SAT % 71 79     PFT Values          1/20/2023    10:45   Pre Drug PFT Results   FVC 91   FEV1 79   FEF 25-75% 45   FEV1/FVC 67.9   Other Tests PFT Results   DLCO 35   D/VAsb 45       Results for orders placed in visit on 01/20/23    Pulmonary Function Test    Narrative  Pulmonary Function Test  Performed by: Silvino Kevin CMA  Authorized by: Danna Sorenson APRN    Pre Drug % Predicted  FVC: 91%  FEV1: 79%  FEF 25-75%: 45%  FEV1/FVC: 67.9%  DLCO: 35%  D/VAsb: 45%    Interpretation  Spirometry  Spirometry shows mild obstruction. There is reduced midflow suggesting small airway/airflow obstruction.  Review of FVL curve  Patient's effort is normal.  Diffusion Capacity  The patient's diffusion capacity is severely reduced.  Diffusion capacity is severely reduced when corrected for alveolar volume.      Results for orders placed in visit on 11/30/21    Pulmonary Function Test    Narrative  Pulmonary Function Test  Performed  by: Luna Sanford, RRT  Authorized by: Danna Sorenson APRN    Pre Drug % Predicted  FVC: 90%  FEV1: 82%  FEF 25-75%: 55%  FEV1/FVC: 71.62%  DLCO: 42%  D/VAsb: 51%    Interpretation  Spirometry  Spirometry shows normal results. There is reduced midflow suggesting small airway/airflow obstruction.  Diffusion Capacity  The patient's diffusion capacity is severely reduced.  Diffusion capacity is moderately reduced when corrected for alveolar volume.      Results for orders placed in visit on 10/17/19    Pulmonary Function Test    Narrative  Pulmonary Function Test  Performed by: Danna Sorenson APRN  Authorized by: Danna Sorenson APRN    Pre Drug  FVC: 76%  FEV1: 76%  FEF 25-75%: 70%  FEV1/FVC: 78.71%  T%  RV: 67%  DLCO: 41%  D/VAsb: 48%             Assessment and Plan  Diagnoses and all orders for this visit:    1. Bronchiectasis without acute exacerbation (Primary)  Overview:  Identified 1/3/24-there is  bronchiectasis and stacked cystic changes    10/13/13 PERICO, ANCA, RF, CCP WNL    Continue bronchodilators.     Zithromax MWF      Assessment & Plan:  The patient continues on bronchodilators.  He was hospitalized this month for treatment of lung infection.  I recommend beginning chest percussion therapy as he has had a persistent cough for at least 6 months.  He is on chronic antibiotic therapy and steroid therapy.  Has diagnosis of bronchiectasis noted on high-resolution CT of the chest January 3, 2024.  He is unable to participate in postral drainage techniques as he has physical limitations and does not have full use of his left upper extremity. He does not have a caregiver at home to provide manual percussion.       2. Allergic rhinitis, unspecified seasonality, unspecified trigger  Overview:  XyzalTristannase    Assessment & Plan:  Stable. Continue current treatment regimen.        3. ILD (interstitial lung disease)  Overview:  CT Angiogram Chest (2023 18:09)  Chronic  interstitial lung disease with diffuse intralobular and interlobular septal thickening, centrilobular paraseptal cystic changes with honeycombing in the lung bases.    10/12/23 Discussed antifibrotic. Patient does not feel that he would be interested 2' GI side effects. He states that he struggles with GI upset frequently.     10/13/13 PERICO, ANCA, RF, CCP WNL     CT Chest Hi Resolution Diagnostic (01/03/2024 13:50)  Severe emphysematous changes. At the lung bases, there is  bronchiectasis and stacked cystic changes. Honeycombing such as seen with usual interstitial pneumonia (UIP) is not excluded.    Did not tolerate Tyvaso 2' chest pain.     Did not tolerate esbreit.     Prednisone 10mg po daily      Assessment & Plan:  Increase prednisone to 20 mg daily at this time.  The patient will titrate back down to 10 mg daily in a couple of weeks.    Orders:  -     Discontinue: predniSONE (DELTASONE) 10 MG tablet; Take 2 tablets by mouth Daily.  Dispense: 30 tablet; Refill: 3  -     predniSONE (DELTASONE) 10 MG tablet; Take 2 tablets by mouth Daily.  Dispense: 60 tablet; Refill: 3    4. Chronic respiratory failure with hypoxia  Overview:  O2 10L exertion/4L rest     Assessment & Plan:  Continue chronic oxygen therapy.  The patient is benefiting from it and wishes to continue it.        5. Pulmonary emphysema, unspecified emphysema type  Overview:  8/20/19 alpha 1 MM    5/9/24 zithromax MWF started   3/4/24 Qtc 397   6/13/24 Qtc 425    Continue albuterol HFA as needed, continue chronic oxygen therapy, continue to monitor DLCO, continue to monitor Qtc      6. Gastroesophageal reflux disease, unspecified whether esophagitis present  Overview:  PPI-Prilosec    Assessment & Plan:  Continue current treatment regimen.            Follow Up  MIKE Fuentes  10/29/2024  16:07 CDT    Return in about 4 weeks (around 11/26/2024) for SEAN REYES .    Patient was given instructions and counseling regarding his condition or for  health maintenance advice. Please see specific information pulled into the AVS if appropriate.     Please note that portions of this note were completed with a voice recognition program.    Patient or patient representative verbalized consent for the use of Ambient Listening during the visit with  MIKE Fuentes for chart documentation. 10/29/2024  16:06 CDT

## 2024-10-29 ENCOUNTER — OFFICE VISIT (OUTPATIENT)
Dept: PULMONOLOGY | Facility: CLINIC | Age: 74
End: 2024-10-29
Payer: MEDICARE

## 2024-10-29 ENCOUNTER — TELEPHONE (OUTPATIENT)
Dept: PULMONOLOGY | Facility: CLINIC | Age: 74
End: 2024-10-29

## 2024-10-29 VITALS
SYSTOLIC BLOOD PRESSURE: 115 MMHG | HEIGHT: 73 IN | DIASTOLIC BLOOD PRESSURE: 60 MMHG | HEART RATE: 75 BPM | BODY MASS INDEX: 28.89 KG/M2 | WEIGHT: 218 LBS | OXYGEN SATURATION: 89 %

## 2024-10-29 DIAGNOSIS — K21.9 GASTROESOPHAGEAL REFLUX DISEASE, UNSPECIFIED WHETHER ESOPHAGITIS PRESENT: Chronic | ICD-10-CM

## 2024-10-29 DIAGNOSIS — J84.9 ILD (INTERSTITIAL LUNG DISEASE): ICD-10-CM

## 2024-10-29 DIAGNOSIS — J47.9 BRONCHIECTASIS WITHOUT ACUTE EXACERBATION: Primary | Chronic | ICD-10-CM

## 2024-10-29 DIAGNOSIS — J96.11 CHRONIC RESPIRATORY FAILURE WITH HYPOXIA: Chronic | ICD-10-CM

## 2024-10-29 DIAGNOSIS — J43.9 PULMONARY EMPHYSEMA, UNSPECIFIED EMPHYSEMA TYPE: Chronic | ICD-10-CM

## 2024-10-29 DIAGNOSIS — J30.9 ALLERGIC RHINITIS, UNSPECIFIED SEASONALITY, UNSPECIFIED TRIGGER: Chronic | ICD-10-CM

## 2024-10-29 PROCEDURE — 1160F RVW MEDS BY RX/DR IN RCRD: CPT | Performed by: NURSE PRACTITIONER

## 2024-10-29 PROCEDURE — 1159F MED LIST DOCD IN RCRD: CPT | Performed by: NURSE PRACTITIONER

## 2024-10-29 PROCEDURE — 99214 OFFICE O/P EST MOD 30 MIN: CPT | Performed by: NURSE PRACTITIONER

## 2024-10-29 RX ORDER — PREDNISONE 10 MG/1
20 TABLET ORAL DAILY
Qty: 60 TABLET | Refills: 3 | Status: SHIPPED | OUTPATIENT
Start: 2024-10-29

## 2024-10-29 RX ORDER — PREDNISONE 10 MG/1
20 TABLET ORAL DAILY
Qty: 30 TABLET | Refills: 3 | Status: SHIPPED | OUTPATIENT
Start: 2024-10-29 | End: 2024-10-29

## 2024-10-29 NOTE — PATIENT INSTRUCTIONS
Bronchiectasis    Bronchiectasis is a condition in which the airways in the lungs (bronchi) are damaged and widened. The condition makes it hard for the lungs to get rid of mucus, and it causes mucus to gather in the bronchi. This condition often leads to lung infections, which can make the condition worse.  What are the causes?  You can be born with this condition, or you can develop it later in life. Common causes of this condition include:  Cystic fibrosis.  Repeated lung infections, such as pneumonia or tuberculosis.  An object or other blockage in the lungs.  Breathing in fluid, food, or other objects (aspiration).  A problem with the body's defense system (immune system) and lung structure that is present at birth (congenital).  Sometimes the cause is not known.  What are the signs or symptoms?  Common symptoms of this condition include:  A daily cough that brings up mucus and lasts for more than 3 weeks.  Lung infections that happen often.  Shortness of breath and wheezing.  Weakness and feeling tired (fatigue).  How is this diagnosed?  This condition is diagnosed with tests, such as:  Chest X-rays or CT scans. These are done to check for changes in the lungs.  Breathing tests. These are done to check how well your lungs are working.  A test of a sample of your saliva (sputum culture). This test is done to check for infection.  Blood tests and other tests. These are done to check for related diseases or causes.  How is this treated?  Treatment for this condition depends on the severity of the illness and its cause. Treatment may include:  Medicines that loosen mucus so it can be coughed up (mucolytics).  Medicines that relax the muscles of the bronchi (bronchodilators).  Antibiotic medicines to prevent or treat infection.  Physical therapy to help clear mucus from the lungs. Techniques may include:  Postural drainage. This is when you sit or lie in certain positions so that mucus can drain by gravity.  Chest  percussion. This involves tapping the chest or back with a cupped hand.  Chest vibration. For this therapy, a hand or special equipment vibrates your chest and back.  Surgery to remove the affected part of the lung. This may be done in severe cases.  Follow these instructions at home:  Medicines  Take over-the-counter and prescription medicines only as told by your health care provider.  If you were prescribed an antibiotic medicine, take it as told by your health care provider. Do not stop taking the antibiotic even if you start to feel better.  Avoid taking sedatives and antihistamines unless your health care provider tells you to take them. These medicines tend to thicken the mucus in the lungs.  Managing symptoms  Do breathing exercises or techniques to clear your lungs as told by your health care provider.  Consider using a cold steam vaporizer or humidifier in your room or home to help loosen secretions.  If you have a cough that gets worse at night, try sleeping in a semi-upright position.  General instructions  Get plenty of rest.  Drink enough fluid to keep your urine pale yellow.  Stay inside when pollution and ozone levels are high.  Stay up to date with vaccinations and immunizations.  Avoid cigarette smoke and other lung irritants.  Do not use any products that contain nicotine or tobacco. These products include cigarettes, chewing tobacco, and vaping devices, such as e-cigarettes. If you need help quitting, ask your health care provider.  Keep all follow-up visits. This is important.  Contact a health care provider if:  You cough up more sputum than before and the sputum is yellow or green in color.  You have a fever or chills.  You cannot control your cough and are losing sleep.  Get help right away if:  You cough up blood.  You have chest pain.  You have increasing shortness of breath.  You have pain that gets worse or is not controlled with medicines.  You have a fever and your symptoms suddenly get  worse.  These symptoms may be an emergency. Get help right away. Call 911.  Do not wait to see if the symptoms will go away.  Do not drive yourself to the hospital.  Summary  Bronchiectasis is a condition in which the airways in the lungs (bronchi) are damaged and widened. The condition makes it hard for the lungs to get rid of mucus, and it causes mucus to gather in the bronchi.  Treatment usually includes therapy to help clear mucus from the lungs.  Avoid cigarette smoke and other lung irritants.  Stay up to date with vaccinations and immunizations.  This information is not intended to replace advice given to you by your health care provider. Make sure you discuss any questions you have with your health care provider.  Document Revised: 08/31/2022 Document Reviewed: 07/19/2022  Elsevier Patient Education © 2024 [employer]

## 2024-10-29 NOTE — ASSESSMENT & PLAN NOTE
Increase prednisone to 20 mg daily at this time.  The patient will titrate back down to 10 mg daily in a couple of weeks.

## 2024-10-29 NOTE — ASSESSMENT & PLAN NOTE
The patient continues on bronchodilators.  He was hospitalized this month for treatment of lung infection.  I recommend beginning chest percussion therapy as he has had a persistent cough for at least 6 months.  He is on chronic antibiotic therapy and steroid therapy.  Has diagnosis of bronchiectasis noted on high-resolution CT of the chest January 3, 2024.  He is unable to participate in postral drainage techniques as he has physical limitations and does not have full use of his left upper extremity. He does not have a caregiver at home to provide manual percussion.

## 2024-10-29 NOTE — TELEPHONE ENCOUNTER
Please let the patient know that I did review his labs from [facility].  I do agree that his CBC did not show signs of infection.  His respiratory culture was actually noted to be normal respiratory mariel.  This could be because he is on the chronic Zithromax and the bacteria just did not grow out.  EKG was okay for him to continue chronic Zithromax Monday Wednesday and Friday if he wishes.

## 2024-10-31 DIAGNOSIS — J84.9 ILD (INTERSTITIAL LUNG DISEASE): ICD-10-CM

## 2024-10-31 RX ORDER — PREDNISONE 10 MG/1
10 TABLET ORAL DAILY
Qty: 30 TABLET | Refills: 3 | OUTPATIENT
Start: 2024-10-31

## 2024-10-31 NOTE — TELEPHONE ENCOUNTER
Pharmacy sent a request for refills on Prednisone. Refill request routed to Danna MCKEON.   This was a duplicate request. The pharmacy did already receive the one from 10/29/24.  Rx Refill Note  Requested Prescriptions     Pending Prescriptions Disp Refills    predniSONE (DELTASONE) 10 MG tablet [Pharmacy Med Name: PREDNISONE 10 MG TABLET] 30 tablet 3     Sig: TAKE 1 TABLET BY MOUTH EVERY DAY      Last office visit with prescribing clinician: 10/29/2024   Last telemedicine visit with prescribing clinician: Visit date not found   Next office visit with prescribing clinician: 11/20/24 Montse MCKEON                        Would you like a call back once the refill request has been completed: [] Yes [] No    If the office needs to give you a call back, can they leave a voicemail: [] Yes [] No    Silvino Kevin CMA  10/31/24, 08:05 CDT

## 2024-11-19 NOTE — PROGRESS NOTES
MIKE Lundberg  [facility]   Pulmonary and Critical Care  [address] [place] [address]  Phone: [phone]  Fax: [phone]           Chief Complaint  Bronchiectasis without acute exacerbation and ILD (interstitial lung disease)    Subjective        Jeramie Anaya presents to [facility] PULMONARY & CRITICAL CARE MEDICINE     History of Present Illness  Mr. Anaya is a 74-year-old male patient who is recently been following with Danna Sorenson NP and now establishing care with myself.  He has known severe emphysema, ILD with UIP appearance, small airway disease, bronchiectasis, GERD, chronic respiratory with hypoxemia (O2 10L exertion/4L rest), and allergic rhinitis. History of left ptx and thoracoscopy with resection of bleb and pleurodesis in [facility] in July 2019.  He also has who group 3 pulmonary artery hypertension and A-fib.    He was previously on a daily dose of 10 mg prednisone, which was increased to 20 mg for 15 days in October 2024. However, he experienced a decline in his breathing three days after discontinuing the 20 mg dose. His oxygen levels fluctuate between 85 and 92, with a reading of 80 this morning so he was taken off of his pulsed dose and placed on our oxygen concentrator.  He uses a 10 L concentrator at home and switches to a 5 L concentrator when at rest.  He experiences shortness of breath after walking 30 feet to the bathroom. His primary concern is persistent shortness of breath and increased oxygen demand.    He has been on three different antibiotics and received a steroid injection. He takes Zithromax on Mondays, Wednesdays, and Fridays. He used a chest vest for 20 minutes twice daily for a week, but it did not improve his symptoms.  This was sent back to the company and picked up on Saturday.  He notes he generally does not produce phlegm.  When he does he uses Mucinex and finds benefit.  He reports no fevers, chills, night  "sweats, significant cough, or mucus production.  He reports no wheezing. He continues to use Xyzal and Flonase.   He has generally had intolerance, side effects or chest pain to multiple inhalers, antifibrotic's as well as Tyvaso.    He has declined the influenza and RSV vaccines.  He reports no swelling. He was previously on Jardiance, potassium, and spironolactone, but discontinued these due to intolerance. He takes half a pill of furosemide daily. He experienced hand cramps while on furosemide. He has an electric scooter for mobility. IMMUNIZATIONS        Objective   Vital Signs:   /60   Pulse 87   Ht 185.4 cm (72.99\")   Wt 98 kg (216 lb)   SpO2 (!) 89% Comment: 5L Cont  BMI 28.50 kg/m²     Physical Exam  Vitals reviewed.   Constitutional:       Appearance: Normal appearance.   Cardiovascular:      Rate and Rhythm: Normal rate and regular rhythm.   Pulmonary:      Effort: Pulmonary effort is normal.      Breath sounds: Decreased breath sounds present.   Musculoskeletal:      Comments: Wheelchair   Neurological:      General: No focal deficit present.      Mental Status: He is alert and oriented to person, place, and time.   Psychiatric:         Mood and Affect: Mood normal.         Behavior: Behavior normal.            Result Review :  The following data was reviewed by: MIKE Lundberg on 11/20/2024:    My interpretation of imaging: No new  My interpretation of labs: No new  ECG 12 Lead (10/17/2024 13:46)   PFT Values          1/20/2023    10:45   Pre Drug PFT Results   FVC 91   FEV1 79   FEF 25-75% 45   FEV1/FVC 67.9   Other Tests PFT Results   DLCO 35   D/VAsb 45     My interpretation of the PFT : No new    Results for orders placed in visit on 01/20/23    Pulmonary Function Test    Narrative  Pulmonary Function Test  Performed by: Silvino Kevin CMA  Authorized by: Danna Sorenson APRN    Pre Drug % Predicted  FVC: 91%  FEV1: 79%  FEF 25-75%: 45%  FEV1/FVC: 67.9%  DLCO: " 35%  D/VAsb: 45%    Interpretation  Spirometry  Spirometry shows mild obstruction. There is reduced midflow suggesting small airway/airflow obstruction.  Review of FVL curve  Patient's effort is normal.  Diffusion Capacity  The patient's diffusion capacity is severely reduced.  Diffusion capacity is severely reduced when corrected for alveolar volume.      Results for orders placed in visit on 21    Pulmonary Function Test    Narrative  Pulmonary Function Test  Performed by: Luna Sanford RRT  Authorized by: Danna Sorenson APRN    Pre Drug % Predicted  FVC: 90%  FEV1: 82%  FEF 25-75%: 55%  FEV1/FVC: 71.62%  DLCO: 42%  D/VAsb: 51%    Interpretation  Spirometry  Spirometry shows normal results. There is reduced midflow suggesting small airway/airflow obstruction.  Diffusion Capacity  The patient's diffusion capacity is severely reduced.  Diffusion capacity is moderately reduced when corrected for alveolar volume.      Results for orders placed in visit on 10/17/19    Pulmonary Function Test    Narrative  Pulmonary Function Test  Performed by: Danna Sorenson APRN  Authorized by: Danna Sorenson APRN    Pre Drug  FVC: 76%  FEV1: 76%  FEF 25-75%: 70%  FEV1/FVC: 78.71%  T%  RV: 67%  DLCO: 41%  D/VAsb: 48%    Rest/Exercise Pulse Ox Values          2023    10:00 10/12/2023    09:15   Rest/Exercise Pulse Ox Results   Rest on O2 @ Liters 3 4L PD   Rest on O2 SAT % 92 93       4L CONT 97%   Exercise on O2 @ Liters 3 6L CONT   Exercise on O2 SAT % 71 79         Assessment and Plan   Diagnoses and all orders for this visit:    1. ILD (interstitial lung disease) (Primary)  Overview:  CT Angiogram Chest (2023 18:09)  Chronic interstitial lung disease with diffuse intralobular and interlobular septal thickening, centrilobular paraseptal cystic changes with honeycombing in the lung bases.    10/12/23 Discussed antifibrotic. Patient does not feel that he would be interested 2' GI  side effects. He states that he struggles with GI upset frequently.     10/13/13 PERICO, ANCA, RF, CCP WNL     CT Chest Hi Resolution Diagnostic (01/03/2024 13:50)  Severe emphysematous changes. At the lung bases, there is  bronchiectasis and stacked cystic changes. Honeycombing such as seen with usual interstitial pneumonia (UIP) is not excluded.    Did not tolerate Tyvaso 2' chest pain.     Did not tolerate esbreit.     Prednisone 10mg po daily      Assessment & Plan:  Patient worsened with decreasing back down to 10 mg a day of prednisone.  We will keep him on 20 mg a day at this time.  He is cautioned about side effects of long-term steroid use.  At this time he has failed all other treatment modalities.      2. Emphysema, unspecified  -     albuterol sulfate  (90 Base) MCG/ACT inhaler; Inhale 2 puffs Every 4 (Four) Hours As Needed for Wheezing or Shortness of Air.  Dispense: 18 g; Refill: 11    3. Allergic rhinitis, unspecified seasonality, unspecified trigger  Overview:  Xyzal, Flonase    Assessment & Plan:  Stable, continue Xyzal and Flonase.    Orders:  -     fluticasone (FLONASE) 50 MCG/ACT nasal spray; 2 sprays by Each Nare route Daily for 30 days.  Dispense: 16 g; Refill: 11    4. Bronchiectasis without acute exacerbation  Overview:  Identified 1/3/24-there is  bronchiectasis and stacked cystic changes    10/13/13 PERICO, ANCA, RF, CCP WNL    Continue bronchodilators.     Zithromax MW-3/2024 QTc 397, 6/2024 QTc 425, October 2024 QTc 449      Assessment & Plan:  Patient denies any significant mucus production at this time.  Continue to use Mucinex as needed.  Unfortunately he did not find any benefit after using the chest percussion vest 20 minutes a day twice a day for a week.  The DME company has picked this device back up from him at this time.  He remains on chronic Zithromax Monday Wednesday Friday and chronic steroid therapy.    He is unable to participate in postral drainage techniques as he has  physical limitations and does not have full use of his left upper extremity. He does not have a caregiver at home to provide manual percussion.     Will repeat EKG in 3 months.  If QTc continues to climb may have to consider coming off of the Zithromax.          5. Pulmonary hypertension  Overview:  Adult Transthoracic Echo Complete W/ Cont if Necessary Per Protocol (09/03/2023 09:51)    Left ventricular systolic function is normal. Left ventricular ejection fraction appears to be 51 - 55%.    Left ventricular diastolic function is consistent with (grade I) impaired relaxation.    Estimated right ventricular systolic pressure from tricuspid regurgitation is moderately elevated (45-55 mmHg).    There is a trivial pericardial effusion.  RVSP 49.4mmHg    NM Lung Scan Perfusion Particulate (11/13/2023 10:18)   IMPRESSION:  1. Low probability of pulmonary embolism.    CARDIAC CATHETERIZATION REPORT  Date of Procedure: 12/1/2023   Findings:  Pre-capillary pulmonary hypertension with preserved cardiac output.  RA (mean) 7, RV (s/edp): 51/7, PA (s/d/mean) 56/23/34, PCWP (mean) 12, [place] CO/CI 6/2.7 with PVR 3.7 HAHN  Recommendations:  Follow-up in CHF clinic and with pulmonology for consideration of treatment for ILD-PAH  Complications:  None    Follows with Dr. Erwin  Did not tolerate Tyvaso        Assessment & Plan:  He denies any increased edema.  He is no longer following with Dr. Erwin for the pulmonary artery hypertension nor in the heart failure clinic at this time.  He remains on furosemide 20 mg daily and being monitored by his PCP.      6. Pulmonary emphysema, unspecified emphysema type  Overview:  8/20/19 alpha 1 MM    5/9/24 zithromax MWF started   3/4/24 Qtc 397   6/13/24 Qtc 425        Assessment & Plan:  Continue albuterol HFA as needed.  Continue supplemental oxygen therapy.  Continue to monitor FLC and DLCO.  Repeat in 3 months.      7. Chronic respiratory failure with hypoxia  Overview:  O2 10L exertion/4L  rest       8. High risk medication use  -     ECG 12 Lead; Future    Other orders  -     predniSONE (DELTASONE) 20 MG tablet; Take 1 tablet by mouth Daily.  Dispense: 30 tablet; Refill: 3        Alpha 1: Normal, August 20, 2019  LDCT: Does not qualify  Smoking Cessation: Former quit 1997  Vaccinations: PNA:  Completed, declines Flu and RSV        Follow Up   Return in about 3 months (around 2/20/2025) for FVL w DIF.  Patient was given instructions and counseling regarding his condition or for health maintenance advice. Please see specific information pulled into the AVS if appropriate.     MIKE Lundberg  11/20/2024  13:38 CST    Please note that portions of this note were completed with a voice recognition program.    Patient or patient representative verbalized consent for the use of Ambient Listening during the visit with  MIKE Lundberg for chart documentation. 11/20/2024  10:43 CST

## 2024-11-20 ENCOUNTER — OFFICE VISIT (OUTPATIENT)
Dept: PULMONOLOGY | Facility: CLINIC | Age: 74
End: 2024-11-20
Payer: MEDICARE

## 2024-11-20 VITALS
OXYGEN SATURATION: 89 % | HEIGHT: 73 IN | BODY MASS INDEX: 28.63 KG/M2 | DIASTOLIC BLOOD PRESSURE: 60 MMHG | SYSTOLIC BLOOD PRESSURE: 100 MMHG | HEART RATE: 87 BPM | WEIGHT: 216 LBS

## 2024-11-20 DIAGNOSIS — J43.9 PULMONARY EMPHYSEMA, UNSPECIFIED EMPHYSEMA TYPE: Chronic | ICD-10-CM

## 2024-11-20 DIAGNOSIS — J47.9 BRONCHIECTASIS WITHOUT ACUTE EXACERBATION: Chronic | ICD-10-CM

## 2024-11-20 DIAGNOSIS — I27.20 PULMONARY HYPERTENSION: Chronic | ICD-10-CM

## 2024-11-20 DIAGNOSIS — J96.11 CHRONIC RESPIRATORY FAILURE WITH HYPOXIA: Chronic | ICD-10-CM

## 2024-11-20 DIAGNOSIS — Z79.899 HIGH RISK MEDICATION USE: ICD-10-CM

## 2024-11-20 DIAGNOSIS — J30.9 ALLERGIC RHINITIS, UNSPECIFIED SEASONALITY, UNSPECIFIED TRIGGER: ICD-10-CM

## 2024-11-20 DIAGNOSIS — J84.9 ILD (INTERSTITIAL LUNG DISEASE): Primary | Chronic | ICD-10-CM

## 2024-11-20 DIAGNOSIS — J43.9 EMPHYSEMA, UNSPECIFIED: ICD-10-CM

## 2024-11-20 PROCEDURE — 99214 OFFICE O/P EST MOD 30 MIN: CPT | Performed by: NURSE PRACTITIONER

## 2024-11-20 RX ORDER — ALBUTEROL SULFATE 90 UG/1
2 INHALANT RESPIRATORY (INHALATION) EVERY 4 HOURS PRN
Qty: 18 G | Refills: 11 | Status: SHIPPED | OUTPATIENT
Start: 2024-11-20

## 2024-11-20 RX ORDER — FLUTICASONE PROPIONATE 50 MCG
2 SPRAY, SUSPENSION (ML) NASAL DAILY
Qty: 16 G | Refills: 11 | Status: SHIPPED | OUTPATIENT
Start: 2024-11-20 | End: 2024-12-20

## 2024-11-20 RX ORDER — PREDNISONE 20 MG/1
20 TABLET ORAL DAILY
Qty: 30 TABLET | Refills: 3 | Status: SHIPPED | OUTPATIENT
Start: 2024-11-20

## 2024-11-20 NOTE — ASSESSMENT & PLAN NOTE
Patient denies any significant mucus production at this time.  Continue to use Mucinex as needed.  Unfortunately he did not find any benefit after using the chest percussion vest 20 minutes a day twice a day for a week.  The DME company has picked this device back up from him at this time.  He remains on chronic Zithromax Monday Wednesday Friday and chronic steroid therapy.    He is unable to participate in postral drainage techniques as he has physical limitations and does not have full use of his left upper extremity. He does not have a caregiver at home to provide manual percussion.     Will repeat EKG in 3 months.  If QTc continues to climb may have to consider coming off of the Zithromax.

## 2024-11-20 NOTE — ASSESSMENT & PLAN NOTE
Patient worsened with decreasing back down to 10 mg a day of prednisone.  We will keep him on 20 mg a day at this time.  He is cautioned about side effects of long-term steroid use.  At this time he has failed all other treatment modalities.

## 2024-11-20 NOTE — ASSESSMENT & PLAN NOTE
Continue albuterol HFA as needed.  Continue supplemental oxygen therapy.  Continue to monitor FLC and DLCO.  Repeat in 3 months.

## 2024-11-20 NOTE — ASSESSMENT & PLAN NOTE
He denies any increased edema.  He is no longer following with Dr. Erwin for the pulmonary artery hypertension nor in the heart failure clinic at this time.  He remains on furosemide 20 mg daily and being monitored by his PCP.

## 2025-01-01 ENCOUNTER — APPOINTMENT (OUTPATIENT)
Dept: CT IMAGING | Facility: HOSPITAL | Age: 75
DRG: 189 | End: 2025-01-01
Payer: MEDICARE

## 2025-01-01 ENCOUNTER — APPOINTMENT (OUTPATIENT)
Dept: CARDIOLOGY | Facility: HOSPITAL | Age: 75
DRG: 189 | End: 2025-01-01
Payer: MEDICARE

## 2025-01-01 ENCOUNTER — HOSPITAL ENCOUNTER (INPATIENT)
Facility: HOSPITAL | Age: 75
LOS: 6 days | DRG: 189 | End: 2025-01-26
Attending: EMERGENCY MEDICINE | Admitting: FAMILY MEDICINE
Payer: MEDICARE

## 2025-01-01 ENCOUNTER — APPOINTMENT (OUTPATIENT)
Dept: GENERAL RADIOLOGY | Facility: HOSPITAL | Age: 75
DRG: 189 | End: 2025-01-01
Payer: MEDICARE

## 2025-01-01 VITALS
RESPIRATION RATE: 18 BRPM | WEIGHT: 224 LBS | SYSTOLIC BLOOD PRESSURE: 103 MMHG | TEMPERATURE: 98.1 F | HEIGHT: 73 IN | OXYGEN SATURATION: 88 % | BODY MASS INDEX: 29.69 KG/M2 | DIASTOLIC BLOOD PRESSURE: 62 MMHG | HEART RATE: 72 BPM

## 2025-01-01 DIAGNOSIS — J44.1 COPD EXACERBATION: Primary | ICD-10-CM

## 2025-01-01 DIAGNOSIS — R09.02 HYPOXIA: ICD-10-CM

## 2025-01-01 DIAGNOSIS — Z74.09 IMPAIRED MOBILITY: ICD-10-CM

## 2025-01-01 LAB
ALBUMIN SERPL-MCNC: 3 G/DL (ref 3.5–5.2)
ALBUMIN SERPL-MCNC: 3.3 G/DL (ref 3.5–5.2)
ALBUMIN/GLOB SERPL: 1.2 G/DL
ALBUMIN/GLOB SERPL: 1.7 G/DL
ALP SERPL-CCNC: 81 U/L (ref 39–117)
ALP SERPL-CCNC: 87 U/L (ref 39–117)
ALT SERPL W P-5'-P-CCNC: 17 U/L (ref 1–41)
ALT SERPL W P-5'-P-CCNC: 32 U/L (ref 1–41)
ANION GAP SERPL CALCULATED.3IONS-SCNC: 10 MMOL/L (ref 5–15)
ANION GAP SERPL CALCULATED.3IONS-SCNC: 10 MMOL/L (ref 5–15)
ANION GAP SERPL CALCULATED.3IONS-SCNC: 11 MMOL/L (ref 5–15)
ANION GAP SERPL CALCULATED.3IONS-SCNC: 11 MMOL/L (ref 5–15)
ANION GAP SERPL CALCULATED.3IONS-SCNC: 14 MMOL/L (ref 5–15)
ANION GAP SERPL CALCULATED.3IONS-SCNC: 9 MMOL/L (ref 5–15)
ARTERIAL PATENCY WRIST A: POSITIVE
ARTERIAL PATENCY WRIST A: POSITIVE
AST SERPL-CCNC: 11 U/L (ref 1–40)
AST SERPL-CCNC: 14 U/L (ref 1–40)
ATMOSPHERIC PRESS: 759 MMHG
ATMOSPHERIC PRESS: 764 MMHG
AV MEAN PRESS GRAD SYS DOP V1V2: 5.5 MMHG
AV VMAX SYS DOP: 180 CM/SEC
B PARAPERT DNA SPEC QL NAA+PROBE: NOT DETECTED
B PERT DNA SPEC QL NAA+PROBE: NOT DETECTED
BACTERIA SPEC RESP CULT: NORMAL
BASE EXCESS BLDA CALC-SCNC: 1.3 MMOL/L (ref 0–2)
BASE EXCESS BLDA CALC-SCNC: 2.5 MMOL/L (ref 0–2)
BASOPHILS # BLD AUTO: 0.02 10*3/MM3 (ref 0–0.2)
BASOPHILS # BLD AUTO: 0.02 10*3/MM3 (ref 0–0.2)
BASOPHILS NFR BLD AUTO: 0.2 % (ref 0–1.5)
BASOPHILS NFR BLD AUTO: 0.3 % (ref 0–1.5)
BDY SITE: ABNORMAL
BDY SITE: ABNORMAL
BH CV ECHO MEAS - AO MAX PG: 13 MMHG
BH CV ECHO MEAS - AO V2 VTI: 26.3 CM
BH CV ECHO MEAS - AVA(I,D): 4.6 CM2
BH CV ECHO MEAS - EDV(CUBED): 148.9 ML
BH CV ECHO MEAS - EDV(MOD-SP2): 42.1 ML
BH CV ECHO MEAS - EDV(MOD-SP4): 69.9 ML
BH CV ECHO MEAS - EF(MOD-SP2): 72.7 %
BH CV ECHO MEAS - EF(MOD-SP4): 70.2 %
BH CV ECHO MEAS - ESV(CUBED): 64 ML
BH CV ECHO MEAS - ESV(MOD-SP2): 11.5 ML
BH CV ECHO MEAS - ESV(MOD-SP4): 20.8 ML
BH CV ECHO MEAS - FS: 24.5 %
BH CV ECHO MEAS - IVS/LVPW: 0.8 CM
BH CV ECHO MEAS - IVSD: 0.6 CM
BH CV ECHO MEAS - LA DIMENSION: 5 CM
BH CV ECHO MEAS - LAT PEAK E' VEL: 10.3 CM/SEC
BH CV ECHO MEAS - LV DIASTOLIC VOL/BSA (35-75): 31 CM2
BH CV ECHO MEAS - LV MASS(C)D: 121.4 GRAMS
BH CV ECHO MEAS - LV MAX PG: 8.2 MMHG
BH CV ECHO MEAS - LV MEAN PG: 5 MMHG
BH CV ECHO MEAS - LV SYSTOLIC VOL/BSA (12-30): 9.2 CM2
BH CV ECHO MEAS - LV V1 MAX: 143 CM/SEC
BH CV ECHO MEAS - LV V1 VTI: 26.9 CM
BH CV ECHO MEAS - LVIDD: 5.3 CM
BH CV ECHO MEAS - LVIDS: 4 CM
BH CV ECHO MEAS - LVOT AREA: 4.5 CM2
BH CV ECHO MEAS - LVOT DIAM: 2.4 CM
BH CV ECHO MEAS - LVPWD: 0.75 CM
BH CV ECHO MEAS - MED PEAK E' VEL: 3.3 CM/SEC
BH CV ECHO MEAS - MR MAX PG: 74.3 MMHG
BH CV ECHO MEAS - MR MAX VEL: 431 CM/SEC
BH CV ECHO MEAS - MV A MAX VEL: 129 CM/SEC
BH CV ECHO MEAS - MV DEC SLOPE: 252 CM/SEC2
BH CV ECHO MEAS - MV E MAX VEL: 61.3 CM/SEC
BH CV ECHO MEAS - MV E/A: 0.48
BH CV ECHO MEAS - MV P1/2T: 97.4 MSEC
BH CV ECHO MEAS - MVA(P1/2T): 2.26 CM2
BH CV ECHO MEAS - PA V2 MAX: 98.8 CM/SEC
BH CV ECHO MEAS - RAP SYSTOLE: 8 MMHG
BH CV ECHO MEAS - RV MAX PG: 2.7 MMHG
BH CV ECHO MEAS - RV V1 MAX: 81.4 CM/SEC
BH CV ECHO MEAS - RVDD: 2.7 CM
BH CV ECHO MEAS - RVSP: 74.3 MMHG
BH CV ECHO MEAS - SV(LVOT): 121.7 ML
BH CV ECHO MEAS - SV(MOD-SP2): 30.6 ML
BH CV ECHO MEAS - SV(MOD-SP4): 49.1 ML
BH CV ECHO MEAS - SVI(LVOT): 53.9 ML/M2
BH CV ECHO MEAS - SVI(MOD-SP2): 13.6 ML/M2
BH CV ECHO MEAS - SVI(MOD-SP4): 21.7 ML/M2
BH CV ECHO MEAS - TR MAX PG: 66.3 MMHG
BH CV ECHO MEAS - TR MAX VEL: 407 CM/SEC
BH CV ECHO MEASUREMENTS AVERAGE E/E' RATIO: 9.01
BILIRUB SERPL-MCNC: 0.3 MG/DL (ref 0–1.2)
BILIRUB SERPL-MCNC: 0.7 MG/DL (ref 0–1.2)
BODY TEMPERATURE: 37
BODY TEMPERATURE: 37
BUN SERPL-MCNC: 16 MG/DL (ref 8–23)
BUN SERPL-MCNC: 16 MG/DL (ref 8–23)
BUN SERPL-MCNC: 21 MG/DL (ref 8–23)
BUN SERPL-MCNC: 21 MG/DL (ref 8–23)
BUN SERPL-MCNC: 22 MG/DL (ref 8–23)
BUN SERPL-MCNC: 23 MG/DL (ref 8–23)
BUN/CREAT SERPL: 18.4 (ref 7–25)
BUN/CREAT SERPL: 19.2 (ref 7–25)
BUN/CREAT SERPL: 19.3 (ref 7–25)
BUN/CREAT SERPL: 20.3 (ref 7–25)
BUN/CREAT SERPL: 21.2 (ref 7–25)
BUN/CREAT SERPL: 21.6 (ref 7–25)
C PNEUM DNA NPH QL NAA+NON-PROBE: NOT DETECTED
CA-I BLD-MCNC: 4.21 MG/DL (ref 4.6–5.4)
CALCIUM SPEC-SCNC: 7.7 MG/DL (ref 8.6–10.5)
CALCIUM SPEC-SCNC: 7.7 MG/DL (ref 8.6–10.5)
CALCIUM SPEC-SCNC: 7.9 MG/DL (ref 8.6–10.5)
CALCIUM SPEC-SCNC: 7.9 MG/DL (ref 8.6–10.5)
CALCIUM SPEC-SCNC: 8 MG/DL (ref 8.6–10.5)
CALCIUM SPEC-SCNC: 8.7 MG/DL (ref 8.6–10.5)
CHLORIDE SERPL-SCNC: 95 MMOL/L (ref 98–107)
CHLORIDE SERPL-SCNC: 96 MMOL/L (ref 98–107)
CHLORIDE SERPL-SCNC: 96 MMOL/L (ref 98–107)
CHLORIDE SERPL-SCNC: 98 MMOL/L (ref 98–107)
CO2 SERPL-SCNC: 22 MMOL/L (ref 22–29)
CO2 SERPL-SCNC: 24 MMOL/L (ref 22–29)
CO2 SERPL-SCNC: 24 MMOL/L (ref 22–29)
CO2 SERPL-SCNC: 25 MMOL/L (ref 22–29)
CO2 SERPL-SCNC: 26 MMOL/L (ref 22–29)
CO2 SERPL-SCNC: 26 MMOL/L (ref 22–29)
COHGB MFR BLD: 0.8 % (ref 0–5)
COHGB MFR BLD: 1.5 % (ref 0–5)
CREAT SERPL-MCNC: 0.74 MG/DL (ref 0.76–1.27)
CREAT SERPL-MCNC: 0.79 MG/DL (ref 0.76–1.27)
CREAT SERPL-MCNC: 0.99 MG/DL (ref 0.76–1.27)
CREAT SERPL-MCNC: 1.14 MG/DL (ref 0.76–1.27)
CREAT SERPL-MCNC: 1.14 MG/DL (ref 0.76–1.27)
CREAT SERPL-MCNC: 1.2 MG/DL (ref 0.76–1.27)
D-LACTATE SERPL-SCNC: 1.9 MMOL/L (ref 0.5–2)
D-LACTATE SERPL-SCNC: 2.4 MMOL/L (ref 0.5–2)
D-LACTATE SERPL-SCNC: 2.4 MMOL/L (ref 0.5–2)
DEPRECATED RDW RBC AUTO: 51.8 FL (ref 37–54)
DEPRECATED RDW RBC AUTO: 52.6 FL (ref 37–54)
DEPRECATED RDW RBC AUTO: 53 FL (ref 37–54)
DEPRECATED RDW RBC AUTO: 53.3 FL (ref 37–54)
EGFRCR SERPLBLD CKD-EPI 2021: 62.7 ML/MIN/1.73
EGFRCR SERPLBLD CKD-EPI 2021: 66.7 ML/MIN/1.73
EGFRCR SERPLBLD CKD-EPI 2021: 66.7 ML/MIN/1.73
EGFRCR SERPLBLD CKD-EPI 2021: 78.9 ML/MIN/1.73
EGFRCR SERPLBLD CKD-EPI 2021: 92.1 ML/MIN/1.73
EGFRCR SERPLBLD CKD-EPI 2021: 93.9 ML/MIN/1.73
EOSINOPHIL # BLD AUTO: 0 10*3/MM3 (ref 0–0.4)
EOSINOPHIL # BLD AUTO: 0 10*3/MM3 (ref 0–0.4)
EOSINOPHIL NFR BLD AUTO: 0 % (ref 0.3–6.2)
EOSINOPHIL NFR BLD AUTO: 0 % (ref 0.3–6.2)
ERYTHROCYTE [DISTWIDTH] IN BLOOD BY AUTOMATED COUNT: 16.9 % (ref 12.3–15.4)
ERYTHROCYTE [DISTWIDTH] IN BLOOD BY AUTOMATED COUNT: 17.3 % (ref 12.3–15.4)
ERYTHROCYTE [DISTWIDTH] IN BLOOD BY AUTOMATED COUNT: 17.3 % (ref 12.3–15.4)
ERYTHROCYTE [DISTWIDTH] IN BLOOD BY AUTOMATED COUNT: 17.5 % (ref 12.3–15.4)
FLUAV SUBTYP SPEC NAA+PROBE: NOT DETECTED
FLUBV RNA ISLT QL NAA+PROBE: NOT DETECTED
GAS FLOW AIRWAY: 15 LPM
GAS FLOW AIRWAY: 35 LPM
GEN 5 1HR TROPONIN T REFLEX: 18 NG/L
GLOBULIN UR ELPH-MCNC: 1.8 GM/DL
GLOBULIN UR ELPH-MCNC: 2.7 GM/DL
GLUCOSE BLDC GLUCOMTR-MCNC: 106 MG/DL (ref 70–130)
GLUCOSE BLDC GLUCOMTR-MCNC: 147 MG/DL (ref 70–130)
GLUCOSE BLDC GLUCOMTR-MCNC: 150 MG/DL (ref 70–130)
GLUCOSE BLDC GLUCOMTR-MCNC: 176 MG/DL (ref 70–130)
GLUCOSE BLDC GLUCOMTR-MCNC: 178 MG/DL (ref 70–130)
GLUCOSE BLDC GLUCOMTR-MCNC: 192 MG/DL (ref 70–130)
GLUCOSE BLDC GLUCOMTR-MCNC: 203 MG/DL (ref 70–130)
GLUCOSE BLDC GLUCOMTR-MCNC: 208 MG/DL (ref 70–130)
GLUCOSE BLDC GLUCOMTR-MCNC: 225 MG/DL (ref 70–130)
GLUCOSE BLDC GLUCOMTR-MCNC: 230 MG/DL (ref 70–130)
GLUCOSE BLDC GLUCOMTR-MCNC: 231 MG/DL (ref 70–130)
GLUCOSE BLDC GLUCOMTR-MCNC: 234 MG/DL (ref 70–130)
GLUCOSE BLDC GLUCOMTR-MCNC: 243 MG/DL (ref 70–130)
GLUCOSE BLDC GLUCOMTR-MCNC: 272 MG/DL (ref 70–130)
GLUCOSE BLDC GLUCOMTR-MCNC: 273 MG/DL (ref 70–130)
GLUCOSE BLDC GLUCOMTR-MCNC: 275 MG/DL (ref 70–130)
GLUCOSE BLDC GLUCOMTR-MCNC: 275 MG/DL (ref 70–130)
GLUCOSE BLDC GLUCOMTR-MCNC: 281 MG/DL (ref 70–130)
GLUCOSE BLDC GLUCOMTR-MCNC: 284 MG/DL (ref 70–130)
GLUCOSE BLDC GLUCOMTR-MCNC: 292 MG/DL (ref 70–130)
GLUCOSE BLDC GLUCOMTR-MCNC: 297 MG/DL (ref 70–130)
GLUCOSE BLDC GLUCOMTR-MCNC: 318 MG/DL (ref 70–130)
GLUCOSE BLDC GLUCOMTR-MCNC: 334 MG/DL (ref 70–130)
GLUCOSE SERPL-MCNC: 150 MG/DL (ref 65–99)
GLUCOSE SERPL-MCNC: 178 MG/DL (ref 65–99)
GLUCOSE SERPL-MCNC: 209 MG/DL (ref 65–99)
GLUCOSE SERPL-MCNC: 241 MG/DL (ref 65–99)
GLUCOSE SERPL-MCNC: 249 MG/DL (ref 65–99)
GLUCOSE SERPL-MCNC: 261 MG/DL (ref 65–99)
GRAM STN SPEC: NORMAL
HADV DNA SPEC NAA+PROBE: NOT DETECTED
HCO3 BLDA-SCNC: 23.1 MMOL/L (ref 20–26)
HCO3 BLDA-SCNC: 25.2 MMOL/L (ref 20–26)
HCOV 229E RNA SPEC QL NAA+PROBE: NOT DETECTED
HCOV HKU1 RNA SPEC QL NAA+PROBE: NOT DETECTED
HCOV NL63 RNA SPEC QL NAA+PROBE: NOT DETECTED
HCOV OC43 RNA SPEC QL NAA+PROBE: NOT DETECTED
HCT VFR BLD AUTO: 34.1 % (ref 37.5–51)
HCT VFR BLD AUTO: 37 % (ref 37.5–51)
HCT VFR BLD AUTO: 37 % (ref 37.5–51)
HCT VFR BLD AUTO: 40.4 % (ref 37.5–51)
HCT VFR BLD CALC: 40.1 % (ref 38–51)
HCT VFR BLD CALC: 42.2 % (ref 38–51)
HGB BLD-MCNC: 11.3 G/DL (ref 13–17.7)
HGB BLD-MCNC: 12.6 G/DL (ref 13–17.7)
HGB BLD-MCNC: 12.7 G/DL (ref 13–17.7)
HGB BLD-MCNC: 13.8 G/DL (ref 13–17.7)
HGB BLDA-MCNC: 13.1 G/DL (ref 14–18)
HGB BLDA-MCNC: 13.8 G/DL (ref 14–18)
HMPV RNA NPH QL NAA+NON-PROBE: NOT DETECTED
HPIV1 RNA ISLT QL NAA+PROBE: NOT DETECTED
HPIV2 RNA SPEC QL NAA+PROBE: NOT DETECTED
HPIV3 RNA NPH QL NAA+PROBE: NOT DETECTED
HPIV4 P GENE NPH QL NAA+PROBE: NOT DETECTED
IMM GRANULOCYTES # BLD AUTO: 0.09 10*3/MM3 (ref 0–0.05)
IMM GRANULOCYTES # BLD AUTO: 0.11 10*3/MM3 (ref 0–0.05)
IMM GRANULOCYTES NFR BLD AUTO: 0.9 % (ref 0–0.5)
IMM GRANULOCYTES NFR BLD AUTO: 1.4 % (ref 0–0.5)
INHALED O2 CONCENTRATION: 100 %
L PNEUMO1 AG UR QL IA: NEGATIVE
LEFT ATRIUM VOLUME INDEX: 23.7 ML/M2
LEFT ATRIUM VOLUME: 53.5 ML
LIPASE SERPL-CCNC: 27 U/L (ref 13–60)
LV EF BIPLANE MOD: 71.5 %
LYMPHOCYTES # BLD AUTO: 0.22 10*3/MM3 (ref 0.7–3.1)
LYMPHOCYTES # BLD AUTO: 0.23 10*3/MM3 (ref 0.7–3.1)
LYMPHOCYTES NFR BLD AUTO: 2.3 % (ref 19.6–45.3)
LYMPHOCYTES NFR BLD AUTO: 2.9 % (ref 19.6–45.3)
Lab: ABNORMAL
M PNEUMO IGG SER IA-ACNC: NOT DETECTED
MAGNESIUM SERPL-MCNC: 2.1 MG/DL (ref 1.6–2.4)
MCH RBC QN AUTO: 28.5 PG (ref 26.6–33)
MCH RBC QN AUTO: 28.8 PG (ref 26.6–33)
MCH RBC QN AUTO: 29 PG (ref 26.6–33)
MCH RBC QN AUTO: 30 PG (ref 26.6–33)
MCHC RBC AUTO-ENTMCNC: 33.1 G/DL (ref 31.5–35.7)
MCHC RBC AUTO-ENTMCNC: 34.1 G/DL (ref 31.5–35.7)
MCHC RBC AUTO-ENTMCNC: 34.2 G/DL (ref 31.5–35.7)
MCHC RBC AUTO-ENTMCNC: 34.3 G/DL (ref 31.5–35.7)
MCV RBC AUTO: 84.3 FL (ref 79–97)
MCV RBC AUTO: 85.3 FL (ref 79–97)
MCV RBC AUTO: 85.9 FL (ref 79–97)
MCV RBC AUTO: 87.3 FL (ref 79–97)
METHGB BLD QL: 0.4 % (ref 0–3)
METHGB BLD QL: 0.5 % (ref 0–3)
MODALITY: ABNORMAL
MODALITY: ABNORMAL
MONOCYTES # BLD AUTO: 0.06 10*3/MM3 (ref 0.1–0.9)
MONOCYTES # BLD AUTO: 0.23 10*3/MM3 (ref 0.1–0.9)
MONOCYTES NFR BLD AUTO: 0.8 % (ref 5–12)
MONOCYTES NFR BLD AUTO: 2.4 % (ref 5–12)
MRSA DNA SPEC QL NAA+PROBE: ABNORMAL
NEUTROPHILS NFR BLD AUTO: 7.39 10*3/MM3 (ref 1.7–7)
NEUTROPHILS NFR BLD AUTO: 8.92 10*3/MM3 (ref 1.7–7)
NEUTROPHILS NFR BLD AUTO: 94.2 % (ref 42.7–76)
NEUTROPHILS NFR BLD AUTO: 94.6 % (ref 42.7–76)
NOTIFIED BY: ABNORMAL
NOTIFIED WHO: ABNORMAL
NRBC BLD AUTO-RTO: 0 /100 WBC (ref 0–0.2)
NRBC BLD AUTO-RTO: 0.2 /100 WBC (ref 0–0.2)
OXYHGB MFR BLDV: 85.6 % (ref 94–99)
OXYHGB MFR BLDV: 98.4 % (ref 94–99)
P JIROVECII DNA # SPEC NAA+PROBE: POSITIVE {COPIES}/ML
PCO2 BLDA: 28.2 MM HG (ref 35–45)
PCO2 BLDA: 32.3 MM HG (ref 35–45)
PCO2 TEMP ADJ BLD: 28.2 MM HG (ref 35–45)
PCO2 TEMP ADJ BLD: 32.3 MM HG (ref 35–45)
PH BLDA: 7.5 PH UNITS (ref 7.35–7.45)
PH BLDA: 7.52 PH UNITS (ref 7.35–7.45)
PH, TEMP CORRECTED: 7.5 PH UNITS (ref 7.35–7.45)
PH, TEMP CORRECTED: 7.52 PH UNITS (ref 7.35–7.45)
PLATELET # BLD AUTO: 162 10*3/MM3 (ref 140–450)
PLATELET # BLD AUTO: 164 10*3/MM3 (ref 140–450)
PLATELET # BLD AUTO: 183 10*3/MM3 (ref 140–450)
PLATELET # BLD AUTO: 189 10*3/MM3 (ref 140–450)
PMV BLD AUTO: 9 FL (ref 6–12)
PMV BLD AUTO: 9.1 FL (ref 6–12)
PMV BLD AUTO: 9.6 FL (ref 6–12)
PMV BLD AUTO: 9.7 FL (ref 6–12)
PO2 BLDA: 142 MM HG (ref 83–108)
PO2 BLDA: 47 MM HG (ref 83–108)
PO2 TEMP ADJ BLD: 142 MM HG (ref 83–108)
PO2 TEMP ADJ BLD: 47 MM HG (ref 83–108)
POTASSIUM BLDA-SCNC: 4 MMOL/L (ref 3.5–5.2)
POTASSIUM BLDA-SCNC: 4.2 MMOL/L (ref 3.5–5.2)
POTASSIUM SERPL-SCNC: 3.6 MMOL/L (ref 3.5–5.2)
POTASSIUM SERPL-SCNC: 3.8 MMOL/L (ref 3.5–5.2)
POTASSIUM SERPL-SCNC: 4.1 MMOL/L (ref 3.5–5.2)
POTASSIUM SERPL-SCNC: 4.1 MMOL/L (ref 3.5–5.2)
POTASSIUM SERPL-SCNC: 4.2 MMOL/L (ref 3.5–5.2)
POTASSIUM SERPL-SCNC: 4.6 MMOL/L (ref 3.5–5.2)
PROCALCITONIN SERPL-MCNC: 0.22 NG/ML (ref 0–0.25)
PROT SERPL-MCNC: 4.8 G/DL (ref 6–8.5)
PROT SERPL-MCNC: 6 G/DL (ref 6–8.5)
QT INTERVAL: 482 MS
QTC INTERVAL: 509 MS
RBC # BLD AUTO: 3.97 10*6/MM3 (ref 4.14–5.8)
RBC # BLD AUTO: 4.24 10*6/MM3 (ref 4.14–5.8)
RBC # BLD AUTO: 4.34 10*6/MM3 (ref 4.14–5.8)
RBC # BLD AUTO: 4.79 10*6/MM3 (ref 4.14–5.8)
RHINOVIRUS RNA SPEC NAA+PROBE: NOT DETECTED
RSV RNA NPH QL NAA+NON-PROBE: NOT DETECTED
S PNEUM AG SPEC QL LA: NEGATIVE
SAO2 % BLDCOA: 87.3 % (ref 94–99)
SAO2 % BLDCOA: 99.6 % (ref 94–99)
SARS-COV-2 RNA RESP QL NAA+PROBE: NOT DETECTED
SODIUM BLDA-SCNC: 131 MMOL/L (ref 136–145)
SODIUM BLDA-SCNC: 131 MMOL/L (ref 136–145)
SODIUM SERPL-SCNC: 130 MMOL/L (ref 136–145)
SODIUM SERPL-SCNC: 131 MMOL/L (ref 136–145)
SODIUM SERPL-SCNC: 132 MMOL/L (ref 136–145)
SODIUM SERPL-SCNC: 132 MMOL/L (ref 136–145)
SPECIMEN SOURCE: ABNORMAL
TROPONIN T NUMERIC DELTA: -2 NG/L
TROPONIN T SERPL HS-MCNC: 20 NG/L
VANCOMYCIN TROUGH SERPL-MCNC: 9.5 MCG/ML (ref 5–20)
VENTILATOR MODE: ABNORMAL
VENTILATOR MODE: ABNORMAL
WBC NRBC COR # BLD AUTO: 7.81 10*3/MM3 (ref 3.4–10.8)
WBC NRBC COR # BLD AUTO: 7.95 10*3/MM3 (ref 3.4–10.8)
WBC NRBC COR # BLD AUTO: 8.39 10*3/MM3 (ref 3.4–10.8)
WBC NRBC COR # BLD AUTO: 9.48 10*3/MM3 (ref 3.4–10.8)

## 2025-01-01 PROCEDURE — 94799 UNLISTED PULMONARY SVC/PX: CPT

## 2025-01-01 PROCEDURE — 83735 ASSAY OF MAGNESIUM: CPT | Performed by: EMERGENCY MEDICINE

## 2025-01-01 PROCEDURE — 25010000002 VANCOMYCIN PER 500 MG: Performed by: INTERNAL MEDICINE

## 2025-01-01 PROCEDURE — 93010 ELECTROCARDIOGRAM REPORT: CPT | Performed by: INTERNAL MEDICINE

## 2025-01-01 PROCEDURE — 80048 BASIC METABOLIC PNL TOTAL CA: CPT | Performed by: INTERNAL MEDICINE

## 2025-01-01 PROCEDURE — 36415 COLL VENOUS BLD VENIPUNCTURE: CPT | Performed by: EMERGENCY MEDICINE

## 2025-01-01 PROCEDURE — 99497 ADVNCD CARE PLAN 30 MIN: CPT | Performed by: CLINICAL NURSE SPECIALIST

## 2025-01-01 PROCEDURE — 63710000001 INSULIN LISPRO (HUMAN) PER 5 UNITS: Performed by: INTERNAL MEDICINE

## 2025-01-01 PROCEDURE — 85027 COMPLETE CBC AUTOMATED: CPT | Performed by: INTERNAL MEDICINE

## 2025-01-01 PROCEDURE — 25010000002 VANCOMYCIN 10 G RECONSTITUTED SOLUTION: Performed by: INTERNAL MEDICINE

## 2025-01-01 PROCEDURE — 99285 EMERGENCY DEPT VISIT HI MDM: CPT

## 2025-01-01 PROCEDURE — 25510000001 IOPAMIDOL PER 1 ML: Performed by: INTERNAL MEDICINE

## 2025-01-01 PROCEDURE — 80053 COMPREHEN METABOLIC PANEL: CPT | Performed by: INTERNAL MEDICINE

## 2025-01-01 PROCEDURE — 25010000002 METHYLPREDNISOLONE PER 125 MG: Performed by: INTERNAL MEDICINE

## 2025-01-01 PROCEDURE — 25010000002 CEFEPIME PER 500 MG: Performed by: INTERNAL MEDICINE

## 2025-01-01 PROCEDURE — 63710000001 INSULIN GLARGINE PER 5 UNITS: Performed by: INTERNAL MEDICINE

## 2025-01-01 PROCEDURE — 82948 REAGENT STRIP/BLOOD GLUCOSE: CPT

## 2025-01-01 PROCEDURE — 63710000001 INSULIN GLARGINE PER 5 UNITS: Performed by: FAMILY MEDICINE

## 2025-01-01 PROCEDURE — 25010000002 CEFTRIAXONE PER 250 MG: Performed by: EMERGENCY MEDICINE

## 2025-01-01 PROCEDURE — 94664 DEMO&/EVAL PT USE INHALER: CPT

## 2025-01-01 PROCEDURE — 97165 OT EVAL LOW COMPLEX 30 MIN: CPT | Performed by: OCCUPATIONAL THERAPIST

## 2025-01-01 PROCEDURE — 71275 CT ANGIOGRAPHY CHEST: CPT

## 2025-01-01 PROCEDURE — 87798 DETECT AGENT NOS DNA AMP: CPT | Performed by: INTERNAL MEDICINE

## 2025-01-01 PROCEDURE — 80053 COMPREHEN METABOLIC PANEL: CPT | Performed by: EMERGENCY MEDICINE

## 2025-01-01 PROCEDURE — 87205 SMEAR GRAM STAIN: CPT | Performed by: INTERNAL MEDICINE

## 2025-01-01 PROCEDURE — 99232 SBSQ HOSP IP/OBS MODERATE 35: CPT | Performed by: INTERNAL MEDICINE

## 2025-01-01 PROCEDURE — 25010000002 FUROSEMIDE PER 20 MG: Performed by: INTERNAL MEDICINE

## 2025-01-01 PROCEDURE — 25810000003 SODIUM CHLORIDE 0.9 % SOLUTION: Performed by: INTERNAL MEDICINE

## 2025-01-01 PROCEDURE — 97530 THERAPEUTIC ACTIVITIES: CPT

## 2025-01-01 PROCEDURE — 25010000002 MORPHINE PER 10 MG

## 2025-01-01 PROCEDURE — 25010000002 HYDROMORPHONE 1 MG/ML SOLUTION

## 2025-01-01 PROCEDURE — 25010000002 CEFTRIAXONE PER 250 MG: Performed by: INTERNAL MEDICINE

## 2025-01-01 PROCEDURE — 93306 TTE W/DOPPLER COMPLETE: CPT

## 2025-01-01 PROCEDURE — 83050 HGB METHEMOGLOBIN QUAN: CPT

## 2025-01-01 PROCEDURE — 25010000002 LORAZEPAM PER 2 MG: Performed by: FAMILY MEDICINE

## 2025-01-01 PROCEDURE — G0378 HOSPITAL OBSERVATION PER HR: HCPCS

## 2025-01-01 PROCEDURE — 85025 COMPLETE CBC W/AUTO DIFF WBC: CPT | Performed by: EMERGENCY MEDICINE

## 2025-01-01 PROCEDURE — 82805 BLOOD GASES W/O2 SATURATION: CPT

## 2025-01-01 PROCEDURE — 84484 ASSAY OF TROPONIN QUANT: CPT | Performed by: EMERGENCY MEDICINE

## 2025-01-01 PROCEDURE — 82375 ASSAY CARBOXYHB QUANT: CPT

## 2025-01-01 PROCEDURE — 93306 TTE W/DOPPLER COMPLETE: CPT | Performed by: INTERNAL MEDICINE

## 2025-01-01 PROCEDURE — 94640 AIRWAY INHALATION TREATMENT: CPT

## 2025-01-01 PROCEDURE — 97535 SELF CARE MNGMENT TRAINING: CPT | Performed by: OCCUPATIONAL THERAPIST

## 2025-01-01 PROCEDURE — 87641 MR-STAPH DNA AMP PROBE: CPT | Performed by: INTERNAL MEDICINE

## 2025-01-01 PROCEDURE — 25810000003 SODIUM CHLORIDE 0.9 % SOLUTION: Performed by: EMERGENCY MEDICINE

## 2025-01-01 PROCEDURE — 25510000001 PERFLUTREN 6.52 MG/ML SUSPENSION: Performed by: INTERNAL MEDICINE

## 2025-01-01 PROCEDURE — 25010000002 METHYLPREDNISOLONE PER 40 MG: Performed by: INTERNAL MEDICINE

## 2025-01-01 PROCEDURE — 87070 CULTURE OTHR SPECIMN AEROBIC: CPT | Performed by: INTERNAL MEDICINE

## 2025-01-01 PROCEDURE — 93005 ELECTROCARDIOGRAM TRACING: CPT | Performed by: EMERGENCY MEDICINE

## 2025-01-01 PROCEDURE — 99222 1ST HOSP IP/OBS MODERATE 55: CPT | Performed by: INTERNAL MEDICINE

## 2025-01-01 PROCEDURE — 84145 PROCALCITONIN (PCT): CPT | Performed by: INTERNAL MEDICINE

## 2025-01-01 PROCEDURE — 71045 X-RAY EXAM CHEST 1 VIEW: CPT

## 2025-01-01 PROCEDURE — 25010000002 VANCOMYCIN 2-0.9 GM/500ML-% SOLUTION: Performed by: INTERNAL MEDICINE

## 2025-01-01 PROCEDURE — 83605 ASSAY OF LACTIC ACID: CPT | Performed by: EMERGENCY MEDICINE

## 2025-01-01 PROCEDURE — 25010000002 MORPHINE PER 10 MG: Performed by: FAMILY MEDICINE

## 2025-01-01 PROCEDURE — 80202 ASSAY OF VANCOMYCIN: CPT | Performed by: INTERNAL MEDICINE

## 2025-01-01 PROCEDURE — 80048 BASIC METABOLIC PNL TOTAL CA: CPT | Performed by: FAMILY MEDICINE

## 2025-01-01 PROCEDURE — 25010000002 MAGNESIUM SULFATE 2 GM/50ML SOLUTION: Performed by: EMERGENCY MEDICINE

## 2025-01-01 PROCEDURE — 25010000002 HYDROMORPHONE PER 4 MG

## 2025-01-01 PROCEDURE — 87449 NOS EACH ORGANISM AG IA: CPT | Performed by: INTERNAL MEDICINE

## 2025-01-01 PROCEDURE — 0202U NFCT DS 22 TRGT SARS-COV-2: CPT | Performed by: EMERGENCY MEDICINE

## 2025-01-01 PROCEDURE — 97110 THERAPEUTIC EXERCISES: CPT

## 2025-01-01 PROCEDURE — 85025 COMPLETE CBC W/AUTO DIFF WBC: CPT | Performed by: FAMILY MEDICINE

## 2025-01-01 PROCEDURE — 25010000002 LORAZEPAM PER 2 MG

## 2025-01-01 PROCEDURE — 63710000001 INSULIN LISPRO (HUMAN) PER 5 UNITS: Performed by: FAMILY MEDICINE

## 2025-01-01 PROCEDURE — 83690 ASSAY OF LIPASE: CPT | Performed by: EMERGENCY MEDICINE

## 2025-01-01 PROCEDURE — 94761 N-INVAS EAR/PLS OXIMETRY MLT: CPT

## 2025-01-01 PROCEDURE — 36600 WITHDRAWAL OF ARTERIAL BLOOD: CPT

## 2025-01-01 PROCEDURE — 97161 PT EVAL LOW COMPLEX 20 MIN: CPT | Performed by: PHYSICAL THERAPIST

## 2025-01-01 PROCEDURE — 99222 1ST HOSP IP/OBS MODERATE 55: CPT | Performed by: CLINICAL NURSE SPECIALIST

## 2025-01-01 PROCEDURE — 87899 AGENT NOS ASSAY W/OPTIC: CPT | Performed by: INTERNAL MEDICINE

## 2025-01-01 RX ORDER — FUROSEMIDE 10 MG/ML
80 INJECTION INTRAMUSCULAR; INTRAVENOUS
Status: DISCONTINUED | OUTPATIENT
Start: 2025-01-01 | End: 2025-01-01

## 2025-01-01 RX ORDER — INSULIN GLARGINE 100 [IU]/ML
20 INJECTION, SOLUTION SUBCUTANEOUS NIGHTLY
Status: DISCONTINUED | OUTPATIENT
Start: 2025-01-01 | End: 2025-01-01

## 2025-01-01 RX ORDER — FUROSEMIDE 20 MG/1
40 TABLET ORAL
Status: DISCONTINUED | OUTPATIENT
Start: 2025-01-01 | End: 2025-01-01

## 2025-01-01 RX ORDER — METHYLPREDNISOLONE SODIUM SUCCINATE 40 MG/ML
80 INJECTION, POWDER, LYOPHILIZED, FOR SOLUTION INTRAMUSCULAR; INTRAVENOUS EVERY 8 HOURS
Status: DISCONTINUED | OUTPATIENT
Start: 2025-01-01 | End: 2025-01-01 | Stop reason: DRUGHIGH

## 2025-01-01 RX ORDER — VANCOMYCIN/0.9 % SOD CHLORIDE 750 MG/250
750 PLASTIC BAG, INJECTION (ML) INTRAVENOUS EVERY 12 HOURS
Status: DISCONTINUED | OUTPATIENT
Start: 2025-01-01 | End: 2025-01-01

## 2025-01-01 RX ORDER — IPRATROPIUM BROMIDE AND ALBUTEROL SULFATE 2.5; .5 MG/3ML; MG/3ML
3 SOLUTION RESPIRATORY (INHALATION)
Status: DISCONTINUED | OUTPATIENT
Start: 2025-01-01 | End: 2025-01-01

## 2025-01-01 RX ORDER — DIPHENOXYLATE HYDROCHLORIDE AND ATROPINE SULFATE 2.5; .025 MG/1; MG/1
1 TABLET ORAL
Status: DISCONTINUED | OUTPATIENT
Start: 2025-01-01 | End: 2025-01-01 | Stop reason: HOSPADM

## 2025-01-01 RX ORDER — SODIUM CHLORIDE 0.9 % (FLUSH) 0.9 %
10 SYRINGE (ML) INJECTION EVERY 12 HOURS SCHEDULED
Status: DISCONTINUED | OUTPATIENT
Start: 2025-01-01 | End: 2025-01-01

## 2025-01-01 RX ORDER — INSULIN LISPRO 100 [IU]/ML
8 INJECTION, SOLUTION INTRAVENOUS; SUBCUTANEOUS
Status: DISCONTINUED | OUTPATIENT
Start: 2025-01-01 | End: 2025-01-01

## 2025-01-01 RX ORDER — FUROSEMIDE 10 MG/ML
80 INJECTION INTRAMUSCULAR; INTRAVENOUS ONCE
Status: COMPLETED | OUTPATIENT
Start: 2025-01-01 | End: 2025-01-01

## 2025-01-01 RX ORDER — LORAZEPAM 2 MG/ML
1 INJECTION INTRAMUSCULAR ONCE
Status: COMPLETED | OUTPATIENT
Start: 2025-01-01 | End: 2025-01-01

## 2025-01-01 RX ORDER — BISACODYL 10 MG
10 SUPPOSITORY, RECTAL RECTAL DAILY PRN
Status: DISCONTINUED | OUTPATIENT
Start: 2025-01-01 | End: 2025-01-01

## 2025-01-01 RX ORDER — FLUTICASONE PROPIONATE 50 MCG
2 SPRAY, SUSPENSION (ML) NASAL DAILY
COMMUNITY

## 2025-01-01 RX ORDER — HYDROMORPHONE HYDROCHLORIDE 1 MG/ML
0.5 INJECTION, SOLUTION INTRAMUSCULAR; INTRAVENOUS; SUBCUTANEOUS
Status: DISCONTINUED | OUTPATIENT
Start: 2025-01-01 | End: 2025-01-01 | Stop reason: HOSPADM

## 2025-01-01 RX ORDER — INSULIN GLARGINE 100 [IU]/ML
30 INJECTION, SOLUTION SUBCUTANEOUS NIGHTLY
Status: DISCONTINUED | OUTPATIENT
Start: 2025-01-01 | End: 2025-01-01

## 2025-01-01 RX ORDER — ACETAMINOPHEN 160 MG/5ML
650 SOLUTION ORAL EVERY 4 HOURS PRN
Status: DISCONTINUED | OUTPATIENT
Start: 2025-01-01 | End: 2025-01-01 | Stop reason: HOSPADM

## 2025-01-01 RX ORDER — AMIODARONE HYDROCHLORIDE 200 MG/1
200 TABLET ORAL DAILY
COMMUNITY

## 2025-01-01 RX ORDER — POTASSIUM CHLORIDE 750 MG/1
40 CAPSULE, EXTENDED RELEASE ORAL ONCE
Status: COMPLETED | OUTPATIENT
Start: 2025-01-01 | End: 2025-01-01

## 2025-01-01 RX ORDER — INSULIN LISPRO 100 [IU]/ML
3-14 INJECTION, SOLUTION INTRAVENOUS; SUBCUTANEOUS
Status: DISCONTINUED | OUTPATIENT
Start: 2025-01-01 | End: 2025-01-01

## 2025-01-01 RX ORDER — IBUPROFEN 600 MG/1
1 TABLET ORAL
Status: DISCONTINUED | OUTPATIENT
Start: 2025-01-01 | End: 2025-01-01

## 2025-01-01 RX ORDER — METHYLPREDNISOLONE SODIUM SUCCINATE 125 MG/2ML
80 INJECTION, POWDER, LYOPHILIZED, FOR SOLUTION INTRAMUSCULAR; INTRAVENOUS EVERY 8 HOURS
Status: DISCONTINUED | OUTPATIENT
Start: 2025-01-01 | End: 2025-01-01

## 2025-01-01 RX ORDER — FLUTICASONE PROPIONATE 50 MCG
2 SPRAY, SUSPENSION (ML) NASAL DAILY
Status: DISCONTINUED | OUTPATIENT
Start: 2025-01-01 | End: 2025-01-01 | Stop reason: HOSPADM

## 2025-01-01 RX ORDER — IOPAMIDOL 755 MG/ML
100 INJECTION, SOLUTION INTRAVASCULAR
Status: COMPLETED | OUTPATIENT
Start: 2025-01-01 | End: 2025-01-01

## 2025-01-01 RX ORDER — BISACODYL 5 MG/1
5 TABLET, DELAYED RELEASE ORAL DAILY PRN
Status: DISCONTINUED | OUTPATIENT
Start: 2025-01-01 | End: 2025-01-01

## 2025-01-01 RX ORDER — MORPHINE SULFATE 2 MG/ML
2 INJECTION, SOLUTION INTRAMUSCULAR; INTRAVENOUS
Status: DISCONTINUED | OUTPATIENT
Start: 2025-01-01 | End: 2025-01-01

## 2025-01-01 RX ORDER — POLYETHYLENE GLYCOL 3350 17 G/17G
17 POWDER, FOR SOLUTION ORAL DAILY PRN
Status: DISCONTINUED | OUTPATIENT
Start: 2025-01-01 | End: 2025-01-01

## 2025-01-01 RX ORDER — MAGNESIUM SULFATE HEPTAHYDRATE 40 MG/ML
2 INJECTION, SOLUTION INTRAVENOUS ONCE
Status: COMPLETED | OUTPATIENT
Start: 2025-01-01 | End: 2025-01-01

## 2025-01-01 RX ORDER — IPRATROPIUM BROMIDE AND ALBUTEROL SULFATE 2.5; .5 MG/3ML; MG/3ML
3 SOLUTION RESPIRATORY (INHALATION) ONCE
Status: COMPLETED | OUTPATIENT
Start: 2025-01-01 | End: 2025-01-01

## 2025-01-01 RX ORDER — IBUPROFEN 600 MG/1
1 TABLET ORAL
Status: DISCONTINUED | OUTPATIENT
Start: 2025-01-01 | End: 2025-01-01 | Stop reason: HOSPADM

## 2025-01-01 RX ORDER — LORAZEPAM 2 MG/ML
2 INJECTION INTRAMUSCULAR
Status: DISCONTINUED | OUTPATIENT
Start: 2025-01-01 | End: 2025-01-01 | Stop reason: HOSPADM

## 2025-01-01 RX ORDER — SODIUM CHLORIDE FOR INHALATION 3 %
4 VIAL, NEBULIZER (ML) INHALATION
Status: DISCONTINUED | OUTPATIENT
Start: 2025-01-01 | End: 2025-01-01

## 2025-01-01 RX ORDER — SCOPOLAMINE 1 MG/3D
1 PATCH, EXTENDED RELEASE TRANSDERMAL
Status: DISCONTINUED | OUTPATIENT
Start: 2025-01-01 | End: 2025-01-01 | Stop reason: HOSPADM

## 2025-01-01 RX ORDER — POLYETHYLENE GLYCOL 3350 17 G/17G
17 POWDER, FOR SOLUTION ORAL DAILY
Status: DISCONTINUED | OUTPATIENT
Start: 2025-01-01 | End: 2025-01-01

## 2025-01-01 RX ORDER — SODIUM CHLORIDE 0.9 % (FLUSH) 0.9 %
10 SYRINGE (ML) INJECTION AS NEEDED
Status: DISCONTINUED | OUTPATIENT
Start: 2025-01-01 | End: 2025-01-01 | Stop reason: HOSPADM

## 2025-01-01 RX ORDER — BUDESONIDE AND FORMOTEROL FUMARATE DIHYDRATE 160; 4.5 UG/1; UG/1
2 AEROSOL RESPIRATORY (INHALATION)
Status: DISCONTINUED | OUTPATIENT
Start: 2025-01-01 | End: 2025-01-01

## 2025-01-01 RX ORDER — SODIUM CHLORIDE 9 MG/ML
40 INJECTION, SOLUTION INTRAVENOUS AS NEEDED
Status: DISCONTINUED | OUTPATIENT
Start: 2025-01-01 | End: 2025-01-01 | Stop reason: HOSPADM

## 2025-01-01 RX ORDER — MORPHINE SULFATE 2 MG/ML
2 INJECTION, SOLUTION INTRAMUSCULAR; INTRAVENOUS
Status: DISCONTINUED | OUTPATIENT
Start: 2025-01-01 | End: 2025-01-01 | Stop reason: HOSPADM

## 2025-01-01 RX ORDER — GUAIFENESIN 600 MG/1
1200 TABLET, EXTENDED RELEASE ORAL EVERY 12 HOURS SCHEDULED
Status: DISCONTINUED | OUTPATIENT
Start: 2025-01-01 | End: 2025-01-01

## 2025-01-01 RX ORDER — CETIRIZINE HYDROCHLORIDE 10 MG/1
10 TABLET ORAL DAILY
Status: DISCONTINUED | OUTPATIENT
Start: 2025-01-01 | End: 2025-01-01

## 2025-01-01 RX ORDER — MORPHINE SULFATE 2 MG/ML
2 INJECTION, SOLUTION INTRAMUSCULAR; INTRAVENOUS ONCE
Status: DISCONTINUED | OUTPATIENT
Start: 2025-01-01 | End: 2025-01-01

## 2025-01-01 RX ORDER — NICOTINE POLACRILEX 4 MG
15 LOZENGE BUCCAL
Status: DISCONTINUED | OUTPATIENT
Start: 2025-01-01 | End: 2025-01-01 | Stop reason: HOSPADM

## 2025-01-01 RX ORDER — INSULIN LISPRO 100 [IU]/ML
2-7 INJECTION, SOLUTION INTRAVENOUS; SUBCUTANEOUS
Status: DISCONTINUED | OUTPATIENT
Start: 2025-01-01 | End: 2025-01-01

## 2025-01-01 RX ORDER — ALBUTEROL SULFATE 0.83 MG/ML
2.5 SOLUTION RESPIRATORY (INHALATION) EVERY 4 HOURS PRN
Status: DISCONTINUED | OUTPATIENT
Start: 2025-01-01 | End: 2025-01-01 | Stop reason: HOSPADM

## 2025-01-01 RX ORDER — METHYLPREDNISOLONE SODIUM SUCCINATE 40 MG/ML
40 INJECTION, POWDER, LYOPHILIZED, FOR SOLUTION INTRAMUSCULAR; INTRAVENOUS EVERY 8 HOURS
Status: DISCONTINUED | OUTPATIENT
Start: 2025-01-01 | End: 2025-01-01

## 2025-01-01 RX ORDER — ACETAMINOPHEN 325 MG/1
650 TABLET ORAL EVERY 4 HOURS PRN
Status: DISCONTINUED | OUTPATIENT
Start: 2025-01-01 | End: 2025-01-01 | Stop reason: HOSPADM

## 2025-01-01 RX ORDER — VANCOMYCIN/0.9 % SOD CHLORIDE 1.5G/250ML
1500 PLASTIC BAG, INJECTION (ML) INTRAVENOUS EVERY 12 HOURS
Status: DISCONTINUED | OUTPATIENT
Start: 2025-01-01 | End: 2025-01-01 | Stop reason: HOSPADM

## 2025-01-01 RX ORDER — AMOXICILLIN 250 MG
2 CAPSULE ORAL 2 TIMES DAILY
Status: DISCONTINUED | OUTPATIENT
Start: 2025-01-01 | End: 2025-01-01

## 2025-01-01 RX ORDER — AZITHROMYCIN 250 MG/1
500 TABLET, FILM COATED ORAL
Status: COMPLETED | OUTPATIENT
Start: 2025-01-01 | End: 2025-01-01

## 2025-01-01 RX ORDER — FUROSEMIDE 10 MG/ML
60 INJECTION INTRAMUSCULAR; INTRAVENOUS ONCE
Status: COMPLETED | OUTPATIENT
Start: 2025-01-01 | End: 2025-01-01

## 2025-01-01 RX ORDER — BUDESONIDE 0.5 MG/2ML
0.5 INHALANT ORAL
Status: DISCONTINUED | OUTPATIENT
Start: 2025-01-01 | End: 2025-01-01

## 2025-01-01 RX ORDER — METHYLPREDNISOLONE SODIUM SUCCINATE 125 MG/2ML
60 INJECTION, POWDER, LYOPHILIZED, FOR SOLUTION INTRAMUSCULAR; INTRAVENOUS DAILY
Status: DISCONTINUED | OUTPATIENT
Start: 2025-01-01 | End: 2025-01-01

## 2025-01-01 RX ORDER — LORAZEPAM 2 MG/ML
1 INJECTION INTRAMUSCULAR
Status: DISCONTINUED | OUTPATIENT
Start: 2025-01-01 | End: 2025-01-01

## 2025-01-01 RX ORDER — AMOXICILLIN 250 MG
2 CAPSULE ORAL 2 TIMES DAILY PRN
Status: DISCONTINUED | OUTPATIENT
Start: 2025-01-01 | End: 2025-01-01

## 2025-01-01 RX ORDER — VANCOMYCIN 2 GRAM/500 ML IN 0.9 % SODIUM CHLORIDE INTRAVENOUS
20 ONCE
Status: COMPLETED | OUTPATIENT
Start: 2025-01-01 | End: 2025-01-01

## 2025-01-01 RX ORDER — DEXTROSE MONOHYDRATE 25 G/50ML
25 INJECTION, SOLUTION INTRAVENOUS
Status: DISCONTINUED | OUTPATIENT
Start: 2025-01-01 | End: 2025-01-01 | Stop reason: HOSPADM

## 2025-01-01 RX ORDER — NITROGLYCERIN 0.4 MG/1
0.4 TABLET SUBLINGUAL
Status: DISCONTINUED | OUTPATIENT
Start: 2025-01-01 | End: 2025-01-01 | Stop reason: HOSPADM

## 2025-01-01 RX ORDER — INSULIN GLARGINE 100 [IU]/ML
25 INJECTION, SOLUTION SUBCUTANEOUS NIGHTLY
Status: DISCONTINUED | OUTPATIENT
Start: 2025-01-01 | End: 2025-01-01

## 2025-01-01 RX ORDER — HYDROMORPHONE HYDROCHLORIDE 1 MG/ML
0.5 INJECTION, SOLUTION INTRAMUSCULAR; INTRAVENOUS; SUBCUTANEOUS
Status: DISCONTINUED | OUTPATIENT
Start: 2025-01-01 | End: 2025-01-01

## 2025-01-01 RX ORDER — PANTOPRAZOLE SODIUM 20 MG/1
20 TABLET, DELAYED RELEASE ORAL 2 TIMES DAILY
Status: DISCONTINUED | OUTPATIENT
Start: 2025-01-01 | End: 2025-01-01

## 2025-01-01 RX ORDER — ACETAMINOPHEN 650 MG/1
650 SUPPOSITORY RECTAL EVERY 4 HOURS PRN
Status: DISCONTINUED | OUTPATIENT
Start: 2025-01-01 | End: 2025-01-01 | Stop reason: HOSPADM

## 2025-01-01 RX ADMIN — CEFEPIME 2000 MG: 2 INJECTION, POWDER, FOR SOLUTION INTRAVENOUS at 17:18

## 2025-01-01 RX ADMIN — SODIUM CHLORIDE SOLN NEBU 3% 4 ML: 3 NEBU SOLN at 14:10

## 2025-01-01 RX ADMIN — Medication 10 ML: at 08:22

## 2025-01-01 RX ADMIN — IPRATROPIUM BROMIDE AND ALBUTEROL SULFATE 3 ML: 2.5; .5 SOLUTION RESPIRATORY (INHALATION) at 18:56

## 2025-01-01 RX ADMIN — INSULIN LISPRO 5 UNITS: 100 INJECTION, SOLUTION INTRAVENOUS; SUBCUTANEOUS at 12:22

## 2025-01-01 RX ADMIN — INSULIN GLARGINE 25 UNITS: 100 INJECTION, SOLUTION SUBCUTANEOUS at 21:28

## 2025-01-01 RX ADMIN — HYDROMORPHONE HYDROCHLORIDE 1.5 MG: 1 INJECTION, SOLUTION INTRAMUSCULAR; INTRAVENOUS; SUBCUTANEOUS at 23:45

## 2025-01-01 RX ADMIN — FUROSEMIDE 80 MG: 10 INJECTION, SOLUTION INTRAVENOUS at 09:38

## 2025-01-01 RX ADMIN — BUDESONIDE 0.5 MG: 0.5 INHALANT RESPIRATORY (INHALATION) at 19:52

## 2025-01-01 RX ADMIN — ACETAMINOPHEN 650 MG: 325 TABLET ORAL at 17:13

## 2025-01-01 RX ADMIN — INSULIN LISPRO 8 UNITS: 100 INJECTION, SOLUTION INTRAVENOUS; SUBCUTANEOUS at 11:55

## 2025-01-01 RX ADMIN — Medication 10 ML: at 09:15

## 2025-01-01 RX ADMIN — INSULIN LISPRO 8 UNITS: 100 INJECTION, SOLUTION INTRAVENOUS; SUBCUTANEOUS at 09:15

## 2025-01-01 RX ADMIN — ACETAMINOPHEN 650 MG: 325 TABLET ORAL at 17:00

## 2025-01-01 RX ADMIN — INSULIN LISPRO 5 UNITS: 100 INJECTION, SOLUTION INTRAVENOUS; SUBCUTANEOUS at 22:09

## 2025-01-01 RX ADMIN — INSULIN LISPRO 8 UNITS: 100 INJECTION, SOLUTION INTRAVENOUS; SUBCUTANEOUS at 17:31

## 2025-01-01 RX ADMIN — INSULIN LISPRO 3 UNITS: 100 INJECTION, SOLUTION INTRAVENOUS; SUBCUTANEOUS at 09:10

## 2025-01-01 RX ADMIN — AZITHROMYCIN 500 MG: 250 TABLET, FILM COATED ORAL at 08:30

## 2025-01-01 RX ADMIN — SODIUM CHLORIDE 1000 MG: 9 INJECTION, SOLUTION INTRAVENOUS at 09:12

## 2025-01-01 RX ADMIN — BUDESONIDE 0.5 MG: 0.5 INHALANT RESPIRATORY (INHALATION) at 21:02

## 2025-01-01 RX ADMIN — METHYLPREDNISOLONE SODIUM SUCCINATE 40 MG: 40 INJECTION, POWDER, FOR SOLUTION INTRAMUSCULAR; INTRAVENOUS at 23:53

## 2025-01-01 RX ADMIN — INSULIN LISPRO 10 UNITS: 100 INJECTION, SOLUTION INTRAVENOUS; SUBCUTANEOUS at 12:08

## 2025-01-01 RX ADMIN — PANTOPRAZOLE SODIUM 20 MG: 20 TABLET, DELAYED RELEASE ORAL at 21:42

## 2025-01-01 RX ADMIN — AZITHROMYCIN 500 MG: 250 TABLET, FILM COATED ORAL at 08:21

## 2025-01-01 RX ADMIN — PANTOPRAZOLE SODIUM 20 MG: 20 TABLET, DELAYED RELEASE ORAL at 09:10

## 2025-01-01 RX ADMIN — CEFTRIAXONE SODIUM 2000 MG: 2 INJECTION, POWDER, FOR SOLUTION INTRAMUSCULAR; INTRAVENOUS at 15:18

## 2025-01-01 RX ADMIN — CETIRIZINE HYDROCHLORIDE 10 MG: 10 TABLET, FILM COATED ORAL at 09:10

## 2025-01-01 RX ADMIN — LORAZEPAM 1 MG: 2 INJECTION INTRAMUSCULAR; INTRAVENOUS at 16:01

## 2025-01-01 RX ADMIN — GUAIFENESIN 1200 MG: 600 TABLET ORAL at 21:27

## 2025-01-01 RX ADMIN — BUDESONIDE 0.5 MG: 0.5 INHALANT RESPIRATORY (INHALATION) at 06:24

## 2025-01-01 RX ADMIN — METHYLPREDNISOLONE SODIUM SUCCINATE 40 MG: 40 INJECTION, POWDER, FOR SOLUTION INTRAMUSCULAR; INTRAVENOUS at 17:09

## 2025-01-01 RX ADMIN — FUROSEMIDE 40 MG: 20 TABLET ORAL at 17:18

## 2025-01-01 RX ADMIN — INSULIN LISPRO 3 UNITS: 100 INJECTION, SOLUTION INTRAVENOUS; SUBCUTANEOUS at 08:31

## 2025-01-01 RX ADMIN — BUDESONIDE 0.5 MG: 0.5 INHALANT RESPIRATORY (INHALATION) at 07:00

## 2025-01-01 RX ADMIN — Medication 10 ML: at 22:11

## 2025-01-01 RX ADMIN — MORPHINE SULFATE 2 MG: 2 INJECTION, SOLUTION INTRAMUSCULAR; INTRAVENOUS at 16:01

## 2025-01-01 RX ADMIN — BUDESONIDE AND FORMOTEROL FUMARATE DIHYDRATE 2 PUFF: 160; 4.5 AEROSOL RESPIRATORY (INHALATION) at 07:10

## 2025-01-01 RX ADMIN — BUDESONIDE 0.5 MG: 0.5 INHALANT RESPIRATORY (INHALATION) at 18:11

## 2025-01-01 RX ADMIN — IPRATROPIUM BROMIDE AND ALBUTEROL SULFATE 3 ML: 2.5; .5 SOLUTION RESPIRATORY (INHALATION) at 14:33

## 2025-01-01 RX ADMIN — LORAZEPAM 1 MG: 2 INJECTION INTRAMUSCULAR; INTRAVENOUS at 14:51

## 2025-01-01 RX ADMIN — ACETAMINOPHEN 650 MG: 325 TABLET ORAL at 13:01

## 2025-01-01 RX ADMIN — ACETAMINOPHEN 650 MG: 325 TABLET ORAL at 03:07

## 2025-01-01 RX ADMIN — Medication 10 ML: at 08:27

## 2025-01-01 RX ADMIN — MORPHINE SULFATE 2 MG: 2 INJECTION, SOLUTION INTRAMUSCULAR; INTRAVENOUS at 16:33

## 2025-01-01 RX ADMIN — MORPHINE SULFATE 2 MG: 2 INJECTION, SOLUTION INTRAMUSCULAR; INTRAVENOUS at 17:27

## 2025-01-01 RX ADMIN — SODIUM CHLORIDE SOLN NEBU 3% 4 ML: 3 NEBU SOLN at 07:00

## 2025-01-01 RX ADMIN — MORPHINE SULFATE 2 MG: 2 INJECTION, SOLUTION INTRAMUSCULAR; INTRAVENOUS at 01:33

## 2025-01-01 RX ADMIN — ACETAMINOPHEN 650 MG: 325 TABLET ORAL at 23:54

## 2025-01-01 RX ADMIN — SODIUM CHLORIDE 1500 MG: 9 INJECTION, SOLUTION INTRAVENOUS at 12:25

## 2025-01-01 RX ADMIN — CEFEPIME 2000 MG: 2 INJECTION, POWDER, FOR SOLUTION INTRAVENOUS at 02:20

## 2025-01-01 RX ADMIN — INSULIN LISPRO 8 UNITS: 100 INJECTION, SOLUTION INTRAVENOUS; SUBCUTANEOUS at 17:36

## 2025-01-01 RX ADMIN — CETIRIZINE HYDROCHLORIDE 10 MG: 10 TABLET, FILM COATED ORAL at 08:31

## 2025-01-01 RX ADMIN — CEFEPIME 2000 MG: 2 INJECTION, POWDER, FOR SOLUTION INTRAVENOUS at 17:09

## 2025-01-01 RX ADMIN — IPRATROPIUM BROMIDE AND ALBUTEROL SULFATE 3 ML: 2.5; .5 SOLUTION RESPIRATORY (INHALATION) at 09:47

## 2025-01-01 RX ADMIN — PERFLUTREN 13.04 MG: 6.52 INJECTION, SUSPENSION INTRAVENOUS at 13:32

## 2025-01-01 RX ADMIN — PANTOPRAZOLE SODIUM 20 MG: 20 TABLET, DELAYED RELEASE ORAL at 09:15

## 2025-01-01 RX ADMIN — INSULIN LISPRO 8 UNITS: 100 INJECTION, SOLUTION INTRAVENOUS; SUBCUTANEOUS at 09:11

## 2025-01-01 RX ADMIN — GUAIFENESIN 1200 MG: 600 TABLET ORAL at 21:18

## 2025-01-01 RX ADMIN — GUAIFENESIN 1200 MG: 600 TABLET ORAL at 09:03

## 2025-01-01 RX ADMIN — MORPHINE SULFATE 2 MG: 2 INJECTION, SOLUTION INTRAMUSCULAR; INTRAVENOUS at 14:51

## 2025-01-01 RX ADMIN — DOCUSATE SODIUM 50 MG AND SENNOSIDES 8.6 MG 2 TABLET: 8.6; 5 TABLET, FILM COATED ORAL at 21:18

## 2025-01-01 RX ADMIN — PANTOPRAZOLE SODIUM 20 MG: 20 TABLET, DELAYED RELEASE ORAL at 08:30

## 2025-01-01 RX ADMIN — IPRATROPIUM BROMIDE AND ALBUTEROL SULFATE 3 ML: .5; 3 SOLUTION RESPIRATORY (INHALATION) at 20:13

## 2025-01-01 RX ADMIN — INSULIN LISPRO 8 UNITS: 100 INJECTION, SOLUTION INTRAVENOUS; SUBCUTANEOUS at 17:08

## 2025-01-01 RX ADMIN — ACETAMINOPHEN 650 MG: 325 TABLET ORAL at 00:26

## 2025-01-01 RX ADMIN — INSULIN LISPRO 5 UNITS: 100 INJECTION, SOLUTION INTRAVENOUS; SUBCUTANEOUS at 08:25

## 2025-01-01 RX ADMIN — ACETAMINOPHEN 650 MG: 325 TABLET ORAL at 14:31

## 2025-01-01 RX ADMIN — POTASSIUM CHLORIDE 40 MEQ: 750 CAPSULE, EXTENDED RELEASE ORAL at 11:56

## 2025-01-01 RX ADMIN — INSULIN GLARGINE 20 UNITS: 100 INJECTION, SOLUTION SUBCUTANEOUS at 00:22

## 2025-01-01 RX ADMIN — FUROSEMIDE 60 MG: 10 INJECTION, SOLUTION INTRAMUSCULAR; INTRAVENOUS at 12:08

## 2025-01-01 RX ADMIN — INSULIN LISPRO 8 UNITS: 100 INJECTION, SOLUTION INTRAVENOUS; SUBCUTANEOUS at 12:25

## 2025-01-01 RX ADMIN — LORAZEPAM 1 MG: 2 INJECTION INTRAMUSCULAR; INTRAVENOUS at 19:10

## 2025-01-01 RX ADMIN — IPRATROPIUM BROMIDE AND ALBUTEROL SULFATE 3 ML: 2.5; .5 SOLUTION RESPIRATORY (INHALATION) at 14:10

## 2025-01-01 RX ADMIN — INSULIN GLARGINE 30 UNITS: 100 INJECTION, SOLUTION SUBCUTANEOUS at 21:18

## 2025-01-01 RX ADMIN — PANTOPRAZOLE SODIUM 20 MG: 20 TABLET, DELAYED RELEASE ORAL at 08:21

## 2025-01-01 RX ADMIN — LORAZEPAM 1 MG: 2 INJECTION INTRAMUSCULAR; INTRAVENOUS at 21:03

## 2025-01-01 RX ADMIN — DOCUSATE SODIUM 50 MG AND SENNOSIDES 8.6 MG 2 TABLET: 8.6; 5 TABLET, FILM COATED ORAL at 11:56

## 2025-01-01 RX ADMIN — BUDESONIDE 0.5 MG: 0.5 INHALANT RESPIRATORY (INHALATION) at 05:53

## 2025-01-01 RX ADMIN — BUDESONIDE 0.5 MG: 0.5 INHALANT RESPIRATORY (INHALATION) at 05:22

## 2025-01-01 RX ADMIN — SODIUM CHLORIDE 1500 MG: 9 INJECTION, SOLUTION INTRAVENOUS at 15:35

## 2025-01-01 RX ADMIN — DOCUSATE SODIUM 50 MG AND SENNOSIDES 8.6 MG 2 TABLET: 8.6; 5 TABLET, FILM COATED ORAL at 22:09

## 2025-01-01 RX ADMIN — LORAZEPAM 1 MG: 2 INJECTION INTRAMUSCULAR; INTRAVENOUS at 18:07

## 2025-01-01 RX ADMIN — IPRATROPIUM BROMIDE AND ALBUTEROL SULFATE 3 ML: 2.5; .5 SOLUTION RESPIRATORY (INHALATION) at 05:22

## 2025-01-01 RX ADMIN — SODIUM CHLORIDE 1000 MG: 900 INJECTION INTRAVENOUS at 21:29

## 2025-01-01 RX ADMIN — IPRATROPIUM BROMIDE AND ALBUTEROL SULFATE 3 ML: 2.5; .5 SOLUTION RESPIRATORY (INHALATION) at 14:18

## 2025-01-01 RX ADMIN — FLUTICASONE PROPIONATE 2 SPRAY: 50 SPRAY, METERED NASAL at 08:21

## 2025-01-01 RX ADMIN — INSULIN GLARGINE 20 UNITS: 100 INJECTION, SOLUTION SUBCUTANEOUS at 21:09

## 2025-01-01 RX ADMIN — SODIUM CHLORIDE SOLN NEBU 3% 4 ML: 3 NEBU SOLN at 18:08

## 2025-01-01 RX ADMIN — INSULIN LISPRO 5 UNITS: 100 INJECTION, SOLUTION INTRAVENOUS; SUBCUTANEOUS at 21:09

## 2025-01-01 RX ADMIN — Medication 10 ML: at 09:06

## 2025-01-01 RX ADMIN — POLYETHYLENE GLYCOL 3350 17 G: 17 POWDER, FOR SOLUTION ORAL at 11:56

## 2025-01-01 RX ADMIN — INSULIN LISPRO 8 UNITS: 100 INJECTION, SOLUTION INTRAVENOUS; SUBCUTANEOUS at 09:05

## 2025-01-01 RX ADMIN — IPRATROPIUM BROMIDE AND ALBUTEROL SULFATE 3 ML: 2.5; .5 SOLUTION RESPIRATORY (INHALATION) at 18:08

## 2025-01-01 RX ADMIN — GUAIFENESIN 1200 MG: 600 TABLET ORAL at 08:21

## 2025-01-01 RX ADMIN — BUDESONIDE AND FORMOTEROL FUMARATE DIHYDRATE 2 PUFF: 160; 4.5 AEROSOL RESPIRATORY (INHALATION) at 00:21

## 2025-01-01 RX ADMIN — ACETAMINOPHEN 650 MG: 325 TABLET ORAL at 08:25

## 2025-01-01 RX ADMIN — ACETAMINOPHEN 650 MG: 325 TABLET ORAL at 18:24

## 2025-01-01 RX ADMIN — CEFTRIAXONE SODIUM 2000 MG: 2 INJECTION, POWDER, FOR SOLUTION INTRAMUSCULAR; INTRAVENOUS at 17:32

## 2025-01-01 RX ADMIN — INSULIN LISPRO 6 UNITS: 100 INJECTION, SOLUTION INTRAVENOUS; SUBCUTANEOUS at 11:55

## 2025-01-01 RX ADMIN — GUAIFENESIN 1200 MG: 600 TABLET ORAL at 21:09

## 2025-01-01 RX ADMIN — IPRATROPIUM BROMIDE AND ALBUTEROL SULFATE 3 ML: 2.5; .5 SOLUTION RESPIRATORY (INHALATION) at 13:01

## 2025-01-01 RX ADMIN — CETIRIZINE HYDROCHLORIDE 10 MG: 10 TABLET, FILM COATED ORAL at 09:15

## 2025-01-01 RX ADMIN — MORPHINE SULFATE 2 MG: 2 INJECTION, SOLUTION INTRAMUSCULAR; INTRAVENOUS at 21:19

## 2025-01-01 RX ADMIN — IPRATROPIUM BROMIDE AND ALBUTEROL SULFATE 3 ML: 2.5; .5 SOLUTION RESPIRATORY (INHALATION) at 09:03

## 2025-01-01 RX ADMIN — SODIUM CHLORIDE 1000 ML: 9 INJECTION, SOLUTION INTRAVENOUS at 21:29

## 2025-01-01 RX ADMIN — CETIRIZINE HYDROCHLORIDE 10 MG: 10 TABLET, FILM COATED ORAL at 08:26

## 2025-01-01 RX ADMIN — GUAIFENESIN 1200 MG: 600 TABLET ORAL at 22:09

## 2025-01-01 RX ADMIN — Medication 10 ML: at 00:22

## 2025-01-01 RX ADMIN — FLUTICASONE PROPIONATE 2 SPRAY: 50 SPRAY, METERED NASAL at 08:27

## 2025-01-01 RX ADMIN — INSULIN LISPRO 3 UNITS: 100 INJECTION, SOLUTION INTRAVENOUS; SUBCUTANEOUS at 12:25

## 2025-01-01 RX ADMIN — PANTOPRAZOLE SODIUM 20 MG: 20 TABLET, DELAYED RELEASE ORAL at 21:18

## 2025-01-01 RX ADMIN — INSULIN LISPRO 8 UNITS: 100 INJECTION, SOLUTION INTRAVENOUS; SUBCUTANEOUS at 21:27

## 2025-01-01 RX ADMIN — FUROSEMIDE 80 MG: 10 INJECTION, SOLUTION INTRAVENOUS at 15:18

## 2025-01-01 RX ADMIN — PANTOPRAZOLE SODIUM 20 MG: 20 TABLET, DELAYED RELEASE ORAL at 08:26

## 2025-01-01 RX ADMIN — METHYLPREDNISOLONE SODIUM SUCCINATE 80 MG: 125 INJECTION, POWDER, FOR SOLUTION INTRAMUSCULAR; INTRAVENOUS at 02:20

## 2025-01-01 RX ADMIN — SODIUM CHLORIDE 1000 MG: 9 INJECTION, SOLUTION INTRAVENOUS at 09:09

## 2025-01-01 RX ADMIN — CEFEPIME 2000 MG: 2 INJECTION, POWDER, FOR SOLUTION INTRAVENOUS at 09:03

## 2025-01-01 RX ADMIN — ACETAMINOPHEN 650 MG: 325 TABLET ORAL at 06:18

## 2025-01-01 RX ADMIN — MORPHINE SULFATE 2 MG: 2 INJECTION, SOLUTION INTRAMUSCULAR; INTRAVENOUS at 22:37

## 2025-01-01 RX ADMIN — PANTOPRAZOLE SODIUM 20 MG: 20 TABLET, DELAYED RELEASE ORAL at 21:09

## 2025-01-01 RX ADMIN — SODIUM CHLORIDE 1000 MG: 9 INJECTION, SOLUTION INTRAVENOUS at 18:26

## 2025-01-01 RX ADMIN — Medication 10 ML: at 21:10

## 2025-01-01 RX ADMIN — Medication 10 ML: at 21:28

## 2025-01-01 RX ADMIN — IPRATROPIUM BROMIDE AND ALBUTEROL SULFATE 3 ML: 2.5; .5 SOLUTION RESPIRATORY (INHALATION) at 14:53

## 2025-01-01 RX ADMIN — CETIRIZINE HYDROCHLORIDE 10 MG: 10 TABLET, FILM COATED ORAL at 09:05

## 2025-01-01 RX ADMIN — Medication 10 ML: at 09:11

## 2025-01-01 RX ADMIN — METHYLPREDNISOLONE SODIUM SUCCINATE 80 MG: 125 INJECTION, POWDER, FOR SOLUTION INTRAMUSCULAR; INTRAVENOUS at 17:18

## 2025-01-01 RX ADMIN — CEFTRIAXONE SODIUM 2000 MG: 2 INJECTION, POWDER, FOR SOLUTION INTRAMUSCULAR; INTRAVENOUS at 22:10

## 2025-01-01 RX ADMIN — PANTOPRAZOLE SODIUM 20 MG: 20 TABLET, DELAYED RELEASE ORAL at 21:27

## 2025-01-01 RX ADMIN — GUAIFENESIN 1200 MG: 600 TABLET ORAL at 08:26

## 2025-01-01 RX ADMIN — INSULIN LISPRO 8 UNITS: 100 INJECTION, SOLUTION INTRAVENOUS; SUBCUTANEOUS at 21:18

## 2025-01-01 RX ADMIN — FUROSEMIDE 40 MG: 20 TABLET ORAL at 08:26

## 2025-01-01 RX ADMIN — IPRATROPIUM BROMIDE AND ALBUTEROL SULFATE 3 ML: 2.5; .5 SOLUTION RESPIRATORY (INHALATION) at 21:02

## 2025-01-01 RX ADMIN — FUROSEMIDE 40 MG: 20 TABLET ORAL at 08:30

## 2025-01-01 RX ADMIN — IPRATROPIUM BROMIDE AND ALBUTEROL SULFATE 3 ML: 2.5; .5 SOLUTION RESPIRATORY (INHALATION) at 00:20

## 2025-01-01 RX ADMIN — IPRATROPIUM BROMIDE AND ALBUTEROL SULFATE 3 ML: 2.5; .5 SOLUTION RESPIRATORY (INHALATION) at 10:25

## 2025-01-01 RX ADMIN — CEFEPIME 2000 MG: 2 INJECTION, POWDER, FOR SOLUTION INTRAVENOUS at 00:55

## 2025-01-01 RX ADMIN — IPRATROPIUM BROMIDE AND ALBUTEROL SULFATE 3 ML: 2.5; .5 SOLUTION RESPIRATORY (INHALATION) at 07:10

## 2025-01-01 RX ADMIN — MAGNESIUM SULFATE HEPTAHYDRATE 2 G: 2 INJECTION, SOLUTION INTRAVENOUS at 19:46

## 2025-01-01 RX ADMIN — SODIUM CHLORIDE 1000 ML: 9 INJECTION, SOLUTION INTRAVENOUS at 19:55

## 2025-01-01 RX ADMIN — LORAZEPAM 1 MG: 2 INJECTION INTRAMUSCULAR; INTRAVENOUS at 16:32

## 2025-01-01 RX ADMIN — Medication 750 MG: at 11:55

## 2025-01-01 RX ADMIN — DOCUSATE SODIUM 50 MG AND SENNOSIDES 8.6 MG 2 TABLET: 8.6; 5 TABLET, FILM COATED ORAL at 09:15

## 2025-01-01 RX ADMIN — INSULIN GLARGINE 25 UNITS: 100 INJECTION, SOLUTION SUBCUTANEOUS at 22:11

## 2025-01-01 RX ADMIN — INSULIN LISPRO 10 UNITS: 100 INJECTION, SOLUTION INTRAVENOUS; SUBCUTANEOUS at 17:32

## 2025-01-01 RX ADMIN — FLUTICASONE PROPIONATE 2 SPRAY: 50 SPRAY, METERED NASAL at 08:31

## 2025-01-01 RX ADMIN — Medication 10 ML: at 21:43

## 2025-01-01 RX ADMIN — INSULIN LISPRO 3 UNITS: 100 INJECTION, SOLUTION INTRAVENOUS; SUBCUTANEOUS at 08:20

## 2025-01-01 RX ADMIN — INSULIN LISPRO 3 UNITS: 100 INJECTION, SOLUTION INTRAVENOUS; SUBCUTANEOUS at 17:33

## 2025-01-01 RX ADMIN — HYDROMORPHONE HYDROCHLORIDE 0.5 MG: 1 INJECTION, SOLUTION INTRAMUSCULAR; INTRAVENOUS; SUBCUTANEOUS at 21:54

## 2025-01-01 RX ADMIN — GUAIFENESIN 1200 MG: 600 TABLET ORAL at 09:15

## 2025-01-01 RX ADMIN — GUAIFENESIN 1200 MG: 600 TABLET ORAL at 21:42

## 2025-01-01 RX ADMIN — INSULIN GLARGINE 25 UNITS: 100 INJECTION, SOLUTION SUBCUTANEOUS at 21:42

## 2025-01-01 RX ADMIN — PANTOPRAZOLE SODIUM 20 MG: 20 TABLET, DELAYED RELEASE ORAL at 00:22

## 2025-01-01 RX ADMIN — IPRATROPIUM BROMIDE AND ALBUTEROL SULFATE 3 ML: 2.5; .5 SOLUTION RESPIRATORY (INHALATION) at 07:00

## 2025-01-01 RX ADMIN — GUAIFENESIN 1200 MG: 600 TABLET ORAL at 09:05

## 2025-01-01 RX ADMIN — CETIRIZINE HYDROCHLORIDE 10 MG: 10 TABLET, FILM COATED ORAL at 08:21

## 2025-01-01 RX ADMIN — IPRATROPIUM BROMIDE AND ALBUTEROL SULFATE 3 ML: 2.5; .5 SOLUTION RESPIRATORY (INHALATION) at 05:49

## 2025-01-01 RX ADMIN — IPRATROPIUM BROMIDE AND ALBUTEROL SULFATE 3 ML: 2.5; .5 SOLUTION RESPIRATORY (INHALATION) at 06:10

## 2025-01-01 RX ADMIN — Medication 750 MG: at 23:55

## 2025-01-01 RX ADMIN — INSULIN LISPRO 5 UNITS: 100 INJECTION, SOLUTION INTRAVENOUS; SUBCUTANEOUS at 17:18

## 2025-01-01 RX ADMIN — DOCUSATE SODIUM 50 MG AND SENNOSIDES 8.6 MG 2 TABLET: 8.6; 5 TABLET, FILM COATED ORAL at 09:09

## 2025-01-01 RX ADMIN — IOPAMIDOL 100 ML: 755 INJECTION, SOLUTION INTRAVENOUS at 15:36

## 2025-01-01 RX ADMIN — FLUTICASONE PROPIONATE 2 SPRAY: 50 SPRAY, METERED NASAL at 09:15

## 2025-01-01 RX ADMIN — Medication 750 MG: at 23:53

## 2025-01-01 RX ADMIN — Medication 10 ML: at 08:32

## 2025-01-01 RX ADMIN — SODIUM CHLORIDE SOLN NEBU 3% 4 ML: 3 NEBU SOLN at 18:56

## 2025-01-01 RX ADMIN — INSULIN LISPRO 8 UNITS: 100 INJECTION, SOLUTION INTRAVENOUS; SUBCUTANEOUS at 12:23

## 2025-01-01 RX ADMIN — METHYLPREDNISOLONE SODIUM SUCCINATE 40 MG: 40 INJECTION, POWDER, FOR SOLUTION INTRAMUSCULAR; INTRAVENOUS at 08:21

## 2025-01-01 RX ADMIN — MORPHINE SULFATE 2 MG: 2 INJECTION, SOLUTION INTRAMUSCULAR; INTRAVENOUS at 19:38

## 2025-01-01 RX ADMIN — SODIUM CHLORIDE SOLN NEBU 3% 4 ML: 3 NEBU SOLN at 05:22

## 2025-01-01 RX ADMIN — IPRATROPIUM BROMIDE AND ALBUTEROL SULFATE 3 ML: 2.5; .5 SOLUTION RESPIRATORY (INHALATION) at 23:32

## 2025-01-01 RX ADMIN — METHYLPREDNISOLONE SODIUM SUCCINATE 80 MG: 125 INJECTION, POWDER, FOR SOLUTION INTRAMUSCULAR; INTRAVENOUS at 08:26

## 2025-01-01 RX ADMIN — INSULIN LISPRO 8 UNITS: 100 INJECTION, SOLUTION INTRAVENOUS; SUBCUTANEOUS at 21:42

## 2025-01-01 RX ADMIN — METHYLPREDNISOLONE SODIUM SUCCINATE 80 MG: 125 INJECTION, POWDER, FOR SOLUTION INTRAMUSCULAR; INTRAVENOUS at 08:51

## 2025-01-01 RX ADMIN — FUROSEMIDE 80 MG: 10 INJECTION, SOLUTION INTRAVENOUS at 11:55

## 2025-01-01 RX ADMIN — IPRATROPIUM BROMIDE AND ALBUTEROL SULFATE 3 ML: 2.5; .5 SOLUTION RESPIRATORY (INHALATION) at 10:13

## 2025-01-01 RX ADMIN — Medication 10 ML: at 21:19

## 2025-01-01 RX ADMIN — MORPHINE SULFATE 2 MG: 2 INJECTION, SOLUTION INTRAMUSCULAR; INTRAVENOUS at 18:28

## 2025-01-01 RX ADMIN — CEFEPIME 2000 MG: 2 INJECTION, POWDER, FOR SOLUTION INTRAVENOUS at 08:27

## 2025-01-01 RX ADMIN — ACETAMINOPHEN 650 MG: 325 TABLET ORAL at 06:14

## 2025-01-01 RX ADMIN — CEFEPIME 2000 MG: 2 INJECTION, POWDER, FOR SOLUTION INTRAVENOUS at 08:21

## 2025-01-01 RX ADMIN — PANTOPRAZOLE SODIUM 20 MG: 20 TABLET, DELAYED RELEASE ORAL at 22:09

## 2025-01-01 RX ADMIN — LORAZEPAM 2 MG: 2 INJECTION INTRAMUSCULAR; INTRAVENOUS at 01:26

## 2025-01-01 RX ADMIN — PANTOPRAZOLE SODIUM 20 MG: 20 TABLET, DELAYED RELEASE ORAL at 09:05

## 2025-01-01 RX ADMIN — IPRATROPIUM BROMIDE AND ALBUTEROL SULFATE 3 ML: 2.5; .5 SOLUTION RESPIRATORY (INHALATION) at 19:52

## 2025-01-01 RX ADMIN — POTASSIUM CHLORIDE 40 MEQ: 750 CAPSULE, EXTENDED RELEASE ORAL at 09:38

## 2025-01-01 RX ADMIN — Medication 2000 MG: at 13:01

## 2025-01-01 RX ADMIN — AZITHROMYCIN 500 MG: 250 TABLET, FILM COATED ORAL at 08:26

## 2025-01-01 RX ADMIN — GUAIFENESIN 1200 MG: 600 TABLET ORAL at 09:09

## 2025-01-01 RX ADMIN — IPRATROPIUM BROMIDE AND ALBUTEROL SULFATE 3 ML: 2.5; .5 SOLUTION RESPIRATORY (INHALATION) at 10:53

## 2025-01-01 RX ADMIN — SCOPOLAMINE 1 PATCH: 1.5 PATCH, EXTENDED RELEASE TRANSDERMAL at 22:37

## 2025-01-01 RX ADMIN — ACETAMINOPHEN 650 MG: 325 TABLET ORAL at 09:44

## 2025-01-01 RX ADMIN — FLUTICASONE PROPIONATE 2 SPRAY: 50 SPRAY, METERED NASAL at 09:11

## 2025-01-01 RX ADMIN — BUDESONIDE 0.5 MG: 0.5 INHALANT RESPIRATORY (INHALATION) at 18:56

## 2025-01-01 RX ADMIN — SODIUM CHLORIDE 1500 MG: 9 INJECTION, SOLUTION INTRAVENOUS at 23:15

## 2025-01-01 NOTE — PROGRESS NOTES
Orthopaedic Clinic Note - Established Patient    NAME:  Philip Escobedo   : 1950  MRN: [id number]    2025    CHIEF COMPLAINT:  follow up right wrist and left knee pain, repeat injection    HISTORY OF PRESENT ILLNESS:   The patient is a 74 y.o. male who returns today for follow up of right wrist pain, requesting repeat injection. It has been 3 months since last injection. Patient denies any recent trauma, fall, or other other injury. Pain is rated 8/10 today. He was recently hospitalized for respiratory issues.    Past Medical History:        Diagnosis Date    Acid reflux disease        Past Surgical History:        Procedure Laterality Date    SHOULDER SURGERY         Current Medications:   Prior to Admission medications    Medication Sig Start Date End Date Taking? Authorizing Provider   predniSONE (DELTASONE) 10 MG tablet Take 1 tablet by mouth 2 times daily 24  Yes Mariama Moore MD   Pirfenidone 267 MG TABS Take 267 mg by mouth 3 times daily 5/10/24   Mariama Moore MD   azithromycin (ZITHROMAX) 250 MG tablet TAKE 1 TABLET MONDAY, WEDNESDAY, AND 24   Mariama Moore MD   pantoprazole (PROTONIX) 20 MG tablet Take 1 tablet by mouth 2 times daily 23   Mariama Moore MD   LASIX 20 MG tablet Take 0.5 tablets by mouth 24   Mariama Moore MD   spironolactone (ALDACTONE) 25 MG tablet See Instructions, 0.5 tab Oral Daily, 0 Refill(s) 24   Mariama Moore MD   fluticasone (FLONASE) 50 MCG/ACT nasal spray 2 sprays by Each Nostril route daily 3/7/24   Mariama Moore MD   empagliflozin (JARDIANCE) 10 MG tablet Take 1 tablet by mouth 24   Mariama Moore MD LANTUS SOLOSTAR 100 UNIT/ML injection pen INJECT 10 UNITS EVERY DAY BY SUBCUTANEOUS ROUTE AT BEDTIME. 24   Mariama Moore MD   levocetirizine (XYZAL) 5 MG tablet TAKE 1 TABLET BY MOUTH 2 TIMES DAILY 17   Mariama Moore MD       Allergies:  Statins and

## 2025-01-02 ENCOUNTER — OFFICE VISIT (OUTPATIENT)
Age: 75
End: 2025-01-02

## 2025-01-02 VITALS — WEIGHT: 214 LBS | HEIGHT: 73 IN | BODY MASS INDEX: 28.36 KG/M2

## 2025-01-02 DIAGNOSIS — M24.131 DEGENERATIVE TEAR OF TRIANGULAR FIBROCARTILAGE COMPLEX (TFCC) OF RIGHT WRIST: Primary | ICD-10-CM

## 2025-01-02 DIAGNOSIS — M17.12 PRIMARY OSTEOARTHRITIS OF LEFT KNEE: ICD-10-CM

## 2025-01-02 RX ORDER — PREDNISONE 10 MG/1
10 TABLET ORAL 2 TIMES DAILY
COMMUNITY
Start: 2024-12-23

## 2025-01-02 RX ORDER — TRIAMCINOLONE ACETONIDE 40 MG/ML
40 INJECTION, SUSPENSION INTRA-ARTICULAR; INTRAMUSCULAR ONCE
Status: COMPLETED | OUTPATIENT
Start: 2025-01-02 | End: 2025-01-02

## 2025-01-02 RX ADMIN — TRIAMCINOLONE ACETONIDE 40 MG: 40 INJECTION, SUSPENSION INTRA-ARTICULAR; INTRAMUSCULAR at 14:30

## 2025-01-02 RX ADMIN — TRIAMCINOLONE ACETONIDE 40 MG: 40 INJECTION, SUSPENSION INTRA-ARTICULAR; INTRAMUSCULAR at 14:29

## 2025-01-19 PROBLEM — J96.21 ACUTE ON CHRONIC RESPIRATORY FAILURE WITH HYPOXIA: Status: ACTIVE | Noted: 2025-01-01

## 2025-01-19 PROBLEM — J44.1 COPD WITH ACUTE EXACERBATION: Status: ACTIVE | Noted: 2025-01-01

## 2025-01-19 PROBLEM — Z79.4 TYPE 2 DIABETES MELLITUS WITHOUT COMPLICATION, WITH LONG-TERM CURRENT USE OF INSULIN: Status: ACTIVE | Noted: 2025-01-01

## 2025-01-19 PROBLEM — J96.20 ACUTE-ON-CHRONIC RESPIRATORY FAILURE: Status: ACTIVE | Noted: 2025-01-01

## 2025-01-19 PROBLEM — E11.9 TYPE 2 DIABETES MELLITUS WITHOUT COMPLICATION, WITH LONG-TERM CURRENT USE OF INSULIN: Status: ACTIVE | Noted: 2025-01-01

## 2025-01-20 NOTE — PLAN OF CARE
Problem: Adult Inpatient Plan of Care  Goal: Plan of Care Review  Outcome: Progressing  Flowsheets (Taken 1/20/2025 0315)  Progress: no change  Outcome Evaluation: Pt arrived to the floor from ED at about 22:30 on [facility] @ 35L80%FiO2. Respirations labored with minimal activity. Lactic 2.4, 2.4, and 1.9. MD aware. BP stable. Telemetry NSR 64-80. Afebrile. [facility] infusing O2 @ 35L75% FiO2. Prn tylenol administered x1 per pt request. Pt has been resting well.  Plan of Care Reviewed With: patient

## 2025-01-20 NOTE — THERAPY EVALUATION
Patient Name: Jeramie Anaya  : 1950    MRN: [id number]                              Today's Date: 2025       Admit Date: 2025    Visit Dx:     ICD-10-CM ICD-9-CM   1. COPD exacerbation  J44.1 491.21   2. Hypoxia  R09.02 799.02     Patient Active Problem List   Diagnosis    Pulmonary emphysema    Gastroesophageal reflux disease    Allergic rhinitis    Chronic respiratory failure with hypoxia    Ureterolithiasis    ILD (interstitial lung disease)    Diastolic dysfunction    Pulmonary hypertension    Lymphadenopathy    Bronchiectasis without acute exacerbation    COPD with acute exacerbation    Type 2 diabetes mellitus without complication, with long-term current use of insulin    Acute on chronic respiratory failure with hypoxia    Acute-on-chronic respiratory failure     Past Medical History:   Diagnosis Date    Bronchiectasis without acute exacerbation 3/7/2024    Bullous emphysema 2019    Diastolic dysfunction 10/12/2023    Emphysema of lung 2019    Gastroesophageal reflux disease 10/17/2019    High cholesterol     ILD (interstitial lung disease) 10/12/2023    Pulmonary hypertension 10/12/2023    Shingles     Type 2 diabetes mellitus without complication, with long-term current use of insulin 2025     Past Surgical History:   Procedure Laterality Date    CARDIAC CATHETERIZATION N/A 2023    Procedure: Right Heart Cath;  Surgeon: Earl Erwin MD;  Location: [facility] CATH INVASIVE LOCATION;  Service: Cardiology;  Laterality: N/A;    CYST REMOVAL      on tailbone    LUNG SURGERY      SUPERIOR VENA CAVA ANGIOPLASTY / STENTING        General Information       Row Name 25 1325          OT Time and Intention    Subjective Information complains of;pain  -LUCI (r) PARKER (t) JW (c)     Document Type evaluation  cc:Shortness of Breath; dx: Acute on chronic respiratory failure with hypoxia; h/o:ILD, Pulmonary hypertension, pulmonary fibrosis  -LUCI (r) PARKER (t) JW (c)     Mode of Treatment  co-treatment;occupational therapy  -JW (r) CB (t) JW (c)     Patient Effort good  -JW (r) CB (t) JW (c)     Symptoms Noted During/After Treatment shortness of breath;significant change in vital signs  -JW (r) CB (t) JW (c)       Row Name 01/20/25 1325          General Information    Patient Profile Reviewed yes  -JW (r) CB (t) JW (c)     Prior Level of Function grooming;bathing;feeding;dressing;home management  min A for bathing and dressing, occasionally for grooming. Can feed I.  -JW (r) CB (t) JW (c)     Existing Precautions/Restrictions fall;oxygen therapy device and L/min  -JW (r) CB (t) JW (c)     Barriers to Rehab medically complex  -JW (r) CB (t) JW (c)       Row Name 01/20/25 1325          Occupational Profile    Environmental Supports and Barriers (Occupational Profile) rwx, battery powered scooter, walk in shower, shower seat  -JW (r) CB (t) JW (c)       Row Name 01/20/25 1325          Living Environment    People in Home alone  daughter comes by daily  -JW (r) CB (t) JW (c)       Row Name 01/20/25 1325          Home Main Entrance    Number of Stairs, Main Entrance none  -JW (r) CB (t) JW (c)     Stair Railings, Main Entrance none  -JW (r) CB (t) JW (c)       Row Name 01/20/25 1325          Stairs Within Home, Primary    Number of Stairs, Within Home, Primary none  -JW (r) CB (t) JW (c)     Stair Railings, Within Home, Primary none  -JW (r) CB (t) JW (c)       Row Name 01/20/25 1325          Cognition    Orientation Status (Cognition) oriented x 4  -JW (r) CB (t) JW (c)       Row Name 01/20/25 1325          Safety Issues/Impairments Affecting Functional Mobility    Impairments Affecting Function (Mobility) strength;balance;endurance/activity tolerance;shortness of breath  -JW (r) CB (t) JW (c)               User Key  (r) = Recorded By, (t) = Taken By, (c) = Cosigned By      Initials Name Provider Type    Ophelia Pereira, SILVER/L, CSRS Occupational Therapist    Cali Galeano, OT Student OT Student                      Mobility/ADL's       Row Name 01/20/25 [facility] 01/20/25 1325       Bed Mobility    Bed Mobility --  -JW (r) CB (t) JW (c) rolling right  -JW (r) CB (t) JW (c)    Rolling Right [place] (Bed Mobility) --  -JW (r) CB (t) JW (c) modified independence  -JW (r) CB (t) JW (c)    Supine-Sit [place] (Bed Mobility) --  -JW (r) CB (t) JW (c) modified independence  -JW (r) CB (t) JW (c)    Assistive Device (Bed Mobility) --  -JW (r) CB (t) JW (c) bed rails;head of bed elevated  -JW (r) CB (t) JW (c)      Row Name 01/20/25 [facility] 01/20/25 1325       Transfers    Transfers --  -JW (r) CB (t) JW (c) sit-stand transfer;stand-sit transfer  -JW (r) CB (t) JW (c)      Row Name 01/20/25 [facility] 01/20/25 1325       Sit-Stand Transfer    Sit-Stand [place] (Transfers) --  -JW (r) CB (t) JW (c) minimum assist (75% patient effort);verbal cues  -JW (r) CB (t) JW (c)      Row Name 01/20/25 [facility] 01/20/25 1325       Stand-Sit Transfer    Stand-Sit [place] (Transfers) --  -JW (r) CB (t) JW (c) contact guard;verbal cues  -JW (r) CB (t) JW (c)      Row Name 01/20/25 [facility] 01/20/25 1325       Functional Mobility    Functional Mobility- Ind. Level --  -JW (r) CB (t) JW (c) contact guard assist;verbal cues required  -JW (r) CB (t) JW (c)    Functional Mobility- Safety Issues -- balance decreased during turns;supplemental O2  -JW (r) CB (t) JW (c)    Functional Mobility- Comment -- Walked ~3 steps from bed>chair  -JW (r) CB (t) JW (c)      Row Name 01/20/25 1325          Activities of Daily Living    BADL Assessment/Intervention lower body dressing  -JW (r) CB (t) JW (c)       Row Name 01/20/25 5819          Lower Body Dressing Assessment/Training    [place] Level (Lower Body Dressing) lower body dressing skills;moderate assist (50% patient effort)  -LUCI (patricia) PARKER (sheri) LUCI reardon)     Position (Lower Body Dressing) supported sitting  -LUCI (patricia) PARKER (sheri) LUCI reardon)     Comment, (Lower Body Dressing) Anticipated levels of assist  -ULCI  (r) CB (t) JW (c)               User Key  (r) = Recorded By, (t) = Taken By, (c) = Cosigned By      Initials Name Provider Type    LUCI Ophelia Hankins OTR/L, CSRS Occupational Therapist    Cali Galeano, OT Student OT Student                   Obj/Interventions       Row Name 01/20/25 1325          Sensory Assessment (Somatosensory)    Sensory Assessment (Somatosensory) sensation intact  -JW (r) CB (t) JW (c)       Row Name 01/20/25 1325          Range of Motion Comprehensive    General Range of Motion upper extremity range of motion deficits identified;other (see comments)  -JW (r) CB (t) JW (c)     Comment, General Range of Motion L should flex ~50%, L shoulder abd ~25%, all other BUE WFL  -JW (r) CB (t) JW (c)       Row Name 01/20/25 1325          Strength Comprehensive (MMT)    General Manual Muscle Testing (MMT) Assessment upper extremity strength deficits identified;other (see comments)  -JW (r) CB (t) JW (c)     Comment, General Manual Muscle Testing (MMT) Assessment L shoulder not assessed due to limited ROM, pt reports caused from a prior surgery. R shoulder flex 3+/5. BL triceps and biceps 4/5. BL  4+/5  -JW (r) CB (t) JW (c)       Row Name 01/20/25 1325          Balance    Balance Assessment sitting static balance;sit to stand dynamic balance;sitting dynamic balance;standing static balance;standing dynamic balance  -JW (r) CB (t) JW (c)     Static Sitting Balance modified independence  -JW (r) CB (t) JW (c)     Dynamic Sitting Balance standby assist  -JW (r) CB (t) JW (c)     Sit to Stand Dynamic Balance minimal assist;verbal cues  -JW (r) CB (t) JW (c)     Static Standing Balance contact guard;verbal cues  -JW (r) CB (t) JW (c)     Dynamic Standing Balance contact guard;verbal cues  -JW (r) CB (t) JW (c)               User Key  (r) = Recorded By, (t) = Taken By, (c) = Cosigned By      Initials Name Provider Type    LUCI Ophelia Hankins, OTR/L, CSRS Occupational Therapist    PARKER Barnard  Cali, OT Student OT Student                   Goals/Plan       Row Name 01/20/25 1325          Bathing Goal 1 (OT)    Activity/Device (Bathing Goal 1, OT) upper body bathing;lower body bathing  -JW (r) CB (t) JW (c)     [place] Level/Cues Needed (Bathing Goal 1, OT) verbal cues required;standby assist  -JW (r) CB (t) JW (c)     Time Frame (Bathing Goal 1, OT) long term goal (LTG);10 days  -JW (r) CB (t) JW (c)     Strategies/Barriers (Bathing Goal 1, OT) perform with 2 or less rest breaks  -JW (r) CB (t) JW (c)     Progress/Outcomes (Bathing Goal 1, OT) new goal  -JW (r) CB (t) JW (c)       Row Name 01/20/25 1325          Dressing Goal 1 (OT)    Activity/Device (Dressing Goal 1, OT) upper body dressing;lower body dressing  -JW (r) CB (t) JW (c)     [place]/Cues Needed (Dressing Goal 1, OT) verbal cues required;standby assist  -JW (r) CB (t) JW (c)     Time Frame (Dressing Goal 1, OT) long term goal (LTG);10 days  -JW (r) CB (t) JW (c)     Strategies/Barriers (Dressing Goal 1, OT) perform with 2 or less rest breaks  -JW (r) CB (t) JW (c)     Progress/Outcome (Dressing Goal 1, OT) new goal  -JW (r) CB (t) JW (c)       Row Name 01/20/25 1325          Toileting Goal 1 (OT)    Activity/Device (Toileting Goal 1, OT) toileting skills, all  -JW (r) CB (t) JW (c)     [place] Level/Cues Needed (Toileting Goal 1, OT) verbal cues required;standby assist  -JW (r) CB (t) JW (c)     Time Frame (Toileting Goal 1, OT) long term goal (LTG);10 days  -JW (r) CB (t) JW (c)     Strategies/Barriers (Toileting Goal 1, OT) perform with 2 or less rest breaks  -JW (r) CB (t) JW (c)     Progress/Outcome (Toileting Goal 1, OT) new goal  -JW (r) PARKER (t) JW (c)       Row Name 01/20/25 4708          Therapy Assessment/Plan (OT)    Planned Therapy Interventions (OT) activity tolerance training;adaptive equipment training;BADL retraining;occupation/activity based interventions;patient/caregiver education/training;transfer/mobility  retraining;strengthening exercise;ROM/therapeutic exercise  -JW (r) PARKER (t) LUCI (c)               User Key  (r) = Recorded By, (t) = Taken By, (c) = Cosigned By      Initials Name Provider Type    Ophelia Pereira, OTR/L, CSRS Occupational Therapist    Cali Galeano, OT Student OT Student                   Clinical Impression       Row Name 01/20/25 1325          Pain Assessment    Pretreatment Pain Rating 5/10  -JW (r) PARKER (t) LUCI (c)     Posttreatment Pain Rating 5/10  -JW (r) PARKER (t) LUCI (c)     Pain Location back  -JW (r) PARKER (t) LUCI (c)     Pain Side/Orientation lower  -JW (r) PARKER (t) LUCI (c)     Pain Management Interventions activity modification encouraged;exercise or physical activity utilized;positioning techniques utilized  -LUCI (r) PARKER (t) LUCI (c)     Response to Pain Interventions activity participation with tolerable pain  -LUCI (r) PARKER (t) LUCI (c)       Row Name 01/20/25 132          Plan of Care Review    Plan of Care Reviewed With patient  -LUCI (r) PARKER (t) LUCI (c)     Progress no change  -LUCI (r) PARKER (t) LUCI (c)     Outcome Evaluation OT eval completed. A&Ox4. C/O pain in lower pain. Pt in side-lying L upon arrival. Pt on Vapotherm 35L/65%. O2sat began at ~95 and desatted to 82 after ambulating from bed>chair with CGA x2 with verbal cues. O2 improved to ~95 after 5+ mins of pursed lip breathing. Sit>stand was performed min A with verbal cues. Stand>sit was CGA with verbal cues. ROM test revealed L shoulder flex was 50% and L shoulder aBd was 25%. Pt reports it is due to previous surgery. MMT performed and revealed deficits in R shoulder 3+/5. Sensation intact. Pt left in chair with nsg and call light in reach. Pt would benefit from OT services to adress deficits in activity tolerance, BUE strength, and BADL performance. Recommend SNF at d/c.  -LUCI (r) PARKER (t) LUCI (c)       Row Name 01/20/25 1325          Therapy Assessment/Plan (OT)    Rehab Potential (OT) good  -LUCI (r) PARKER (t) LUCI (c)     Criteria for Skilled  Therapeutic Interventions Met (OT) yes;meets criteria;skilled treatment is necessary  -JW (r) CB (t) JW (c)     Therapy Frequency (OT) 5 times/wk  -JW (r) CB (t) JW (c)       Row Name 01/20/25 1325          Therapy Plan Review/Discharge Plan (OT)    Anticipated Discharge Disposition (OT) skilled nursing facility  -JW (r) CB (t) JW (c)       Row Name 01/20/25 1325          Vital Signs    Pre SpO2 (%) 95  -JW (r) CB (t) JW (c)     O2 Delivery Pre Treatment supplemental O2  -JW (r) CB (t) JW (c)     Intra SpO2 (%) 82  -JW (r) CB (t) JW (c)     O2 Delivery Intra Treatment supplemental O2  -JW (r) CB (t) JW (c)     Post SpO2 (%) 95  -JW (r) CB (t) JW (c)     O2 Delivery Post Treatment supplemental O2  -JW (r) CB (t) JW (c)     Pre Patient Position Supine  -JW (r) CB (t) JW (c)     Intra Patient Position Standing  -JW (r) CB (t) JW (c)     Post Patient Position Sitting  -JW (r) CB (t) JW (c)       Row Name 01/20/25 1325          Positioning and Restraints    Pre-Treatment Position in bed  -JW (r) CB (t) JW (c)     Post Treatment Position chair  -JW (r) CB (t) JW (c)     In Chair sitting;call light within reach;encouraged to call for assist;with nsg  -JW (r) CB (t) JW (c)               User Key  (r) = Recorded By, (t) = Taken By, (c) = Cosigned By      Initials Name Provider Type    Ophelia Pereira, OTR/L, CSRS Occupational Therapist    Cali Galeano, OT Student OT Student                   Outcome Measures       Row Name 01/20/25 1325          How much help from another is currently needed...    Putting on and taking off regular lower body clothing? 2  -JW (r) CB (t) JW (c)     Bathing (including washing, rinsing, and drying) 2  -JW (r) CB (t) JW (c)     Toileting (which includes using toilet bed pan or urinal) 2  -JW (r) CB (t) JW (c)     Putting on and taking off regular upper body clothing 3  -JW (r) CB (t) JW (c)     Taking care of personal grooming (such as brushing teeth) 3  -JW (r) CB (t) JW (c)      Eating meals 3  -JW (r) CB (t) JW (c)     AM-PAC 6 Clicks Score (OT) 15  -JW (r) CB (t)       Row Name 01/20/25 1330 01/20/25 0800       How much help from another person do you currently need...    Turning from your back to your side while in flat bed without using bedrails? 3 (P)   -EW 3  -    Moving from lying on back to sitting on the side of a flat bed without bedrails? 3 (P)   -EW 3  -    Moving to and from a bed to a chair (including a wheelchair)? 3 (P)   -EW 3  -    Standing up from a chair using your arms (e.g., wheelchair, bedside chair)? 3 (P)   -EW 3  -    Climbing 3-5 steps with a railing? 3 (P)   -EW 3  -    To walk in hospital room? 3 (P)   -EW 3  -    AM-PAC 6 Clicks Score (PT) 18 (P)   -EW 18  -    Highest Level of Mobility Goal 6 --> Walk 10 steps or more (P)   -EW 6 --> Walk 10 steps or more  -      Row Name 01/20/25 1330 01/20/25 1325       Functional Assessment    Outcome Measure Options AM-PAC 6 Clicks Basic Mobility (PT) (P)   -EW AM-PAC 6 Clicks Daily Activity (OT)  -JW (r) CB (t) JW (c)              User Key  (r) = Recorded By, (t) = Taken By, (c) = Cosigned By      Initials Name Provider Type    Ophelia Pereira, OTR/L, CSRS Occupational Therapist    Oliva Macias, RN Registered Nurse    EW Annia Keller, PT Student PT Student    Cali Galeano, OT Student OT Student                    Occupational Therapy Education       Title: PT OT SLP Therapies (In Progress)       Topic: Occupational Therapy (In Progress)       Point: ADL training (Done)       Description:   Instruct learner(s) on proper safety adaptation and remediation techniques during self care or transfers.   Instruct in proper use of assistive devices.                  Learning Progress Summary            Patient Acceptance, E, VU by PARKER at 1/20/2025 1888    Comment: Role of OT                      Point: Home exercise program (Not Started)       Description:   Instruct learner(s) on appropriate technique  for monitoring, assisting and/or progressing therapeutic exercises/activities.                  Learner Progress:  Not documented in this visit.              Point: Precautions (Done)       Description:   Instruct learner(s) on prescribed precautions during self-care and functional transfers.                  Learning Progress Summary            Patient Acceptance, E, VU by CB at 1/20/2025 1509    Comment: Role of OT                      Point: Body mechanics (Done)       Description:   Instruct learner(s) on proper positioning and spine alignment during self-care, functional mobility activities and/or exercises.                  Learning Progress Summary            Patient Acceptance, E, VU by CB at 1/20/2025 1509    Comment: Role of OT                                      User Key       Initials Effective Dates Name Provider Type Discipline    CB 12/17/24 -  Cali Barnard, OT Student OT Student OT                  OT Recommendation and Plan  Planned Therapy Interventions (OT): activity tolerance training, adaptive equipment training, BADL retraining, occupation/activity based interventions, patient/caregiver education/training, transfer/mobility retraining, strengthening exercise, ROM/therapeutic exercise  Therapy Frequency (OT): 5 times/wk  Plan of Care Review  Plan of Care Reviewed With: patient  Progress: no change  Outcome Evaluation: OT eval completed. A&Ox4. C/O pain in lower pain. Pt in side-lying L upon arrival. Pt on Vapotherm 35L/65%. O2sat began at ~95 and desatted to 82 after ambulating from bed>chair with CGA x2 with verbal cues. O2 improved to ~95 after 5+ mins of pursed lip breathing. Sit>stand was performed min A with verbal cues. Stand>sit was CGA with verbal cues. ROM test revealed L shoulder flex was 50% and L shoulder aBd was 25%. Pt reports it is due to previous surgery. MMT performed and revealed deficits in R shoulder 3+/5. Sensation intact. Pt left in chair with nsg and call light in reach.  Pt would benefit from OT services to adress deficits in activity tolerance, BUE strength, and BADL performance. Recommend SNF at d/c.     Time Calculation:         Time Calculation- OT       Row Name 01/20/25 1425             Time Calculation- OT    OT Start Time 1325  -JW (r) CB (t) LUCI (c)      OT Stop Time 1408  -JW (r) CB (t) LUCI (c)      OT Time Calculation (min) 43 min  -JW (r) PARKER (t)      OT Received On 01/20/25  -LUCI (r) PARKER (t) LUCI (c)      OT Goal Re-Cert Due Date 01/30/25  -LUCI (r) PARKER (t) LUCI (c)                User Key  (r) = Recorded By, (t) = Taken By, (c) = Cosigned By      Initials Name Provider Type    Ophelia Pereira, OTR/L, CSRS Occupational Therapist    Cali Galeano, OT Student OT Student                           Cali Barnard, JOSE Student  1/20/2025

## 2025-01-20 NOTE — PAYOR COMM NOTE
"1/20/25 [facility] [phone]  [phone]    PATIENT ER ADMIT TO INPATIENT ON 1/19/25. FAXING CLINICAL FOR INPATIENT REVIEW. AUTH [id number]                  Jeramie Anaya (74 y.o. Male)       Date of Birth   1950    Social Security Number       Address   [address] [id number]    Home Phone   [phone]    MRN   [id number]       [facility]    Marital Status                               Admission Date   1/19/25    Admission Type   Emergency    Admitting Provider   Jb Rutledge MD    Attending Provider   Jb Rutledge MD    Department, Room/Bed   [facility] 4B, 412/1       Discharge Date       Discharge Disposition       Discharge Destination                                 Attending Provider: Jb Rutledge MD    Allergies: Other, Statins    Isolation: None   Infection: None   Code Status: No CPR    Ht: 185.4 cm (73\")   Wt: 102 kg (224 lb)    Admission Cmt: None   Principal Problem: Acute on chronic respiratory failure with hypoxia [J96.21]                   Active Insurance as of 1/19/2025       Primary Coverage       Payor Plan Insurance Group Employer/Plan Group    AETNA MEDICARE REPLACEMENT AETNA MEDICARE REPLACEMENT [id number]       Payor Plan Address Payor Plan Phone Number Payor Plan Fax Number Effective Dates    [address] [phone]  4/1/2022 - None Entered    [facility] [id number]         Subscriber Name Subscriber Birth Date Member ID       JERAMIE ANAYA 1950 [id number]                     Emergency Contacts        (Rel.) Home Phone Work Phone Mobile Phone    Meme Brito (Daughter) -- -- [phone]    FlakoksJake (Other) -- -- [phone]             [facility] Encounter Date/Time: 1/19/2025 [facility]   Hospital Account: [id number]    MRN: [id number]   Patient:  Jeramie Anaya   Contact Serial #: [id number]   SSN:          ENCOUNTER           "   Patient Class: Inpatient   Unit: [address] Service: Medicine     Bed: 412/1   Admitting Provider: Jb Rutledge MD   Referring Physician:     Attending Provider: Jb Rutledge MD   Adm Diagnosis: COPD with acute exacerba*               PATIENT             Name: Jeramie Baker : 1950 (74 yrs)   Address: [address] [place] [address] Sex: Male   City: [place] [address]   County: [place]   Marital Status:  Ethnicity: NOT                                                                         Race: WHITE   Primary Care Provider: Hi Herrera Patients Phone: Home Phone: [phone]     Mobile Phone: [phone]     EMERGENCY CONTACT   Contact Name Legal Guardian? Relationship to Patient Home Phone Work Phone Mobile Phone   1. Meme Brito  2. Jake Brito      Daughter  Other           [phone]   GUARANTOR             Guarantor: Jeramie Baker     : 1950   Address: [address] [place] [address] Sex: [place] [address]     Relation to Patient: Self       Home Phone: [phone]   Guarantor ID: [id number]       Work Phone:     GUARANTOR EMPLOYER   Employer:           Status: RETIRED   COVERAGE          PRIMARY INSURANCE   Payor: AETNA MEDICARE REPLACEMENT Plan: AETNA MEDICARE REPLACEMENT   Group Number: [id number] Insurance Type: INDEMNITY   Subscriber Name: JERAMIE BAKER Subscriber : 1950   Subscriber ID: [id number] Coverage Address: [facility] [id number] [place] [address]   Pat. Rel. to Subscriber: Self Coverage Phone: [phone]   SECONDARY INSURANCE   Payor: N/A Plan: N/A   Group Number:   Insurance Type:     Subscriber Name:   Subscriber :     Subscriber ID:   Coverage Address:     Pat. Rel. to Subscriber:   Coverage Phone:        Contact Serial # ([id number])         2025    Chart ID ([id number])         Sesar Wise DO   Physician  Emergency Medicine     ED Provider Notes     Addendum     Date of Service:  01/19/25 1936  Creation Time: 01/19/25 1936     Expand All Collapse All       Subjective  History of Present Illness  Pt presents to the  with report of SOB X 2d.  Pt has hx of interstitial fibrosis and is on chronic O2 - states he typically uses 6L.  He has had cough at home - states not more than normal. No CP. No n/v/f/c.  No known sick contacts.  Pt was given solumedrol with EMS - 125mg           Review of Systems   Constitutional:  Negative for chills and fever.   HENT:  Negative for congestion.    Respiratory:  Positive for cough and shortness of breath.    Cardiovascular:  Negative for chest pain.   Gastrointestinal:  Negative for abdominal pain, nausea and vomiting.   Genitourinary:  Negative for dysuria.   All other systems reviewed and are negative.        Medical History        Past Medical History:   Diagnosis Date    Bronchiectasis without acute exacerbation 3/7/2024    Bullous emphysema 08/20/2019    Diastolic dysfunction 10/12/2023    Emphysema of lung 03/2019    Gastroesophageal reflux disease 10/17/2019    High cholesterol      ILD (interstitial lung disease) 10/12/2023    Pulmonary hypertension 10/12/2023    Shingles              Allergies         Allergies   Allergen Reactions    Other Other (See Comments)       Contrast for eyes    Statins Other (See Comments)       Pain all over body and joint pain all over body.             Surgical History         Past Surgical History:   Procedure Laterality Date    CARDIAC CATHETERIZATION N/A 12/1/2023     Procedure: Right Heart Cath;  Surgeon: Earl Erwin MD;  Location:  [facility] CATH INVASIVE LOCATION;  Service: Cardiology;  Laterality: N/A;    CYST REMOVAL         on tailbone    LUNG SURGERY        SUPERIOR VENA CAVA ANGIOPLASTY / STENTING                      Family History   Problem Relation Age of Onset    Leukemia Mother      Alzheimer's disease Father      Cancer Brother           Social History   Social History            Socioeconomic History     Marital status:    Tobacco Use    Smoking status: Former       Current packs/day: 0.00       Average packs/day: 3.0 packs/day for 35.0 years (105.0 ttl pk-yrs)       Types: Cigarettes       Start date: 1962       Quit date: 1997       Years since quittin.0       Passive exposure: Past    Smokeless tobacco: Never   Vaping Use    Vaping status: Never Used   Substance and Sexual Activity    Alcohol use: No    Drug use: No    Sexual activity: Defer                     Objective[]Expand by Default  Physical Exam  Vitals and nursing note reviewed.   HENT:      Head: Normocephalic and atraumatic.      Mouth/Throat:      Mouth: Mucous membranes are moist.   Cardiovascular:      Rate and Rhythm: Normal rate and regular rhythm.   Pulmonary:      Effort: Accessory muscle usage present.      Breath sounds: Examination of the right-upper field reveals rhonchi. Examination of the left-upper field reveals rhonchi. Examination of the right-middle field reveals rhonchi. Examination of the left-middle field reveals rhonchi. Rhonchi present.   Abdominal:      Palpations: Abdomen is soft.   Musculoskeletal:      Right lower leg: No edema.      Left lower leg: No edema.   Skin:     General: Skin is warm and dry.      Capillary Refill: Capillary refill takes less than 2 seconds.   Neurological:      Mental Status: He is alert.                Medical Decision Making  Pt stable in EC - NAD att.  BP has been soft but stable - family voiced concerns that this may be medication induced.  No evid att of AMI/pneumonia.  Dbt sepsis.  Pt was noted to have significant hypoxia on his home O2 dose of 6LPM.  Has improved on vapotherm.  Given rocephin/zithromax to cover for bacterial component of bronchitis/COPD exac.  Given his hypoxia - have d/w Dr. Solis for admit/further mgmt.      Amount and/or Complexity of Data Reviewed  Labs: ordered.  Radiology: ordered.  ECG/medicine tests: ordered and independent interpretation  performed.     Details: NSR, RBBB, No acute STT changes     Risk  Prescription drug management.        nal diagnoses:   COPD exacerbation   Hypoxia         ED Disposition  ED Disposition         ED Disposition   Decision to Admit    Condition   --    Comment   --                 JessSesar tapia DO  01/19/25 2115          Henry angel MD   Physician  Medicine     H&P     Signed     Date of Service: 01/19/25 2313  Creation Time: 01/19/25 2313     Signed       Expand All Collapse [facility] Medicine Services  HISTORY AND PHYSICAL     Date of Admission: 1/19/2025  Primary Care Physician: Hi Herrera   Primary Historian: Patient     Chief Complaint: Shortness of breath     History of Present Illness  74-year-old male with past medical history of's pulmonary fibrosis, bronchiectasis, chronic respiratory failure, pulmonary hypertension group 3, diabetes mellitus insulin-dependent presents to the emergency room with worsening dyspnea despite wearing 2 L oxygen nasal cannula.  He routinely wears 4-6 and had to increase the rate tonight, he is also had productive cough.  In the emergency room he presents with oxygen saturation of 78% on 6 L oxygen nasal cannula was placed on high flow 35 L/min to get an oxygen saturation around 90%.  He follows with pulmonology outpatient.  Denies fever or chest pain.  Has edema in the dorsum of feet.     Review of Systems   Otherwise complete ROS reviewed and negative except as mentioned in the HPI.     Past Medical History:   Medical History[]Expand by Default        Past Medical History:   Diagnosis Date    Bronchiectasis without acute exacerbation 3/7/2024    Bullous emphysema 08/20/2019    Diastolic dysfunction 10/12/2023    Emphysema of lung 03/2019    Gastroesophageal reflux disease 10/17/2019    High cholesterol      ILD (interstitial lung disease) 10/12/2023    Pulmonary hypertension 10/12/2023    Shingles            Past Surgical History:  Surgical History         Past Surgical History:   Procedure Laterality Date    CARDIAC CATHETERIZATION N/A 12/1/2023     Procedure: Right Heart Cath;  Surgeon: Earl Erwin MD;  Location:  [facility] CATH INVASIVE LOCATION;  Service: Cardiology;  Laterality: N/A;    CYST REMOVAL         on tailbone    LUNG SURGERY        SUPERIOR VENA CAVA ANGIOPLASTY / STENTING             Social History:  reports that he quit smoking about 28 years ago. His smoking use included cigarettes. He started smoking about 63 years ago. He has a 105 pack-year smoking history. He has been exposed to tobacco smoke. He has never used smokeless tobacco. He reports that he does not drink alcohol and does not use drugs.     Family History: family history includes Alzheimer's disease in his father; Cancer in his brother; Leukemia in his mother.        Allergies:  Allergies         Allergies   Allergen Reactions    Other Other (See Comments)       Contrast for eyes    Statins Other (See Comments)       Pain all over body and joint pain all over body.             Medications:          Prior to Admission medications    Medication Sig Start Date End Date Taking? Authorizing Provider   amiodarone (PACERONE) 200 MG tablet Take 1 tablet by mouth Daily.     Yes Provider, MD Alissa   apixaban (ELIQUIS) 5 MG tablet tablet Take 1 tablet by mouth 2 (Two) Times a Day.     Yes ProviderAlissa MD   azithromycin (Zithromax) 250 MG tablet Take 1 tablet Monday, Wednesday, and Friday 5/20/24   Yes Danna Sorenson APRN   fluticasone (FLONASE) 50 MCG/ACT nasal spray 2 sprays by Each Nare route Daily for 30 days. 11/20/24 1/19/25 Yes Lilo Leyva APRN   furosemide (LASIX) 40 MG tablet Take 0.5 tablets by mouth Daily. 10/7/23   Yes Hi Herrera   insulin glargine (LANTUS, SEMGLEE) 100 UNIT/ML injection Inject 14 Units under the skin into the appropriate area as directed Daily. 1/10/24   Yes Hi Herrera  "  levocetirizine (XYZAL) 5 MG tablet Take 1 tablet by mouth 2 (Two) Times a Day. 12/1/17   Yes Hi Herrera   O2 (OXYGEN) Inhale 4 L/min Continuous. 4L at rest, 10L during movement     Yes ProviderAlissa MD   pantoprazole (PROTONIX) 20 MG EC tablet Take 1 tablet by mouth 2 (Two) Times a Day. 3/1/23   Yes Hi Herrera   predniSONE (DELTASONE) 20 MG tablet Take 1 tablet by mouth Daily. 11/20/24   Yes Lilo Leyva APRN   albuterol sulfate  (90 Base) MCG/ACT inhaler Inhale 2 puffs Every 4 (Four) Hours As Needed for Wheezing or Shortness of Air. 11/20/24     Lilo Leyva APRN   triamcinolone (KENALOG) 0.1 % cream Apply 1 Application topically to the appropriate area as directed. 2/16/24     Provider, MD Alissa      I have utilized all available immediate resources to obtain, update, or review the patient's current medications (including all prescriptions, over-the-counter products, herbals, cannabis/cannabidiol products, and vitamin/mineral/dietary (nutritional) supplements).     Objective      Vital Signs: /74 (BP Location: Right arm, Patient Position: Sitting)   Pulse 81   Temp 97.6 °F (36.4 °C) (Oral)   Resp 22   Ht 185.4 cm (73\")   Wt 102 kg (224 lb)   SpO2 92%   BMI 29.55 kg/m²   Physical Exam  Vitals reviewed.   Constitutional:       General: He is not in acute distress.     Appearance: He is ill-appearing. He is not toxic-appearing.   HENT:      Head: Normocephalic and atraumatic.      Mouth/Throat:      Mouth: Mucous membranes are moist.      Pharynx: Oropharynx is clear.   Eyes:      Extraocular Movements: Extraocular movements intact.      Conjunctiva/sclera: Conjunctivae normal.      Pupils: Pupils are equal, round, and reactive to light.   Cardiovascular:      Rate and Rhythm: Normal rate and regular rhythm.      Pulses: Normal pulses.      Heart sounds: No murmur heard.  Pulmonary:      Effort: Respiratory distress present.      Breath sounds: Wheezing, " rhonchi and rales present.   Abdominal:      General: Bowel sounds are normal. There is no distension.      Palpations: Abdomen is soft.      Tenderness: There is no abdominal tenderness.   Musculoskeletal:         General: No swelling or tenderness. Normal range of motion.      Cervical back: Normal range of motion and neck supple. No muscular tenderness.      Right lower leg: Edema present.      Left lower leg: Edema present.   Skin:     General: Skin is warm and dry.      Findings: No erythema or rash.   Neurological:      General: No focal deficit present.      Mental Status: He is alert and oriented to person, place, and time.      Cranial Nerves: No cranial nerve deficit.      Motor: No weakness.   Psychiatric:         Mood and Affect: Mood normal.         Behavior: Behavior normal.      Results Reviewed:  Lab Results (last 24 hours)         Procedure Component Value Units Date/Time     High Sensitivity Troponin T 1Hr [[id number]]  (Normal) Collected: 01/19/25 2227     Specimen: Blood Updated: 01/19/25 2255       HS Troponin T 18 ng/L         Troponin T Numeric Delta -2 ng/L       Narrative:       High Sensitive Troponin T Reference Range:  <14.0 ng/L- Negative Female for AMI  <22.0 ng/L- Negative Male for AMI  >=14 - Abnormal Female indicating possible myocardial injury.  >=22 - Abnormal Male indicating possible myocardial injury.   Clinicians would have to utilize clinical acumen, EKG, Troponin, and serial changes to determine if it is an Acute Myocardial Infarction or myocardial injury due to an underlying chronic condition.           Lactic Acid, Plasma [[id number]]  (Abnormal) Collected: 01/19/25 2227     Specimen: Blood Updated: 01/19/25 2255       Lactate 2.4 mmol/L       Comprehensive Metabolic Panel [[id number]]  (Abnormal) Collected: 01/19/25 1951     Specimen: Blood from Arm, Right Updated: 01/19/25 2042       Glucose 241 mg/dL         BUN 23 mg/dL         Creatinine 1.20 mg/dL         Sodium 132  mmol/L         Potassium 4.2 mmol/L         Chloride 96 mmol/L         CO2 25.0 mmol/L         Calcium 8.7 mg/dL         Total Protein 6.0 g/dL         Albumin 3.3 g/dL         ALT (SGPT) 17 U/L         AST (SGOT) 11 U/L         Alkaline Phosphatase 87 U/L         Total Bilirubin 0.7 mg/dL         Globulin 2.7 gm/dL         A/G Ratio 1.2 g/dL         BUN/Creatinine Ratio 19.2       Anion Gap 11.0 mmol/L         eGFR 62.7 mL/min/1.73       Narrative:       GFR Categories in Chronic Kidney Disease (CKD)                         GFR Category          GFR (mL/min/1.73)    Interpretation  G1                       90 or greater              Normal or high (1)  G2                               60-89                Mild decrease (1)  G3a                   45-59                Mild to moderate decrease  G3b                   30-44                Moderate to severe decrease  G4                    15-29                Severe decrease  G5                    14 or less           Kidney failure                                                 (1)In the absence of evidence of kidney disease, neither GFR category G1 or G2 fulfill the criteria for CKD.     eGFR calculation 2021 CKD-EPI creatinine equation, which does not include race as a factor     Magnesium [[id number]]  (Normal) Collected: 01/19/25 1951     Specimen: Blood from Arm, Right Updated: 01/19/25 2042       Magnesium 2.1 mg/dL       High Sensitivity Troponin T [[id number]]  (Normal) Collected: 01/19/25 1951     Specimen: Blood from Arm, Right Updated: 01/19/25 2038       HS Troponin T 20 ng/L       Narrative:       High Sensitive Troponin T Reference Range:  <14.0 ng/L- Negative Female for AMI  <22.0 ng/L- Negative Male for AMI  >=14 - Abnormal Female indicating possible myocardial injury.  >=22 - Abnormal Male indicating possible myocardial injury.   Clinicians would have to utilize clinical acumen, EKG, Troponin, and serial changes to determine if it is an Acute  Myocardial Infarction or myocardial injury due to an underlying chronic condition.           Lipase [[id number]]  (Normal) Collected: 01/19/25 1951     Specimen: Blood from Arm, Right Updated: 01/19/25 2037       Lipase 27 U/L       Respiratory Panel PCR w/COVID-19(SARS-CoV-2) [facility] In-House, NP Swab in UTM/VTM, 2 HR TAT - Swab, Nasopharynx [[id number]]  (Normal) Collected: 01/19/25 1937     Specimen: Swab from Nasopharynx Updated: 01/19/25 2036       ADENOVIRUS, PCR Not Detected       Coronavirus 229E Not Detected       Coronavirus HKU1 Not Detected       Coronavirus NL63 Not Detected       Coronavirus OC43 Not Detected       COVID19 Not Detected       Human Metapneumovirus Not Detected       Human Rhinovirus/Enterovirus Not Detected       Influenza A PCR Not Detected       Influenza B PCR Not Detected       Parainfluenza Virus 1 Not Detected       Parainfluenza Virus 2 Not Detected       Parainfluenza Virus 3 Not Detected       Parainfluenza Virus 4 Not Detected       RSV, PCR Not Detected       Bordetella pertussis pcr Not Detected       Bordetella parapertussis PCR Not Detected       Chlamydophila pneumoniae PCR Not Detected       Mycoplasma pneumo by PCR Not Detected     Narrative:       In the setting of a positive respiratory panel with a viral infection PLUS a negative procalcitonin without other underlying concern for bacterial infection, consider observing off antibiotics or discontinuation of antibiotics and continue supportive care. If the respiratory panel is positive for atypical bacterial infection (Bordetella pertussis, Chlamydophila pneumoniae, or Mycoplasma pneumoniae), consider antibiotic de-escalation to target atypical bacterial infection.     CBC & Differential [[id number]]  (Abnormal) Collected: 01/19/25 1951     Specimen: Blood from Arm, Right Updated: 01/19/25 2020     Narrative:       The following orders were created for panel order CBC & Differential.  Procedure                                Abnormality         Status                     ---------                               -----------         ------                     CBC Auto Differential[[id number]]        Abnormal            Final result                  Please view results for these tests on the individual orders.     CBC Auto Differential [[id number]]  (Abnormal) Collected: 01/19/25 1951     Specimen: Blood from Arm, Right Updated: 01/19/25 2020       WBC 9.48 10*3/mm3         RBC 4.79 10*6/mm3         Hemoglobin 13.8 g/dL         Hematocrit 40.4 %         MCV 84.3 fL         MCH 28.8 pg         MCHC 34.2 g/dL         RDW 17.5 %         RDW-SD 52.6 fl         MPV 9.6 fL         Platelets 189 10*3/mm3         Neutrophil % 94.2 %         Lymphocyte % 2.3 %         Monocyte % 2.4 %         Eosinophil % 0.0 %         Basophil % 0.2 %         Immature Grans % 0.9 %         Neutrophils, Absolute 8.92 10*3/mm3         Lymphocytes, Absolute 0.22 10*3/mm3         Monocytes, Absolute 0.23 10*3/mm3         Eosinophils, Absolute 0.00 10*3/mm3         Basophils, Absolute 0.02 10*3/mm3         Immature Grans, Absolute 0.09 10*3/mm3         nRBC 0.2 /100 WBC       Blood Gas, Arterial With Co-Ox [[id number]]  (Abnormal) Collected: 01/19/25 2018     Specimen: Arterial Blood Updated: 01/19/25 2017       Site Right Radial       Miles's Test Positive       pH, Arterial 7.522 pH units         Comment: 83 Value above reference range          pCO2, Arterial 28.2 mm Hg         Comment: 84 Value below reference range          pO2, Arterial 142.0 mm Hg         Comment: 83 Value above reference range          HCO3, Arterial 23.1 mmol/L         Base Excess, Arterial 1.3 mmol/L         O2 Saturation, Arterial 99.6 %         Comment: 83 Value above reference range          Hemoglobin, Blood Gas 13.8 g/dL         Comment: 84 Value below reference range          Hematocrit, Blood Gas 42.2 %         Oxyhemoglobin 98.4 %         Methemoglobin 0.40 %          Carboxyhemoglobin 0.8 %         Temperature 37.0       Sodium, Arterial 131 mmol/L         Comment: 84 Value below reference range          Potassium, Arterial 4.2 mmol/L         Barometric Pressure for Blood Gas 764 mmHg         Modality Heated HFNC       FIO2 100 %         Flow Rate 35.0 lpm         Ventilator Mode NA       Collected by [id number]       Comment: Meter: [id number]     :  Sarah Garcia, SHARONDA          pH, Temp Corrected 7.522 pH Units         pCO2, Temperature Corrected 28.2 mm Hg         pO2, Temperature Corrected 142 mm Hg               Imaging Results (Last 24 Hours)         Procedure Component Value Units Date/Time     XR Chest 1 View [[id number]] Collected: 01/19/25 2008       Updated: 01/19/25 2013     Narrative:       XR CHEST 1 VW- 1/19/2025 6:37 PM     HISTORY: dyspnea       COMPARISON: Chest x-ray dated 11/13/2023     FINDINGS:  Upright frontal radiograph of the chest was obtained     Underlying fibrotic interstitial lung disease. Lungs are well expanded.  No new consolidation. No pleural effusion or pneumothorax. Heart size is  normal. Pulmonary vasculature are nondilated. No acute bony abnormality.        Impression:       1.  Underlying fibrotic interstitial lung disease. No superimposed acute  process.     This report was signed and finalized on 1/19/2025 8:09 PM by Dr Jhon Mahajan.                I have personally reviewed and interpreted the radiology studies and ECG obtained at time of admission.      Assessment / Plan   Assessment:        Active Hospital Problems     Diagnosis      **Acute on chronic respiratory failure with hypoxia      COPD with acute exacerbation      Type 2 diabetes mellitus without complication, with long-term current use of insulin      Bronchiectasis without acute exacerbation      ILD (interstitial lung disease)      Pulmonary hypertension      Diastolic dysfunction      Chronic respiratory failure with hypoxia      Pulmonary emphysema         Treatment Plan  The patient will be admitted to my service [facility] at [facility].   Admit to medical floor with remote telemetry  Vitals every 4 hours  Diet cardiac consistent carbohydrate  IVF saline lock  Incentive spirometry  Activity as tolerated     Solumedrol 60 mg IVP q 24 h   Douneb 1 UD QID  Albuterol 1 UD q4h prn  Oxygen high flow nasal cannula 35 L FiO2 80% goal 6 L/min home rate  Diuresis increase Lasix 20 kneeing daily to 40 p.o. twice daily  Since and outs  Daily weight  Pulmonology consult in a.m. morning     Diabetes mellitus  Lantus 20 units nightly  Humalog low dose sliding scale  Hypoglycemia protocol     Home medications reviewed.  Note that the patient has prescribed amiodarone and Eliquis last refilled and generated by Dylan Carr MD..  He follows with Dr. Erwin cardiology and I do not see any referral to diagnosis of atrial fibrillation.  Amiodarone will be a really problematic medication in a patient with this advanced pulmonary fibrosis.  Will hold this until his confirmed that he really does need them.     DVT prophylaxis > SCD        Medical Decision Making  Number and Complexity of problems: 4 complex medical problems  Differential Diagnosis: see above     Conditions and Status        Condition is unchanged.     [facility] Data  External documents reviewed: [facility] EHR  Cardiac tracing (EKG, telemetry) interpretation: Normal sinus rhythm   Radiology interpretation: See above  Labs reviewed: see above  Any tests that were considered but not ordered: none     Decision rules/scores evaluated (example [id number], Wells, etc): N/A     Discussed with: Patient     Care Planning  Shared decision making: Patient  Code status and discussions: DNR per patient request     Disposition  Social Determinants of Health that impact treatment or disposition: none  Estimated length of stay is over 2 midnights.      I confirmed that the patient's advanced care plan is present, code status is  documented, and a surrogate decision maker is listed in the patient's medical record.      The patient's surrogate decision maker is family, see records.      The patient was seen and examined by me on 1/19/2025 at 2313.     Electronically signed by Henry Solis MD, 01/19/25, 23:13 CST.                           Jb Rutledge MD   Physician  Medicine     Progress Notes     Signed     Date of Service: 01/20/25 0822  Creation Time: 01/20/25 0822     Signed              [facility] Medicine Services  INPATIENT PROGRESS NOTE     Patient Name: Jeramie Anaya  Date of Admission: 1/19/2025  Today's Date: 01/20/25  Length of Stay: 0  Primary Care Physician: Hi Herrera     Subjective   Chief Complaint: Shortness of breath  HPI   Patient reports that he normally uses 6 L of oxygen at home at rest, and increases to 12 L with any activity.  Even when at 12 L she reports that he can only walk very short distances before getting very short of breath and noticing that his oxygen levels are getting low.  He reports a cough productive of thick, white sputum.  At times, his sputum can almost be glue-like.  Denies any fevers or chills.  Denies any new pain symptoms.        Review of Systems   All pertinent negatives and positives are as above. All other systems have been reviewed and are negative unless otherwise stated.      Objective    Temp:  [97.6 °F (36.4 °C)-98.2 °F (36.8 °C)] 97.8 °F (36.6 °C)  Heart Rate:  [65-82] 74  Resp:  [13-22] 18  BP: ()/(56-74) 137/62  Physical Exam  Vitals reviewed.   Constitutional:       General: He is not in acute distress.     Appearance: He is ill-appearing.   HENT:      Head: Normocephalic.      Mouth/Throat:      Mouth: Mucous membranes are moist.   Cardiovascular:      Rate and Rhythm: Normal rate.   Pulmonary:      Effort: No respiratory distress.      Breath sounds: Rhonchi present.      Comments: On Vapotherm at 35lpm and 70% Fi02;  "no work of breathing during exam; scattered crackly BSs   Musculoskeletal:         General: No deformity.   Skin:     General: Skin is warm.   Neurological:      General: No focal deficit present.      Mental Status: He is alert. Mental status is at baseline.   Psychiatric:         Mood and Affect: Mood normal.            Results Review:  I have reviewed the labs, radiology results, and diagnostic studies.     Laboratory Data:         Results from last 7 days   Lab Units 01/20/25 0119 01/19/25 1951   WBC 10*3/mm3 7.81 9.48   HEMOGLOBIN g/dL 12.6* 13.8   HEMATOCRIT % 37.0* 40.4   PLATELETS 10*3/mm3 162 189                Results from last 7 days   Lab Units 01/20/25  0119 01/19/25 2018 01/19/25 1951   SODIUM mmol/L 131*  --  132*   SODIUM, ARTERIAL mmol/L  --  131*  --    POTASSIUM mmol/L 4.6  --  4.2   CHLORIDE mmol/L 95*  --  96*   CO2 mmol/L 22.0  --  25.0   BUN mg/dL 21  --  23   CREATININE mg/dL 0.99  --  1.20   CALCIUM mg/dL 7.7*  --  8.7   BILIRUBIN mg/dL  --   --  0.7   ALK PHOS U/L  --   --  87   ALT (SGPT) U/L  --   --  17   AST (SGOT) U/L  --   --  11   GLUCOSE mg/dL 261*  --  241*         Culture Data:   No results found for: \"BLOODCX\", \"URINECX\", \"WOUNDCX\", \"MRSACX\", \"RESPCX\", \"STOOLCX\"     Radiology Data:   Imaging Results (Last 24 Hours)         Procedure Component Value Units Date/Time     XR Chest 1 View [[id number]] Collected: 01/19/25 2008       Updated: 01/19/25 2013     Narrative:       XR CHEST 1 VW- 1/19/2025 6:37 PM     HISTORY: dyspnea       COMPARISON: Chest x-ray dated 11/13/2023     FINDINGS:  Upright frontal radiograph of the chest was obtained     Underlying fibrotic interstitial lung disease. Lungs are well expanded.  No new consolidation. No pleural effusion or pneumothorax. Heart size is  normal. Pulmonary vasculature are nondilated. No acute bony abnormality.        Impression:       1.  Underlying fibrotic interstitial lung disease. No superimposed acute  process.     This report " was signed and finalized on 1/19/2025 8:09 PM by Dr Jhon Mahajan.                   I have reviewed the patient's current medications.      Assessment/Plan   Assessment       Active Hospital Problems     Diagnosis      **Acute on chronic respiratory failure with hypoxia      COPD with acute exacerbation      Type 2 diabetes mellitus without complication, with long-term current use of insulin      Acute-on-chronic respiratory failure      Bronchiectasis without acute exacerbation      ILD (interstitial lung disease)      Pulmonary hypertension      Diastolic dysfunction      Chronic respiratory failure with hypoxia      Pulmonary emphysema           Treatment Plan  Currently on Vapotherm at 35 L/min and 70% FiO2 (patient reports that he is normally on 6 L of supplemental oxygen at home with rest, and increases to 12 L with any activity)  Admission CXR reviewed with findings of fibrotic interstitial lung disease       3.  Plan to get CT Chest this AM, particularly given new 02 requirement.    4.  Pulmonary consulted and appreciate their assistance  5.  Respiratory PCR panel was negative  6.  Plan to titrate IV Solumedrol to 80mg IV q8hrs this AM  7.  Adjust scheduled Duonebs from q6hrs to q4hrs  8.  Trial of Metanebs  9.  Add procalcitonin to labs already sent  10.  Add Mucinex  11.  Add chest physiotherapy  12.  IV Cefepime for now  13.  Add respiratory culture  14.  Workup ongoing     Medical Decision Making  Number and Complexity of problems: 3 acute; high complexity        Conditions and Status        Condition is worsening.     [facility] Data  External documents reviewed: none  Cardiac tracing (EKG, telemetry) interpretation: no new EKGs  Radiology interpretation: as above  Labs reviewed: as above  Any tests that were considered but not ordered: will get CT Chest today     Decision rules/scores evaluated (example [id number], Wells, etc): none     Discussed with: patient and bedside nurse, Oliva     Care  "Planning  Shared decision making: Discussed with patient with agreement to proceed with treatment plan as outlined  Code status and discussions: DO NOT RESUSCITATE     Disposition  Social Determinants of Health that impact treatment or disposition: None apparent at this time  I expect the patient to be discharged to: D           Electronically signed by Jb Rutledge MD, 01/20/25, 08:22 Jeramie No N #[id number] (CSN:[id number]) (74 y.o. M) (Adm: 01/19/25)  [facility] [id number]  CommentAttending Provider: Jb Rutledge MD   Allergies: Other, Statins    Isolation: (none)   Code Status: No CPR    Ht: 185.4 cm (73\")   Wt: 102 kg (224 lb)   Admission Wt: 105 kg (231 lb)    Admission Cmt: None           Most Recent CDI DRG Information    There are no CDI reviews on this account.     Treatment Team  Chat With All Treatment Team   Provider Role Specialty From To    Jb Rutledge MD  Attending Hospitalist 01/20/25 0629 --    Terrell Carrillo RN   -- 01/20/25 0700 01/20/25 1500    Jason Rico  [facility] -- 01/20/25 0627 --    Jun London DO  Consulting Physician Pulmonary Disease 01/19/25 5537 --    Michaelle Downs RDN, LD  Dietitian Nutrition 01/20/25 0800 01/20/25 1630    Martine Gale, PCT  Patient Care Technician -- 01/19/25 2223 01/20/25 1628    Sally Adhikari [facility]  Patient Care Technician -- 01/20/25 0703 01/20/25 1903    Oliva Almonte, RN  Registered Nurse -- 01/20/25 0706 01/20/25 1936    Jonathan Hall CRT  Respiratory Therapist Respiratory Therapy 01/20/25 0504 01/20/25 1703    Yuridia Cabrera, BSW    01/20/25 0822 01/20/25 1622     Orders to Be Acknowledged  Comment(From admission, onward)  None     Length of Stay    Actual Admission Date    0 days 01/19/2025      [facility] Advisories    Click to view active [facility] Advisories     Treatment Team Sticky Notes  Comment ADT FaceSheet Print    Report Name " Print   ADT Face Sheet Print      Social Determinants of Health   ReportCurrent Living Arrangements    01/19 2239  home   Difficulty Concentrating, Remembering or Making Decisions    01/19 2257  no   Difficulty Managing Errands Independently    01/19 2257  yes   Errands Management    01/19 2257  daughter helps     Sticky Notes to Physicians  CommentPharmacy Med Rec complete. No discrepancies found between Gyeap-oz-Jspgfod medication list and medications resumed inpatient.   Last edited by Mikki Lucero, PharmD on 01/20/25 at 0938      Medical Problems  CommentHospital Problem List  Date Reviewed: 10/29/2024     Codes Priority Class Noted POA   Pulmonary emphysema (Chronic) ICD-10-CM: J43.9  ICD-9-CM: 492.8   8/20/2019 Yes   Overview Addendum 11/20/2024 11:30 AM by Lilo Leyva APRN    8/20/19 alpha 1 MM     5/9/24 zithromax MWF started   3/4/24 Qtc 397   6/13/24 Qtc 425            Previous Version   More...   Chronic respiratory failure with hypoxia (Chronic) ICD-10-CM: J96.11  ICD-9-CM: 518.83, 799.02   11/2/2021 Yes   Overview Addendum 10/12/2023  3:17 PM by Danna Sorenson APRN    O2 10L exertion/4L rest       Previous Version   More...   ILD (interstitial lung disease) (Chronic) ICD-10-CM: J84.9  ICD-9-CM: 515   10/12/2023 Yes   Overview Addendum 7/26/2024  4:05 PM by Danna Sorenson APRN    CT Angiogram Chest (08/30/2023 18:09)  Chronic interstitial lung disease with diffuse intralobular and interlobular septal thickening, centrilobular paraseptal cystic changes with honeycombing in the lung bases.     10/12/23 Discussed antifibrotic. Patient does not feel that he would be interested 2' GI side effects. He states that he struggles with GI upset frequently.      10/13/13 PERICO, ANCA, RF, CCP WNL      CT Chest Hi Resolution Diagnostic (01/03/2024 13:50)  Severe emphysematous changes. At the lung bases, there is  bronchiectasis and stacked cystic changes. Honeycombing such as seen with usual  interstitial pneumonia (UIP) is not excluded.     Did not tolerate Tyvaso 2' chest pain.      Did not tolerate esbreit.      Prednisone 10mg po daily         Previous Version   More...   Diastolic dysfunction ICD-10-CM: I51.89  ICD-9-CM: 429.9   10/12/2023 Yes   More...   Pulmonary hypertension (Chronic) ICD-10-CM: I27.20  ICD-9-CM: 416.8   10/12/2023 Yes   Overview Addendum 2/6/2024  1:47 PM by Danna Sorenson APRN    Adult Transthoracic Echo Complete W/ Cont if Necessary Per Protocol (09/03/2023 09:51)    Left ventricular systolic function is normal. Left ventricular ejection fraction appears to be 51 - 55%.    Left ventricular diastolic function is consistent with (grade I) impaired relaxation.    Estimated right ventricular systolic pressure from tricuspid regurgitation is moderately elevated (45-55 mmHg).    There is a trivial pericardial effusion.  RVSP 49.4mmHg     NM Lung Scan Perfusion Particulate (11/13/2023 10:18)   IMPRESSION:  1. Low probability of pulmonary embolism.     CARDIAC CATHETERIZATION REPORT  Date of Procedure: 12/1/2023   Findings:  Pre-capillary pulmonary hypertension with preserved cardiac output.  RA (mean) 7, RV (s/edp): 51/7, PA (s/d/mean) 56/23/34, PCWP (mean) 12, [place] CO/CI 6/2.7 with PVR 3.7 HAHN  Recommendations:  Follow-up in CHF clinic and with pulmonology for consideration of treatment for ILD-PAH  Complications:  None     Follows with Dr. Erwin  Did not tolerate Tyvaso            Previous Version   More...   Bronchiectasis without acute exacerbation (Chronic) ICD-10-CM: J47.9  ICD-9-CM: 494.0   3/7/2024 Yes   Overview Addendum 11/20/2024 11:37 AM by Lilo Leyva APRN    Identified 1/3/24-there is  bronchiectasis and stacked cystic changes     10/13/13 PERICO, ANCA, RF, CCP WNL     Continue bronchodilators.      Zithromax MWF-3/2024 QTc 397, 6/2024 QTc 425, October 2024 QTc 449         Previous Version   More...   COPD with acute exacerbation ICD-10-CM:  J44.1  ICD-9-CM: 491.21   1/19/2025 Yes   Type 2 diabetes mellitus without complication, with long-term current use of insulin ICD-10-CM: E11.9, Z79.4  ICD-9-CM: 250.00, V58.67   1/19/2025 Not Applicable    Acute on chronic respiratory failure with hypoxia ICD-10-CM: J96.21  ICD-9-CM: 518.84, 799.02   1/19/2025 Yes   Acute-on-chronic respiratory failure ICD-10-CM: J96.20  ICD-9-CM: 518.84   1/19/2025 Yes   Non-Hospital Problem List  Date Reviewed: 10/29/2024     Codes Priority Class Noted   Gastroesophageal reflux disease (Chronic) ICD-10-CM: K21.9  ICD-9-CM: 530.81   10/17/2019   Overview Signed 8/2/2022  3:14 PM by Danna Sorenson APRN    PPI-Prilosec      More...   Allergic rhinitis (Chronic) ICD-10-CM: J30.9  ICD-9-CM: 477.9   10/17/2019   Overview Signed 8/2/2022  3:14 PM by Danna Sorenson APRN    Xyzal, Flonase      More...   Ureterolithiasis ICD-10-CM: N20.1  ICD-9-CM: 592.1   8/29/2023   Lymphadenopathy ICD-10-CM: R59.1  ICD-9-CM: 785.6   10/12/2023   Overview Signed 10/12/2023  3:26 PM by Danna Sorenson APRN    CT Angiogram Chest (08/30/2023 18:09)  Prominent intrathoracic lymphadenopathy, presumably reactive        More...     Consult Orders  Expand  Hide  (From admission, onward)  Start   Ordered   01/20/25 0702  Inpatient Pulmonology Consult  IN AM     Complete    Specialty:  Pulmonary Disease  Provider:  Jun London DO    01/19/25 0341        Vital Signs (last 2 days)    Date/Time Temp Pulse Resp BP Patient Position Device (Oxygen Therapy) Flow (L/min) (Oxygen Therapy) Oxygen Concentration (%) SpO2   01/20/25 0804 97.8 (36.6) 74 18 137/62 Sitting high-flow nasal cannula;heated 35 -- 92   01/20/25 0800 -- -- -- -- -- high-flow nasal cannula;heated 35 95 --   01/20/25 0719 -- 68 -- -- -- -- -- -- 97   01/20/25 0711 -- -- -- -- -- -- -- 70  --   01/20/25 0710 -- 66 17 -- -- heated;high-flow nasal cannula;humidified 35 75 98   01/20/25 0402 97.8 (36.6) 65 18 101/56 Lying  heated;high-flow nasal cannula;humidified 35 75 97   01/20/25 0031 -- 67 20 -- Lying heated;high-flow nasal cannula;humidified 35 75 97   01/20/25 0020 -- 69 20 -- Lying heated;high-flow nasal cannula;humidified 35 75  99   01/19/25 2259 -- -- -- -- -- heated;high-flow nasal cannula 35 80 --   01/19/25 2223 97.6 (36.4) 81 22 128/74 Sitting heated;high-flow nasal cannula;humidified 35 80 92   01/19/25 2221 -- 82 22 -- -- heated;high-flow nasal cannula;humidified 35 80 95   01/19/25 2148 -- 71 19 109/66 Sitting high-flow nasal cannula -- 80 95   01/19/25 2021 -- 70 15 -- -- humidified;heated;high-flow nasal cannula 35  80  89 Abnormal    01/19/25 2015 -- 75 -- 83/66 Abnormal  -- -- -- 70  100   01/19/25 2013 -- 69 13 -- -- heated;humidified;high-flow nasal cannula 35 100 100   01/19/25 19:41:53 -- -- 16 -- -- high-flow nasal cannula 35 100 98   01/19/25 1934 -- 79 -- -- -- -- 40  100 76 Abnormal    01/19/25 1931 -- -- -- -- -- -- -- -- 92   01/19/25 1928 -- -- -- -- -- heated;humidified;high-flow nasal cannula 35 100 --   01/19/25 1927 98.2 (36.8) 79 20 91/65 Sitting nasal prongs 6 -- 78 Abnormal          Current Facility-Administered Medications   Medication Dose Route Frequency Provider Last Rate Last Admin    acetaminophen (TYLENOL) tablet 650 mg  650 mg Oral Q4H PRN Henry Solis MD   650 mg at 01/20/25 0944    Or    acetaminophen (TYLENOL) 160 MG/5ML oral solution 650 mg  650 mg Oral Q4H PRN Henry Solis MD        Or    acetaminophen (TYLENOL) suppository 650 mg  650 mg Rectal Q4H PRN Henry Solis MD        albuterol (PROVENTIL) nebulizer solution 0.083% 2.5 mg/3mL  2.5 mg Nebulization Q4H PRN Henry Solis MD        azithromycin (ZITHROMAX) tablet 500 mg  500 mg Oral Q24H Henry Solis MD   500 mg at 01/20/25 0830    sennosides-docusate (PERICOLACE) 8.6-50 MG per tablet 2 tablet  2 tablet Oral BID PRN Henry Solis MD        And    polyethylene  glycol (MIRALAX) packet 17 g  17 g Oral Daily PRN Henry Solis MD        And    bisacodyl (DULCOLAX) EC tablet 5 mg  5 mg Oral Daily PRN Henry Solis MD        And    bisacodyl (DULCOLAX) suppository 10 mg  10 mg Rectal Daily PRN Henry Solis MD        budesonide-formoterol (SYMBICORT) 160-4.5 MCG/ACT inhaler 2 puff  2 puff Inhalation BID - RT Henry Solis MD   2 puff at 01/20/25 0710    Calcium Replacement - Follow Nurse / BPA Driven Protocol   Not Applicable PRN Henry Solis MD        cefepime 2000 mg IVPB in 100 mL NS (MBP)  2,000 mg Intravenous Q8H Jb Rutledge MD        cetirizine (zyrTEC) tablet 10 mg  10 mg Oral Daily Henry Solis MD   10 mg at 01/20/25 0831    dextrose (D50W) (25 g/50 mL) IV injection 25 g  25 g Intravenous Q15 Min PRN Henry Solis MD        dextrose (GLUTOSE) oral gel 15 g  15 g Oral Q15 Min PRN Henry Solis MD        fluticasone (FLONASE) 50 MCG/ACT nasal spray 2 spray  2 spray Each Nare Daily Henry Solis MD   2 spray at 01/20/25 0831    furosemide (LASIX) tablet 40 mg  40 mg Oral BID Diuretics Henry Solis MD   40 mg at 01/20/25 0830    glucagon (GLUCAGEN) injection 1 mg  1 mg Intramuscular Q15 Min PRN Henry Solis MD        guaiFENesin (MUCINEX) 12 hr tablet 1,200 mg  1,200 mg Oral Q12H Jb Rutledge MD   1,200 mg at 01/20/25 0903    insulin glargine (LANTUS, SEMGLEE) injection 25 Units  25 Units Subcutaneous Nightly Jb Rutledge MD        Insulin Lispro (humaLOG) injection 3-14 Units  3-14 Units Subcutaneous 4x Daily AC & at Bedtime Jb Rutledge MD        ipratropium-albuterol (DUO-NEB) nebulizer solution 3 mL  3 mL Nebulization Q4H - RT Jb Rutledge MD        Magnesium Standard Dose Replacement - Follow Nurse / BPA Driven Protocol   Not Applicable PRN Henry Solis MD        methylPREDNISolone sodium  succinate (SOLU-Medrol) injection 80 mg  80 mg Intravenous Q8H Jb Rutledge MD   80 mg at 01/20/25 0851    nitroglycerin (NITROSTAT) SL tablet 0.4 mg  0.4 mg Sublingual Q5 Min PRN Henry Solis MD        pantoprazole (PROTONIX) EC tablet 20 mg  20 mg Oral BID Henry Solis MD   20 mg at 01/20/25 0830    Phosphorus Replacement - Follow Nurse / BPA Driven Protocol   Not Applicable PRN Henry Solis MD        Potassium Replacement - Follow Nurse / BPA Driven Protocol   Not Applicable PRN Henry Solis MD        sodium chloride 0.9 % bolus 1,000 mL  1,000 mL Intravenous Once Henry Solis MD        sodium chloride 0.9 % flush 10 mL  10 mL Intravenous Q12H Henry Solis MD   10 mL at 01/20/25 0832    sodium chloride 0.9 % flush 10 mL  10 mL Intravenous PRN Henry Solis MD        sodium chloride 0.9 % infusion 40 mL  40 mL Intravenous PRN Henry Solis MD

## 2025-01-20 NOTE — THERAPY EVALUATION
Patient Name: Jeramie Anaya  : 1950    MRN: [id number]                              Today's Date: 2025       Admit Date: 2025    Visit Dx:     ICD-10-CM ICD-9-CM   1. COPD exacerbation  J44.1 491.21   2. Hypoxia  R09.02 799.02     Patient Active Problem List   Diagnosis    Pulmonary emphysema    Gastroesophageal reflux disease    Allergic rhinitis    Chronic respiratory failure with hypoxia    Ureterolithiasis    ILD (interstitial lung disease)    Diastolic dysfunction    Pulmonary hypertension    Lymphadenopathy    Bronchiectasis without acute exacerbation    COPD with acute exacerbation    Type 2 diabetes mellitus without complication, with long-term current use of insulin    Acute on chronic respiratory failure with hypoxia    Acute-on-chronic respiratory failure     Past Medical History:   Diagnosis Date    Bronchiectasis without acute exacerbation 3/7/2024    Bullous emphysema 2019    Diastolic dysfunction 10/12/2023    Emphysema of lung 2019    Gastroesophageal reflux disease 10/17/2019    High cholesterol     ILD (interstitial lung disease) 10/12/2023    Pulmonary hypertension 10/12/2023    Shingles     Type 2 diabetes mellitus without complication, with long-term current use of insulin 2025     Past Surgical History:   Procedure Laterality Date    CARDIAC CATHETERIZATION N/A 2023    Procedure: Right Heart Cath;  Surgeon: Earl Erwin MD;  Location:  [facility];  Service: Cardiology;  Laterality: N/A;    CYST REMOVAL      on tailbone    LUNG SURGERY      SUPERIOR VENA CAVA ANGIOPLASTY / STENTING        General Information       Row Name 25 1330          Physical Therapy Time and Intention    Document Type evaluation  CC: SOB. Dx: acute on chronic respiratory failure with hypoxia, COPD with acute exacerbation. Hx: pulmonary fibrosis, bronchiectasis, pulmonary hypertension.  -SB (r) EW (t) SB (c)     Mode of Treatment physical therapy  -SB (r) EW  (t) SB (c)       Row Name 01/20/25 1330          General Information    Patient Profile Reviewed yes  -SB (r) EW (t) SB (c)     Prior Level of Function independent:;ADL's;dressing;bathing;all household mobility;transfer  -SB (r) EW (t) SB (c)     Existing Precautions/Restrictions oxygen therapy device and L/min;fall  -SB (r) EW (t) SB (c)     Barriers to Rehab medically complex;previous functional deficit;physical barrier  -SB (r) EW (t) SB (c)       Row Name 01/20/25 1330          Living Environment    People in Home alone  daughter lives close and checks in daily  -SB (r) EW (t) SB (c)       Row Name 01/20/25 1330          Home Main Entrance    Number of Stairs, Main Entrance none  -SB (r) EW (t) SB (c)       Row Name 01/20/25 1330          Stairs Within Home, Primary    Number of Stairs, Within Home, Primary none  -SB (r) EW (t) SB (c)       Row Name 01/20/25 1330          Cognition    Orientation Status (Cognition) oriented x 4  -SB (r) EW (t) SB (c)       Row Name 01/20/25 1330          Safety Issues/Impairments Affecting Functional Mobility    Impairments Affecting Function (Mobility) balance;endurance/activity tolerance;pain;shortness of breath;strength  -SB (r) EW (t) SB (c)               User Key  (r) = Recorded By, (t) = Taken By, (c) = Cosigned By      Initials Name Provider Type    SB Barbara Traore, PT DPT Physical Therapist    EW Annia Keller, PT Student PT Student                   Mobility       Row Name 01/20/25 1330          Bed Mobility    Bed Mobility supine-sit  -SB (r) EW (t) SB (c)     Supine-Sit [place] (Bed Mobility) modified independence  -SB (r) EW (t) SB (c)     Assistive Device (Bed Mobility) bed rails;head of bed elevated  -SB (r) EW (t) SB (c)       Row Name 01/20/25 1330          Bed-Chair Transfer    Bed-Chair [place] (Transfers) contact guard;nonverbal cues (demo/gesture);verbal cues  -SB (r) EW (t) SB (c)               User Key  (r) = Recorded By, (t) = Taken By, (c) =  Cosigned By      Initials Name Provider Type    Barbara Jacome, PT DPT Physical Therapist    [facility], PT Student PT Student                   Obj/Interventions       Row Name 01/20/25 1330          Range of Motion Comprehensive    General Range of Motion bilateral lower extremity ROM WFL  -SB (r) EW (t) SB (c)       Row Name 01/20/25 1330          Strength Comprehensive (MMT)    Comment, General Manual Muscle Testing (MMT) Assessment BLE strength grossly 4+/5  -SB (r) EW (t) SB (c)       Row Name 01/20/25 1330          Balance    Balance Assessment sitting static balance;sitting dynamic balance;standing static balance;standing dynamic balance  -SB (r) EW (t) SB (c)     Position, Sitting Balance unsupported;sitting edge of bed  -SB (r) EW (t) SB (c)     Static Standing Balance standby assist  -SB (r) EW (t) SB (c)     Dynamic Standing Balance contact guard  -SB (r) EW (t) SB (c)     Position/Device Used, Standing Balance unsupported  -SB (r) EW (t) SB (c)       Row Name 01/20/25 1330          Sensory Assessment (Somatosensory)    Sensory Assessment (Somatosensory) LE sensation intact  -SB (r) EW (t) SB (c)               User Key  (r) = Recorded By, (t) = Taken By, (c) = Cosigned By      Initials Name Provider Type    Barbara Jacome, PT DPT Physical Therapist    [facility], Annia, PT Student PT Student                   Goals/Plan       Row Name 01/20/25 1330          Bed Mobility Goal 1 (PT)    Activity/Assistive Device (Bed Mobility Goal 1, PT) sit to supine/supine to sit;sidelying to sit/sit to sidelying;bed rails  -SB (r) EW (t) SB (c)     [place] Level/Cues Needed (Bed Mobility Goal 1, PT) modified independence  -SB (r) EW (t) SB (c)     Time Frame (Bed Mobility Goal 1, PT) long term goal (LTG);10 days  -SB (r) EW (t) SB (c)     Progress/Outcomes (Bed Mobility Goal 1, PT) new goal  -SB (r) EW (t) SB (c)       Row Name 01/20/25 1330          Transfer Goal 1 (PT)    Activity/Assistive Device  (Transfer Goal 1, PT) sit-to-stand/stand-to-sit;bed-to-chair/chair-to-bed  -SB (r) EW (t) SB (c)     [place] Level/Cues Needed (Transfer Goal 1, PT) modified independence  -SB (r) EW (t) SB (c)     Time Frame (Transfer Goal 1, PT) long term goal (LTG);10 days  -SB (r) EW (t) SB (c)     Progress/Outcome (Transfer Goal 1, PT) new goal  -SB (r) EW (t) SB (c)       Row Name 01/20/25 6960          Gait Training Goal 1 (PT)    Activity/Assistive Device (Gait Training Goal 1, PT) gait (walking locomotion);decrease fall risk;improve balance and speed;increase endurance/gait distance;increase energy conservation  -SB (r) EW (t) SB (c)     [place] Level (Gait Training Goal 1, PT) standby assist  -SB (r) EW (t) SB (c)     Distance (Gait Training Goal 1, PT) 25ft  -SB (r) EW (t) SB (c)     Time Frame (Gait Training Goal 1, PT) long term goal (LTG);10 days  -SB (r) EW (t) SB (c)     Progress/Outcome (Gait Training Goal 1, PT) new goal  -SB (r) EW (t) SB (c)       Row Name 01/20/25 0803          Therapy Assessment/Plan (PT)    Planned Therapy Interventions (PT) balance training;bed mobility training;gait training;home exercise program;patient/family education;strengthening;transfer training  -SB (r) EW (t) SB (c)               User Key  (r) = Recorded By, (t) = Taken By, (c) = Cosigned By      Initials Name Provider Type    Barbara Jacome, PT DPT Physical Therapist    EW Annia Keller, PT Student PT Student                   Clinical Impression       Row Name 01/20/25 2424          Pain    Pretreatment Pain Rating 4/10  -SB (r) EW (t) SB (c)     Pain Location back  -SB (r) EW (t) SB (c)     Pain Side/Orientation lower  -SB (r) EW (t) SB (c)     Pain Management Interventions exercise or physical activity utilized;positioning techniques utilized  -SB (r) EW (t) SB (c)     Additional Documentation Pain Scale: FACES Pre/Post-Treatment (Group)  -SB (r) EW (t) SB (c)       Row Name 01/20/25 6733          Pain Scale: FACES  Pre/Post-Treatment    Posttreatment Pain Rating 2-->hurts little bit  -SB (r) EW (t) SB (c)       Row Name 01/20/25 4552          Plan of Care Review    Plan of Care Reviewed With patient  -SB (r) EW (t) SB (c)     Outcome Evaluation PT eval completed. Pt found in lying in bed on L side upon therapist arrival. Pt A&Ox4 with c/o SOB and moderate low back pain due to a reported strain a couple weeks prior. Pt on Vapotherm 35L/65%. SpO2 of 97% noted prior to any activity. Pt denies any nausea or dizziness. Pt says he is independent with ADLs and mobility at home. He says his daughter comes by his hosue daily to check on him and assists if needed. Pt able to t/f supine>sit independently with use of bedraills and HOB elevated. Pt demos good sitting balance at EOB and reports minor increase in breathlessness. SpO2 was 88% following positional change. Pt completed sit>stand with CGA for safety and able to take a couple steps over to chair at bedside. SpO2 desat during t/f and dropped to ~82%. Oxygen levels recovered with 2-3min seated rest. SpO2 returned to 92%. Pt left in chair with call light in reach, encouraged to call for assistance, and RRT in room. Pt would benefit from skilled PT to improve functional strength, endurance and mobility for safe return to PLOF. Recommend d/c to SNF.  -SB (r) EW (t) SB (c)       Row Name 01/20/25 0758          Therapy Assessment/Plan (PT)    Patient/Family Therapy Goals Statement (PT) return home  -SB (r) EW (t) SB (c)     Rehab Potential (PT) good  -SB (r) EW (t) SB (c)     Criteria for Skilled Interventions Met (PT) yes;meets criteria;skilled treatment is necessary  -SB (r) EW (t) SB (c)     Therapy Frequency (PT) 2 times/day  -SB (r) EW (t) SB (c)     Predicted Duration of Therapy Intervention (PT) until d/c  -SB (r) EW (t) SB (c)       Row Name 01/20/25 9061          Vital Signs    Pretreatment Heart Rate (beats/min) 72  -SB (r) EW (t) SB (c)     Intratreatment Heart Rate  (beats/min) 91  -SB (r) EW (t) SB (c)     Posttreatment Heart Rate (beats/min) 84  -SB (r) EW (t) SB (c)     Pre SpO2 (%) 97  -SB (r) EW (t) SB (c)     O2 Delivery Pre Treatment --  [facility] Vapotherm  -SB (r) EW (t) SB (c)     Intra SpO2 (%) 82  -SB (r) EW (t) SB (c)     O2 Delivery Intra Treatment --  [facility] Vapotherm  -SB (r) EW (t) SB (c)     Post SpO2 (%) 92  -SB (r) EW (t) SB (c)     O2 Delivery Post Treatment --  [facility] Vapotherm  -SB (r) EW (t) SB (c)     Pre Patient Position Supine  -SB (r) EW (t) SB (c)     Intra Patient Position Sitting  -SB (r) EW (t) SB (c)     Post Patient Position Sitting  -SB (r) EW (t) SB (c)     Recovery Time 2-3min  -SB (r) EW (t) SB (c)       Row Name 01/20/25 1330          Positioning and Restraints    Pre-Treatment Position in bed  -SB (r) EW (t) SB (c)     Post Treatment Position chair  -SB (r) EW (t) SB (c)     In Chair sitting;call light within reach;encouraged to call for assist;with other staff;notified nsg  -SB (r) EW (t) SB (c)               User Key  (r) = Recorded By, (t) = Taken By, (c) = Cosigned By      Initials Name Provider Type    Barbara Jacome, PT DPT Physical Therapist    Annia Adams, PABLITO Student PT Student                   Outcome Measures       Row Name 01/20/25 1330 01/20/25 0800       How much help from another person do you currently need...    Turning from your back to your side while in flat bed without using bedrails? 3  -SB (r) EW (t) SB (c) 3  -    Moving from lying on back to sitting on the side of a flat bed without bedrails? 3  -SB (r) EW (t) SB (c) 3  -    Moving to and from a bed to a chair (including a wheelchair)? 3  -SB (r) EW (t) SB (c) 3  -    Standing up from a chair using your arms (e.g., wheelchair, bedside chair)? 3  -SB (r) EW (t) SB (c) 3  -    Climbing 3-5 steps with a railing? 3  -SB (r) EW (t) SB (c) 3  -    To walk in hospital room? 3  -SB (r) EW (t) SB (c) 3  -    AM-PAC 6 Clicks Score (PT) 18  -SB (r) EW (t) 18  -     Highest Level of Mobility Goal 6 --> Walk 10 steps or more  -SB (r) EW (t) 6 --> Walk 10 steps or more  -      Row Name 01/20/25 1330 01/20/25 1325       Functional Assessment    Outcome Measure Options AM-PAC 6 Clicks Basic Mobility (PT)  -SB (r) EW (t) SB (c) AM-PAC 6 Clicks Daily Activity (OT)  -JW (r) CB (t) JW (c)              User Key  (r) = Recorded By, (t) = Taken By, (c) = Cosigned By      Initials Name Provider Type    SB Barbara Traore, PT DPT Physical Therapist    Ophelia Pereira, OTR/L, CSRS Occupational Therapist    Oliva Macias, RN Registered Nurse    EW Keller, Annia, PT Student PT Student    Cali Galeano, OT Student OT Student                                 Physical Therapy Education       Title: PT OT SLP Therapies (In Progress)       Topic: Physical Therapy (In Progress)       Point: Mobility training (Done)       Learning Progress Summary            Patient Acceptance, E, VU by EW at 1/20/2025 1330    Comment: benefits of mobility, progression of PT                      Point: Home exercise program (Not Started)       Learner Progress:  Not documented in this visit.              Point: Body mechanics (Not Started)       Learner Progress:  Not documented in this visit.              Point: Precautions (Not Started)       Learner Progress:  Not documented in this visit.                              User Key       Initials Effective Dates Name Provider Type Discipline     12/16/24 -  Keller, Annia, PT Student PT Student PT                  PT Recommendation and Plan  Planned Therapy Interventions (PT): balance training, bed mobility training, gait training, home exercise program, patient/family education, strengthening, transfer training  Outcome Evaluation: PT eval completed. Pt found in lying in bed on L side upon therapist arrival. Pt A&Ox4 with c/o SOB and moderate low back pain due to a reported strain a couple weeks prior. Pt on Vapotherm 35L/65%. SpO2 of 97% noted prior to any  activity. Pt denies any nausea or dizziness. Pt says he is independent with ADLs and mobility at home. He says his daughter comes by his hosue daily to check on him and assists if needed. Pt able to t/f supine>sit independently with use of bedraills and HOB elevated. Pt demos good sitting balance at EOB and reports minor increase in breathlessness. SpO2 was 88% following positional change. Pt completed sit>stand with CGA for safety and able to take a couple steps over to chair at bedside. SpO2 desat during t/f and dropped to ~82%. Oxygen levels recovered with 2-3min seated rest. SpO2 returned to 92%. Pt left in chair with call light in reach, encouraged to call for assistance, and RRT in room. Pt would benefit from skilled PT to improve functional strength, endurance and mobility for safe return to PLOF. Recommend d/c to SNF.     Time Calculation:         PT Charges       Row Name 01/20/25 1330             Time Calculation    Start Time 1330  -SB (r) EW (t) SB (c)      Stop Time 1415  -SB (r) EW (t) SB (c)      Time Calculation (min) 45 min  -SB (r) EW (t)      PT Received On 01/20/25  -SB (r) EW (t) SB (c)      PT Goal Re-Cert Due Date 01/30/25  -SB (r) EW (t) SB (c)                User Key  (r) = Recorded By, (t) = Taken By, (c) = Cosigned By      Initials Name Provider Type    Barbara Jacome, PT DPT Physical Therapist    EW Keller, [place], PT Student PT Student                      PT G-Codes  Outcome Measure Options: AM-PAC 6 Clicks Basic Mobility (PT)  AM-PAC 6 Clicks Score (PT): 18  AM-PAC 6 Clicks Score (OT): 15  PT Discharge Summary  Anticipated Discharge Disposition (PT): skilled nursing facility    [place] Keller, PT Student  1/20/2025

## 2025-01-20 NOTE — PLAN OF CARE
Goal Outcome Evaluation:  Plan of Care Reviewed With: patient        Progress: no change  Outcome Evaluation: OT eval completed. A&Ox4. C/O pain in lower pain. Pt in side-lying L upon arrival. Pt on Vapotherm 35L/65%. O2sat began at ~95 and desatted to 82 after ambulating from bed>chair with CGA x2 with verbal cues. O2 improved to ~95 after 5+ mins of pursed lip breathing. Sit>stand was performed min A with verbal cues. Stand>sit was CGA with verbal cues. ROM test revealed L shoulder flex was 50% and L shoulder aBd was 25%. Pt reports it is due to previous surgery. MMT performed and revealed deficits in R shoulder 3+/5. Sensation intact. Pt left in chair with nsg and call light in reach. Pt would benefit from OT services to adress deficits in activity tolerance, BUE strength, and BADL performance. Recommend SNF at d/c.    Anticipated Discharge Disposition (OT): skilled nursing facility

## 2025-01-20 NOTE — PLAN OF CARE
Problem: Adult Inpatient Plan of Care  Goal: Plan of Care Review  Outcome: Progressing  Goal: Patient-Specific Goal (Individualized)  Outcome: Progressing  Goal: Absence of Hospital-Acquired Illness or Injury  Outcome: Progressing  Goal: Optimal Comfort and Wellbeing  Outcome: Progressing  Goal: Readiness for Transition of Care  Outcome: Progressing     Problem: Comorbidity Management  Goal: Maintenance of COPD Symptom Control  Outcome: Progressing  Goal: Blood Glucose Level Within Target Range  Outcome: Progressing  Goal: Blood Pressure in Desired Range  Outcome: Progressing   Goal Outcome Evaluation:

## 2025-01-20 NOTE — CONSULTS
Inpatient Consult Note            NAME: Jeramie Anaya  MRN: [id number]  AGE: 74 y.o.            : 1950  ADMISSION DATE: 2025  PRIMARY CARE PROVIDER: Hi Herrera  ATTENDING PHYSICIAN: Jb Rutledge MD               Reason for Consult:  We have been consulted by Jb Rutledge MD to see Jeramie Anaya for acute on chronic respiratory failure.    HPI:    Mr. Anaya is a 74-year-old male admitted to [facility] for acute on chronic respiratory failure.  Past medical history includes chronic hypoxemic respiratory failure, severe emphysema, ILD with UIP pattern, bronchiectasis, allergic rhinitis, pulmonary hypertension, atrial fibrillation.    Mr. Anaya is known to the respiratory disease clinic, he now follows with Sherly Leyva NP.  He was last seen in clinic on 2024.  At that visit it was noted that his respiratory status had worsened with the weaning of his prednisone from 20 down to 10 mg.  His prednisone was increased back to 20 mg.  He is currently on chronic prednisone due to having failed all other treatment modalities.  He is currently managed with nebulized therapy only due to no benefit from inhaled inhaled therapy.  The patient normally uses 6 L of oxygen with rest and 12 L with exertion.  Starting 2 days ago he had to increase his oxygen to 12 L while at rest to maintain a sat in the low 90s.  He reports yesterday even with the 12 L he was not able to maintain a sat in the 90s therefore he presented to the ED.  Reports worsening of his respiratory status over the past 2-3 months which has accelerated in the past week.  He has noted increased shortness of breath which is worse with exertion.  He is also having increased wheezing.  States his cough is currently at his baseline of thick mucus.  Denies any sick contacts or fever.  Complains of occasional chills.    This is the patient's second admission to the hospital for his respiratory failure in the past 6  months.  He was admitted for 2 weeks at [facility] for acute on chronic respiratory failure.  He states at that time he was treated for volume overload and atrial fibrillation.    Review of Systems:  A full 12-point review of systems was performed and was negative except as documented in the HPI.               Social History:  Social History     Socioeconomic History    Marital status:    Tobacco Use    Smoking status: Former     Current packs/day: 0.00     Average packs/day: 3.0 packs/day for 35.0 years (105.0 ttl pk-yrs)     Types: Cigarettes     Start date: 1962     Quit date: 1997     Years since quittin.0     Passive exposure: Past    Smokeless tobacco: Never   Vaping Use    Vaping status: Never Used   Substance and Sexual Activity    Alcohol use: No    Drug use: No    Sexual activity: Defer               Physical Exam:  General: Well appearing, in no respiratory distress.  On Vapotherm  Head: Normocephalic, atraumatic  Eyes: Conjunctiva clear, sclera non-icteric  Teeth/Gums: Normal inspection  Neck: Supple without stridor  Heart: Regular rate and rhythm, no murmur  Lungs: Diminished bilaterally with coarse breath sounds  Abdomen: Soft, nontender  MSK/extremities: No cyanosis or edema  Neurologic: AOx3, grossly no focal deficits      Imaging Review:  I personally reviewed the chest x-ray and high-res CT done on admission and previously:  High-res CT shows severe emphysematous changes with subpleural reticulation mainly in the lower lung zones.  No consistent areas of honeycombing noted.  There is bronchiectasis.  Chest x-ray on admission shows his known chronic lung changes.      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  Suspect acute exacerbation of his underlying CPFE and COPD.  RVP negative.  Check sputum culture, urinary antigens, MRSA swab.  Also within the differential would be atypical infection given his chronic prednisone use.  Will attempt to obtain pneumocystis PCR from sputum sample if possible.  Otherwise we will follow-up on the ordered CTA.  If CT is consistent with atypical infection can consider bronchoscopy.      Recommendations:   -Continue supplemental oxygen as needed and wean as tolerated, goal O2 sat of 88-92%  -Stop Symbicort and start nebulized budesonide, continue scheduled DuoNebs as ordered  -Pneumocystis PCR on sputum  -Continue IV steroids as ordered  -CTA chest  -Start MetaNebs CPT and hypertonic saline  -Sputum culture, urinary antigens, MRSA swab  -Continue cefepime, add vancomycin if MRSA swab is positive  -PT/OT        Thank you for allowing us to participate in this patient's care. We will continue to follow.              Past Medical History:   Past Medical History:   Diagnosis Date    Bronchiectasis without acute exacerbation 3/7/2024    Bullous emphysema 08/20/2019    Diastolic dysfunction 10/12/2023    Emphysema of lung 03/2019    Gastroesophageal reflux disease 10/17/2019    High cholesterol     ILD (interstitial lung disease) 10/12/2023    Pulmonary hypertension 10/12/2023    Shingles     Type 2 diabetes mellitus without complication, with long-term current use of insulin 1/19/2025         Past Surgical History:   Past Surgical History:   Procedure Laterality Date    CARDIAC CATHETERIZATION N/A 12/1/2023    Procedure: Right Heart Cath;  Surgeon: Earl Erwin MD;  Location: [facility] CATH INVASIVE LOC[facility];  Service: Cardiology;  Laterality: N/A;    CYST REMOVAL      on tailbone    LUNG SURGERY      SUPERIOR VENA CAVA ANGIOPLASTY / STENTING           Family History:   Family History   Problem Relation Age of Onset     Leukemia Mother     Alzheimer's disease Father     Cancer Brother          Medications:   Prior to Admission medications    Medication Sig Start Date End Date Taking? Authorizing Provider   amiodarone (PACERONE) 200 MG tablet Take 1 tablet by mouth Daily.   Yes Alissa Barnard MD   apixaban (ELIQUIS) 5 MG tablet tablet Take 1 tablet by mouth 2 (Two) Times a Day.   Yes Alissa Barnard MD   azithromycin (Zithromax) 250 MG tablet Take 1 tablet Monday, Wednesday, and Friday 5/20/24  Yes Danna Sorenson APRN   fluticasone (FLONASE) 50 MCG/ACT nasal spray 2 sprays by Each Nare route Daily.   Yes ProviderAlissa MD   furosemide (LASIX) 40 MG tablet Take 1 tablet by mouth Daily.  Patient taking differently: Take 1 tablet by mouth Daily. Pt takes one-half tablet daily 10/7/23  Yes Hi Herrera   insulin glargine (LANTUS, SEMGLEE) 100 UNIT/ML injection Inject 10 Units under the skin into the appropriate area as directed Daily.  Patient taking differently: Inject 10 Units under the skin into the appropriate area as directed Daily. Pt takes 20 units nightly 1/10/24  Yes Hi Herrera   levocetirizine (XYZAL) 5 MG tablet Take 1 tablet by mouth 2 (Two) Times a Day. 12/1/17  Yes Hi Herrera   O2 (OXYGEN) Inhale 4 L/min Continuous. 4L at rest, 10L during movement   Yes ProviderAlissa MD   pantoprazole (PROTONIX) 20 MG EC tablet Take 1 tablet by mouth 2 (Two) Times a Day. 3/1/23  Yes Hi Herrera   predniSONE (DELTASONE) 20 MG tablet Take 1 tablet by mouth Daily. 11/20/24  Yes Lilo Leyva APRN   fluticasone (FLONASE) 50 MCG/ACT nasal spray 2 sprays by Each Nare route Daily for 30 days. 11/20/24 1/20/25 Yes Lilo Leyva APRN   albuterol sulfate  (90 Base) MCG/ACT inhaler Inhale 2 puffs Every 4 (Four) Hours As Needed for Wheezing or Shortness of Air. 11/20/24   Lilo Leyva APRN   triamcinolone (KENALOG) 0.1 % cream Apply 1 Application topically to the  "appropriate area as directed. 2/16/24   Provider, MD Alissa         Allergies:   Other and Statins      Results Review:   Results from last 7 days   Lab Units 01/20/25 0119 01/19/25 2018 01/19/25 1951   SODIUM mmol/L 131*  --  132*   SODIUM, ARTERIAL mmol/L  --  131*  --    POTASSIUM mmol/L 4.6  --  4.2   CHLORIDE mmol/L 95*  --  96*   CO2 mmol/L 22.0  --  25.0   BUN mg/dL 21  --  23   CALCIUM mg/dL 7.7*  --  8.7     Results from last 7 days   Lab Units 01/20/25 0119 01/19/25 1951   WBC 10*3/mm3 7.81 9.48   HEMOGLOBIN g/dL 12.6* 13.8   HEMATOCRIT % 37.0* 40.4   PLATELETS 10*3/mm3 162 189       Visit Vitals  /62 (BP Location: Right arm, Patient Position: Sitting)   Pulse 71   Temp 97.8 °F (36.6 °C) (Oral)   Resp 16   Ht 185.4 cm (73\")   Wt 102 kg (224 lb)   SpO2 95%   BMI 29.55 kg/m²                Jun London DO  Pulmonary/Critical Care  1/20/2025  10:47 CST  " Vegetarian

## 2025-01-20 NOTE — H&P
[facility] Medicine Services  HISTORY AND PHYSICAL    Date of Admission: 1/19/2025  Primary Care Physician: Hi Herrera    Subjective   Primary Historian: Patient    Chief Complaint: Shortness of breath    History of Present Illness  74-year-old male with past medical history of's pulmonary fibrosis, bronchiectasis, chronic respiratory failure, pulmonary hypertension group 3, diabetes mellitus insulin-dependent presents to the emergency room with worsening dyspnea despite wearing 2 L oxygen nasal cannula.  He routinely wears 4-6 and had to increase the rate tonight, he is also had productive cough.  In the emergency room he presents with oxygen saturation of 78% on 6 L oxygen nasal cannula was placed on high flow 35 L/min to get an oxygen saturation around 90%.  He follows with pulmonology outpatient.  Denies fever or chest pain.  Has edema in the dorsum of feet.    Review of Systems   Otherwise complete ROS reviewed and negative except as mentioned in the HPI.    Past Medical History:   Past Medical History:   Diagnosis Date    Bronchiectasis without acute exacerbation 3/7/2024    Bullous emphysema 08/20/2019    Diastolic dysfunction 10/12/2023    Emphysema of lung 03/2019    Gastroesophageal reflux disease 10/17/2019    High cholesterol     ILD (interstitial lung disease) 10/12/2023    Pulmonary hypertension 10/12/2023    Shingles      Past Surgical History:  Past Surgical History:   Procedure Laterality Date    CARDIAC CATHETERIZATION N/A 12/1/2023    Procedure: Right Heart Cath;  Surgeon: Earl Erwin MD;  Location: [facility] CATH INVASIVE LOCATION;  Service: Cardiology;  Laterality: N/A;    CYST REMOVAL      on tailbone    LUNG SURGERY      SUPERIOR VENA CAVA ANGIOPLASTY / STENTING       Social History:  reports that he quit smoking about 28 years ago. His smoking use included cigarettes. He started smoking about 63 years ago. He has a 105 pack-year smoking history. He has  been exposed to tobacco smoke. He has never used smokeless tobacco. He reports that he does not drink alcohol and does not use drugs.    Family History: family history includes Alzheimer's disease in his father; Cancer in his brother; Leukemia in his mother.       Allergies:  Allergies   Allergen Reactions    Other Other (See Comments)     Contrast for eyes    Statins Other (See Comments)     Pain all over body and joint pain all over body.        Medications:  Prior to Admission medications    Medication Sig Start Date End Date Taking? Authorizing Provider   amiodarone (PACERONE) 200 MG tablet Take 1 tablet by mouth Daily.   Yes Alissa Barnard MD   apixaban (ELIQUIS) 5 MG tablet tablet Take 1 tablet by mouth 2 (Two) Times a Day.   Yes Alissa Barnard MD   azithromycin (Zithromax) 250 MG tablet Take 1 tablet Monday, Wednesday, and Friday 5/20/24  Yes Danna Sorenson APRN   fluticasone (FLONASE) 50 MCG/ACT nasal spray 2 sprays by Each Nare route Daily for 30 days. 11/20/24 1/19/25 Yes Lilo Leyva APRN   furosemide (LASIX) 40 MG tablet Take 0.5 tablets by mouth Daily. 10/7/23  Yes Hi Herrera   insulin glargine (LANTUS, SEMGLEE) 100 UNIT/ML injection Inject 14 Units under the skin into the appropriate area as directed Daily. 1/10/24  Yes Hi Herrera   levocetirizine (XYZAL) 5 MG tablet Take 1 tablet by mouth 2 (Two) Times a Day. 12/1/17  Yes Hi Herrera   O2 (OXYGEN) Inhale 4 L/min Continuous. 4L at rest, 10L during movement   Yes ProviderAlissa MD   pantoprazole (PROTONIX) 20 MG EC tablet Take 1 tablet by mouth 2 (Two) Times a Day. 3/1/23  Yes Hi Herrera   predniSONE (DELTASONE) 20 MG tablet Take 1 tablet by mouth Daily. 11/20/24  Yes Lilo Leyva APRN   albuterol sulfate  (90 Base) MCG/ACT inhaler Inhale 2 puffs Every 4 (Four) Hours As Needed for Wheezing or Shortness of Air. 11/20/24   Lilo Leyva APRN   triamcinolone (KENALOG) 0.1 % cream  "Apply 1 Application topically to the appropriate area as directed. 2/16/24   Provider, MD Alissa     I have utilized all available immediate resources to obtain, update, or review the patient's current medications (including all prescriptions, over-the-counter products, herbals, cannabis/cannabidiol products, and vitamin/mineral/dietary (nutritional) supplements).    Objective     Vital Signs: /74 (BP Location: Right arm, Patient Position: Sitting)   Pulse 81   Temp 97.6 °F (36.4 °C) (Oral)   Resp 22   Ht 185.4 cm (73\")   Wt 102 kg (224 lb)   SpO2 92%   BMI 29.55 kg/m²   Physical Exam  Vitals reviewed.   Constitutional:       General: He is not in acute distress.     Appearance: He is ill-appearing. He is not toxic-appearing.   HENT:      Head: Normocephalic and atraumatic.      Mouth/Throat:      Mouth: Mucous membranes are moist.      Pharynx: Oropharynx is clear.   Eyes:      Extraocular Movements: Extraocular movements intact.      Conjunctiva/sclera: Conjunctivae normal.      Pupils: Pupils are equal, round, and reactive to light.   Cardiovascular:      Rate and Rhythm: Normal rate and regular rhythm.      Pulses: Normal pulses.      Heart sounds: No murmur heard.  Pulmonary:      Effort: Respiratory distress present.      Breath sounds: Wheezing, rhonchi and rales present.   Abdominal:      General: Bowel sounds are normal. There is no distension.      Palpations: Abdomen is soft.      Tenderness: There is no abdominal tenderness.   Musculoskeletal:         General: No swelling or tenderness. Normal range of motion.      Cervical back: Normal range of motion and neck supple. No muscular tenderness.      Right lower leg: Edema present.      Left lower leg: Edema present.   Skin:     General: Skin is warm and dry.      Findings: No erythema or rash.   Neurological:      General: No focal deficit present.      Mental Status: He is alert and oriented to person, place, and time.      Cranial " Nerves: No cranial nerve deficit.      Motor: No weakness.   Psychiatric:         Mood and Affect: Mood normal.         Behavior: Behavior normal.     Results Reviewed:  Lab Results (last 24 hours)       Procedure Component Value Units Date/Time    High Sensitivity Troponin T 1Hr [[id number]]  (Normal) Collected: 01/19/25 2227    Specimen: Blood Updated: 01/19/25 2255     HS Troponin T 18 ng/L      Troponin T Numeric Delta -2 ng/L     Narrative:      High Sensitive Troponin T Reference Range:  <14.0 ng/L- Negative Female for AMI  <22.0 ng/L- Negative Male for AMI  >=14 - Abnormal Female indicating possible myocardial injury.  >=22 - Abnormal Male indicating possible myocardial injury.   Clinicians would have to utilize clinical acumen, EKG, Troponin, and serial changes to determine if it is an Acute Myocardial Infarction or myocardial injury due to an underlying chronic condition.         Lactic Acid, Plasma [[id number]]  (Abnormal) Collected: 01/19/25 2227    Specimen: Blood Updated: 01/19/25 2255     Lactate 2.4 mmol/L     Comprehensive Metabolic Panel [[id number]]  (Abnormal) Collected: 01/19/25 1951    Specimen: Blood from Arm, Right Updated: 01/19/25 2042     Glucose 241 mg/dL      BUN 23 mg/dL      Creatinine 1.20 mg/dL      Sodium 132 mmol/L      Potassium 4.2 mmol/L      Chloride 96 mmol/L      CO2 25.0 mmol/L      Calcium 8.7 mg/dL      Total Protein 6.0 g/dL      Albumin 3.3 g/dL      ALT (SGPT) 17 U/L      AST (SGOT) 11 U/L      Alkaline Phosphatase 87 U/L      Total Bilirubin 0.7 mg/dL      Globulin 2.7 gm/dL      A/G Ratio 1.2 g/dL      BUN/Creatinine Ratio 19.2     Anion Gap 11.0 mmol/L      eGFR 62.7 mL/min/1.73     Narrative:      GFR Categories in Chronic Kidney Disease (CKD)      GFR Category          GFR (mL/min/1.73)    Interpretation  G1                     90 or greater         Normal or high (1)  G2                      60-89                Mild decrease (1)  G3a                   45-59                 Mild to moderate decrease  G3b                   30-44                Moderate to severe decrease  G4                    15-29                Severe decrease  G5                    14 or less           Kidney failure          (1)In the absence of evidence of kidney disease, neither GFR category G1 or G2 fulfill the criteria for CKD.    eGFR calculation 2021 CKD-EPI creatinine equation, which does not include race as a factor    Magnesium [[id number]]  (Normal) Collected: 01/19/25 1951    Specimen: Blood from Arm, Right Updated: 01/19/25 2042     Magnesium 2.1 mg/dL     High Sensitivity Troponin T [[id number]]  (Normal) Collected: 01/19/25 1951    Specimen: Blood from Arm, Right Updated: 01/19/25 2038     HS Troponin T 20 ng/L     Narrative:      High Sensitive Troponin T Reference Range:  <14.0 ng/L- Negative Female for AMI  <22.0 ng/L- Negative Male for AMI  >=14 - Abnormal Female indicating possible myocardial injury.  >=22 - Abnormal Male indicating possible myocardial injury.   Clinicians would have to utilize clinical acumen, EKG, Troponin, and serial changes to determine if it is an Acute Myocardial Infarction or myocardial injury due to an underlying chronic condition.         Lipase [[id number]]  (Normal) Collected: 01/19/25 1951    Specimen: Blood from Arm, Right Updated: 01/19/25 2037     Lipase 27 U/L     Respiratory Panel PCR w/COVID-19(SARS-CoV-2) RUFNIA/MILTON/TOO/PAD/COR/LARISSA In-House, NP Swab in UTM/[facility], 2 HR TAT - Swab, Nasopharynx [[id number]]  (Normal) Collected: 01/19/25 1937    Specimen: Swab from Nasopharynx Updated: 01/19/25 2036     ADENOVIRUS, PCR Not Detected     Coronavirus 229E Not Detected     Coronavirus HKU1 Not Detected     Coronavirus NL63 Not Detected     Coronavirus OC43 Not Detected     COVID19 Not Detected     Human Metapneumovirus Not Detected     Human Rhinovirus/Enterovirus Not Detected     Influenza A PCR Not Detected     Influenza B PCR Not Detected     Parainfluenza Virus 1 Not  Detected     Parainfluenza Virus 2 Not Detected     Parainfluenza Virus 3 Not Detected     Parainfluenza Virus 4 Not Detected     RSV, PCR Not Detected     Bordetella pertussis pcr Not Detected     Bordetella parapertussis PCR Not Detected     Chlamydophila pneumoniae PCR Not Detected     Mycoplasma pneumo by PCR Not Detected    Narrative:      In the setting of a positive respiratory panel with a viral infection PLUS a negative procalcitonin without other underlying concern for bacterial infection, consider observing off antibiotics or discontinuation of antibiotics and continue supportive care. If the respiratory panel is positive for atypical bacterial infection (Bordetella pertussis, Chlamydophila pneumoniae, or Mycoplasma pneumoniae), consider antibiotic de-escalation to target atypical bacterial infection.    CBC & Differential [[id number]]  (Abnormal) Collected: 01/19/25 1951    Specimen: Blood from Arm, Right Updated: 01/19/25 2020    Narrative:      The following orders were created for panel order CBC & Differential.  Procedure                               Abnormality         Status                     ---------                               -----------         ------                     CBC Auto Differential[[id number]]        Abnormal            Final result                 Please view results for these tests on the individual orders.    CBC Auto Differential [[id number]]  (Abnormal) Collected: 01/19/25 1951    Specimen: Blood from Arm, Right Updated: 01/19/25 2020     WBC 9.48 10*3/mm3      RBC 4.79 10*6/mm3      Hemoglobin 13.8 g/dL      Hematocrit 40.4 %      MCV 84.3 fL      MCH 28.8 pg      MCHC 34.2 g/dL      RDW 17.5 %      RDW-SD 52.6 fl      MPV 9.6 fL      Platelets 189 10*3/mm3      Neutrophil % 94.2 %      Lymphocyte % 2.3 %      Monocyte % 2.4 %      Eosinophil % 0.0 %      Basophil % 0.2 %      Immature Grans % 0.9 %      Neutrophils, Absolute 8.92 10*3/mm3      Lymphocytes, Absolute 0.22  10*3/mm3      Monocytes, Absolute 0.23 10*3/mm3      Eosinophils, Absolute 0.00 10*3/mm3      Basophils, Absolute 0.02 10*3/mm3      Immature Grans, Absolute 0.09 10*3/mm3      nRBC 0.2 /100 WBC     Blood Gas, Arterial With Co-Ox [[id number]]  (Abnormal) Collected: 01/19/25 2018    Specimen: Arterial Blood Updated: 01/19/25 2017     Site Right Radial     Miles's Test Positive     pH, Arterial 7.522 pH units      Comment: 83 Value above reference range        pCO2, Arterial 28.2 mm Hg      Comment: 84 Value below reference range        pO2, Arterial 142.0 mm Hg      Comment: 83 Value above reference range        HCO3, Arterial 23.1 mmol/L      Base Excess, Arterial 1.3 mmol/L      O2 Saturation, Arterial 99.6 %      Comment: 83 Value above reference range        Hemoglobin, Blood Gas 13.8 g/dL      Comment: 84 Value below reference range        Hematocrit, Blood Gas 42.2 %      Oxyhemoglobin 98.4 %      Methemoglobin 0.40 %      Carboxyhemoglobin 0.8 %      Temperature 37.0     Sodium, Arterial 131 mmol/L      Comment: 84 Value below reference range        Potassium, Arterial 4.2 mmol/L      Barometric Pressure for Blood Gas 764 mmHg      Modality Heated HFNC     FIO2 100 %      Flow Rate 35.0 lpm      Ventilator Mode NA     Collected by [id number]     Comment: Meter: [id number]     :  Sarah Garcia RRT        pH, Temp Corrected 7.522 pH Units      pCO2, Temperature Corrected 28.2 mm Hg      pO2, Temperature Corrected 142 mm Hg           Imaging Results (Last 24 Hours)       Procedure Component Value Units Date/Time    XR Chest 1 View [[id number]] Collected: 01/19/25 2008     Updated: 01/19/25 2013    Narrative:      XR CHEST 1 VW- 1/19/2025 6:37 PM     HISTORY: dyspnea       COMPARISON: Chest x-ray dated 11/13/2023     FINDINGS:  Upright frontal radiograph of the chest was obtained     Underlying fibrotic interstitial lung disease. Lungs are well expanded.  No new consolidation. No pleural effusion or  pneumothorax. Heart size is  normal. Pulmonary vasculature are nondilated. No acute bony abnormality.       Impression:      1.  Underlying fibrotic interstitial lung disease. No superimposed acute  process.     This report was signed and finalized on 1/19/2025 8:09 PM by Dr Jhon Mahajan.             I have personally reviewed and interpreted the radiology studies and ECG obtained at time of admission.     Assessment / Plan   Assessment:   Active Hospital Problems    Diagnosis     **Acute on chronic respiratory failure with hypoxia     COPD with acute exacerbation     Type 2 diabetes mellitus without complication, with long-term current use of insulin     Bronchiectasis without acute exacerbation     ILD (interstitial lung disease)     Pulmonary hypertension     Diastolic dysfunction     Chronic respiratory failure with hypoxia     Pulmonary emphysema      Treatment Plan  The patient will be admitted to my service here at [facility].   Admit to medical floor with remote telemetry  Vitals every 4 hours  Diet cardiac consistent carbohydrate  IVF saline lock  Incentive spirometry  Activity as tolerated    Solumedrol 60 mg IVP q 24 h   Douneb 1 UD QID  Albuterol 1 UD q4h prn  Oxygen high flow nasal cannula 35 L FiO2 80% goal 6 L/min home rate  Diuresis increase Lasix 20 kneeing daily to 40 p.o. twice daily  Since and outs  Daily weight  Pulmonology consult in a.m. morning    Diabetes mellitus  Lantus 20 units nightly  Humalog low dose sliding scale  Hypoglycemia protocol    Home medications reviewed.  Note that the patient has prescribed amiodarone and Eliquis last refilled and generated by Dylan Carr MD..  He follows with Dr. Erwin cardiology and I do not see any referral to diagnosis of atrial fibrillation.  Amiodarone will be a really problematic medication in a patient with this advanced pulmonary fibrosis.  Will hold this until his confirmed that he really does need them.    DVT prophylaxis >  SCD      Medical Decision Making  Number and Complexity of problems: 4 complex medical problems  Differential Diagnosis: see above    Conditions and Status        Condition is unchanged.     [facility] Data  External documents reviewed: Sian's Plan EHR  Cardiac tracing (EKG, telemetry) interpretation: Normal sinus rhythm   Radiology interpretation: See above  Labs reviewed: see above  Any tests that were considered but not ordered: none     Decision rules/scores evaluated (example [id number], Wells, etc): N/A     Discussed with: Patient     Care Planning  Shared decision making: Patient  Code status and discussions: DNR per patient request    Disposition  Social Determinants of Health that impact treatment or disposition: none  Estimated length of stay is over 2 midnights.     I confirmed that the patient's advanced care plan is present, code status is documented, and a surrogate decision maker is listed in the patient's medical record.     The patient's surrogate decision maker is family, see records.     The patient was seen and examined by me on 1/19/2025 at 2313.    Electronically signed by Henry Solis MD, 01/19/25, 23:13 CST.

## 2025-01-20 NOTE — ED NOTES
Nursing report ED to floor  Jeramie Anaya  74 y.o.  male    HPI:   Chief Complaint   Patient presents with    Shortness of Breath       Admitting doctor:   Henry Solis MD    Consulting provider(s):  Consults       No orders found from 12/21/2024 to 1/20/2025.             Admitting diagnosis:   The primary encounter diagnosis was COPD exacerbation. A diagnosis of Hypoxia was also pertinent to this visit.    Code status:   Current Code Status       Date Active Code Status Order ID Comments User Context       Prior            Allergies:   Other and Statins    Intake and Output  No intake or output data in the 24 hours ending 01/19/25 2126    Weight:       01/19/25 1927   Weight: 105 kg (231 lb)       Most recent vitals:   Vitals:    01/19/25 1941 01/19/25 2013 01/19/25 2015 01/19/25 2021   BP:   (!) 83/66    BP Location:       Patient Position:       Pulse:  69 75 70   Resp: 16 13  15   Temp:       TempSrc:       SpO2: 98% 100% 100% (!) 89%   Weight:       Height:         Oxygen Therapy: .    Active LDAs/IV Access:   Lines, Drains & Airways       Active LDAs       Name Placement date Placement time Site Days    Peripheral IV 01/19/25 1945 Left;Posterior Hand 01/19/25 1945  Hand  less than 1                    Labs (abnormal labs have a star):   Labs Reviewed   COMPREHENSIVE METABOLIC PANEL - Abnormal; Notable for the following components:       Result Value    Glucose 241 (*)     Sodium 132 (*)     Chloride 96 (*)     Albumin 3.3 (*)     All other components within normal limits    Narrative:     GFR Categories in Chronic Kidney Disease (CKD)      GFR Category          GFR (mL/min/1.73)    Interpretation  G1                     90 or greater         Normal or high (1)  G2                      60-89                Mild decrease (1)  G3a                   45-59                Mild to moderate decrease  G3b                   30-44                Moderate to severe decrease  G4                    15-29                 Severe decrease  G5                    14 or less           Kidney failure          (1)In the absence of evidence of kidney disease, neither GFR category G1 or G2 fulfill the criteria for CKD.    eGFR calculation 2021 CKD-EPI creatinine equation, which does not include race as a factor   CBC WITH AUTO DIFFERENTIAL - Abnormal; Notable for the following components:    RDW 17.5 (*)     Neutrophil % 94.2 (*)     Lymphocyte % 2.3 (*)     Monocyte % 2.4 (*)     Eosinophil % 0.0 (*)     Immature Grans % 0.9 (*)     Neutrophils, Absolute 8.92 (*)     Lymphocytes, Absolute 0.22 (*)     Immature Grans, Absolute 0.09 (*)     All other components within normal limits   BLOOD GAS, ARTERIAL W/CO-OXIMETRY - Abnormal; Notable for the following components:    pH, Arterial 7.522 (*)     pCO2, Arterial 28.2 (*)     pO2, Arterial 142.0 (*)     O2 Saturation, Arterial 99.6 (*)     Hemoglobin, Blood Gas 13.8 (*)     Sodium, Arterial 131 (*)     pH, Temp Corrected 7.522 (*)     pCO2, Temperature Corrected 28.2 (*)     pO2, Temperature Corrected 142 (*)     All other components within normal limits   RESPIRATORY PANEL PCR W/ COVID-19 (SARS-COV-2), NP SWAB IN UTM/VTP, 2 HR TAT - Normal    Narrative:     In the setting of a positive respiratory panel with a viral infection PLUS a negative procalcitonin without other underlying concern for bacterial infection, consider observing off antibiotics or discontinuation of antibiotics and continue supportive care. If the respiratory panel is positive for atypical bacterial infection (Bordetella pertussis, Chlamydophila pneumoniae, or Mycoplasma pneumoniae), consider antibiotic de-escalation to target atypical bacterial infection.   LIPASE - Normal   MAGNESIUM - Normal   TROPONIN - Normal    Narrative:     High Sensitive Troponin T Reference Range:  <14.0 ng/L- Negative Female for AMI  <22.0 ng/L- Negative Male for AMI  >=14 - Abnormal Female indicating possible myocardial injury.  >=22 -  Abnormal Male indicating possible myocardial injury.   Clinicians would have to utilize clinical acumen, EKG, Troponin, and serial changes to determine if it is an Acute Myocardial Infarction or myocardial injury due to an underlying chronic condition.        BLOOD GAS, ARTERIAL   HIGH SENSITIVITIY TROPONIN T 1HR   LACTIC ACID, PLASMA   CBC AND DIFFERENTIAL    Narrative:     The following orders were created for panel order CBC & Differential.  Procedure                               Abnormality         Status                     ---------                               -----------         ------                     CBC Auto Differential[[id number]]        Abnormal            Final result                 Please view results for these tests on the individual orders.       Meds given in ED:   Medications   sodium chloride 0.9 % bolus 1,000 mL (has no administration in time range)   cefTRIAXone (ROCEPHIN) 1,000 mg in sodium chloride 0.9 % 100 mL MBP (has no administration in time range)   azithromycin (ZITHROMAX) tablet 500 mg (has no administration in time range)   sodium chloride 0.9 % bolus 1,000 mL (has no administration in time range)   magnesium sulfate 2g/50 mL (PREMIX) infusion (2 g Intravenous New Bag 1/19/25 1946)   ipratropium-albuterol (DUO-NEB) nebulizer solution 3 mL (3 mL Nebulization Given 1/19/25 2013)   sodium chloride 0.9 % bolus 1,000 mL (1,000 mL Intravenous New Bag 1/19/25 1955)           NIH Stroke Scale:       Isolation/Infection(s):  No active isolations   No active infections     COVID Testing  Collected .  Resulted .    Nursing report ED to floor:  Mental status: .  Ambulatory status: .  Precautions: .    ED nurse phone extentsion- ..

## 2025-01-20 NOTE — PLAN OF CARE
Goal Outcome Evaluation:  Plan of Care Reviewed With: patient           Outcome Evaluation: PT eval completed. Pt found in lying in bed on L side upon therapist arrival. Pt A&Ox4 with c/o SOB and moderate low back pain due to a reported strain a couple weeks prior. Pt on Vapotherm 35L/65%. SpO2 of 97% noted prior to any activity. Pt denies any nausea or dizziness. Pt says he is independent with ADLs and mobility at home. He says his daughter comes by his hosue daily to check on him and assists if needed. Pt able to t/f supine>sit independently with use of bedraills and HOB elevated. Pt demos good sitting balance at EOB and reports minor increase in breathlessness. SpO2 was 88% following positional change. Pt completed sit>stand with CGA for safety and able to take a couple steps over to chair at bedside. SpO2 desat during t/f and dropped to ~82%. Oxygen levels recovered with 2-3min seated rest. SpO2 returned to 92%. Pt left in chair with call light in reach, encouraged to call for assistance, and RRT in room. Pt would benefit from skilled PT to improve functional strength, endurance and mobility for safe return to PLOF. Recommend d/c to SNF.    Anticipated Discharge Disposition (PT): skilled nursing facility

## 2025-01-20 NOTE — PROGRESS NOTES
[facility] Medicine Services  INPATIENT PROGRESS NOTE    Patient Name: Jeramie Anaya  Date of Admission: 1/19/2025  Today's Date: 01/20/25  Length of Stay: 0  Primary Care Physician: Hi Herrera    Subjective   Chief Complaint: Shortness of breath  HPI   Patient reports that he normally uses 6 L of oxygen at home at rest, and increases to 12 L with any activity.  Even when at 12 L she reports that he can only walk very short distances before getting very short of breath and noticing that his oxygen levels are getting low.  He reports a cough productive of thick, white sputum.  At times, his sputum can almost be glue-like.  Denies any fevers or chills.  Denies any new pain symptoms.      Review of Systems   All pertinent negatives and positives are as above. All other systems have been reviewed and are negative unless otherwise stated.     Objective    Temp:  [97.6 °F (36.4 °C)-98.2 °F (36.8 °C)] 97.8 °F (36.6 °C)  Heart Rate:  [65-82] 74  Resp:  [13-22] 18  BP: ()/(56-74) 137/62  Physical Exam  Vitals reviewed.   Constitutional:       General: He is not in acute distress.     Appearance: He is ill-appearing.   HENT:      Head: Normocephalic.      Mouth/Throat:      Mouth: Mucous membranes are moist.   Cardiovascular:      Rate and Rhythm: Normal rate.   Pulmonary:      Effort: No respiratory distress.      Breath sounds: Rhonchi present.      Comments: On Vapotherm at 35lpm and 70% Fi02; no work of breathing during exam; scattered crackly BSs   Musculoskeletal:         General: No deformity.   Skin:     General: Skin is warm.   Neurological:      General: No focal deficit present.      Mental Status: He is alert. Mental status is at baseline.   Psychiatric:         Mood and Affect: Mood normal.         Results Review:  I have reviewed the labs, radiology results, and diagnostic studies.    Laboratory Data:   Results from last 7 days   Lab Units 01/20/25  0119  "01/19/25 1951   WBC 10*3/mm3 7.81 9.48   HEMOGLOBIN g/dL 12.6* 13.8   HEMATOCRIT % 37.0* 40.4   PLATELETS 10*3/mm3 162 189        Results from last 7 days   Lab Units 01/20/25  0119 01/19/25 2018 01/19/25 1951   SODIUM mmol/L 131*  --  132*   SODIUM, ARTERIAL mmol/L  --  131*  --    POTASSIUM mmol/L 4.6  --  4.2   CHLORIDE mmol/L 95*  --  96*   CO2 mmol/L 22.0  --  25.0   BUN mg/dL 21  --  23   CREATININE mg/dL 0.99  --  1.20   CALCIUM mg/dL 7.7*  --  8.7   BILIRUBIN mg/dL  --   --  0.7   ALK PHOS U/L  --   --  87   ALT (SGPT) U/L  --   --  17   AST (SGOT) U/L  --   --  11   GLUCOSE mg/dL 261*  --  241*       Culture Data:   No results found for: \"BLOODCX\", \"URINECX\", \"WOUNDCX\", \"MRSACX\", \"RESPCX\", \"STOOLCX\"    Radiology Data:   Imaging Results (Last 24 Hours)       Procedure Component Value Units Date/Time    XR Chest 1 View [[id number]] Collected: 01/19/25 2008     Updated: 01/19/25 2013    Narrative:      XR CHEST 1 VW- 1/19/2025 6:37 PM     HISTORY: dyspnea       COMPARISON: Chest x-ray dated 11/13/2023     FINDINGS:  Upright frontal radiograph of the chest was obtained     Underlying fibrotic interstitial lung disease. Lungs are well expanded.  No new consolidation. No pleural effusion or pneumothorax. Heart size is  normal. Pulmonary vasculature are nondilated. No acute bony abnormality.       Impression:      1.  Underlying fibrotic interstitial lung disease. No superimposed acute  process.     This report was signed and finalized on 1/19/2025 8:09 PM by Dr Jhon Mahajan.               I have reviewed the patient's current medications.     Assessment/Plan   Assessment  Active Hospital Problems    Diagnosis     **Acute on chronic respiratory failure with hypoxia     COPD with acute exacerbation     Type 2 diabetes mellitus without complication, with long-term current use of insulin     Acute-on-chronic respiratory failure     Bronchiectasis without acute exacerbation     ILD (interstitial lung disease)     " Pulmonary hypertension     Diastolic dysfunction     Chronic respiratory failure with hypoxia     Pulmonary emphysema        Treatment Plan  Currently on Vapotherm at 35 L/min and 70% FiO2 (patient reports that he is normally on 6 L of supplemental oxygen at home with rest, and increases to 12 L with any activity)  Admission CXR reviewed with findings of fibrotic interstitial lung disease      3.  Plan to get CT Chest this AM, particularly given new 02 requirement.    4.  Pulmonary consulted and appreciate their assistance  5.  Respiratory PCR panel was negative  6.  Plan to titrate IV Solumedrol to 80mg IV q8hrs this AM  7.  Adjust scheduled Duonebs from q6hrs to q4hrs  8.  Trial of Metanebs  9.  Add procalcitonin to labs already sent  10.  Add Mucinex  11.  Add chest physiotherapy  12.  IV Cefepime for now  13.  Add respiratory culture  14.  Workup ongoing    Medical Decision Making  Number and Complexity of problems: 3 acute; high complexity      Conditions and Status        Condition is worsening.     [facility] Data  External documents reviewed: none  Cardiac tracing (EKG, telemetry) interpretation: no new EKGs  Radiology interpretation: as above  Labs reviewed: as above  Any tests that were considered but not ordered: will get CT Chest today     Decision rules/scores evaluated (example [id number], Wells, etc): none     Discussed with: patient and bedside nurse, Oliva     Care Planning  Shared decision making: Discussed with patient with agreement to proceed with treatment plan as outlined  Code status and discussions: DO NOT RESUSCITATE    Disposition  Social Determinants of Health that impact treatment or disposition: None apparent at this time  I expect the patient to be discharged to: TBD        Electronically signed by Jb Rutledge MD, 01/20/25, 08:22 CST.

## 2025-01-20 NOTE — ED PROVIDER NOTES
Subjective   History of Present Illness  Pt presents to the  with report of SOB X 2d.  Pt has hx of interstitial fibrosis and is on chronic O2 - states he typically uses 6L.  He has had cough at home - states not more than normal. No CP. No n/v/f/c.  No known sick contacts.  Pt was given solumedrol with EMS - 125mg        Review of Systems   Constitutional:  Negative for chills and fever.   HENT:  Negative for congestion.    Respiratory:  Positive for cough and shortness of breath.    Cardiovascular:  Negative for chest pain.   Gastrointestinal:  Negative for abdominal pain, nausea and vomiting.   Genitourinary:  Negative for dysuria.   All other systems reviewed and are negative.      Past Medical History:   Diagnosis Date    Bronchiectasis without acute exacerbation 3/7/2024    Bullous emphysema 08/20/2019    Diastolic dysfunction 10/12/2023    Emphysema of lung 03/2019    Gastroesophageal reflux disease 10/17/2019    High cholesterol     ILD (interstitial lung disease) 10/12/2023    Pulmonary hypertension 10/12/2023    Shingles        Allergies   Allergen Reactions    Other Other (See Comments)     Contrast for eyes    Statins Other (See Comments)     Pain all over body and joint pain all over body.        Past Surgical History:   Procedure Laterality Date    CARDIAC CATHETERIZATION N/A 12/1/2023    Procedure: Right Heart Cath;  Surgeon: Earl Erwin MD;  Location:  [facility] CATH [facility]AS[facility];  Service: Cardiology;  Laterality: N/A;    CYST REMOVAL      on tailbone    LUNG SURGERY      SUPERIOR VENA CAVA ANGIOPLASTY / STENTING         Family History   Problem Relation Age of Onset    Leukemia Mother     Alzheimer's disease Father     Cancer Brother        Social History     Socioeconomic History    Marital status:    Tobacco Use    Smoking status: Former     Current packs/day: 0.00     Average packs/day: 3.0 packs/day for 35.0 years (105.0 ttl pk-yrs)     Types: Cigarettes     Start date: 1/1/1962      Quit date: 1997     Years since quittin.0     Passive exposure: Past    Smokeless tobacco: Never   Vaping Use    Vaping status: Never Used   Substance and Sexual Activity    Alcohol use: No    Drug use: No    Sexual activity: Defer           Objective   Physical Exam  Vitals and nursing note reviewed.   HENT:      Head: Normocephalic and atraumatic.      Mouth/Throat:      Mouth: Mucous membranes are moist.   Cardiovascular:      Rate and Rhythm: Normal rate and regular rhythm.   Pulmonary:      Effort: Accessory muscle usage present.      Breath sounds: Examination of the right-upper field reveals rhonchi. Examination of the left-upper field reveals rhonchi. Examination of the right-middle field reveals rhonchi. Examination of the left-middle field reveals rhonchi. Rhonchi present.   Abdominal:      Palpations: Abdomen is soft.   Musculoskeletal:      Right lower leg: No edema.      Left lower leg: No edema.   Skin:     General: Skin is warm and dry.      Capillary Refill: Capillary refill takes less than 2 seconds.   Neurological:      Mental Status: He is alert.         Procedures           ED Course      Labs Reviewed   COMPREHENSIVE METABOLIC PANEL - Abnormal; Notable for the following components:       Result Value    Glucose 241 (*)     Sodium 132 (*)     Chloride 96 (*)     Albumin 3.3 (*)     All other components within normal limits    Narrative:     GFR Categories in Chronic Kidney Disease (CKD)      GFR Category          GFR (mL/min/1.73)    Interpretation  G1                     90 or greater         Normal or high (1)  G2                      60-89                Mild decrease (1)  G3a                   45-59                Mild to moderate decrease  G3b                   30-44                Moderate to severe decrease  G4                    15-29                Severe decrease  G5                    14 or less           Kidney failure          (1)In the absence of evidence of kidney  disease, neither GFR category G1 or G2 fulfill the criteria for CKD.    eGFR calculation 2021 CKD-EPI creatinine equation, which does not include race as a factor   CBC WITH AUTO DIFFERENTIAL - Abnormal; Notable for the following components:    RDW 17.5 (*)     Neutrophil % 94.2 (*)     Lymphocyte % 2.3 (*)     Monocyte % 2.4 (*)     Eosinophil % 0.0 (*)     Immature Grans % 0.9 (*)     Neutrophils, Absolute 8.92 (*)     Lymphocytes, Absolute 0.22 (*)     Immature Grans, Absolute 0.09 (*)     All other components within normal limits   BLOOD GAS, ARTERIAL W/CO-OXIMETRY - Abnormal; Notable for the following components:    pH, Arterial 7.522 (*)     pCO2, Arterial 28.2 (*)     pO2, Arterial 142.0 (*)     O2 Saturation, Arterial 99.6 (*)     Hemoglobin, Blood Gas 13.8 (*)     Sodium, Arterial 131 (*)     pH, Temp Corrected 7.522 (*)     pCO2, Temperature Corrected 28.2 (*)     pO2, Temperature Corrected 142 (*)     All other components within normal limits   RESPIRATORY PANEL PCR W/ COVID-19 (SARS-COV-2), NP SWAB IN UTM/VTP, 2 HR TAT - Normal    Narrative:     In the setting of a positive respiratory panel with a viral infection PLUS a negative procalcitonin without other underlying concern for bacterial infection, consider observing off antibiotics or discontinuation of antibiotics and continue supportive care. If the respiratory panel is positive for atypical bacterial infection (Bordetella pertussis, Chlamydophila pneumoniae, or Mycoplasma pneumoniae), consider antibiotic de-escalation to target atypical bacterial infection.   LIPASE - Normal   MAGNESIUM - Normal   TROPONIN - Normal    Narrative:     High Sensitive Troponin T Reference Range:  <14.0 ng/L- Negative Female for AMI  <22.0 ng/L- Negative Male for AMI  >=14 - Abnormal Female indicating possible myocardial injury.  >=22 - Abnormal Male indicating possible myocardial injury.   Clinicians would have to utilize clinical acumen, EKG, Troponin, and serial  changes to determine if it is an Acute Myocardial Infarction or myocardial injury due to an underlying chronic condition.        BLOOD GAS, ARTERIAL   HIGH SENSITIVITIY TROPONIN T 1HR   LACTIC ACID, PLASMA   CBC AND DIFFERENTIAL    Narrative:     The following orders were created for panel order CBC & Differential.  Procedure                               Abnormality         Status                     ---------                               -----------         ------                     CBC Auto Differential[[id number]]        Abnormal            Final result                 Please view results for these tests on the individual orders.     XR Chest 1 View   Final Result   1.  Underlying fibrotic interstitial lung disease. No superimposed acute   process.       This report was signed and finalized on 1/19/2025 8:09 PM by Dr Jhon Mahajan.                                                               Medical Decision Making  Pt stable in EC - NAD att.  BP has been soft but stable - family voiced concerns that this may be medication induced.  No evid att of AMI/pneumonia.  Dbt sepsis.  Pt was noted to have significant hypoxia on his home O2 dose of 6LPM.  Has improved on vapotherm.  Given rocephin/zithromax to cover for bacterial component of bronchitis/COPD exac.  Given his hypoxia - have d/w Dr. Solis for admit/further mgmt.     Amount and/or Complexity of Data Reviewed  Labs: ordered.  Radiology: ordered.  ECG/medicine tests: ordered and independent interpretation performed.     Details: NSR, RBBB, No acute STT changes    Risk  Prescription drug management.        Final diagnoses:   COPD exacerbation   Hypoxia       ED Disposition  ED Disposition       ED Disposition   Decision to Admit    Condition   --    Comment   --               No follow-up provider specified.       Medication List      No changes were made to your prescriptions during this visit.            Sesar Wise DO  01/19/25 1938        Sesar Wise, DO  01/19/25 2112       Sesar Wise,   01/19/25 2115

## 2025-01-21 NOTE — PROGRESS NOTES
"     Inpatient Progress Note        Results Review:   Results from last 7 days   Lab Units 25  0219 25  0119 25   SODIUM mmol/L 131* 131*  --  132*   SODIUM, ARTERIAL mmol/L  --   --  131*  --    POTASSIUM mmol/L 4.1 4.6  --  4.2   CHLORIDE mmol/L 96* 95*  --  96*   CO2 mmol/L 26.0 22.0  --  25.0   BUN mg/dL 21 21  --  23   CALCIUM mg/dL 7.9* 7.7*  --  8.7     Results from last 7 days   Lab Units 25  0119 25   WBC 10*3/mm3 7.81 9.48   HEMOGLOBIN g/dL 12.6* 13.8   HEMATOCRIT % 37.0* 40.4   PLATELETS 10*3/mm3 162 189       Visit Vitals  /61 (BP Location: Right arm, Patient Position: Lying)   Pulse 77   Temp 97.5 °F (36.4 °C) (Axillary)   Resp 18   Ht 185.4 cm (73\")   Wt 102 kg (224 lb)   SpO2 91%   BMI 29.55 kg/m²            Medications:   azithromycin, 500 mg, Oral, Q24H  budesonide, 0.5 mg, Nebulization, BID - RT  cefepime, 2,000 mg, Intravenous, Q8H  cetirizine, 10 mg, Oral, Daily  fluticasone, 2 spray, Each Nare, Daily  [Held by provider] furosemide, 40 mg, Oral, BID Diuretics  guaiFENesin, 1,200 mg, Oral, Q12H  insulin glargine, 25 Units, Subcutaneous, Nightly  insulin lispro, 3-14 Units, Subcutaneous, 4x Daily AC & at Bedtime  ipratropium-albuterol, 3 mL, Nebulization, Q4H - RT  methylPREDNISolone sodium succinate, 40 mg, Intravenous, Q8H  pantoprazole, 20 mg, Oral, BID  sodium chloride, 1,000 mL, Intravenous, Once  sodium chloride, 10 mL, Intravenous, Q12H  sodium chloride, 4 mL, Nebulization, BID - RT  vancomycin, 20 mg/kg, Intravenous, Once   Followed by  [START ON 2025] vancomycin, 750 mg, Intravenous, Q12H                                                NAME: Jeramie Anaya  MRN: [id number]  AGE: 74 y.o.            : 1950  ADMISSION DATE: 2025  PRIMARY CARE PROVIDER: Hi Herrera  ATTENDING PHYSICIAN: Jb Rutledge MD                       Reason for Consult:  Exacerbation of CPFE    Interval History:    No acute " events overnight, patient resting in bedside chair on 12 L.  He was just moved from the bed to the chair with physical therapy.  He did have some increased work of breathing which improved during interview.  Per therapy he did require increased to 14 L while standing up and moving, he is since been turned back down to 12 L and is satting 90%.  States his respiratory status is improved though is not at baseline.  Was able to provide a sputum sample yesterday evening.    Review of Systems:  A full 12-point review of systems was performed and was negative except as documented in the HPI.                       Physical Exam:  General: No acute distress, cooperative  Head: Atraumatic, normocephalic, normal inspection  Eyes: EOMI, normal inspection  ENT: Mucous membranes moist, no thrush  Teeth/gums: Normal inspection  Heart: RRR, no murmur  Lungs: Diminished bilaterally with bibasilar crackles  MSK: No edema or clubbing  GI/abdominal: Soft, nontender  Neurologic: Alert, oriented.  Cranial nerves II through XII grossly intact.           Imaging Review:   I personally reviewed the CTA chest completed yesterday:  Chest CTA showed no evidence of PE.  Showed advanced emphysematous changes as well as UIP pattern fibrosis.  Evidence of elevated PA pressures.                       Problem List:  CPFE with acute exacerbation  Moderate COPD with acute exacerbation  Pulmonary hypertension  Acute on chronic hypoxic respiratory failure, improving  Severe emphysema  Reduced DLCO  Former tobacco use           Recommendations:   -Continue supplemental oxygen as needed and wean as tolerated, goal O2 sat of 88-92%  -His presentation and is more consistent with exacerbation of his CPFE.  Recommend we continue broad antibiotics with the addition of vancomycin to complete at least a 5-day course  -He has improved with Solu-Medrol 80 mg every 8 hours, recommend we continue this current dose for now.  If his respiratory status worsens can  consider fibrosis exacerbation pulsed dosing with 1 g Solu-Medrol for 3 days  -His pulmonary hypertension may also be contributing to his current respiratory status.  Recommend continuing his chronic by mouth Lasix with an additional IV dose of 60 mg today.  Monitor I's and O's and metabolic panel, repeat IV dosing as needed  -Will continue nebulized triple therapy  -Urinary antigens negative, culture with normal mariel today.  Pneumocystis PCR pending  -Continue MetaNeb CPT and hypertonic saline  -PT/OT    Thank you for allowing us to participate in this patient's care. We will continue to follow.             Jun London DO  Pulmonary/Critical Care  1/21/2025  12:10 CST

## 2025-01-21 NOTE — PROGRESS NOTES
"Pharmacy Dosing Service  Pharmacokinetics  Vancomycin Initial Evaluation  Assessment/Action/Plan:  Loading dose: 20mg/kg Vancomycin 2g IV once   Followed by: Vancomycin 750 mg IVPB every 12 hours  Current end date:1/26/25  Additional antimicrobial agent(s): Cefepime    Vancomycin dosage initiated based on population pharmacokinetic parameters. Predicted steady state concentrations below. Pharmacy will continue to follow daily and adjust dose accordingly.    AUC Model Data:  Regimen: 750 mg IV every 12 hours.  Exposure target: AUC24 (range)400-600 mg/L.hr   AUC24,ss: 445 mg/L.hr  Probability of AUC24 > 400: 62 %  Ctrough,ss: 15.3 mg/L  Probability of Ctrough,ss > 20: 24 %  Probability of nephrotoxicity (Lodise ITALIA 2009): 11 %     Subjective:  Jeramie Anaya is a 74 y.o. male with a Vancomycin \"Pharmacy to Dose\" consult for the treatment of Pneumonia , day 1 of 5 of treatment.      Objective:  Ht: 185.4 cm (73\"); Wt: 102 kg (224 lb)  Estimated Creatinine Clearance: 69.2 mL/min (by C-G formula based on SCr of 1.14 mg/dL).   Creatinine   Date Value Ref Range Status   01/21/2025 1.14 0.76 - 1.27 mg/dL Final   01/20/2025 0.99 0.76 - 1.27 mg/dL Final   01/19/2025 1.20 0.76 - 1.27 mg/dL Final      Lab Results   Component Value Date    WBC 7.81 01/20/2025    WBC 9.48 01/19/2025    WBC 11.50 (H) 10/12/2023      Baseline culture results:  Microbiology Results (last 10 days)       Procedure Component Value - Date/Time    MRSA Screen, PCR (Inpatient) - Swab, Nares [[id number]]  (Abnormal) Collected: 01/20/25 1111    Lab Status: Final result Specimen: Swab from Nares Updated: 01/20/25 1229     MRSA PCR MRSA Detected    Narrative:      The negative predictive value of this diagnostic test is high and should only be used to consider de-escalating anti-MRSA therapy. A positive result may indicate colonization with MRSA and must be correlated clinically.    S. Pneumo Ag Urine or CSF - Urine, Urine, Clean Catch [[id number]]  (Normal) " Collected: 01/20/25 1108    Lab Status: Final result Specimen: Urine, Clean Catch Updated: 01/20/25 1136     Strep Pneumo Ag Negative    Legionella Antigen, Urine - Urine, Urine, Clean Catch [[id number]]  (Normal) Collected: 01/20/25 1108    Lab Status: Final result Specimen: Urine, Clean Catch Updated: 01/20/25 1136     LEGIONELLA ANTIGEN, URINE Negative    Respiratory Culture - Sputum, Cough [[id number]] Collected: 01/20/25 1039    Lab Status: Preliminary result Specimen: Sputum from Cough Updated: 01/21/25 0844     Respiratory Culture Moderate growth (3+) The culture consists of normal respiratory mariel. This is a preliminary report; final report to follow.     Gram Stain Many (4+) WBCs per low power field      Few (2+) Epithelial cells per low power field      Many (4+) Gram positive bacilli resembling diphtheroids      Rare (1+) Mixed gram positive mariel    Respiratory Panel PCR w/COVID-19(SARS-CoV-2) [facility]-House, NP Swab in UTM/VTM, 2 HR TAT - Swab, Nasopharynx [[id number]]  (Normal) Collected: 01/19/25 1937    Lab Status: Final result Specimen: Swab from Nasopharynx Updated: 01/19/25 2036     ADENOVIRUS, PCR Not Detected     Coronavirus 229E Not Detected     Coronavirus HKU1 Not Detected     Coronavirus NL63 Not Detected     Coronavirus OC43 Not Detected     COVID19 Not Detected     Human Metapneumovirus Not Detected     Human Rhinovirus/Enterovirus Not Detected     Influenza A PCR Not Detected     Influenza B PCR Not Detected     Parainfluenza Virus 1 Not Detected     Parainfluenza Virus 2 Not Detected     Parainfluenza Virus 3 Not Detected     Parainfluenza Virus 4 Not Detected     RSV, PCR Not Detected     Bordetella pertussis pcr Not Detected     Bordetella parapertussis PCR Not Detected     Chlamydophila pneumoniae PCR Not Detected     Mycoplasma pneumo by PCR Not Detected    Narrative:      In the setting of a positive respiratory panel with a viral infection PLUS a negative  procalcitonin without other underlying concern for bacterial infection, consider observing off antibiotics or discontinuation of antibiotics and continue supportive care. If the respiratory panel is positive for atypical bacterial infection (Bordetella pertussis, Chlamydophila pneumoniae, or Mycoplasma pneumoniae), consider antibiotic de-escalation to target atypical bacterial infection.            CAROL Quintero PharmD  01/21/25 11:27 CST

## 2025-01-21 NOTE — PLAN OF CARE
Goal Outcome Evaluation:     Pt has been desating into the middle 80s frequently while sleeping. He is snoring with mouth open when desatting. A/O x4. Pt is confused about his meds. He cannot understand why he is taking his meds different here than at home. I tried to educate the pt on med regimen in the hospital, but he could not understand. Pt is currently on 14L hi flow to maintain sats to 92. Maintained safety.

## 2025-01-21 NOTE — THERAPY TREATMENT NOTE
Acute Care - Physical Therapy Treatment Note  [facility]     Patient Name: Jeramie Anaya  : 1950  MRN: [id number]  Today's Date: 2025      Visit Dx:     ICD-10-CM ICD-9-CM   1. COPD exacerbation  J44.1 491.21   2. Hypoxia  R09.02 799.02   3. Impaired mobility [Z74.09]  Z74.09 799.89     Patient Active Problem List   Diagnosis    Pulmonary emphysema    Gastroesophageal reflux disease    Allergic rhinitis    Chronic respiratory failure with hypoxia    Ureterolithiasis    ILD (interstitial lung disease)    Diastolic dysfunction    Pulmonary hypertension    Lymphadenopathy    Bronchiectasis without acute exacerbation    COPD with acute exacerbation    Type 2 diabetes mellitus without complication, with long-term current use of insulin    Acute on chronic respiratory failure with hypoxia    Acute-on-chronic respiratory failure     Past Medical History:   Diagnosis Date    Bronchiectasis without acute exacerbation 3/7/2024    Bullous emphysema 2019    Diastolic dysfunction 10/12/2023    Emphysema of lung 2019    Gastroesophageal reflux disease 10/17/2019    High cholesterol     ILD (interstitial lung disease) 10/12/2023    Pulmonary hypertension 10/12/2023    Shingles     Type 2 diabetes mellitus without complication, with long-term current use of insulin 2025     Past Surgical History:   Procedure Laterality Date    CARDIAC CATHETERIZATION N/A 2023    Procedure: Right Heart Cath;  Surgeon: Earl Erwin MD;  Location: [facility] INVASIVE LOCATION;  Service: Cardiology;  Laterality: N/A;    CYST REMOVAL      on tailbone    LUNG SURGERY      SUPERIOR VENA CAVA ANGIOPLASTY / STENTING       PT Assessment (Last 12 Hours)       PT Evaluation and Treatment       Row Name 25 0858          Physical Therapy Time and Intention    Subjective Information complains of;dyspnea;pain  -MF     Document Type therapy note (daily note)  -MF     Mode of Treatment physical therapy  -       Row Name  01/21/25 0858          General Information    Existing Precautions/Restrictions fall;oxygen therapy device and L/min  -       Row Name 01/21/25 0858          Pain Scale: FACES Pre/Post-Treatment    Pain: FACES Scale, Pretreatment 6-->hurts even more  -     Posttreatment Pain Rating 2-->hurts little bit  -     Pre/Posttreatment Pain Comment low back, improved with back support in chair  -       Row Name 01/21/25 0858          Bed Mobility    Supine-Sit [place] (Bed Mobility) supervision  -     Assistive Device (Bed Mobility) head of bed elevated;bed rails  -       Row Name 01/21/25 0858          Bed-Chair Transfer    Bed-Chair [place] (Transfers) contact guard  -       Row Name 01/21/25 0858          Sit-Stand Transfer    Sit-Stand [place] (Transfers) contact guard  -       Row Name 01/21/25 0858          Stand-Sit Transfer    Stand-Sit [place] (Transfers) contact guard  -       Row Name 01/21/25 0858          Balance    Comment, Balance Independent with sitting balance-Increased O2 to 14L due to O2 sat dropping to 81% with 12L.  -       Row Name 01/21/25 0858          Hip (Therapeutic Exercise)    Hip (Therapeutic Exercise) AROM (active range of motion)  -     Hip AROM (Therapeutic Exercise) bilateral;flexion;aBduction;aDduction;sitting;15 repititions  -       Row Name 01/21/25 0858          Knee (Therapeutic Exercise)    Knee (Therapeutic Exercise) AROM (active range of motion)  -     Knee AROM (Therapeutic Exercise) bilateral;LAQ (long arc quad);sitting;20 repititions  -       Row Name 01/21/25 0858          Ankle (Therapeutic Exercise)    Ankle (Therapeutic Exercise) AROM (active range of motion)  -     Ankle AROM (Therapeutic Exercise) bilateral;dorsiflexion;sitting;20 repititions  -       Row Name 01/21/25 0858          Plan of Care Review    Plan of Care Reviewed With patient  -     Progress no change  -     Outcome Evaluation Pt agreeable to therapy. Pt  resting on 12L high flow O2. He was Supervision for getting to EOB. O2 sat dropped to 81%. Increased O2 to 14L and his sat increased to 88%. Participated with a few LE exercises. He was CGA to stand and to take steps from bed to chair. Finished a few more exercises in chair. Pt's O2 sat was 92-94% while on 14L. Decreased back to 12L at end of session and pt maintained 92%. Notified nsg. Pt's only c/o low back pain which was improved with sitting up in chair. Will continue to work on strengthening and mobilizing pt as able.  -       Row Name 01/21/25 0858          Vital Signs    Pre SpO2 (%) 92  -MF     O2 Delivery Pre Treatment hi-flow  12l  -MF     Intra SpO2 (%) 81  -MF     O2 Delivery Intra Treatment hi-flow   12L, increased to 14L with sitting up-O2 ranged 88-95% during activity while on 14L.  -MF     Post SpO2 (%) 94  -MF     O2 Delivery Post Treatment hi-flow  12l  -MF     Pre Patient Position Supine  -MF     Intra Patient Position Sitting  -MF     Post Patient Position Sitting  -       Row Name 01/21/25 0858          Positioning and Restraints    Pre-Treatment Position in bed  -MF     Post Treatment Position chair  -MF     In Chair reclined;call light within reach;encouraged to call for assist  -               User Key  (r) = Recorded By, (t) = Taken By, (c) = Cosigned By      Initials Name Provider Type    Heather Davis, PTA Physical Therapist Assistant                    Physical Therapy Education       Title: PT OT SLP Therapies (In Progress)       Topic: Physical Therapy (In Progress)       Point: Mobility training (Done)       Learning Progress Summary            Patient Acceptance, E, VU by RAFA at 1/20/2025 1330    Comment: benefits of mobility, progression of PT                      Point: Home exercise program (Not Started)       Learner Progress:  Not documented in this visit.              Point: Body mechanics (Not Started)       Learner Progress:  Not documented in this visit.               Point: Precautions (Not Started)       Learner Progress:  Not documented in this visit.                              User Key       Initials Effective Dates Name Provider Type Discipline     12/16/24 -  Keller, Annia, PT Student PT Student PT                  PT Recommendation and Plan     Plan of Care Reviewed With: patient  Progress: no change  Outcome Evaluation: Pt agreeable to therapy. Pt resting on 12L high flow O2. He was Supervision for getting to EOB. O2 sat dropped to 81%. Increased O2 to 14L and his sat increased to 88%. Participated with a few LE exercises. He was CGA to stand and to take steps from bed to chair. Finished a few more exercises in chair. Pt's O2 sat was 92-94% while on 14L. Decreased back to 12L at end of session and pt maintained 92%. Notified nsg. Pt's only c/o low back pain which was improved with sitting up in chair. Will continue to work on strengthening and mobilizing pt as able.   Outcome Measures       Row Name 01/21/25 0858             How much help from another person do you currently need...    Turning from your back to your side while in flat bed without using bedrails? 4  -MF      Moving from lying on back to sitting on the side of a flat bed without bedrails? 4  -MF      Moving to and from a bed to a chair (including a wheelchair)? 3  -MF      Standing up from a chair using your arms (e.g., wheelchair, bedside chair)? 3  -MF      Climbing 3-5 steps with a railing? 2  -MF      To walk in hospital room? 3  -MF      AM-PAC 6 Clicks Score (PT) 19  -MF         Functional Assessment    Outcome Measure Options AM-PAC 6 Clicks Basic Mobility (PT)  -MF                User Key  (r) = Recorded By, (t) = Taken By, (c) = Cosigned By      Initials Name Provider Type    Heather Davis PTA Physical Therapist Assistant                     Time Calculation:    PT Charges       Row Name 01/21/25 1768             Time Calculation    Start Time 0858  -      Stop Time 0951  -       Time Calculation (min) 53 min  -      PT Received On 01/21/25  -         Time Calculation- PT    Total Timed Code Minutes- PT 53 minute(s)  -         Timed Charges    02972 - PT Therapeutic Exercise Minutes 30  -MF      51441 - PT Therapeutic Activity Minutes 23  -MF         Total Minutes    Timed Charges Total Minutes 53  -MF       Total Minutes 53  -MF                User Key  (r) = Recorded By, (t) = Taken By, (c) = Cosigned By      Initials Name Provider Type    Heather Davis PTA Physical Therapist Assistant                  Therapy Charges for Today       Code Description Service Date Service Provider Modifiers Qty    65487973136 HC PT THER PROC EA 15 MIN 1/21/2025 Heather Woodall PTA GP 2    53758566575 HC PT THERAPEUTIC ACT EA 15 MIN 1/21/2025 Heather Woodall PTA GP 2            PT G-Codes  Outcome Measure Options: AM-PAC 6 Clicks Basic Mobility (PT)  AM-PAC 6 Clicks Score (PT): 19  AM-PAC 6 Clicks Score (OT): 15    Heather Woodall PTA  1/21/2025

## 2025-01-21 NOTE — PLAN OF CARE
Goal Outcome Evaluation:  Plan of Care Reviewed With: patient        Progress: no change  Outcome Evaluation: Pt agreeable to therapy. Pt resting on 12L high flow O2. He was Supervision for getting to EOB. O2 sat dropped to 81%. Increased O2 to 14L and his sat increased to 88%. Participated with a few LE exercises. He was CGA to stand and to take steps from bed to chair. Finished a few more exercises in chair. Pt's O2 sat was 92-94% while on 14L. Decreased back to 12L at end of session and pt maintained 92%. Notified nsg. Pt's only c/o low back pain which was improved with sitting up in chair. Will continue to work on strengthening and mobilizing pt as able.                              I have seen and evaluated this patient with the resident.   I agree with the findings  unless other wise stated.  I have made appropriate changes in documentations where needed, After my face to face bedside evaluation, I am further  notinF hx of diverticulitis s/p resection, colostomy creation and reversal, recent repair of incisional hernia (19) who presents from surgery office with incisional site drainage. Initially after discharge patient was feeling well, had drain left in LLQ incision site which was removed last Monday. Since then she has had increased drainage, initially green/yellow now bloody associated with pain at site. She went to [facility] on Wednesday for RLE pain, was r/o for DVT, also had CTAP done which showed fluid collections. She followed up with Dr. Lucas today and suggested to come to ED. She has been having low grade fevers, last night temp of 101, also endorses nausea. Denies cp, sob, v/d, dysuria. Pt alert mild dehydration Overweight No distress Abdomen draining sinuses in LLQ and suprapubic areas with tenderness around them d/c reddish brown No masses No CVA tenderness Clear lungs Heart regular s1s2 non focal neuro. Concern for intra abdominal fluid collection abscess dehydration will have iv fluids tylenol CT ABP Surgical eval --Adams I have seen and evaluated this patient with the resident.   I agree with the findings  unless other wise stated.  I have made appropriate changes in documentations where needed, After my face to face bedside evaluation, I am further  notinF hx of diverticulitis s/p resection, colostomy creation and reversal, recent repair of incisional hernia (19) who presents from surgery office with incisional site drainage. Initially after discharge patient was feeling well, had drain left in LLQ incision site which was removed last Monday. Since then she has had increased drainage, initially green/yellow now bloody associated with pain at site. She went to [facility] on Wednesday for RLE pain, was r/o for DVT, also had CTAP done which showed fluid collections. She followed up with Dr. Lucas today and suggested to come to ED. She has been having low grade fevers, last night temp of 101, also endorses nausea. Denies cp, sob, v/d, dysuria. Pt alert mild dehydration Overweight No distress Abdomen draining sinuses in LLQ and suprapubic areas with tenderness around them d/c reddish brown No masses No CVA tenderness Clear lungs Heart regular s1s2 non focal neuro. Concern for intra abdominal fluid collection abscess dehydration will have iv fluids tylenol CT ABP Surgical eval admit --Bryan

## 2025-01-21 NOTE — PLAN OF CARE
Goal Outcome Evaluation:  Plan of Care Reviewed With: patient, other (see comments) (pt encouraged to share energy conservation sheet with daughter when she arrives today)        Progress: no change  Outcome Evaluation: OT treatment completed. A&Ox4. C/O dypnea, Pt in chair upon arrival. Pt was educated on energy conservation strategies. Pt verbalized understanding and was able to recall the energy conservation strategies for daily tasks. Attempted to do a sit<>stand and chair>bed transfer, but unable due to significant change in O2 sat from 93% to 80% on 12L when pt attempted to reposition for transfer. O2 returned to baseline after ~5 minutes. Pt performed washing of face and BUE while seated in chair with set up. O2 desatted to 80 and then returned to basline during activity. Pt performed LBD donning socks with mod I while seated in chair. Pt was educated on how to implement energy conservation tactics for donning socks and was able to implement them correctly. Pt left in chair with call light in reach. O2 sat was remaining at mid to high 80's at end of session, nsg was notified. OT services should continue to address deficits in strength, activity tolerance, and BADL performance. Recommend SNF at discharge.    Anticipated Discharge Disposition (OT): skilled nursing facility

## 2025-01-21 NOTE — PROGRESS NOTES
[facility] Medicine Services  INPATIENT PROGRESS NOTE    Patient Name: Jeramie Anaya  Date of Admission: 1/19/2025  Today's Date: 01/21/25  Length of Stay: 1  Primary Care Physician: Hi Herrera    Subjective   Chief Complaint: Shortness of breath  HPI   Patient now off of Vapotherm and on 12 L by nasal cannula.  Sitting up at the bedside chair this morning.  Does report having some ongoing shortness of breath symptoms especially with any activity.  States that he does have a cough which is mainly nonproductive now.  Denies any new pain symptoms.    Review of Systems   All pertinent negatives and positives are as above. All other systems have been reviewed and are negative unless otherwise stated.     Objective    Temp:  [97.5 °F (36.4 °C)-97.8 °F (36.6 °C)] 97.5 °F (36.4 °C)  Heart Rate:  [58-92] 77  Resp:  [18-20] 18  BP: (105-116)/(56-64) 105/61  Physical Exam  Vitals reviewed.   Constitutional:       General: He is not in acute distress.     Appearance: He is ill-appearing.   HENT:      Head: Normocephalic.      Mouth/Throat:      Mouth: Mucous membranes are moist.   Cardiovascular:      Rate and Rhythm: Normal rate.   Pulmonary:      Effort: No respiratory distress.      Breath sounds: Rhonchi present.      Comments: On 12L high flow cannula; scattered crackly BSs; some work of breathing noted with even minimal activity  Musculoskeletal:         General: No deformity.      Right lower leg: Edema present.      Left lower leg: Edema present.   Skin:     General: Skin is warm.   Neurological:      General: No focal deficit present.      Mental Status: He is alert. Mental status is at baseline.   Psychiatric:         Mood and Affect: Mood normal.         Results Review:  I have reviewed the labs, radiology results, and diagnostic studies.    Laboratory Data:   Results from last 7 days   Lab Units 01/20/25  0119 01/19/25 1951   WBC 10*3/mm3 7.81 9.48   HEMOGLOBIN g/dL 12.6*  "13.8   HEMATOCRIT % 37.0* 40.4   PLATELETS 10*3/mm3 162 189        Results from last 7 days   Lab Units 01/21/25  0219 01/20/25  0119 01/19/25 2018 01/19/25 1951   SODIUM mmol/L 131* 131*  --  132*   SODIUM, ARTERIAL mmol/L  --   --  131*  --    POTASSIUM mmol/L 4.1 4.6  --  4.2   CHLORIDE mmol/L 96* 95*  --  96*   CO2 mmol/L 26.0 22.0  --  25.0   BUN mg/dL 21 21  --  23   CREATININE mg/dL 1.14 0.99  --  1.20   CALCIUM mg/dL 7.9* 7.7*  --  8.7   BILIRUBIN mg/dL  --   --   --  0.7   ALK PHOS U/L  --   --   --  87   ALT (SGPT) U/L  --   --   --  17   AST (SGOT) U/L  --   --   --  11   GLUCOSE mg/dL 178* 261*  --  241*       Culture Data:   No results found for: \"BLOODCX\", \"URINECX\", \"WOUNDCX\", \"MRSACX\", \"RESPCX\", \"STOOLCX\"    Radiology Data:   Imaging Results (Last 24 Hours)       Procedure Component Value Units Date/Time    CT Angiogram Chest [[id number]] Collected: 01/20/25 1545     Updated: 01/20/25 1556    Narrative:      EXAMINATION: CT ANGIOGRAM CHEST- 1/20/2025 3:45 PM     HISTORY: acute respiratory failure with hypoxemia; J44.1-Chronic  obstructive pulmonary disease with (acute) exacerbation;  R09.02-Hypoxemia     TOTAL DOSE: 637.73 mGy.cm (Automatic exposure control technique was  implemented in an effort to keep the radiation dose as low as possible  without compromising image quality)     REPORT:  Spiral CT of the chest was performed after administration of intravenous  contrast from the thoracic inlet through the upper abdomen using CTA  protocol, which includes reconstructed maximum intensity projection  (MIP)coronal and sagittal images.     Comparison: High-resolution chest CT 1/3/2024.     The contrast bolus is satisfactory, there is mild dilation of the  central pulmonary arteries, no filling defects are seen in the pulmonary  arteries. Heart size is normal. The RV LV ratio is greater than 1, which  can be associated with right heart strain. There appears to be mild  dilation of the right atrium and " right ventricle. Diffuse calcified  coronary artery plaque is present. The thoracic aorta is normal in  caliber, small volume of calcified plaque is noted within the aortic  arch and at the origins of the great vessels.     No evidence of aortic dissection is identified. There is no pleural  effusion. No intrathoracic lymphadenopathy is identified. The thyroid  gland appears homogeneous. Review of lung windows demonstrates severe  changes of mixed paraseptal and centrilobular emphysema. There is  diffuse thickening of the interlobular septa within the periphery of  both lungs, greater in the lower lung zones. There appears to be mild  honeycombing in the lung bases as seen on previous high-resolution chest  CT. UIP is not excluded. No pneumothorax is identified, there is no lung  consolidation. The interstitial disease appears similar to the  high-resolution CT. Review of bone windows shows no acute abnormality,  there our chronic appearing superior plate compression fractures at T11  and T12.       Impression:      1. No evidence of PE is identified, the RV LV ratio is greater than 1,  which indicates right heart strain, also, the pulmonary arteries are  mildly dilated suggesting pulmonary arterial hypertension. This may be  associated with the extensive pulmonary fibrosis, which appears stable.  There is honeycombing in the periphery of the lung bases as before. UIP  is not excluded.               This report was signed and finalized on 1/20/2025 3:53 PM by Dr. Krishna Felipe MD.               I have reviewed the patient's current medications.     Assessment/Plan   Assessment  Active Hospital Problems    Diagnosis     **Acute on chronic respiratory failure with hypoxia     COPD with acute exacerbation     Type 2 diabetes mellitus without complication, with long-term current use of insulin     Acute-on-chronic respiratory failure     Bronchiectasis without acute exacerbation     ILD (interstitial lung disease)      Pulmonary hypertension     Diastolic dysfunction     Chronic respiratory failure with hypoxia     Pulmonary emphysema        Treatment Plan  Currently on 12L by high flow cannula (patient reports that he is normally on 6 L of supplemental oxygen at home with rest, and increases to 12 L with any activity)  CT Chest results reviewed  3.  Pulmonary following and appreciate their assistance  4.  MRSA PCR positive -- add IV Vancomycin  5.  Continue Azithromycin and Cefepime  6.  Respiratory PCR panel was negative  7.  Taper IV Solumedrol to 40mg IV q8hrs this AM  8.  Continue trial of Metanebs  9.  Follow-up respiratory culture and pneumocystis PCR  10.  Agree with trial of IV Lasix (ordered by Dr. London)  11.  PT and OT  12.  Workup continues    Medical Decision Making  Number and Complexity of problems: 3 acute; high complexity      Conditions and Status        Condition is unchanged     MDM Data  External documents reviewed: none  Cardiac tracing (EKG, telemetry) interpretation: no new EKGs  Radiology interpretation: CTA Chest reviewed and discussed with patient at bedside this morning    Labs reviewed: as above  Any tests that were considered but not ordered: none     Decision rules/scores evaluated (example [id number], Wells, etc): none     Discussed with: patient      Care Planning  Shared decision making: Discussed with patient with agreement to proceed with treatment plan as outlined  Code status and discussions: DO NOT RESUSCITATE    Disposition  Social Determinants of Health that impact treatment or disposition: None apparent at this time  I expect the patient to be discharged to: TBD        Electronically signed by Jb Rutledge MD, 01/21/25, 11:24 CST.

## 2025-01-21 NOTE — THERAPY TREATMENT NOTE
Patient Name: Jeramie Anaya  : 1950    MRN: [id number]                              Today's Date: 2025       Admit Date: 2025    Visit Dx:     ICD-10-CM ICD-9-CM   1. COPD exacerbation  J44.1 491.21   2. Hypoxia  R09.02 799.02   3. Impaired mobility [Z74.09]  Z74.09 799.89     Patient Active Problem List   Diagnosis    Pulmonary emphysema    Gastroesophageal reflux disease    Allergic rhinitis    Chronic respiratory failure with hypoxia    Ureterolithiasis    ILD (interstitial lung disease)    Diastolic dysfunction    Pulmonary hypertension    Lymphadenopathy    Bronchiectasis without acute exacerbation    COPD with acute exacerbation    Type 2 diabetes mellitus without complication, with long-term current use of insulin    Acute on chronic respiratory failure with hypoxia    Acute-on-chronic respiratory failure     Past Medical History:   Diagnosis Date    Bronchiectasis without acute exacerbation 3/7/2024    Bullous emphysema 2019    Diastolic dysfunction 10/12/2023    Emphysema of lung 2019    Gastroesophageal reflux disease 10/17/2019    High cholesterol     ILD (interstitial lung disease) 10/12/2023    Pulmonary hypertension 10/12/2023    Shingles     Type 2 diabetes mellitus without complication, with long-term current use of insulin 2025     Past Surgical History:   Procedure Laterality Date    CARDIAC CATHETERIZATION N/A 2023    Procedure: Right Heart Cath;  Surgeon: Earl Erwin MD;  Location: [facility] CATH INVASIVE LOCATION;  Service: Cardiology;  Laterality: N/A;    CYST REMOVAL      on tailbone    LUNG SURGERY      SUPERIOR VENA CAVA ANGIOPLASTY / STENTING        General Information       Row Name 25 1005          OT Time and Intention    Subjective Information complains of;dyspnea  -LUCI (r) PARKER (t) LUCI (c)     Document Type therapy note (daily note)  -LUCI (r) PARKER (t) LUCI (c)     Mode of Treatment occupational therapy  -LUCI (r) PARKER (t) LUCI (c)     Patient Effort good   -JW (r) CB (t) JW (c)     Symptoms Noted During/After Treatment shortness of breath;significant change in vital signs  -JW (r) CB (t) JW (c)       Row Name 01/21/25 1005          General Information    Patient Profile Reviewed yes  -JW (r) CB (t) JW (c)     Existing Precautions/Restrictions fall;oxygen therapy device and L/min  -JW (r) CB (t) JW (c)     Barriers to Rehab medically complex;previous functional deficit;physical barrier  -JW (r) CB (t) JW (c)       Row Name 01/21/25 1005          Cognition    Orientation Status (Cognition) oriented x 4  -JW (r) CB (t) JW (c)       Row Name 01/21/25 1005          Safety Issues/Impairments Affecting Functional Mobility    Impairments Affecting Function (Mobility) balance;endurance/activity tolerance;shortness of breath;strength  -JW (r) CB (t) JW (c)     Comment, Safety Issues/Impairments (Mobility) anticipated impairments, significant change in O2 sat prevented ambulation  -JW (r) CB (t) JW (c)               User Key  (r) = Recorded By, (t) = Taken By, (c) = Cosigned By      Initials Name Provider Type    Ophelia Pereira, OTR/L, CSRS Occupational Therapist    Cali Galeano, OT Student OT Student                     Mobility/ADL's       Row Name 01/21/25 1005          Transfers    Comment, (Transfers) Attempted to transfer, O2 dessatted significantly from 93% to 80%  -JW (r) CB (t) JW (c)       Row Name 01/21/25 1005          Functional Mobility    Functional Mobility- Safety Issues balance decreased during turns;supplemental O2  -JW (r) CB (t) JW (c)       Row Name 01/21/25 1005          Activities of Daily Living    BADL Assessment/Intervention lower body dressing;grooming  -JW (r) CB (t) JW (c)       Row Name 01/21/25 1005          Lower Body Dressing Assessment/Training    [place] Level (Lower Body Dressing) don;socks;modified independence  -JW (r) CB (t) JW (c)     Position (Lower Body Dressing) supported sitting  -JW (r) CB (t) JW (c)       Row Name  01/21/25 1005          Grooming Assessment/Training    [place] Level (Grooming) wash face, hands;set up  -JW (r) CB (t) JW (c)     Position (Grooming) supported sitting  -JW (r) CB (t) JW (c)               User Key  (r) = Recorded By, (t) = Taken By, (c) = Cosigned By      Initials Name Provider Type    Ophelia Pereira OTR/L, BRAYAN Occupational Therapist    Cali Galeano, OT Student OT Student                   Obj/Interventions       Row Name 01/21/25 1005          Motor Skills    Motor Skills functional endurance  -JW (r) CB (t) JW (c)     Functional Endurance Washing face and BUE with wash rag  -JW (r) CB (t) JW (c)       Row Name 01/21/25 1005          Balance    Balance Interventions sitting;static;dynamic  -JW (r) CB (t) JW (c)     Comment, Balance Mod I with sitting static and dynamic balance in chair. O2 desatted to 80% with 12L.  -JW (r) CB (t) JW (c)               User Key  (r) = Recorded By, (t) = Taken By, (c) = Cosigned By      Initials Name Provider Type    Ophelia Pereira OTR/L, BRAYAN Occupational Therapist    Cali Galeano, OT Student OT Student                   Goals/Plan       Row Name 01/21/25 1005          Therapy Assessment/Plan (OT)    Planned Therapy Interventions (OT) --  -JW (r) CB (t) JW (c)               User Key  (r) = Recorded By, (t) = Taken By, (c) = Cosigned By      Initials Name Provider Type    Ophelia Pereira OTR/L, [facility] Occupational Therapist    Cali Galeano, OT Student OT Student                   Clinical Impression       Row Name 01/21/25 1005          Pain Assessment    Pretreatment Pain Rating 0/10 - no pain  -JW (r) CB (t) JW (c)     Posttreatment Pain Rating 0/10 - no pain  -JW (r) CB (t) JW (c)       Row Name 01/21/25 1005          Plan of Care Review    Plan of Care Reviewed With patient;other (see comments)  pt encouraged to share energy conservation sheet with daughter when she arrives today  -JW (r) CB (t) JW (c)     Progress no  change  -JW (r) CB (t) JW (c)     Outcome Evaluation OT treatment completed. A&Ox4. C/O dypnea, Pt in chair upon arrival. Pt was educated on energy conservation strategies. Pt verbalized understanding and was able to recall the energy conservation strategies for daily tasks. Attempted to do a sit<>stand and chair>bed transfer, but unable due to significant change in O2 sat from 93% to 80% on 12L when pt attempted to reposition for transfer. O2 returned to baseline after ~5 minutes. Pt performed washing of face and BUE while seated in chair with set up. O2 desatted to 80 and then returned to basline during activity. Pt performed LBD donning socks with mod I while seated in chair. Pt was educated on how to implement energy conservation tactics for donning socks and was able to implement them correctly. Pt left in chair with call light in reach. O2 sat was remaining at mid to high 80's at end of session, nsg was notified. OT services should continue to address deficits in strength, activity tolerance, and BADL performance. Recommend SNF at discharge.  -JW (r) CB (t) JW (c)       Row Name 01/21/25 1005          Therapy Assessment/Plan (OT)    Rehab Potential (OT) --  -JW (r) CB (t) JW (c)     Criteria for Skilled Therapeutic Interventions Met (OT) --  -JW (r) CB (t) JW (c)     Therapy Frequency (OT) --  -JW (r) CB (t) JW (c)       Row Name 01/21/25 1005          Therapy Plan Review/Discharge Plan (OT)    Anticipated Discharge Disposition (OT) skilled nursing facility  -JW (r) CB (t) JW (c)       Row Name 01/21/25 1005          Vital Signs    Pre SpO2 (%) 93  -JW (r) CB (t) JW (c)     O2 Delivery Pre Treatment supplemental O2  -JW (r) CB (t) JW (c)     Intra SpO2 (%) 80  -JW (r) CB (t) JW (c)     O2 Delivery Intra Treatment supplemental O2  -JW (r) CB (t) JW (c)     Post SpO2 (%) 86  Nsg notified  -JW (r) CB (t) JW (c)     O2 Delivery Post Treatment supplemental O2  -JW (r) CB (t) JW (c)       Row Name 01/21/25 1005           Positioning and Restraints    Pre-Treatment Position sitting in chair/recliner  -JW (r) CB (t) JW (c)     Post Treatment Position chair  -JW (r) CB (t) JW (c)     In Chair notified nsg;sitting;call light within reach;encouraged to call for assist  -JW (r) CB (t) JW (c)               User Key  (r) = Recorded By, (t) = Taken By, (c) = Cosigned By      Initials Name Provider Type    Ophelia Pereira, OTR/L, CSRS Occupational Therapist    Cali Galeano, OT Student OT Student                   Outcome Measures       Row Name 01/21/25 1005          How much help from another is currently needed...    Putting on and taking off regular lower body clothing? 2  -JW (r) CB (t) JW (c)     Bathing (including washing, rinsing, and drying) 2  -JW (r) CB (t) JW (c)     Toileting (which includes using toilet bed pan or urinal) 2  -JW (r) CB (t) JW (c)     Putting on and taking off regular upper body clothing 3  -JW (r) CB (t) JW (c)     Taking care of personal grooming (such as brushing teeth) 3  -JW (r) CB (t) JW (c)     Eating meals 3  -JW (r) CB (t) JW (c)     AM-PAC 6 Clicks Score (OT) 15  -JW (r) CB (t)       Row Name 01/21/25 0858 01/21/25 0718       How much help from another person do you currently need...    Turning from your back to your side while in flat bed without using bedrails? 4  -MF 3  -    Moving from lying on back to sitting on the side of a flat bed without bedrails? 4  -MF 3  -    Moving to and from a bed to a chair (including a wheelchair)? 3  -MF 3  -    Standing up from a chair using your arms (e.g., wheelchair, bedside chair)? 3  -MF 3  -    Climbing 3-5 steps with a railing? 2  -MF 3  -    To walk in hospital room? 3  -MF 3  -    AM-PAC 6 Clicks Score (PT) 19  -MF 18  -    Highest Level of Mobility Goal 6 --> Walk 10 steps or more  -MF 6 --> Walk 10 steps or more  -      Row Name 01/21/25 1005 01/21/25 0858       Functional Assessment    Outcome Measure Options AM-PAC 6  Clicks Daily Activity (OT)  -LUCI (r) CB (t) JW (c) AM-PAC 6 Clicks Basic Mobility (PT)  -              User Key  (r) = Recorded By, (t) = Taken By, (c) = Cosigned By      Initials Name Provider Type    Heather Davis, PTA Physical Therapist Assistant    Ophelia Pereira, OTR/L, CSRS Occupational Therapist    Oliva Macias, RN Registered Nurse    Cali Galeano, OT Student OT Student                    Occupational Therapy Education       Title: PT OT SLP Therapies (In Progress)       Topic: Occupational Therapy (In Progress)       Point: ADL training (Done)       Description:   Instruct learner(s) on proper safety adaptation and remediation techniques during self care or transfers.   Instruct in proper use of assistive devices.                  Learning Progress Summary            Patient Eager, E, VU by CB at 1/21/2025 1126    Comment: Role of OT    Acceptance, E, VU by CB at 1/20/2025 1509    Comment: Role of OT                      Point: Home exercise program (Not Started)       Description:   Instruct learner(s) on appropriate technique for monitoring, assisting and/or progressing therapeutic exercises/activities.                  Learner Progress:  Not documented in this visit.              Point: Precautions (Done)       Description:   Instruct learner(s) on prescribed precautions during self-care and functional transfers.                  Learning Progress Summary            Patient Eager, E, VU by CB at 1/21/2025 1126    Comment: Role of OT    Acceptance, E, VU by CB at 1/20/2025 1509    Comment: Role of OT                      Point: Body mechanics (Done)       Description:   Instruct learner(s) on proper positioning and spine alignment during self-care, functional mobility activities and/or exercises.                  Learning Progress Summary            Patient Eager, E, VU by CB at 1/21/2025 1126    Comment: Role of OT    Acceptance, E, VU by CB at 1/20/2025 1509    Comment: Role of  OT                                      User Key       Initials Effective Dates Name Provider Type Discipline     12/17/24 -  Cali Barnard, OT Student OT Student OT                  OT Recommendation and Plan  Planned Therapy Interventions (OT): activity tolerance training, adaptive equipment training, BADL retraining, occupation/activity based interventions, patient/caregiver education/training, transfer/mobility retraining, strengthening exercise, ROM/therapeutic exercise  Therapy Frequency (OT): 5 times/wk  Plan of Care Review  Plan of Care Reviewed With: patient, other (see comments) (pt encouraged to share energy conservation sheet with daughter when she arrives today)  Progress: no change  Outcome Evaluation: OT treatment completed. A&Ox4. C/O dypnea, Pt in chair upon arrival. Pt was educated on energy conservation strategies. Pt verbalized understanding and was able to recall the energy conservation strategies for daily tasks. Attempted to do a sit<>stand and chair>bed transfer, but unable due to significant change in O2 sat from 93% to 80% on 12L when pt attempted to reposition for transfer. O2 returned to baseline after ~5 minutes. Pt performed washing of face and BUE while seated in chair with set up. O2 desatted to 80 and then returned to basline during activity. Pt performed LBD donning socks with mod I while seated in chair. Pt was educated on how to implement energy conservation tactics for donning socks and was able to implement them correctly. Pt left in chair with call light in reach. O2 sat was remaining at mid to high 80's at end of session, nsg was notified. OT services should continue to address deficits in strength, activity tolerance, and BADL performance. Recommend SNF at discharge.     Time Calculation:         Time Calculation- OT       Row Name 01/21/25 1005             Time Calculation- OT    OT Start Time 1005  -JW (r) CB (t) JW (c)      OT Stop Time 1038  -JW (r) CB (t) JW (c)       OT Time Calculation (min) 33 min  -JW (r) CB (t)      Total Timed Code Minutes- OT 33 minute(s)  -JW (r) CB (t) JW (c)      OT Received On 01/21/25  -JW (r) CB (t) JW (c)         Timed Charges    17439 - OT Self Care/Mgmt Minutes 33  -JW (r) CB (t) JW (c)         Total Minutes    Timed Charges Total Minutes 33  -JW (r) CB (t)       Total Minutes 33  -JW (r) CB (t)                User Key  (r) = Recorded By, (t) = Taken By, (c) = Cosigned By      Initials Name Provider Type    Ophelia Pereira, OTR/L, CSRS Occupational Therapist    Cali Galeano, OT Student OT Student                           Cali Barnard, OT Student  1/21/2025

## 2025-01-22 NOTE — PLAN OF CARE
Goal Outcome Evaluation:  Plan of Care Reviewed With: patient        Progress: no change  Outcome Evaluation: OT treatment completed. A&Ox4. C/O lower back pain and dyspnea. On 13L O2 via NC. Pt found EOB with nursing. LBD donning socks was performed mod I, pt able to implement energy conservation strategy for BADL.Pt performed 3 grooming tasks EOB with setup. O2 was initially ~90% and desatted to 78% during grooming activities, returned to baseline after ~5 mins. Pt able to recall all energy conservation strategies. Sit>supine, rolling left, and sidelying>sit were performed mod I using handrails and HOB elevated. Sit<>stand and bed>chair transfer was performed CGA and verbal cues. Pt left in chair reclined with nsg present. Cont OT. Recommend SNF at d/c.    Anticipated Discharge Disposition (OT): skilled nursing facility

## 2025-01-22 NOTE — PROGRESS NOTES
"     Inpatient Progress Note        Results Review:   Results from last 7 days   Lab Units 25  0150 25  0219 25  0119   SODIUM mmol/L 131* 131* 131*   POTASSIUM mmol/L 3.6 4.1 4.6   CHLORIDE mmol/L 95* 96* 95*   CO2 mmol/L 26.0 26.0 22.0   BUN mg/dL 22 21 21   CALCIUM mg/dL 7.7* 7.9* 7.7*     Results from last 7 days   Lab Units 25  0119 25   WBC 10*3/mm3 7.81 9.48   HEMOGLOBIN g/dL 12.6* 13.8   HEMATOCRIT % 37.0* 40.4   PLATELETS 10*3/mm3 162 189       Visit Vitals  /59 (BP Location: Right arm, Patient Position: Lying)   Pulse 78   Temp 97.3 °F (36.3 °C) (Oral)   Resp 18   Ht 185.4 cm (73\")   Wt 102 kg (224 lb)   SpO2 90%   BMI 29.55 kg/m²            Medications:   budesonide, 0.5 mg, Nebulization, BID - RT  cefTRIAXone, 2,000 mg, Intravenous, Q24H  cetirizine, 10 mg, Oral, Daily  fluticasone, 2 spray, Each Nare, Daily  [Held by provider] furosemide, 40 mg, Oral, BID Diuretics  guaiFENesin, 1,200 mg, Oral, Q12H  insulin glargine, 25 Units, Subcutaneous, Nightly  insulin lispro, 3-14 Units, Subcutaneous, 4x Daily AC & at Bedtime  ipratropium-albuterol, 3 mL, Nebulization, Q4H - RT  methylPREDNISolone sodium succinate, 80 mg, Intravenous, Q8H  pantoprazole, 20 mg, Oral, BID  senna-docusate sodium, 2 tablet, Oral, BID   And  polyethylene glycol, 17 g, Oral, Daily  sodium chloride, 10 mL, Intravenous, Q12H  sodium chloride, 4 mL, Nebulization, BID - RT  vancomycin, 750 mg, Intravenous, Q12H                                                NAME: Jeramie Anaya  MRN: [id number]  AGE: 74 y.o.            : 1950  ADMISSION DATE: 2025  PRIMARY CARE PROVIDER: Hi Herrera  ATTENDING PHYSICIAN: Jb Rutledge MD                       Reason for Consult:  Acute on chronic respiratory failure, pulmonary fibrosis exacerbation    Interval History:    No acute events overnight, patient sitting on side of bed on 12 L.  He does have increased work of breathing today.  PT " at bedside noted that the patient desatted to 78% with activity while seated.  Subjectively feels his respiratory status is similar.  No new pulmonary complaints.    Review of Systems:  A full 12-point review of systems was performed and was negative except as documented in the HPI.                       Physical Exam:  General: No acute distress, cooperative  Head: Atraumatic, normocephalic, normal inspection  Eyes: EOMI, normal inspection  ENT: Mucous membranes moist, no thrush  Teeth/gums: Normal inspection  Heart: RRR, no murmur  Lungs: Diminished bilaterally with bibasilar crackles  MSK: Similar pitting edema up to the knee  GI/abdominal: Soft, nontender  Neurologic: Alert, oriented.  Cranial nerves II through XII grossly intact.           Imaging Review:   No new imaging for review.                       Problem List:  CPFE with acute exacerbation  Moderate COPD with acute exacerbation  Pulmonary hypertension  Acute on chronic hypoxic respiratory failure, improving  Severe emphysema  Reduced DLCO  Former tobacco use           Recommendations:   -Continue supplemental oxygen as needed and wean as tolerated, goal O2 sat of 88-92%  -Respiratory status similar today compared to previous.  Will continue Solu-Medrol at current dosing  -Rocephin and vancomycin to complete at least a 5-day course  -Repeat Lasix dose today with 80 mg IV x 1, he has had good urine output with previous dosing  -Continue nebulized triple therapy  -Pneumocystis PCR pending  -Discontinue MetaNeb and hypertonic saline, continue flutter valve and incentive spirometer  -PT/OT  -Chest x-ray tomorrow    Thank you for allowing us to participate in this patient's care. We will continue to follow.             Jun London DO  Pulmonary/Critical Care  1/22/2025  12:31 CST

## 2025-01-22 NOTE — PLAN OF CARE
Goal Outcome Evaluation:      Pt has been short of breath frequently tonight. Sats were in the 80s. Increased O2 to 14L. Sats in the middle 90s. Cont to complain increased to 15L. Maintained safety.

## 2025-01-22 NOTE — THERAPY TREATMENT NOTE
Patient Name: Jeramie Anaya  : 1950    MRN: [id number]                              Today's Date: 2025       Admit Date: 2025    Visit Dx:     ICD-10-CM ICD-9-CM   1. COPD exacerbation  J44.1 491.21   2. Hypoxia  R09.02 799.02   3. Impaired mobility [Z74.09]  Z74.09 799.89     Patient Active Problem List   Diagnosis    Pulmonary emphysema    Gastroesophageal reflux disease    Allergic rhinitis    Chronic respiratory failure with hypoxia    Ureterolithiasis    ILD (interstitial lung disease)    Diastolic dysfunction    Pulmonary hypertension    Lymphadenopathy    Bronchiectasis without acute exacerbation    COPD with acute exacerbation    Type 2 diabetes mellitus without complication, with long-term current use of insulin    Acute on chronic respiratory failure with hypoxia    Acute-on-chronic respiratory failure     Past Medical History:   Diagnosis Date    Bronchiectasis without acute exacerbation 3/7/2024    Bullous emphysema 2019    Diastolic dysfunction 10/12/2023    Emphysema of lung 2019    Gastroesophageal reflux disease 10/17/2019    High cholesterol     ILD (interstitial lung disease) 10/12/2023    Pulmonary hypertension 10/12/2023    Shingles     Type 2 diabetes mellitus without complication, with long-term current use of insulin 2025     Past Surgical History:   Procedure Laterality Date    CARDIAC CATHETERIZATION N/A 2023    Procedure: Right Heart Cath;  Surgeon: Earl Erwin MD;  Location: [facility] CATH INVASIVE LOCATION;  Service: Cardiology;  Laterality: N/A;    CYST REMOVAL      on tailbone    LUNG SURGERY      SUPERIOR VENA CAVA ANGIOPLASTY / STENTING        General Information       Row Name 25 0833          OT Time and Intention    Subjective Information complains of;dyspnea  -LUCI (r) PARKER (t) LUCI (c)     Document Type therapy note (daily note)  -LUCI (r) PARKER (t) LUCI (c)     Mode of Treatment occupational therapy  -LUCI (r) PARKER (t) LUCI (c)     Patient Effort good   -JW (r) CB (t) JW (c)     Symptoms Noted During/After Treatment significant change in vital signs;shortness of breath  -JW (r) CB (t) JW (c)       Row Name 01/22/25 [facility]          General Information    Patient Profile Reviewed yes  -JW (r) CB (t) JW (c)     Existing Precautions/Restrictions fall;oxygen therapy device and L/min  -JW (r) CB (t) JW (c)     Barriers to Rehab medically complex;previous functional deficit;physical barrier  -JW (r) CB (t) JW (c)       Row Name 01/22/25 [facility]          Cognition    Orientation Status (Cognition) oriented x 4  -JW (r) CB (t) JW (c)       Row Name 01/22/25 [facility]          Safety Issues/Impairments Affecting Functional Mobility    Impairments Affecting Function (Mobility) endurance/activity tolerance;shortness of breath;strength  -JW (r) CB (t) JW (c)               User Key  (r) = Recorded By, (t) = Taken By, (c) = Cosigned By      Initials Name Provider Type    Ophelia Pereira, OTR/L, CSRS Occupational Therapist    Cali Galeano, OT Student OT Student                     Mobility/ADL's       Row Name 01/22/25 [facility]          Bed Mobility    Bed Mobility rolling left;sidelying-sit  -JW (r) CB (t) JW (c)     Rolling Right [place] (Bed Mobility) modified independence  -JW (r) CB (t) JW (c)     Sidelying-Sit [place] (Bed Mobility) modified independence  -JW (r) CB (t) JW (c)     Assistive Device (Bed Mobility) bed rails;head of bed elevated  -JW (r) CB (t) JW (c)       Row Name 01/22/25 [facility]          Transfers    Transfers sit-stand transfer;stand-sit transfer;bed-chair transfer  -JW (r) CB (t) JW (c)       Row Name 01/22/25 08          Bed-Chair Transfer    Bed-Chair [place] (Transfers) contact guard;verbal cues  -JW (r) CB (t) JW (c)       Row Name 01/22/25 [facility]          Sit-Stand Transfer    Sit-Stand [place] (Transfers) contact guard;verbal cues  -JW (r) CB (t) JW (c)       Row Name 01/22/25 [facility]          Stand-Sit Transfer    Stand-Sit [place]  (Transfers) contact guard;verbal cues  -JW (r) CB (t) JW (c)       Row Name 01/22/25 [facility]          Functional Mobility    Functional Mobility- Ind. Level contact guard assist;verbal cues required  -JW (r) CB (t) JW (c)     Functional Mobility- Safety Issues supplemental O2  -JW (r) CB (t) JW (c)     Functional Mobility- Comment Walked ~2 steps from bed>chair  -JW (r) CB (t) JW (c)       Row Name 01/22/25 [facility]          Activities of Daily Living    BADL Assessment/Intervention grooming;lower body dressing  -JW (r) CB (t) JW (c)       Row Name 01/22/25 [facility]          Lower Body Dressing Assessment/Training    [place] Level (Lower Body Dressing) don;socks;modified independence  -JW (r) CB (t) JW (c)     Position (Lower Body Dressing) unsupported sitting  -JW (r) CB (t) JW (c)       Row Name 01/22/25 [facility]          Grooming Assessment/Training    [place] Level (Grooming) wash face, hands;set up;hair care, combing/brushing;oral care regimen  -JW (r) CB (t) JW (c)     Position (Grooming) edge of bed sitting  -JW (r) CB (t) JW (c)               User Key  (r) = Recorded By, (t) = Taken By, (c) = Cosigned By      Initials Name Provider Type    Ophelia Pereira, OTR/L, CSRS Occupational Therapist    Cali Galeano, OT Student OT Student                   Obj/Interventions       Row Name 01/22/25 [facility]          Motor Skills    Motor Skills functional endurance  -JW (r) CB (t) JW (c)     Functional Endurance Washing face, BUE with wash rag and cleaning denchers  -JW (r) CB (t) JW (c)       Row Name 01/22/25 [facility]          Balance    Static Sitting Balance modified independence  -JW (r) CB (t) JW (c)     Dynamic Sitting Balance standby assist  -JW (r) CB (t) JW (c)     Position, Sitting Balance unsupported;sitting edge of bed  -JW (r) CB (t) JW (c)     Sit to Stand Dynamic Balance contact guard  -JW (r) CB (t) JW (c)     Static Standing Balance standby assist  -JW (r) CB (t) JW (c)     Dynamic Standing Balance  contact guard  -JW (r) PARKER (t) LUCI (c)     Position/Device Used, Standing Balance unsupported  -JW (r) PARKER (t) LUCI (c)     Balance Interventions sitting;standing;sit to stand;static;dynamic  -JW (r) PARKER (t) LUCI (c)               User Key  (r) = Recorded By, (t) = Taken By, (c) = Cosigned By      Initials Name Provider Type    Ophelia Pereira, OTR/L, CSRS Occupational Therapist    Cali Galeano, OT Student OT Student                   Goals/Plan    No documentation.                  Clinical Impression       Row Name 01/22/25 0833          Pain Assessment    Pretreatment Pain Rating 8/10  -JW (r) PARKER (t) LUCI (c)     Posttreatment Pain Rating 8/10  -JW (r) PARKER (t) LUCI (c)     Pain Location back  -JW (r) PARKER (t) LUCI (c)     Pain Side/Orientation lower  -JW (r) PARKER (t) LUCI (c)     Pain Management Interventions activity modification encouraged;positioning techniques utilized;exercise or physical activity utilized  -LUCI (r) PARKER (t) LUCI (c)     Response to Pain Interventions activity participation with tolerable pain  -JW (r) PARKER (t) LUCI (c)       Row Name 01/22/25 0865          Plan of Care Review    Plan of Care Reviewed With patient  -LUCI (r) PARKER (t) LUCI (c)     Outcome Evaluation OT treatment completed. A&Ox4. C/O lower back pain and dyspnea. On 13L O2 via NC. Pt found EOB with nursing. LBD donning socks was performed mod I, pt able to implement energy conservation strategy for BADL.Pt performed 3 grooming tasks EOB with setup. O2 was initially ~90% and desatted to 78% during grooming activities, returned to baseline after ~5 mins. Pt able to recall all energy conservation strategies. Sit>supine, rolling left, and sidelying>sit were performed mod I using handrails and HOB elevated. Sit<>stand and bed>chair transfer was performed CGA and verbal cues. Pt left in chair reclined with nsg present. Cont OT. Recommend SNF at d/c.  -LUCI (r) PARKER (t) LUCI (c)       Row Name 01/22/25 0836          Therapy Plan Review/Discharge Plan (OT)     Anticipated Discharge Disposition (OT) skilled nursing facility  -JW (r) CB (t) JW (c)       Row Name 01/22/25 0833          Vital Signs    Pre SpO2 (%) 90  -JW (r) CB (t) JW (c)     O2 Delivery Pre Treatment supplemental O2  -JW (r) CB (t) JW (c)     Intra SpO2 (%) 78  -JW (r) CB (t) JW (c)     O2 Delivery Intra Treatment supplemental O2  -JW (r) CB (t) JW (c)     Post SpO2 (%) 90  -JW (r) CB (t) JW (c)     O2 Delivery Post Treatment supplemental O2  -JW (r) CB (t) JW (c)     Pre Patient Position Sitting  -JW (r) CB (t) JW (c)     Intra Patient Position Standing  -JW (r) CB (t) JW (c)     Post Patient Position Sitting  -JW (r) CB (t) JW (c)     Recovery Time 4-5 min  -JW (r) CB (t) JW (c)       Row Name 01/22/25 0833          Positioning and Restraints    Pre-Treatment Position in bed  -JW (r) CB (t) JW (c)     Post Treatment Position chair  -JW (r) CB (t) JW (c)     In Chair reclined;notified nsg;call light within reach;encouraged to call for assist  -JW (r) CB (t) JW (c)               User Key  (r) = Recorded By, (t) = Taken By, (c) = Cosigned By      Initials Name Provider Type    Ophelia Pereira, OTR/L, CSRS Occupational Therapist    Cali Galeano, OT Student OT Student                   Outcome Measures       Row Name 01/22/25 0833          How much help from another is currently needed...    Putting on and taking off regular lower body clothing? 2  -JW (r) CB (t) JW (c)     Bathing (including washing, rinsing, and drying) 2  -JW (r) CB (t) JW (c)     Toileting (which includes using toilet bed pan or urinal) 2  -JW (r) CB (t) JW (c)     Putting on and taking off regular upper body clothing 3  -JW (r) CB (t) JW (c)     Taking care of personal grooming (such as brushing teeth) 3  -JW (r) CB (t) JW (c)     Eating meals 3  -JW (r) PARKER (t) LUCI (c)     AM-PAC 6 Clicks Score (OT) 15  -LUCI (r) PARKER (t)       Row Name 01/22/25 0735          How much help from another person do you currently need...    Turning  from your back to your side while in flat bed without using bedrails? 3  -     Moving from lying on back to sitting on the side of a flat bed without bedrails? 3  -     Moving to and from a bed to a chair (including a wheelchair)? 3  -     Standing up from a chair using your arms (e.g., wheelchair, bedside chair)? 3  -     Climbing 3-5 steps with a railing? 3  -     To walk in hospital room? 3  -     AM-PAC 6 Clicks Score (PT) 18  -     Highest Level of Mobility Goal 6 --> Walk 10 steps or more  -       Row Name 01/22/25 0833          Functional Assessment    Outcome Measure Options AM-PAC 6 Clicks Daily Activity (OT)  -LUCI (r) PARKER (t) LUCI (c)               User Key  (r) = Recorded By, (t) = Taken By, (c) = Cosigned By      Initials Name Provider Type    Ophelia Pereira, OTR/L, CSRS Occupational Therapist    Oliva Macias, RN Registered Nurse    Cali Galeano, OT Student OT Student                    Occupational Therapy Education       Title: PT OT SLP Therapies (In Progress)       Topic: Occupational Therapy (In Progress)       Point: ADL training (Done)       Description:   Instruct learner(s) on proper safety adaptation and remediation techniques during self care or transfers.   Instruct in proper use of assistive devices.                  Learning Progress Summary            Patient Acceptance, E, VU by  at 1/22/2025 0941    Comment: Role of OT    Eager, E, VU by  at 1/21/2025 1126    Comment: Role of OT    Acceptance, E, VU by  at 1/20/2025 1509    Comment: Role of OT                      Point: Home exercise program (Not Started)       Description:   Instruct learner(s) on appropriate technique for monitoring, assisting and/or progressing therapeutic exercises/activities.                  Learner Progress:  Not documented in this visit.              Point: Precautions (Done)       Description:   Instruct learner(s) on prescribed precautions during self-care and functional  transfers.                  Learning Progress Summary            Patient Acceptance, E, VU by CB at 1/22/2025 0941    Comment: Role of OT    Eager, E, VU by CB at 1/21/2025 1126    Comment: Role of OT    Acceptance, E, VU by CB at 1/20/2025 1509    Comment: Role of OT                      Point: Body mechanics (Done)       Description:   Instruct learner(s) on proper positioning and spine alignment during self-care, functional mobility activities and/or exercises.                  Learning Progress Summary            Patient Acceptance, E, VU by CB at 1/22/2025 0941    Comment: Role of OT    Eager, E, VU by CB at 1/21/2025 1126    Comment: Role of OT    Acceptance, E, VU by CB at 1/20/2025 1509    Comment: Role of OT                                      User Key       Initials Effective Dates Name Provider Type Discipline     12/17/24 -  Cali Barnard, OT Student OT Student OT                  OT Recommendation and Plan  Planned Therapy Interventions (OT): activity tolerance training, adaptive equipment training, BADL retraining, occupation/activity based interventions, patient/caregiver education/training, transfer/mobility retraining, strengthening exercise, ROM/therapeutic exercise  Therapy Frequency (OT): 5 times/wk  Plan of Care Review  Plan of Care Reviewed With: patient  Progress: no change  Outcome Evaluation: OT treatment completed. A&Ox4. C/O lower back pain and dyspnea. On 13L O2 via NC. Pt found EOB with nursing. LBD donning socks was performed mod I, pt able to implement energy conservation strategy for BADL.Pt performed 3 grooming tasks EOB with setup. O2 was initially ~90% and desatted to 78% during grooming activities, returned to baseline after ~5 mins. Pt able to recall all energy conservation strategies. Sit>supine, rolling left, and sidelying>sit were performed mod I using handrails and HOB elevated. Sit<>stand and bed>chair transfer was performed CGA and verbal cues. Pt left in chair reclined  with nsg present. Cont OT. Recommend SNF at d/c.     Time Calculation:         Time Calculation- OT       Row Name 01/22/25 0833             Time Calculation- OT    OT Start Time 0825  -JW (r) CB (t) JW (c)      OT Stop Time 0903  -JW (r) CB (t) JW (c)      OT Time Calculation (min) 38 min  -JW (r) CB (t)      Total Timed Code Minutes- OT 38 minute(s)  -JW (r) CB (t) JW (c)      OT Received On 01/22/25  -JW         Timed Charges    42435 - OT Self Care/Mgmt Minutes 38  -JW (r) CB (t) JW (c)         Total Minutes    Timed Charges Total Minutes 38  -JW (r) CB (t)       Total Minutes 38  -JW (r) CB (t)                User Key  (r) = Recorded By, (t) = Taken By, (c) = Cosigned By      Initials Name Provider Type    Ophelia Pereira, OTR/L, CSRS Occupational Therapist    Cali Galeano, OT Student OT Student                           Cali Barnard, OT Student  1/22/2025

## 2025-01-22 NOTE — PLAN OF CARE
Problem: Adult Inpatient Plan of Care  Goal: Plan of Care Review  Outcome: Progressing  Goal: Patient-Specific Goal (Individualized)  Outcome: Progressing  Goal: Absence of Hospital-Acquired Illness or Injury  Outcome: Progressing  Intervention: Identify and Manage Fall Risk  Recent Flowsheet Documentation  Taken 1/21/2025 1800 by Oliva Almonte RN  Safety Promotion/Fall Prevention:   safety round/check completed   assistive device/personal items within reach   clutter free environment maintained   lighting adjusted   nonskid shoes/slippers when out of bed  Goal: Optimal Comfort and Wellbeing  Outcome: Progressing  Goal: Readiness for Transition of Care  Outcome: Progressing     Problem: Comorbidity Management  Goal: Maintenance of COPD Symptom Control  Outcome: Progressing  Goal: Blood Glucose Level Within Target Range  Outcome: Progressing  Goal: Blood Pressure in Desired Range  Outcome: Progressing     Problem: Skin Injury Risk Increased  Goal: Skin Health and Integrity  Outcome: Progressing     Problem: Fall Injury Risk  Goal: Absence of Fall and Fall-Related Injury  Outcome: Progressing  Intervention: Promote Injury-Free Environment  Recent Flowsheet Documentation  Taken 1/21/2025 1800 by Oliva Almonte RN  Safety Promotion/Fall Prevention:   safety round/check completed   assistive device/personal items within reach   clutter free environment maintained   lighting adjusted   nonskid shoes/slippers when out of bed   Goal Outcome Evaluation:    Patient breathing became worse and more labored as shift continued. Patient visibly struggling and reporting feeling wore out. States at this point he does not care what happens.  Patient on 15 L high flow nasal cannula.  Dr Rutledge notified and @ bedside. Ordered stat ABGs and CXR. RT and radiology notified. Dr London updated as well. Ordered CPAP 10 with 100 % O2 and 1g solumedrol IVPB daily for 3 days. Dr rutledge notified. ABGs obtained. RT placed pt on Vapotherm  for now. Patient improving and states he is feeling better.

## 2025-01-22 NOTE — PROGRESS NOTES
[facility] Medicine Services  INPATIENT PROGRESS NOTE    Patient Name: Jeramie Anaya  Date of Admission: 1/19/2025  Today's Date: 01/22/25  Length of Stay: 2  Primary Care Physician: Hi Herrera    Subjective   Chief Complaint: Shortness of breath  HPI   Patient on 13 L by nasal cannula.  Sitting up at the bedside chair this morning.  Reports that he cannot perceive much benefit from the MetaNeb treatment, and reports that it is causing some irritation in his mouth and at the sites where his dentures fit.  He reports that even with minimal activity, such as getting out of the bedside chair and transitioning over to the bed will result in his O2 sats dipping down into the 70s.  Reports that his cough remains dry and mainly nonproductive.  Denies any new pain symptoms.  He does report it has been a number of days since his last bowel movement.    Review of Systems   All pertinent negatives and positives are as above. All other systems have been reviewed and are negative unless otherwise stated.     Objective    Temp:  [97.1 °F (36.2 °C)-97.8 °F (36.6 °C)] 97.4 °F (36.3 °C)  Heart Rate:  [62-92] 88  Resp:  [16-20] 18  BP: ()/(47-59) 113/51  Physical Exam  Vitals reviewed.   Constitutional:       General: He is not in acute distress.     Appearance: He is ill-appearing.   HENT:      Head: Normocephalic.      Mouth/Throat:      Mouth: Mucous membranes are moist.   Cardiovascular:      Rate and Rhythm: Normal rate.   Pulmonary:      Effort: No respiratory distress.      Breath sounds: Rhonchi present.      Comments: On 13L high flow cannula; scattered crackly BSs  Musculoskeletal:         General: No deformity.      Right lower leg: Edema present.      Left lower leg: Edema present.   Skin:     General: Skin is warm.   Neurological:      General: No focal deficit present.      Mental Status: He is alert. Mental status is at baseline.   Psychiatric:         Mood and Affect:  "Mood normal.         Results Review:  I have reviewed the labs, radiology results, and diagnostic studies.    Laboratory Data:   Results from last 7 days   Lab Units 01/20/25  0119 01/19/25 1951   WBC 10*3/mm3 7.81 9.48   HEMOGLOBIN g/dL 12.6* 13.8   HEMATOCRIT % 37.0* 40.4   PLATELETS 10*3/mm3 162 189        Results from last 7 days   Lab Units 01/22/25  0150 01/21/25  0219 01/20/25  0119 01/19/25 2018 01/19/25 1951   SODIUM mmol/L 131* 131* 131*  --  132*   SODIUM, ARTERIAL   --   --   --    < >  --    POTASSIUM mmol/L 3.6 4.1 4.6  --  4.2   CHLORIDE mmol/L 95* 96* 95*  --  96*   CO2 mmol/L 26.0 26.0 22.0  --  25.0   BUN mg/dL 22 21 21  --  23   CREATININE mg/dL 1.14 1.14 0.99  --  1.20   CALCIUM mg/dL 7.7* 7.9* 7.7*  --  8.7   BILIRUBIN mg/dL  --   --   --   --  0.7   ALK PHOS U/L  --   --   --   --  87   ALT (SGPT) U/L  --   --   --   --  17   AST (SGOT) U/L  --   --   --   --  11   GLUCOSE mg/dL 209* 178* 261*  --  241*    < > = values in this interval not displayed.       Culture Data:   No results found for: \"BLOODCX\", \"URINECX\", \"WOUNDCX\", \"MRSACX\", \"RESPCX\", \"STOOLCX\"    Radiology Data:   Imaging Results (Last 24 Hours)       ** No results found for the last 24 hours. **            I have reviewed the patient's current medications.     Assessment/Plan   Assessment  Active Hospital Problems    Diagnosis     **Acute on chronic respiratory failure with hypoxia     COPD with acute exacerbation     Type 2 diabetes mellitus without complication, with long-term current use of insulin     Acute-on-chronic respiratory failure     Bronchiectasis without acute exacerbation     ILD (interstitial lung disease)     Pulmonary hypertension     Diastolic dysfunction     Chronic respiratory failure with hypoxia     Pulmonary emphysema        Treatment Plan  Currently on 13L by high flow cannula (patient reports that he is normally on 6 L of supplemental oxygen at home with rest, and increases to 12 L with any " activity)  2.  Pulmonary recommendations reviewed and will return Solumedrol to 80mg IV q8hr dosing for now  3.  MRSA PCR positive -- currently on IV Vancomycin  4.  No Pseudomonas present on current respiratory culture and will transition IV Cefepime to Ceftriaxone at 2grams  5.  Respiratory PCR panel was negative  6.  Stop Metanebs - patient request  7.  Patient also indicates he has been on chest physiotherapy in the past without benefit.  8.  Continue to follow-up respiratory culture and pneumocystis PCR  9.  PT and OT  10.  Add bowel regimen today  11.  Dispo planning    Medical Decision Making  Number and Complexity of problems: 3 acute; high complexity      Conditions and Status        Condition is unchanged     MDM Data  External documents reviewed: none  Cardiac tracing (EKG, telemetry) interpretation: no new EKGs  Radiology interpretation: no new radiology studies  Labs reviewed: as above  Any tests that were considered but not ordered: none     Decision rules/scores evaluated (example [id number], [place], etc): none     Discussed with: patient      Care Planning  Shared decision making: Discussed with patient with agreement to proceed with treatment plan as outlined  Code status and discussions: DO NOT RESUSCITATE    Disposition  Social Determinants of Health that impact treatment or disposition: None apparent at this time  I expect the patient to be discharged to: TBD        Electronically signed by Jb Rutledge MD, 01/22/25, 09:59 CST.

## 2025-01-22 NOTE — PROGRESS NOTES
Placed patient on Vapotherm 25L/75%.  At this time, patient will not tolerate CPAP.  Patient stated he felt  immediately better on Vapotherm.  Spo2 93% .  Report given to pm therapist

## 2025-01-23 NOTE — PROGRESS NOTES
"Pharmacy Dosing Service  Pharmacokinetics  Vancomycin Follow-up Evaluation    Assessment/Action/Plan:    Calculated AUC based on trough drawn today subtherapeutic at 270. Renal function improved. Vancomycin dose adjusted to 1500mg IV q12. Predicted steady state values on new regimen below. Pharmacy will follow daily and adjust as needed. Pharmacy will continue to follow daily and adjust dose accordingly.    AUC Model Data:  Regimen: 1500 mg IV every 12 hours.  Exposure target: AUC24 (range)400-600 mg/L.hr   AUC24,ss: 535 mg/L.hr  Probability of AUC24 > 400: 97 %  Ctrough,ss: 15.7 mg/L  Probability of Ctrough,ss > 20: 11 %  Probability of nephrotoxicity (Lodise ITALIA 2009): 11 %  Additional antimicrobial agent(s): Rocephin     Subjective:  Jeramie Anaya is a 74 y.o. male currently on Vancomycin 750 mg IV every 12 hours for the treatment of pneumonia, day 3 of therapy. Current end date: 1/26/25    Objective:  Ht: 185.4 cm (73\"); Wt: 102 kg (224 lb)  Estimated Creatinine Clearance: 106.5 mL/min (A) (by C-G formula based on SCr of 0.74 mg/dL (L)).   Creatinine   Date Value Ref Range Status   01/23/2025 0.74 (L) 0.76 - 1.27 mg/dL Final   01/22/2025 1.14 0.76 - 1.27 mg/dL Final   01/21/2025 1.14 0.76 - 1.27 mg/dL Final      Lab Results   Component Value Date    WBC 8.39 01/23/2025    WBC 7.81 01/20/2025    WBC 9.48 01/19/2025         Lab Results   Component Value Date    VANCOTROUGH 9.50 01/23/2025         Culture Results:  Microbiology Results (last 10 days)       Procedure Component Value - Date/Time    MRSA Screen, PCR (Inpatient) - Swab, Nares [[id number]]  (Abnormal) Collected: 01/20/25 1111    Lab Status: Final result Specimen: Swab from Nares Updated: 01/20/25 1229     MRSA PCR MRSA Detected    Narrative:      The negative predictive value of this diagnostic test is high and should only be used to consider de-escalating anti-MRSA therapy. A positive result may indicate colonization with MRSA and must be correlated " clinically.    S. Pneumo Ag Urine or CSF - Urine, Urine, Clean Catch [[id number]]  (Normal) Collected: 01/20/25 1108    Lab Status: Final result Specimen: Urine, Clean Catch Updated: 01/20/25 1136     Strep Pneumo Ag Negative    Legionella Antigen, Urine - Urine, Urine, Clean Catch [[id number]]  (Normal) Collected: 01/20/25 1108    Lab Status: Final result Specimen: Urine, Clean Catch Updated: 01/20/25 1136     LEGIONELLA ANTIGEN, URINE Negative    Respiratory Culture - Sputum, Cough [[id number]] Collected: 01/20/25 1039    Lab Status: Final result Specimen: Sputum from Cough Updated: 01/22/25 0848     Respiratory Culture Moderate growth (3+) Normal respiratory mariel. No S. aureus or Pseudomonas aeruginosa detected. Final report.     Gram Stain Many (4+) WBCs per low power field      Few (2+) Epithelial cells per low power field      Many (4+) Gram positive bacilli resembling diphtheroids      Rare (1+) Mixed gram positive mariel    Respiratory Panel PCR w/COVID-19(SARS-CoV-2) [facility] In-House, NP Swab in UTM/VTM, 2 HR TAT - Swab, Nasopharynx [[id number]]  (Normal) Collected: 01/19/25 1937    Lab Status: Final result Specimen: Swab from Nasopharynx Updated: 01/19/25 2036     ADENOVIRUS, PCR Not Detected     Coronavirus 229E Not Detected     Coronavirus HKU1 Not Detected     Coronavirus NL63 Not Detected     Coronavirus OC43 Not Detected     COVID19 Not Detected     Human Metapneumovirus Not Detected     Human Rhinovirus/Enterovirus Not Detected     Influenza A PCR Not Detected     Influenza B PCR Not Detected     Parainfluenza Virus 1 Not Detected     Parainfluenza Virus 2 Not Detected     Parainfluenza Virus 3 Not Detected     Parainfluenza Virus 4 Not Detected     RSV, PCR Not Detected     Bordetella pertussis pcr Not Detected     Bordetella parapertussis PCR Not Detected     Chlamydophila pneumoniae PCR Not Detected     Mycoplasma pneumo by PCR Not Detected    Narrative:      In the setting of a  positive respiratory panel with a viral infection PLUS a negative procalcitonin without other underlying concern for bacterial infection, consider observing off antibiotics or discontinuation of antibiotics and continue supportive care. If the respiratory panel is positive for atypical bacterial infection (Bordetella pertussis, Chlamydophila pneumoniae, or Mycoplasma pneumoniae), consider antibiotic de-escalation to target atypical bacterial infection.            CAROL Quintero PharmD   01/23/25 11:37 CST

## 2025-01-23 NOTE — PLAN OF CARE
Goal Outcome Evaluation:           Progress: no change  Outcome Evaluation: SR [id number]

## 2025-01-23 NOTE — THERAPY TREATMENT NOTE
Patient Name: Jeramie Anaya  : 1950    MRN: [id number]                              Today's Date: 2025       Admit Date: 2025    Visit Dx:     ICD-10-CM ICD-9-CM   1. COPD exacerbation  J44.1 491.21   2. Hypoxia  R09.02 799.02   3. Impaired mobility [Z74.09]  Z74.09 799.89     Patient Active Problem List   Diagnosis    Pulmonary emphysema    Gastroesophageal reflux disease    Allergic rhinitis    Chronic respiratory failure with hypoxia    Ureterolithiasis    ILD (interstitial lung disease)    Diastolic dysfunction    Pulmonary hypertension    Lymphadenopathy    Bronchiectasis without acute exacerbation    COPD with acute exacerbation    Type 2 diabetes mellitus without complication, with long-term current use of insulin    Acute on chronic respiratory failure with hypoxia    Acute-on-chronic respiratory failure     Past Medical History:   Diagnosis Date    Bronchiectasis without acute exacerbation 3/7/2024    Bullous emphysema 2019    Diastolic dysfunction 10/12/2023    Emphysema of lung 2019    Gastroesophageal reflux disease 10/17/2019    High cholesterol     ILD (interstitial lung disease) 10/12/2023    Pulmonary hypertension 10/12/2023    Shingles     Type 2 diabetes mellitus without complication, with long-term current use of insulin 2025     Past Surgical History:   Procedure Laterality Date    CARDIAC CATHETERIZATION N/A 2023    Procedure: Right Heart Cath;  Surgeon: Earl Erwin MD;  Location: [facility] CATH INVASIVE LOCATION;  Service: Cardiology;  Laterality: N/A;    CYST REMOVAL      on tailbone    LUNG SURGERY      SUPERIOR VENA CAVA ANGIOPLASTY / STENTING        General Information       Row Name 25 0738          OT Time and Intention    Subjective Information complains of;pain;dyspnea  -LUCI (r) PARKER (sheri) LUCI (c)     Document Type therapy note (daily note)  -LUCI (patricia) PARKER (sheri) LUCI (c)     Mode of Treatment occupational therapy  -LUCI (r) PARKER (t) LUCI (c)     Patient Effort  adequate  -JW (r) CB (t) JW (c)       Row Name 01/23/25 0738          General Information    Patient Profile Reviewed yes  -JW (r) CB (t) JW (c)     Existing Precautions/Restrictions fall;oxygen therapy device and L/min  -JW (r) CB (t) JW (c)     Barriers to Rehab medically complex;previous functional deficit;physical barrier  -JW (r) CB (t) JW (c)       Row Name 01/23/25 0738          Cognition    Orientation Status (Cognition) oriented x 4  -JW (r) CB (t) JW (c)       Row Name 01/23/25 0738          Safety Issues/Impairments Affecting Functional Mobility    Impairments Affecting Function (Mobility) endurance/activity tolerance;shortness of breath;strength  -JW (r) CB (t) JW (c)     Comment, Safety Issues/Impairments (Mobility) anticipated impairments, Pt refused OOB activities due to significant change in O2 sat yesterday afternoon.  -JW (r) CB (t) JW (c)               User Key  (r) = Recorded By, (t) = Taken By, (c) = Cosigned By      Initials Name Provider Type    Ophelia Pereira OTR/L, CSRS Occupational Therapist    Cali Galeano, OT Student OT Student                     Mobility/ADL's       Row Name 01/23/25 0738          Bed Mobility    Comment, (Bed Mobility) Pt refused all bed mobility, stated its would increase his lower back pain and would only go EOB when his breakfast arrived.  -JW (r) CB (t) JW (c)       Row Name 01/23/25 0738          Activities of Daily Living    BADL Assessment/Intervention grooming  -JW (r) CB (t) JW (c)       Row Name 01/23/25 07          Grooming Assessment/Training    [place] Level (Grooming) wash face, hands;set up;hair care, combing/brushing  -JW (r) CB (t) JW (c)     Position (Grooming) supine  -JW (r) CB (t) JW (c)               User Key  (r) = Recorded By, (t) = Taken By, (c) = Cosigned By      Initials Name Provider Type    Ophelia Pereira OTR/L, CSRS Occupational Therapist    Cali Galeano, OT Student OT Student                    Obj/Interventions       Row Name 01/23/25 0738          Motor Skills    Motor Skills functional endurance  -JW (r) CB (t) LUCI (c)     Functional Endurance Washing face, BUE with wash rag and combing hair  -JW (r) CB (t) LUCI (c)               User Key  (r) = Recorded By, (t) = Taken By, (c) = Cosigned By      Initials Name Provider Type    JW Ophelia Hankins, OTR/L, CSRS Occupational Therapist    Cali Galeano, OT Student OT Student                   Goals/Plan    No documentation.                  Clinical Impression       Row Name 01/23/25 0738          Pain Assessment    Pretreatment Pain Rating 5/10  -JW (r) CB (t) LUCI (c)     Posttreatment Pain Rating 5/10  -JW (r) CB (t) LUCI (c)     Pain Location back  -JW (r) CB (t) LUCI (c)     Pain Side/Orientation lower  -JW (r) CB (t) LUCI (c)     Pain Management Interventions activity modification encouraged;positioning techniques utilized  -JW (r) CB (t) LUCI (c)     Response to Pain Interventions activity participation with tolerable pain  -JW (r) CB (t) LUCI (c)       Row Name 01/23/25 0738          Plan of Care Review    Plan of Care Reviewed With patient  -JW (r) PARKER (t) LUCI (c)     Outcome Evaluation OT treatment completed. A&Ox4. C/O dypnea and pain in lower back. On Vapotherm 30L/min at 70%. Pt in fowlers with nursing present upon arrival. Pt was relectant to therapy this morning due to significant changes in his O2 sats yesterday afternoon. Pt refused all OOB activities and bed mobility stating it would increase his back pain and would only go EOB once his breakfast arrived. Pt completed 2 grooming tasks while in fowlers with set up. O2 desatted from 92% to 86% during grooming activities, returned to baseline after 2-3 mins. Pt was able to recall 3/3 energy conservation strategies. Pt left in fowlers with call light in reach. Cont OT services. Recommend SNF at d/c.  -LUCI (r) PARKER (t) LUCI (c)       Row Name 01/23/25 0738          Therapy Plan Review/Discharge Plan (OT)     Anticipated Discharge Disposition (OT) skilled nursing facility  -JW (r) CB (t) JW (c)       Row Name 01/23/25 0738          Vital Signs    Pre SpO2 (%) 92  -JW (r) CB (t) JW (c)     O2 Delivery Pre Treatment supplemental O2  -JW (r) CB (t) JW (c)     Intra SpO2 (%) 86  -JW (r) CB (t) JW (c)     O2 Delivery Intra Treatment supplemental O2  -JW (r) CB (t) JW (c)     Post SpO2 (%) 92  -JW (r) CB (t) JW (c)     O2 Delivery Post Treatment supplemental O2  -JW (r) CB (t) JW (c)     Pre Patient Position Supine  -JW (r) CB (t) JW (c)     Intra Patient Position Supine  -JW (r) CB (t) JW (c)     Post Patient Position Supine  -JW (r) CB (t) JW (c)     Recovery Time 2-3min  -JW (r) CB (t) JW (c)       Row Name 01/23/25 0738          Positioning and Restraints    Pre-Treatment Position in bed  -JW (r) CB (t) JW (c)     Post Treatment Position bed  -JW (r) CB (t) JW (c)     In Bed fowlers;call light within reach;side rails up x2;encouraged to call for assist  -JW (r) CB (t) JW (c)               User Key  (r) = Recorded By, (t) = Taken By, (c) = Cosigned By      Initials Name Provider Type    Ophelia Pereira, OTR/L, CSRS Occupational Therapist    Cali Galeano, OT Student OT Student                   Outcome Measures       Row Name 01/23/25 0738          How much help from another is currently needed...    Putting on and taking off regular lower body clothing? 2  -JW (r) CB (t) JW (c)     Bathing (including washing, rinsing, and drying) 2  -JW (r) CB (t) JW (c)     Toileting (which includes using toilet bed pan or urinal) 2  -JW (r) CB (t) JW (c)     Putting on and taking off regular upper body clothing 3  -JW (r) CB (t) JW (c)     Taking care of personal grooming (such as brushing teeth) 3  -JW (r) CB (t) JW (c)     Eating meals 3  -LUCI (r) PARKER (sheri) LUCI (loy)     AM-PAC 6 Clicks Score (OT) 15  -LUCI (r) PARKER (t)       Row Name 01/23/25 0738          Functional Assessment    Outcome Measure Options AM-PAC 6 Clicks Daily Activity  (OT)  -JW (r) CB (t) JW (c)               User Key  (r) = Recorded By, (t) = Taken By, (c) = Cosigned By      Initials Name Provider Type    Ophelia Pereira, OTR/L, CSRS Occupational Therapist    Cali Galeano, OT Student OT Student                    Occupational Therapy Education       Title: PT OT SLP Therapies (In Progress)       Topic: Occupational Therapy (In Progress)       Point: ADL training (Done)       Description:   Instruct learner(s) on proper safety adaptation and remediation techniques during self care or transfers.   Instruct in proper use of assistive devices.                  Learning Progress Summary            Patient Acceptance, E, VU by CB at 1/23/2025 0826    Comment: Role of OT    Acceptance, E, VU by CB at 1/22/2025 0941    Comment: Role of OT    Eager, E, VU by CB at 1/21/2025 1126    Comment: Role of OT    Acceptance, E, VU by CB at 1/20/2025 1509    Comment: Role of OT                      Point: Home exercise program (Not Started)       Description:   Instruct learner(s) on appropriate technique for monitoring, assisting and/or progressing therapeutic exercises/activities.                  Learner Progress:  Not documented in this visit.              Point: Precautions (Done)       Description:   Instruct learner(s) on prescribed precautions during self-care and functional transfers.                  Learning Progress Summary            Patient Acceptance, E, VU by CB at 1/23/2025 0826    Comment: Role of OT    Acceptance, E, VU by CB at 1/22/2025 0941    Comment: Role of OT    Eager, E, VU by CB at 1/21/2025 1126    Comment: Role of OT    Acceptance, E, VU by CB at 1/20/2025 1509    Comment: Role of OT                      Point: Body mechanics (Done)       Description:   Instruct learner(s) on proper positioning and spine alignment during self-care, functional mobility activities and/or exercises.                  Learning Progress Summary            Patient Acceptance, E,  VU by  at 1/23/2025 0826    Comment: Role of OT    YADIRA Thomas VU by  at 1/22/2025 0941    Comment: Role of OT    YADIRA Cruz VU by  at 1/21/2025 1126    Comment: Role of OT    Martha, E VU by  at 1/20/2025 1509    Comment: Role of OT                                      User Key       Initials Effective Dates Name Provider Type Discipline     12/17/24 -  Cali Barnard, OT Student OT Student OT                  OT Recommendation and Plan  Planned Therapy Interventions (OT): activity tolerance training, adaptive equipment training, BADL retraining, occupation/activity based interventions, patient/caregiver education/training, transfer/mobility retraining, strengthening exercise, ROM/therapeutic exercise  Therapy Frequency (OT): 5 times/wk  Plan of Care Review  Plan of Care Reviewed With: patient  Progress: no change  Outcome Evaluation: OT treatment completed. A&Ox4. C/O dypnea and pain in lower back. On Vapotherm 30L/min at 70%. Pt in fowlers with nursing present upon arrival. Pt was relectant to therapy this morning due to significant changes in his O2 sats yesterday afternoon. Pt refused all OOB activities and bed mobility stating it would increase his back pain and would only go EOB once his breakfast arrived. Pt completed 2 grooming tasks while in fowlers with set up. O2 desatted from 92% to 86% during grooming activities, returned to baseline after 2-3 mins. Pt was able to recall 3/3 energy conservation strategies. Pt left in fowlers with call light in reach. Cont OT services. Recommend SNF at d/c.     Time Calculation:         Time Calculation- OT       Row Name 01/23/25 0737             Time Calculation- OT    OT Start Time 0730  -LUCI (r) PARKER (t) LUCI (c)      OT Stop Time 0800  -LUCI (r) PARKER (t) JW (c)      OT Time Calculation (min) 30 min  -LUCI (r) CB (t)      Total Timed Code Minutes- OT 30 minute(s)  -LUCI (r) PARKER (t) LUCI (c)      OT Received On 01/23/25  -LUCI (r) PARKER (t) LUCI (c)      OT Goal Re-Cert Due  Date 02/02/25  -JW (r) CB (t) JW (c)         Timed Charges    99383 - OT Self Care/Mgmt Minutes 30  -JW (r) CB (t) JW (c)         Total Minutes    Timed Charges Total Minutes 30  -JW (r) CB (t)       Total Minutes 30  -JW (r) CB (t)                User Key  (r) = Recorded By, (t) = Taken By, (c) = Cosigned By      Initials Name Provider Type    Ophelia Pereira, OTR/L, CSRS Occupational Therapist    Cali Galeano, OT Student OT Student                           Cali Barnard, OT Student  1/23/2025

## 2025-01-23 NOTE — PROGRESS NOTES
[facility] Medicine Services  INPATIENT PROGRESS NOTE    Patient Name: Jeramie Anaya  Date of Admission: 1/19/2025  Today's Date: 01/23/25  Length of Stay: 3  Primary Care Physician: iH Herrera    Subjective   Chief Complaint: Shortness of breath  HPI   Nursing contacted me around 5 PM last night the patient was not doing as well.  He was having worsening shortness of breath.  His oxygen had been increased to 15 L by high flow.  Stat chest x-ray and ABG were performed.  Case also discussed with Dr. London with pulmonology.    It sounds like patient did not indicate attempted utilizing CPAP but has done better on Vapotherm.  Currently on Vapotherm at 30 L/min and 70% FiO2.  Patient also administered 1 g of Solu-Medrol with plans to complete a 3-day course.    Patient voices no new symptoms this morning.  He feels like that he is breathing more comfortably on Vapotherm.  He denies any new pain symptoms.  He does report having a good bowel movement yesterday.  I asked him if he had any interest in resuming MetaNebs, and he did not perceive any benefit and would prefer to avoid.  Reports that his congestion is similar without new change.  Reports very good urine output after receiving IV Lasix.    Review of Systems   All pertinent negatives and positives are as above. All other systems have been reviewed and are negative unless otherwise stated.     Objective    Temp:  [97.3 °F (36.3 °C)-98.6 °F (37 °C)] 97.6 °F (36.4 °C)  Heart Rate:  [63-92] 87  Resp:  [16-20] 16  BP: (103-124)/(51-70) 103/59  Physical Exam  Vitals reviewed.   Constitutional:       General: He is not in acute distress.     Appearance: He is ill-appearing.   HENT:      Head: Normocephalic.      Mouth/Throat:      Mouth: Mucous membranes are moist.   Cardiovascular:      Rate and Rhythm: Normal rate.   Pulmonary:      Effort: No respiratory distress.      Breath sounds: Rhonchi present.      Comments: On  "Vapotherm at 30lpm and 70% Fi02 this morning; congested cough noted during exam  Musculoskeletal:         General: No deformity.      Right lower leg: Edema present.      Left lower leg: Edema present.   Skin:     General: Skin is warm.   Neurological:      General: No focal deficit present.      Mental Status: He is alert. Mental status is at baseline.   Psychiatric:         Mood and Affect: Mood normal.         Results Review:  I have reviewed the labs, radiology results, and diagnostic studies.    Laboratory Data:   Results from last 7 days   Lab Units 01/20/25  0119 01/19/25 1951   WBC 10*3/mm3 7.81 9.48   HEMOGLOBIN g/dL 12.6* 13.8   HEMATOCRIT % 37.0* 40.4   PLATELETS 10*3/mm3 162 189        Results from last 7 days   Lab Units 01/22/25  1710 01/22/25  0150 01/21/25  0219 01/20/25  0119 01/19/25 2018 01/19/25 1951   SODIUM mmol/L  --  131* 131* 131*  --  132*   SODIUM, ARTERIAL mmol/L 131*  --   --   --    < >  --    POTASSIUM mmol/L  --  3.6 4.1 4.6  --  4.2   CHLORIDE mmol/L  --  95* 96* 95*  --  96*   CO2 mmol/L  --  26.0 26.0 22.0  --  25.0   BUN mg/dL  --  22 21 21  --  23   CREATININE mg/dL  --  1.14 1.14 0.99  --  1.20   CALCIUM mg/dL  --  7.7* 7.9* 7.7*  --  8.7   BILIRUBIN mg/dL  --   --   --   --   --  0.7   ALK PHOS U/L  --   --   --   --   --  87   ALT (SGPT) U/L  --   --   --   --   --  17   AST (SGOT) U/L  --   --   --   --   --  11   GLUCOSE mg/dL  --  209* 178* 261*  --  241*    < > = values in this interval not displayed.       Culture Data:   No results found for: \"BLOODCX\", \"URINECX\", \"WOUNDCX\", \"MRSACX\", \"RESPCX\", \"STOOLCX\"    Radiology Data:   Imaging Results (Last 24 Hours)       Procedure Component Value Units Date/Time    XR Chest 1 View [[id number]] Collected: 01/23/25 0631     Updated: 01/23/25 0637    Narrative:      EXAMINATION: XR CHEST 1 VW-     1/23/2025 2:36 AM     HISTORY: Respiratory failure; J44.1-Chronic obstructive pulmonary  disease with (acute) exacerbation; " R09.02-Hypoxemia; Z74.09-Other  reduced mobility     A frontal projection of the chest is compared with the previous study  dated 1/22/2025.     Lungs are poorly expanded, worse than on the previous study.     There are atelectatic changes and bibasilar pleural effusion similar to  the previous study.     Persistent extensive chronic interstitial lung changes are similar to  the previous study.     A septated radiolucency along the right upper lateral chest wall is  similar to the previous study and probably represent a large bulla.     Moderate cardiomegaly persists. Atheromatous changes of the thoracic  aorta.     No acute bony abnormality. Arthropathy of the right shoulder joint with  rotator cuff tear.       Impression:      1. Poor lung expansion. The persistent extensive chronic inflammatory or  obstructive lung changes. Cardiomegaly.        This report was signed and finalized on 1/23/2025 6:33 AM by Dr. Pierce Hartman MD.       XR Chest 1 View [[id number]] Collected: 01/22/25 1724     Updated: 01/22/25 1729    Narrative:      EXAMINATION: XR CHEST 1 VW-     1/22/2025 3:56 PM     HISTORY: shortness of breath; J44.1-Chronic obstructive pulmonary  disease with (acute) exacerbation; R09.02-Hypoxemia; Z74.09-Other  reduced mobility     1 view chest x-ray.     COMPARISON:  1/20/2025 chest CT.  1/19/2025 chest x-ray.     Heart size is within normal limits.  Normal mediastinum.     Pulmonary fibrosis most prominent in the lower lobes.     No focal infiltrate.     No new lung disease.       Impression:      1. Stable chronic change.  2. No acute abnormality.           This report was signed and finalized on 1/22/2025 5:25 PM by Dr. Rafita Bai MD.               I have reviewed the patient's current medications.     Assessment/Plan   Assessment  Active Hospital Problems    Diagnosis     **Acute on chronic respiratory failure with hypoxia     COPD with acute exacerbation     Type 2 diabetes mellitus without  complication, with long-term current use of insulin     Acute-on-chronic respiratory failure     Bronchiectasis without acute exacerbation     ILD (interstitial lung disease)     Pulmonary hypertension     Diastolic dysfunction     Chronic respiratory failure with hypoxia     Pulmonary emphysema        Treatment Plan  Now on Vapotherm at 30lpm and 70% Fi02 (patient normally on 6 L of supplemental oxygen at home with rest, and increases to 12 L with any activity)  CXR this morning personally reviewed - extensive bilaterally changes are noted, included suspected large bulla noted laterally on the right      3.  Case discussed with Dr. London  4.  Solumedrol 1gram IV daily X 3 days - day 2 today  5.  MRSA PCR positive -- remains on IV Vancomycin  6.  No Pseudomonas present on current respiratory culture and transitioned from IV Cefepime to Ceftriaxone at 2grams on 1/22  7.  Received Lasix 80mg IV X 1 yesterday  8.  Respiratory PCR panel was negative  9.  Metanebs stopped at patient request; patient also indicates he has been on chest physiotherapy previously without benefit.  10.  Respiratory culture currently negative  11.  PT and OT as tolerated  12.  Bowel regimen - patient did have a good BM yesterday  13.  Hyperglycemia exacerbated by ongoing steroid administration.  Titrate Lantus to 30 units nightly and will also add scheduled insulin lispro 8 units with meals; sliding scale insulin coverage available.  Monitor blood sugar trend closely.  14.  No labs currently ordered for this morning, and given his clinical change since last PM will order CBC and BMP now  15.  Guarded prognosis    Medical Decision Making  Number and Complexity of problems: 3 acute; high complexity      Conditions and Status        Condition has worsened     MDM Data  External documents reviewed: none  Cardiac tracing (EKG, telemetry) interpretation: no new EKGs  Radiology interpretation:as above  Labs reviewed: pending for this AM  Any tests  that were considered but not ordered: none     Decision rules/scores evaluated (example [id number], Wells, etc): none     Discussed with: patient and Dr. London     Care Planning  Shared decision making: Discussed with patient with agreement to proceed with treatment plan as outlined  Code status and discussions: DO NOT RESUSCITATE    Disposition  Social Determinants of Health that impact treatment or disposition: None apparent at this time  I expect the patient to be discharged to: TBD        Electronically signed by Jb Rutledge MD, 01/23/25, 07:02 CST.

## 2025-01-23 NOTE — CASE MANAGEMENT/SOCIAL WORK
Continued Stay Note   [facility]     Patient Name: Jeramie Anaya  MRN: [id number]  Today's Date: 1/23/2025    Admit Date: 1/19/2025    Plan: Possible SNF   Discharge Plan       Row Name 01/23/25 3924       Plan    Plan Possible SNF    Plan Comments SW spoke with patient regarding plan at discharge.  Patient is currently on 30L O2.  Patient stated if his respiratory status did not improve he would be looking at going home with hospice.  Patient would not be eligible for SNF placement on 30L O2.  SW following patient's progress.      Row Name 01/23/25 0363       Plan    Plan SNF    Plan Comments SW spoke to pt's daughter, Meme at [phone].  She is aware that pt will likely need placement upon dc.  Pt has never been to SNF in the past.  She is unsure where pt will want to go.  She will speak with pt and SW will also to determine where to send referrals.                   Discharge Codes    No documentation.                 Expected Discharge Date and Time       Expected Discharge Date Expected Discharge Time    Jan 23, 2025               CRYSTAL JohsnonW

## 2025-01-23 NOTE — PROGRESS NOTES
"     Inpatient Progress Note        Results Review:   Results from last 7 days   Lab Units 25  0902 25  1710 25  0150 25  0219   SODIUM mmol/L 130*  --  131* 131*   SODIUM, ARTERIAL mmol/L  --  131*  --   --    POTASSIUM mmol/L 4.1  --  3.6 4.1   CHLORIDE mmol/L 95*  --  95* 96*   CO2 mmol/L 24.0  --  26.0 26.0   BUN mg/dL 16  --  22 21   CALCIUM mg/dL 8.0*  --  7.7* 7.9*     Results from last 7 days   Lab Units 25  0902 25  0119 25  1951   WBC 10*3/mm3 8.39 7.81 9.48   HEMOGLOBIN g/dL 12.7* 12.6* 13.8   HEMATOCRIT % 37.0* 37.0* 40.4   PLATELETS 10*3/mm3 183 162 189       Visit Vitals  /58 (BP Location: Right arm, Patient Position: Lying)   Pulse 75   Temp 98.1 °F (36.7 °C) (Oral)   Resp 16   Ht 185.4 cm (73\")   Wt 102 kg (224 lb)   SpO2 (!) 88%   BMI 29.55 kg/m²            Medications:   budesonide, 0.5 mg, Nebulization, BID - RT  cefTRIAXone, 2,000 mg, Intravenous, Q24H  cetirizine, 10 mg, Oral, Daily  fluticasone, 2 spray, Each Nare, Daily  [Held by provider] furosemide, 40 mg, Oral, BID Diuretics  guaiFENesin, 1,200 mg, Oral, Q12H  insulin glargine, 30 Units, Subcutaneous, Nightly  insulin lispro, 3-14 Units, Subcutaneous, 4x Daily AC & at Bedtime  Insulin Lispro, 8 Units, Subcutaneous, TID With Meals  ipratropium-albuterol, 3 mL, Nebulization, Q4H - RT  methylPREDNISolone sodium succinate, 1,000 mg, Intravenous, Daily  pantoprazole, 20 mg, Oral, BID  senna-docusate sodium, 2 tablet, Oral, BID  sodium chloride, 10 mL, Intravenous, Q12H  vancomycin, 750 mg, Intravenous, Q12H                                                NAME: Jeramie Anaya  MRN: [id number]  AGE: 74 y.o.            : 1950  ADMISSION DATE: 2025  PRIMARY CARE PROVIDER: Hi Herrera  ATTENDING PHYSICIAN: Jb Rutledge MD                       Reason for Consult:  Acute on chronic respiratory failure    Interval History:    The patient had worsening of his respiratory status " yesterday evening.  He was placed back on Vapotherm and started on 1 g of Solu-Medrol daily for 3 days.  Today the patient states his respiratory status is improved.  He is breathing more comfortably on Vapotherm.  He is down to 30 L and 70% with O2 sat remaining in the mid to low 90s.    Review of Systems:  A full 12-point review of systems was performed and was negative except as documented in the HPI.                       Physical Exam:  General: No acute distress, cooperative  Head: Atraumatic, normocephalic, normal inspection  Eyes: EOMI, normal inspection  ENT: Mucous membranes moist, no thrush  Teeth/gums: Normal inspection  Heart: RRR, no murmur  Lungs: Diminished bilaterally with bibasilar crackles  MSK: No edema or clubbing  GI/abdominal: Soft, nontender  Neurologic: Alert, oriented.  Cranial nerves II through XII grossly intact.           Imaging Review:   I personally reviewed the chest x-ray completed yesterday evening:  Strays showed chronic changes with no new infiltrates or opacities.                       Problem List:  CPFE with acute exacerbation  Moderate COPD with acute exacerbation  Pulmonary hypertension  Acute on chronic hypoxic respiratory failure, improving  Severe emphysema  Reduced DLCO  Former tobacco use           Recommendations:   -Continue supplemental oxygen as needed and wean as tolerated, goal O2 sat of 88-92%  -Will continue Solu-Medrol at 1 g daily for 3 days total with plan to taper for acute exacerbation of his pulmonary fibrosis  -Rocephin and vancomycin to complete at least a 5-day course  -Continues to have good urine output, however his creatinine has trended up and his sodium/chloride down.  Will hold on IV diuresis today  -Continue nebulized triple therapy  -Pneumocystis PCR pending  -Discontinue MetaNeb and hypertonic saline, continue flutter valve and incentive spirometer  -PT/OT    Thank you for allowing us to participate in this patient's care. We will continue to  follow.             Jun London DO  Pulmonary/Critical Care  1/23/2025  11:15 CST

## 2025-01-23 NOTE — CASE MANAGEMENT/SOCIAL WORK
Continued Stay Note   [place]     Patient Name: Jeramie Anaya  MRN: [id number]  Today's Date: 1/23/2025    Admit Date: 1/19/2025    Plan: SNF   Discharge Plan       Row Name 01/23/25 5916       Plan    Plan SNF    Plan Comments SW spoke to pt's daughter, Meme at [phone].  She is aware that pt will likely need placement upon dc.  Pt has never been to SNF in the past.  She is unsure where pt will want to go.  She will speak with pt and SW will also to determine where to send referrals.                   Discharge Codes    No documentation.                 Expected Discharge Date and Time       Expected Discharge Date Expected Discharge Time    Jan 23, 2025               CRYSTAL MckinnonW

## 2025-01-23 NOTE — PLAN OF CARE
Goal Outcome Evaluation:  Plan of Care Reviewed With: patient        Progress: no change  Outcome Evaluation: OT treatment completed. A&Ox4. C/O dypnea and pain in lower back. On Vapotherm 30L/min at 70%. Pt in fowlers with nursing present upon arrival. Pt was relectant to therapy this morning due to significant changes in his O2 sats yesterday afternoon. Pt refused all OOB activities and bed mobility stating it would increase his back pain and would only go EOB once his breakfast arrived. Pt completed 2 grooming tasks while in fowlers with set up. O2 desatted from 92% to 86% during grooming activities, returned to baseline after 2-3 mins. Pt was able to recall 3/3 energy conservation strategies. Pt left in fowlers with call light in reach. Cont OT services. Recommend SNF at d/c.    Anticipated Discharge Disposition (OT): skilled nursing facility

## 2025-01-24 NOTE — PLAN OF CARE
Goal Outcome Evaluation:           Progress: no change    Problem: Adult Inpatient Plan of Care  Goal: Plan of Care Review  Outcome: Progressing  Flowsheets (Taken 1/24/2025 0344)  Progress: no change  Goal: Patient-Specific Goal (Individualized)  Outcome: Progressing  Goal: Absence of Hospital-Acquired Illness or Injury  Outcome: Progressing  Intervention: Identify and Manage Fall Risk  Recent Flowsheet Documentation  Taken 1/23/2025 1930 by Alisha Monae RN  Safety Promotion/Fall Prevention: safety round/check completed  Intervention: Prevent Skin Injury  Recent Flowsheet Documentation  Taken 1/23/2025 1930 by Alisha Monae RN  Body Position: position changed independently  Goal: Optimal Comfort and Wellbeing  Outcome: Progressing  Goal: Readiness for Transition of Care  Outcome: Progressing     Problem: Comorbidity Management  Goal: Maintenance of COPD Symptom Control  Outcome: Progressing  Goal: Blood Glucose Level Within Target Range  Outcome: Progressing  Goal: Blood Pressure in Desired Range  Outcome: Progressing     Problem: Skin Injury Risk Increased  Goal: Skin Health and Integrity  Outcome: Progressing     Problem: Fall Injury Risk  Goal: Absence of Fall and Fall-Related Injury  Outcome: Progressing  Intervention: Promote Injury-Free Environment  Recent Flowsheet Documentation  Taken 1/23/2025 1930 by Alisha Monae RN  Safety Promotion/Fall Prevention: safety round/check completed     Problem: Noninvasive Ventilation Acute  Goal: Effective Unassisted Ventilation and Oxygenation  Outcome: Progressing

## 2025-01-24 NOTE — PROGRESS NOTES
"     Inpatient Progress Note        Results Review:   Results from last 7 days   Lab Units 25  0146 25  0902 25  1710 25  0150   SODIUM mmol/L 132* 130*  --  131*   SODIUM, ARTERIAL mmol/L  --   --  131*  --    POTASSIUM mmol/L 3.8 4.1  --  3.6   CHLORIDE mmol/L 98 95*  --  95*   CO2 mmol/L 24.0 24.0  --  26.0   BUN mg/dL 16 16  --  22   CALCIUM mg/dL 7.9* 8.0*  --  7.7*     Results from last 7 days   Lab Units 25  0146 25  0902 25  0119   WBC 10*3/mm3 7.95 8.39 7.81   HEMOGLOBIN g/dL 11.3* 12.7* 12.6*   HEMATOCRIT % 34.1* 37.0* 37.0*   PLATELETS 10*3/mm3 164 183 162       Visit Vitals  /57 (BP Location: Right arm, Patient Position: Lying)   Pulse 78   Temp 98 °F (36.7 °C) (Oral)   Resp 16   Ht 185.4 cm (73\")   Wt 102 kg (224 lb)   SpO2 93%   BMI 29.55 kg/m²            Medications:   budesonide, 0.5 mg, Nebulization, BID - RT  cefTRIAXone, 2,000 mg, Intravenous, Q24H  cetirizine, 10 mg, Oral, Daily  fluticasone, 2 spray, Each Nare, Daily  furosemide, 80 mg, Intravenous, Once  [START ON 2025] furosemide, 80 mg, Intravenous, BID Diuretics  guaiFENesin, 1,200 mg, Oral, Q12H  insulin glargine, 20 Units, Subcutaneous, Nightly  insulin lispro, 3-14 Units, Subcutaneous, 4x Daily AC & at Bedtime  Insulin Lispro, 8 Units, Subcutaneous, TID With Meals  ipratropium-albuterol, 3 mL, Nebulization, Q4H - RT  [START ON 2025] methylPREDNISolone sodium succinate, 80 mg, Intravenous, Q8H  pantoprazole, 20 mg, Oral, BID  senna-docusate sodium, 2 tablet, Oral, BID  sodium chloride, 10 mL, Intravenous, Q12H  vancomycin, 1,500 mg, Intravenous, Q12H                                                NAME: Jeramie Anaya  MRN: [id number]  AGE: 74 y.o.            : 1950  ADMISSION DATE: 2025  PRIMARY CARE PROVIDER: Hi Herrera  ATTENDING PHYSICIAN: Jb Rutledge MD                       Reason for Consult:  Acute on chronic hypoxic respiratory failure    Interval " History:    No acute events overnight, patient resting in bed on Vapotherm.  He is currently on 30 L and 70% with a sat in the low 90s.  He does have increased work of breathing today.  Feels his respiratory status has not improved.    Review of Systems:  A full 12-point review of systems was performed and was negative except as documented in the HPI.                       Physical Exam:  General: No acute distress, cooperative  Head: Atraumatic, normocephalic, normal inspection  Eyes: EOMI, normal inspection  ENT: Mucous membranes moist, no thrush  Teeth/gums: Normal flexion  Heart: RRR, no murmur  Lungs: Diminished bilaterally with bibasilar crackles  MSK: No edema or clubbing  GI/abdominal: Soft, nontender  Neurologic: Alert, oriented.  Cranial nerves II through XII grossly intact.           Imaging Review:   No new imaging for review.                       Problem List:  CPFE with acute exacerbation  Moderate COPD with acute exacerbation  Pulmonary hypertension  Acute on chronic hypoxic respiratory failure, improving  Severe emphysema  Reduced DLCO  Former tobacco use           Recommendations:   -Continue supplemental oxygen as needed and wean as tolerated, goal O2 sat of 88-92%  -Solu-Medrol 1 g for 3 days completed today, will resume 80 mg IV 3 times daily tomorrow  -Complete Rocephin and vancomycin for a 5-day course   -Will continue nebulized triple therapy  -He did not tolerate MetaNeb.  He is still noting productive cough of thick sputum.  Will restart hypertonic saline and add Vesicare therapy as tolerated  -Continue frequent incentive spirometer and flutter valve  -Discussion was had with the patient today, he is concerned that his respiratory status has not improved.  States he is considering transitioning to hospice care.  He has been on broad-spectrum antibiotics since admission.  Today was his third day of 1 g Solu-Medrol.  He did have a echo completed which again demonstrated severe pulmonary  hypertension.  We discussed that his current respiratory failure is likely multifactorial from exacerbation of his COPD/CPFE as well as his pulmonary hypertension.  He continues to have pitting bilateral lower extremity edema.  We discussed starting a Lasix drip, the patient asked not to be put on Lasix drip due to urinary frequency.  He is okay with intermittent dosing.  Will start 80 mg IV twice daily.      Thank you for allowing us to participate in this patient's care. We will continue to follow.             Jun London DO  Pulmonary/Critical Care  1/24/2025  14:49 CST

## 2025-01-24 NOTE — CASE MANAGEMENT/SOCIAL WORK
Continued Stay Note   [place]     Patient Name: Jeramie Anaya  MRN: [id number]  Today's Date: 1/24/2025    Admit Date: 1/19/2025    Plan: TBD   Discharge Plan       Row Name 01/24/25 1031       Plan    Plan TBD    Patient/Family in Agreement with Plan yes    Plan Comments Palliative care has been consulted; SW will follow.                   Discharge Codes    No documentation.                 Expected Discharge Date and Time       Expected Discharge Date Expected Discharge Time    Jan 23, 2025               CRYSTAL MckinnonW

## 2025-01-24 NOTE — PROGRESS NOTES
[facility]  INPATIENT PROGRESS NOTE    Patient Name: Jeramie Anaya  Date of Admission: 1/19/2025  Today's Date: 01/24/25  Length of Stay: 4  Primary Care Physician: Hi Herrera    Subjective   Chief Complaint: Shortness of breath  HPI   Patient has been unable to tell any significant improvement in symptoms.  He remains on Vapotherm at 30 L/min and 70% FiO2.  He has numerous members of his family at bedside today.  Continues to report a cough productive of primarily white but thick, almost glue-like sputum.  He reports that even eating, moving around in bed, having a conversation, etc., will cause worsening shortness of breath.  With activity he states that his O2 sats will drop down into the 70s pretty quickly.    Review of Systems   All pertinent negatives and positives are as above. All other systems have been reviewed and are negative unless otherwise stated.     Objective    Temp:  [97.6 °F (36.4 °C)-98.4 °F (36.9 °C)] 97.6 °F (36.4 °C)  Heart Rate:  [67-93] 69  Resp:  [16-20] 16  BP: (103-127)/(56-62) 110/62  Physical Exam  Vitals reviewed.   Constitutional:       General: He is not in acute distress.     Appearance: He is ill-appearing.   HENT:      Head: Normocephalic.      Mouth/Throat:      Mouth: Mucous membranes are moist.   Cardiovascular:      Rate and Rhythm: Normal rate.   Pulmonary:      Effort: No respiratory distress.      Breath sounds: Rhonchi present.      Comments: On Vapotherm at 30lpm and 70% Fi02 this morning; scattered crackly BSs  Musculoskeletal:         General: No deformity.      Right lower leg: Edema present.      Left lower leg: Edema present.   Skin:     General: Skin is warm.   Neurological:      General: No focal deficit present.      Mental Status: He is alert. Mental status is at baseline.   Psychiatric:         Mood and Affect: Mood normal.      Comments: Tearful at times         Results Review:  I have reviewed the  "labs, radiology results, and diagnostic studies.    Laboratory Data:   Results from last 7 days   Lab Units 01/24/25  0146 01/23/25  0902 01/20/25  0119   WBC 10*3/mm3 7.95 8.39 7.81   HEMOGLOBIN g/dL 11.3* 12.7* 12.6*   HEMATOCRIT % 34.1* 37.0* 37.0*   PLATELETS 10*3/mm3 164 183 162        Results from last 7 days   Lab Units 01/24/25  0146 01/23/25  0902 01/22/25  1710 01/22/25  0150 01/19/25 2018 01/19/25  1951   SODIUM mmol/L 132* 130*  --  131*   < > 132*   SODIUM, ARTERIAL mmol/L  --   --  131*  --    < >  --    POTASSIUM mmol/L 3.8 4.1  --  3.6   < > 4.2   CHLORIDE mmol/L 98 95*  --  95*   < > 96*   CO2 mmol/L 24.0 24.0  --  26.0   < > 25.0   BUN mg/dL 16 16  --  22   < > 23   CREATININE mg/dL 0.79 0.74*  --  1.14   < > 1.20   CALCIUM mg/dL 7.9* 8.0*  --  7.7*   < > 8.7   BILIRUBIN mg/dL 0.3  --   --   --   --  0.7   ALK PHOS U/L 81  --   --   --   --  87   ALT (SGPT) U/L 32  --   --   --   --  17   AST (SGOT) U/L 14  --   --   --   --  11   GLUCOSE mg/dL 150* 249*  --  209*   < > 241*    < > = values in this interval not displayed.       Culture Data:   No results found for: \"BLOODCX\", \"URINECX\", \"WOUNDCX\", \"MRSACX\", \"RESPCX\", \"STOOLCX\"    Radiology Data:   Imaging Results (Last 24 Hours)       ** No results found for the last 24 hours. **            I have reviewed the patient's current medications.     Assessment/Plan   Assessment  Active Hospital Problems    Diagnosis     **Acute on chronic respiratory failure with hypoxia     COPD with acute exacerbation     Type 2 diabetes mellitus without complication, with long-term current use of insulin     Acute-on-chronic respiratory failure     Bronchiectasis without acute exacerbation     ILD (interstitial lung disease)     Pulmonary hypertension     Diastolic dysfunction     Chronic respiratory failure with hypoxia     Pulmonary emphysema        Treatment Plan  Remains on Vapotherm at 30lpm and 70% Fi02 (patient normally on 6 L of supplemental oxygen at home " with rest, and increases to 12 L with any activity)  Recent repeat CXR with extensive bilaterally changes noted, including suspected large bulla noted laterally on the right      3.  Pulmonology following and appreciate Dr. London's assistance  4.  Trial of pulse dose steroids with Solumedrol 1gram IV daily X 3 days - day 3/3 today  5.  MRSA PCR positive -- remains on IV Vancomycin - day 4 today  6.  No Pseudomonas present on current respiratory culture and transitioned from IV Cefepime to Ceftriaxone at 2 grams on 1/22 - day 5 today  7.  Plan to repeat dose of Lasix 80mg IV X 1 today  8.  K supplement  9.  Respiratory PCR panel was negative  10.  Metanebs stopped at patient request; patient also indicates he has been on chest physiotherapy previously without benefit.  11.  Respiratory culture currently negative  12.  Will repeat CXR in AM tomorrow  13.  PT and OT as tolerated  14.  Hyperglycemia exacerbated by ongoing steroid administration.  Lantus currently at 20 units nightly and insulin lispro 8 units with meals; sliding scale insulin coverage available.  Monitor blood sugar trend closely.  15.  Guarded prognosis.  Palliative care consultation today.  Based on recent conversations I have had with patient I get the sense he is considering more of a hospice and comfort approach moving forward.    Medical Decision Making  Number and Complexity of problems: 3 acute; high complexity      Conditions and Status        Condition has worsened     MDM Data  External documents reviewed: none  Cardiac tracing (EKG, telemetry) interpretation: no new EKGs  Radiology interpretation:as above  Labs reviewed: as above  Any tests that were considered but not ordered: none     Decision rules/scores evaluated (example [id number], Wells, etc): none     Discussed with: patient and bedside nurse (Aviva); numerous family members at bedside this morning     Care Planning  Shared decision making: Discussed with patient with agreement  to proceed with treatment plan as outlined  Code status and discussions: DO NOT RESUSCITATE    Disposition  Social Determinants of Health that impact treatment or disposition: None apparent at this time  I expect the patient to be discharged to: D        Electronically signed by Jb Rutledge MD, 01/24/25, 09:00 CST.

## 2025-01-24 NOTE — PLAN OF CARE
Goal Outcome Evaluation:              Outcome Evaluation: Food ambassador contacted dietitian about pt requesting foods that exceed his carbohydrate exchanges per meal (75g per meal). Pt informed food ambassador that he is transitioning to comfort measures tomorrow. Dietitian will liberalize the diet per pt preferences. Will monitor POC.

## 2025-01-24 NOTE — CONSULTS
[facility]    Palliative Care Initial Consult   Attending Physician: Jb Rutledge MD  Referring Provider: Angel Rutledge MD    Reason for Referral: assistance with clarification of goals of care  Family/Support: children    Code Status and Medical Interventions: No CPR (Do Not Attempt to Resuscitate); Limited Support; No intubation (DNI), No cardioversion   Ordered at: 01/19/25 0332     Medical Intervention Limits:    No intubation (DNI)    No cardioversion     Code Status (Patient has no pulse and is not breathing):    No CPR (Do Not Attempt to Resuscitate)     Medical Interventions (Patient has pulse or is breathing):    Limited Support     Goals of Care: TBD.    HPI:   74 y.o. male has a past medical history of pulmonary fibrosis-follows pulmonology on chronic steroids/intolerance to Tyvaso and esbreit, chronic respiratory failure-4 to 6 L and increases to 12 L with activity at baseline, atrial fibrillation, bronchiectasis, severe bullous emphysema, Diastolic dysfunction, GERD, hyperlipidemia, interstitial lung disease, Pulmonary hypertension group 3, Shingles, and Type 2 diabetes mellitus with long-term current use of insulin.   [facility] related to shortness of breath and productive cough found to have hypoxia on 6 L and transition to Vapotherm.  ED workup shows elevated lactate, hyperglycemia, hyponatremia, hypoalbuminemia.  Chest imaging shows fibrotic interstitial lung disease without acute process.  Started on Solu-Medrol, nebs, IV Lasix, IV antibiotics.  Pulmonology consulted, medication changes, IV antibiotics to also include 5-day course of vancomycin due to MRSA PCR and additional diagnostics.  Evaluated by therapy who recommended SNF at discharge.  CT chest shows no evidence of PE, positive right heart strain, pulmonary arterial hypertension.  Extensive pulmonary fibrosis appears stable, honeycombing  "in the periphery of the lung bases as prior.  MetaNebs discontinued 1/22 due to oral irritation.  According to chart review desats with minimal activity while seated in the 70s.  Echocardiogram shows EF 61 to 65%, diastolic dysfunction, severe pulmonary hypertension.  Patient mentioned to  1/23 if oxygen did not improve would be looking at going home with hospice.  Remains on Vapotherm, attending notes any activity including conversation etc. causes worsening shortness of breath and desats into the 70s quickly and may be considering more of a hospice and comfort approach.  Sitting up in bed without apparent distress, eating a popsicle.  Multiple family members at bedside. Palliative Care Spoke With: patient and family reports extremely poor quality of life, mostly chair bound other than minimal activity of which he has significant shortness of breath. Due to the Palliative Care Topics Discussed: palliative care, goals of care, care options, resuscitation status, and [facility] we will establish an advance care plan.   Advance Care Planning   Advance Care Planning Discussion: With permission spoke openly with patient and multiple family members in room with regard to events leading to current hospitalization overall poor quality of life, and poor long-term prognosis secondary to respiratory failure, combined pulmonary fibrosis and emphysema with acute exacerbation, severe bullous emphysema, severe pulmonary hypertension, and comorbidities.  Patient reports he has had no improvement in his respiratory status throughout this hospitalization \"I am worse\".  We discussed his daily challenges due to advanced lung disease and high oxygen needs and how this has negatively impacted his life. His family able to confirm his struggles and states it is difficult to watch. States \"this is not going to get any better and likely much worse\". We discussed options to continue current interventions versus transition to comfort care " "approach. States he is considering transitioning to comfort measures \"probably tomorrow\". His family is understandably tearful, but realistic and supportive in patient's decision. We discussed goals and expectations of comfort measures and this decision is his to make when he is ready, we will support him in any manner he chooses. Questions encouraged and answered to the best of my ability. Patient and family appears to have good prognostic awareness.      Review of Systems   Constitutional: Positive for malaise/fatigue.   Respiratory:  Positive for shortness of breath.    Neurological:  Positive for weakness.   Psychiatric/Behavioral:  The patient is not nervous/anxious.      1- Pain Assessment  Pain Location: back  Pain Description: aching    Past Medical History:   Diagnosis Date    Bronchiectasis without acute exacerbation 3/7/2024    Bullous emphysema 2019    Diastolic dysfunction 10/12/2023    Emphysema of lung 2019    Gastroesophageal reflux disease 10/17/2019    High cholesterol     ILD (interstitial lung disease) 10/12/2023    Pulmonary hypertension 10/12/2023    Shingles     Type 2 diabetes mellitus without complication, with long-term current use of insulin 2025     Past Surgical History:   Procedure Laterality Date    CARDIAC CATHETERIZATION N/A 2023    Procedure: Right Heart Cath;  Surgeon: Earl Erwin MD;  Location:  [facility];  Service: Cardiology;  Laterality: N/A;    CYST REMOVAL      on tailbone    LUNG SURGERY      SUPERIOR VENA CAVA ANGIOPLASTY / STENTING       Social History     Socioeconomic History    Marital status:    Tobacco Use    Smoking status: Former     Current packs/day: 0.00     Average packs/day: 3.0 packs/day for 35.0 years (105.0 ttl pk-yrs)     Types: Cigarettes     Start date: 1962     Quit date: 1997     Years since quittin.0     Passive exposure: Past    Smokeless tobacco: Never   Vaping Use    Vaping status: Never " Used   Substance and Sexual Activity    Alcohol use: No    Drug use: No    Sexual activity: Defer         Current Facility-Administered Medications:     acetaminophen (TYLENOL) tablet 650 mg, 650 mg, Oral, Q4H PRN, 650 mg at 01/23/25 1431 **OR** acetaminophen (TYLENOL) 160 MG/5ML oral solution 650 mg, 650 mg, Oral, Q4H PRN **OR** acetaminophen (TYLENOL) suppository 650 mg, 650 mg, Rectal, Q4H PRN, Henry Solis MD    albuterol (PROVENTIL) nebulizer solution 0.083% 2.5 mg/3mL, 2.5 mg, Nebulization, Q4H PRN, Henry Solis MD    sennosides-docusate (PERICOLACE) 8.6-50 MG per tablet 2 tablet, 2 tablet, Oral, BID, 2 tablet at 01/24/25 0915 **AND** polyethylene glycol (MIRALAX) packet 17 g, 17 g, Oral, Daily PRN **AND** bisacodyl (DULCOLAX) EC tablet 5 mg, 5 mg, Oral, Daily PRN **AND** bisacodyl (DULCOLAX) suppository 10 mg, 10 mg, Rectal, Daily PRN, Jb Rutledge MD    budesonide (PULMICORT) nebulizer solution 0.5 mg, 0.5 mg, Nebulization, BID - RT, Jun London, DO, 0.5 mg at 01/24/25 0553    Calcium Replacement - Follow Nurse / BPA Driven Protocol, , Not Applicable, PRN, Henry Solis MD    cefTRIAXone (ROCEPHIN) 2,000 mg in sodium chloride 0.9 % 100 mL MBP, 2,000 mg, Intravenous, Q24H, Jb Rutledge MD, Last Rate: 200 mL/hr at 01/23/25 1732, 2,000 mg at 01/23/25 1732    cetirizine (zyrTEC) tablet 10 mg, 10 mg, Oral, Daily, Henry Solis MD, 10 mg at 01/24/25 0915    dextrose (D50W) (25 g/50 mL) IV injection 25 g, 25 g, Intravenous, Q15 Min PRN, Henry Solis MD    dextrose (GLUTOSE) oral gel 15 g, 15 g, Oral, Q15 Min PRN, Henry Solis MD    fluticasone (FLONASE) 50 MCG/ACT nasal spray 2 spray, 2 spray, Each Nare, Daily, Henry Solis MD, 2 spray at 01/24/25 0915    [Held by provider] furosemide (LASIX) tablet 40 mg, 40 mg, Oral, BID Diuretics, Henry Solis MD, 40 mg at 01/21/25 0826    glucagon  (GLUCAGEN) injection 1 mg, 1 mg, Intramuscular, Q15 Min PRN, Henry Solis MD    guaiFENesin (MUCINEX) 12 hr tablet 1,200 mg, 1,200 mg, Oral, Q12H, Jb Rutledge MD, 1,200 mg at 01/24/25 0915    insulin glargine (LANTUS, SEMGLEE) injection 20 Units, 20 Units, Subcutaneous, Nightly, Jb Rutledge MD    Insulin Lispro (humaLOG) injection 3-14 Units, 3-14 Units, Subcutaneous, 4x Daily AC & at Bedtime, Jb Rutledge MD, 8 Units at 01/23/25 2118    Insulin Lispro (humaLOG) injection 8 Units, 8 Units, Subcutaneous, TID With Meals, Jb Rutledge MD, 8 Units at 01/24/25 0915    ipratropium-albuterol (DUO-NEB) nebulizer solution 3 mL, 3 mL, Nebulization, Q4H - RT, Jb Rutledge MD, 3 mL at 01/24/25 1013    Magnesium Standard Dose Replacement - Follow Nurse / BPA Driven Protocol, , Not Applicable, PRN, Henry Solis MD    nitroglycerin (NITROSTAT) SL tablet 0.4 mg, 0.4 mg, Sublingual, Q5 Min PRN, Henry Solis MD    pantoprazole (PROTONIX) EC tablet 20 mg, 20 mg, Oral, BID, Henry Solis MD, 20 mg at 01/24/25 0915    Phosphorus Replacement - Follow Nurse / BPA Driven Protocol, , Not Applicable, PRWill LOCKWOOD Mariano Ariel, MD    Potassium Replacement - Follow Nurse / BPA Driven Protocol, , Not Applicable, PRNWill Mariano Ariel, MD    sodium chloride 0.9 % flush 10 mL, 10 mL, Intravenous, Q12H, Henry Solis MD, 10 mL at 01/24/25 0915    sodium chloride 0.9 % flush 10 mL, 10 mL, Intravenous, PRN, Henry Solis MD    sodium chloride 0.9 % infusion 40 mL, 40 mL, Intravenous, PRN, Henry Solis MD    [COMPLETED] vancomycin IVPB 2000 mg in 0.9% Sodium Chloride 500 mL, 20 mg/kg, Intravenous, Once, Last Rate: 250 mL/hr at 01/21/25 1301, 2,000 mg at 01/21/25 1301 **FOLLOWED BY** vancomycin IVPB 1500 mg in 0.9% NaCl (Premix) 500 mL, 1,500 mg, Intravenous, Q12H, Jb Rutledge MD, Last Rate: 333.3 mL/hr at  "01/23/25 2315, 1,500 mg at 01/23/25 2315    Allergies   Allergen Reactions    Other Other (See Comments)     Contrast for eyes    Statins Other (See Comments)     Pain all over body and joint pain all over body.      I have utilized all available immediate resources to obtain, update, or review the patient's current medications (including all prescriptions, over-the-counter products, herbals, cannabis/cannabidiol products, and vitamin/mineral/dietary (nutritional) supplements) for name, route of administration, type, dose and frequency.      Intake/Output Summary (Last 24 hours) at 1/24/2025 1017  Last data filed at 1/24/2025 0445  Gross per 24 hour   Intake 720 ml   Output 1100 ml   Net -380 ml       Physical Exam:    Diagnostics: Reviewed  /62 (BP Location: Right arm, Patient Position: Lying)   Pulse 80   Temp 97.6 °F (36.4 °C) (Oral)   Resp 16   Ht 185.4 cm (73\")   Wt 102 kg (224 lb)   SpO2 91%   BMI 29.55 kg/m²     Vitals and nursing note reviewed.   Constitutional:       Appearance: Not in distress. Ill-appearing and chronically ill-appearing.      Interventions: Nasal cannula in place.      Comments: Vapotherm 30L/70%   Eyes:      General: Lids are normal.   HENT:      Head: Normocephalic.   Pulmonary:      Effort: Increased respiratory effort.   Cardiovascular:      Normal rate.   Musculoskeletal:      Cervical back: Neck supple. Genitourinary:     Comments: No reported dysuria  Neurological:      Mental Status: Alert and oriented to person, place and time.     Patient status: Disease state: Deteriorating despite treatments.  Current Functional status: Palliative Performance Scale Score: Performance 40% based on the following measures: Ambulation: Mainly in bed, Activity and Evidence of Disease: Unable to do any work, extensive evidence of disease, Self-Care: Mainly assistance required,  Intake: Normal or reduced, LOC: Full, drowsy or confusion   Nutritional status: Albumin 3.0 Body mass index is " 29.55 kg/m².         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 plan: Goals of care include CODE STATUS no CPR/limited support interventions.    Family support: The patient receives support from his children and extended family..  Advance Directives: Advance Directive Status: Patient has advance directive, copy in chart   POA/Healthcare surrogate-children-next of kin.    2.  Palliative care encounter  - Prognosis is poor long-term secondary to respiratory failure, combined pulmonary fibrosis and emphysema with acute exacerbation, severe bullous emphysema, severe pulmonary hypertension, and comorbidities.  -Patient and family appears to have good prognostic awareness.     -Started on Solu-Medrol, nebs, IV Lasix, IV antibiotics.    -Pulmonology consulted, medication changes, IV antibiotics to also include 5-day course of vancomycin due to MRSA PCR and additional diagnostics.   -Evaluated by therapy who recommended SNF at discharge.    -  1/22-MetaNebs discontinued due to oral irritation/patient  "request.  1/23- Patient mentioned to  if oxygen did not improve would be looking at going home with hospice.    1/24-Remains on Vapotherm, attending notes any activity including conversation etc. causes worsening shortness of breath and desats into the 70s quickly and may be considering more of a hospice and comfort approach.    1/24-continue supportive measures  -Considering de-escalation with focus of comfort \"probably tomorrow\"  -case discussed with nursing and Dr. Rutledge    Anticipate limited life expectancy if patient elects to de-escalate care with focus of comfort would recommend starting;  -titratable morphine drip prior to oxygen weaning  -Ativan 1 mg IV every 10 minutes x 6 as needed during oxygen weaning, followed by hourly dosing as needed once oxygen successfully weaned. Would slowly titrate oxygen down over the course of a hour.  -Additional palliative adjuvants as needed      Thank you for this consult and allowing us to participate in patient's plan of care. Palliative Care Team will continue to follow patient.     18 minutes spent on advance care planning-discussing with patient and/or family, answering questions, providing some guidance about a plan and documentation of care, and coordinating care face to face.    Mary Aguilar, APRN  1/24/2025  10:17 CST             "

## 2025-01-25 NOTE — PLAN OF CARE
Problem: Adult Inpatient Plan of Care  Goal: Plan of Care Review  Outcome: Progressing  Flowsheets (Taken 1/20/2025 1330 by Annia Keller PT Student)  Plan of Care Reviewed With: patient  Goal: Patient-Specific Goal (Individualized)  Outcome: Progressing  Goal: Optimal Comfort and Wellbeing  Outcome: Progressing  Intervention: Provide Person-Centered Care  Recent Flowsheet Documentation  Taken 1/25/2025 0905 by Petroan Epstein RN  Trust Relationship/Rapport:   care explained   questions answered   reassurance provided   thoughts/feelings acknowledged  Goal: Readiness for Transition of Care  Outcome: Progressing     Problem: Comorbidity Management  Goal: Maintenance of COPD Symptom Control  Outcome: Progressing     Problem: Skin Injury Risk Increased  Goal: Skin Health and Integrity  Outcome: Progressing     Problem: Fall Injury Risk  Goal: Absence of Fall and Fall-Related Injury  Outcome: Progressing  Intervention: Promote Injury-Free Environment  Recent Flowsheet Documentation  Taken 1/25/2025 0905 by Petrona Epstein RN  Safety Promotion/Fall Prevention: safety round/check completed   Goal Outcome Evaluation:

## 2025-01-25 NOTE — PLAN OF CARE
Goal Outcome Evaluation:  Plan of Care Reviewed With: patient           Outcome Evaluation: Pt now transitioned to comfort measures, prn Ativan and Morphine being given, goal to keep pt comfortable as oxygen being weaned

## 2025-01-25 NOTE — PROGRESS NOTES
Brief pulmonology progress note.    I visited with Mr. Anaya and his family this morning.  The patient expressed that he wished to transition to comfort measures.  I did discuss the new result of the PJP PCR returning positive.  The patient explained that he would like to still pursue comfort measures as his baseline prior to presenting to the ED was poor.  States that he has had a long time to consider this decision and he feels now is the right time for it.  I explained if he has any questions or changes his mind pulmonology will be available.  Patient expressed understanding.  I will update Sherly Leyva NP at the pulmonary clinic.    Thank you for allowing us to participate in this patient's care.  Pulmonology will sign off at this time, please feel free to contact us if we can be of further assistance.    Anne London, DO  12:03 CST  1/25/2025

## 2025-01-25 NOTE — PLAN OF CARE
Goal Outcome Evaluation:           Progress: no change  Outcome Evaluation: pt plans to transition to comfort care today

## 2025-01-25 NOTE — THERAPY DISCHARGE NOTE
Acute Care - Physical Therapy Discharge Summary  [facility]       Patient Name: Jeramie Anaya  : 1950  MRN: [id number]    Today's Date: 2025                 Admit Date: 2025      PT Recommendation and Plan    Visit Dx:    ICD-10-CM ICD-9-CM   1. COPD exacerbation  J44.1 491.21   2. Hypoxia  R09.02 799.02   3. Impaired mobility [Z74.09]  Z74.09 799.89                PT Rehab Goals       Row Name 25 1607             Bed Mobility Goal 1 (PT)    Activity/Assistive Device (Bed Mobility Goal 1, PT) sit to supine/supine to sit;sidelying to sit/sit to sidelying;bed rails  -MF      [place] Level/Cues Needed (Bed Mobility Goal 1, PT) modified independence  -MF      Time Frame (Bed Mobility Goal 1, PT) long term goal (LTG);10 days  -MF      Progress/Outcomes (Bed Mobility Goal 1, PT) goal not met  -MF         Transfer Goal 1 (PT)    Activity/Assistive Device (Transfer Goal 1, PT) sit-to-stand/stand-to-sit;bed-to-chair/chair-to-bed  -MF      [place] Level/Cues Needed (Transfer Goal 1, PT) modified independence  -MF      Time Frame (Transfer Goal 1, PT) long term goal (LTG);10 days  -MF      Progress/Outcome (Transfer Goal 1, PT) goal not met  -MF         Gait Training Goal 1 (PT)    Activity/Assistive Device (Gait Training Goal 1, PT) gait (walking locomotion);decrease fall risk;improve balance and speed;increase endurance/gait distance;increase energy conservation  -MF      [place] Level (Gait Training Goal 1, PT) standby assist  -MF      Distance (Gait Training Goal 1, PT) 25ft  -MF      Time Frame (Gait Training Goal 1, PT) long term goal (LTG);10 days  -MF      Progress/Outcome (Gait Training Goal 1, PT) goal not met  -MF                User Key  (r) = Recorded By, (t) = Taken By, (c) = Cosigned By      Initials Name Provider Type Discipline    Heather Davis, PTA Physical Therapist Assistant PT                        PT Discharge Summary  Anticipated Discharge Disposition  (PT): other (see comments) (comfort measures)  Reason for Discharge: Unable to participate, Change in medical status  Outcomes Achieved: Unable to make functional progress toward goals at this time  Discharge Destination: other (comment) (comfort measures)      Heather Woodall, PTA   1/25/2025

## 2025-01-25 NOTE — PROGRESS NOTES
[facility] Medicine Services  INPATIENT PROGRESS NOTE    Patient Name: Jeramie Anaya  Date of Admission: 1/19/2025  Today's Date: 01/25/25  Length of Stay: 5  Primary Care Physician: Hi Herrera    Subjective   Chief Complaint: SOA  Shortness of Breath  Pertinent negatives include no abdominal pain, chest pain, ear pain, fever, headaches, rash, vomiting or wheezing.      Patient still SOA.  He is on 30L Vapotherm.  He has decided to transition to comfort measures.          Review of Systems   Constitutional:  Negative for fatigue and fever.   HENT:  Negative for congestion and ear pain.    Eyes:  Negative for redness and visual disturbance.   Respiratory:  Positive for shortness of breath. Negative for cough and wheezing.    Cardiovascular:  Negative for chest pain and palpitations.   Gastrointestinal:  Negative for abdominal pain, diarrhea, nausea and vomiting.   Endocrine: Negative for cold intolerance and heat intolerance.   Genitourinary:  Negative for dysuria and frequency.   Musculoskeletal:  Negative for arthralgias and back pain.   Skin:  Negative for rash and wound.   Neurological:  Positive for weakness. Negative for dizziness and headaches.   Psychiatric/Behavioral:  Negative for confusion. The patient is not nervous/anxious.           All pertinent negatives and positives are as above. All other systems have been reviewed and are negative unless otherwise stated.     Objective    Temp:  [97.6 °F (36.4 °C)-98.4 °F (36.9 °C)] 98.1 °F (36.7 °C)  Heart Rate:  [59-84] 72  Resp:  [18] 18  BP: (103-121)/(62-82) 103/62  Physical Exam  Vitals reviewed.   Constitutional:       General: He is not in acute distress.     Appearance: Normal appearance. He is normal weight. He is not ill-appearing, toxic-appearing or diaphoretic.   HENT:      Head: Normocephalic and atraumatic.      Right Ear: External ear normal.      Left Ear: External ear normal.      Nose: Nose normal.    Eyes:      Extraocular Movements: Extraocular movements intact.   Cardiovascular:      Rate and Rhythm: Normal rate and regular rhythm.   Pulmonary:      Effort: Pulmonary effort is normal. No respiratory distress.      Breath sounds: Normal breath sounds.   Skin:     General: Skin is warm and dry.      Coloration: Skin is not jaundiced or pale.   Neurological:      Mental Status: He is alert and oriented to person, place, and time.   Psychiatric:         Mood and Affect: Mood normal.         Behavior: Behavior normal.         Thought Content: Thought content normal.         Judgment: Judgment normal.             Results Review:  I have reviewed the labs, radiology results, and diagnostic studies.    Laboratory Data:   Results from last 7 days   Lab Units 01/24/25  0146 01/23/25  0902 01/20/25  0119   WBC 10*3/mm3 7.95 8.39 7.81   HEMOGLOBIN g/dL 11.3* 12.7* 12.6*   HEMATOCRIT % 34.1* 37.0* 37.0*   PLATELETS 10*3/mm3 164 183 162        Results from last 7 days   Lab Units 01/24/25  0146 01/23/25  0902 01/22/25  1710 01/22/25  0150 01/19/25 2018 01/19/25  1951   SODIUM mmol/L 132* 130*  --  131*   < > 132*   SODIUM, ARTERIAL mmol/L  --   --  131*  --    < >  --    POTASSIUM mmol/L 3.8 4.1  --  3.6   < > 4.2   CHLORIDE mmol/L 98 95*  --  95*   < > 96*   CO2 mmol/L 24.0 24.0  --  26.0   < > 25.0   BUN mg/dL 16 16  --  22   < > 23   CREATININE mg/dL 0.79 0.74*  --  1.14   < > 1.20   CALCIUM mg/dL 7.9* 8.0*  --  7.7*   < > 8.7   BILIRUBIN mg/dL 0.3  --   --   --   --  0.7   ALK PHOS U/L 81  --   --   --   --  87   ALT (SGPT) U/L 32  --   --   --   --  17   AST (SGOT) U/L 14  --   --   --   --  11   GLUCOSE mg/dL 150* 249*  --  209*   < > 241*    < > = values in this interval not displayed.       Culture Data:   Respiratory Culture   Date Value Ref Range Status   01/20/2025   Final    Moderate growth (3+) Normal respiratory mariel. No S. aureus or Pseudomonas aeruginosa detected. Final report.       Radiology Data:    Imaging Results (Last 24 Hours)       ** No results found for the last 24 hours. **            I have reviewed the patient's current medications.     Assessment/Plan   Assessment  Active Hospital Problems    Diagnosis     **Acute on chronic respiratory failure with hypoxia     COPD with acute exacerbation     Type 2 diabetes mellitus without complication, with long-term current use of insulin     Acute-on-chronic respiratory failure     Bronchiectasis without acute exacerbation     ILD (interstitial lung disease)     Pulmonary hypertension     Diastolic dysfunction     Chronic respiratory failure with hypoxia     Pulmonary emphysema        Treatment Plan  1.  Acute on chronic respiratory failure with hypoxia    2.  ILD    3.  Bronchiectasis    4.  Pulmonary hypertension    5.  T2DM    6.  GERD    7.  HLD    Will transition to comfort measures  Code status changed  Will order PRN Morphine and Ativan, may need drip depending on pain, respiratory distress  Will stop medications not related to comfort.         Medical Decision Making  Number and Complexity of problems: 7 problems, high complexity  Differential Diagnosis: hypoxia, bronchiectasis, copd    Conditions and Status        Condition is unchanged.     [facility] Data  External documents reviewed: n/a  Cardiac tracing (EKG, telemetry) interpretation: n/a  Radiology interpretation: n/a  Labs reviewed: CBC, CMP from 1/24/25  Any tests that were considered but not ordered: n/a     Decision rules/scores evaluated (example [id number], [place], etc): n/a     Discussed with: patient, patient's nurseAmelia     Care Planning  Shared decision making: patient and family agreeable  Code status and discussions: No CPR--comfort measures only    Disposition  Social Determinants of Health that impact treatment or disposition: n/a  I expect the patient to be discharged:  Patient likely to pass while in the hospital        Electronically signed by Anders Tellez MD, 01/25/25, 16:10  CST.

## 2025-01-26 NOTE — PAYOR COMM NOTE
" 25    Jeramie Anaya (Dcsd. Male)       Date of Birth   1950    Social Security Number       [address] [address]    Home Phone   [phone]    MRN   [id number]       [facility]    Marital Status                               Admission Date   25    Admission Type   Emergency    Admitting Provider   Anders Tellez MD    Attending Provider       Department, Room/Bed   [facility] 4B, 412/1       Discharge Date   2025    Discharge Disposition       Discharge Destination                                 Attending Provider: (none)   Allergies: Other, Statins    Isolation: Contact   Infection: MRSA (25)   Code Status: Prior    Ht: 185.4 cm (73\")   Wt: 102 kg (224 lb)    Admission Cmt: None   Principal Problem: Acute on chronic respiratory failure with hypoxia [J96.21]                   Active Insurance as of 2025       Primary Coverage       Payor Plan Insurance Group Employer/Plan Group    AETNA MEDICARE REPLACEMENT AETNA MEDICARE REPLACEMENT [id number]       Payor Plan Address Payor Plan Phone Number Payor Plan Fax Number Effective Dates    [address] [phone]  2022 - None Entered    [facility] [address]         Subscriber Name Subscriber Birth Date Member ID       JERAMIE ANAYA 1950 [id number]                     Emergency Contacts        (Rel.) Home Phone Work Phone Mobile Phone    Meme Brito (Daughter) -- -- [phone]    Jake Brito (Other) -- -- [phone]              Discharge Summary    No notes of this type exist for this encounter.       "

## 2025-01-26 NOTE — SIGNIFICANT NOTE
I was informed by the nurses that the patient passed away at 2: 14 a.m.  Patient was receiving comfort measures.  Family informed and at bedside.

## 2025-01-26 NOTE — THERAPY DISCHARGE NOTE
Acute Care - Occupational Therapy Discharge Summary  [facility]     Patient Name: Jeramie Anaya  : 1950  MRN: [id number]    Today's Date: 2025                 Admit Date: 2025        OT Recommendation and Plan    Visit Dx:    ICD-10-CM ICD-9-CM   1. COPD exacerbation  J44.1 491.21   2. Hypoxia  R09.02 799.02   3. Impaired mobility [Z74.09]  Z74.09 799.89                OT Rehab Goals       Row Name 25 1400             Bathing Goal 1 (OT)    Activity/Device (Bathing Goal 1, OT) upper body bathing;lower body bathing  -CS      [place] Level/Cues Needed (Bathing Goal 1, OT) verbal cues required;standby assist  -CS      Time Frame (Bathing Goal 1, OT) long term goal (LTG);10 days  -CS      Strategies/Barriers (Bathing Goal 1, OT) perform with 2 or less rest breaks  -CS      Progress/Outcomes (Bathing Goal 1, OT) goal not met  -CS         Dressing Goal 1 (OT)    Activity/Device (Dressing Goal 1, OT) upper body dressing;lower body dressing  -CS      [place]/Cues Needed (Dressing Goal 1, OT) verbal cues required;standby assist  -CS      Time Frame (Dressing Goal 1, OT) long term goal (LTG);10 days  -CS      Strategies/Barriers (Dressing Goal 1, OT) perform with 2 or less rest breaks  -CS      Progress/Outcome (Dressing Goal 1, OT) goal not met  -CS         Toileting Goal 1 (OT)    Activity/Device (Toileting Goal 1, OT) toileting skills, all  -CS      [place] Level/Cues Needed (Toileting Goal 1, OT) verbal cues required;standby assist  -CS      Time Frame (Toileting Goal 1, OT) long term goal (LTG);10 days  -CS      Strategies/Barriers (Toileting Goal 1, OT) perform with 2 or less rest breaks  -CS      Progress/Outcome (Toileting Goal 1, OT) goal not met  -CS                User Key  (r) = Recorded By, (t) = Taken By, (c) = Cosigned By      Initials Name Provider Type Discipline    CS Alisha Manning, OTR/L, CNT Occupational Therapist OT                        Timed Therapy Charges   Total Units: 2      Suggested Charges  Total Units: 2      Procedure Name Documented Minutes Units Code    HC OT SELF CARE/MGMT/TRAIN EA 15 MIN 30 2   78441 (CPT®)                 Documented Minutes  Total Minutes: 30      Therapy Provided Minutes    05503 - OT Self Care/Mgmt Minutes 30                        OT Discharge Summary  Anticipated Discharge Disposition (OT): skilled nursing facility  Reason for Discharge: Patient   Outcomes Achieved: Refer to plan of care for updates on goals achieved  Discharge Destination: other (comment) (Pt )      Alisha Manning, OTR/L, CNT  2025

## (undated) DEVICE — Device: Brand: MEDEX

## (undated) DEVICE — PINNACLE INTRODUCER SHEATH: Brand: PINNACLE

## (undated) DEVICE — STPCK 3/WY HP M/RA W/OFF/HNDL 1050PSI STRL

## (undated) DEVICE — SOL PVPI SPRY BETADINE 3OZ

## (undated) DEVICE — PK CATH CARD 30 CA/4

## (undated) DEVICE — GLV SURG NEOLON 2G PF LF 6.5 STRL

## (undated) DEVICE — PK CYSTO LF 60

## (undated) DEVICE — Device

## (undated) DEVICE — GW SS .018 60CM

## (undated) DEVICE — SOLIDIFIER LIQUI LOC PLUS 2000CC

## (undated) DEVICE — GW PTFE FIX/CORE FLXTIP .038 3X150CM

## (undated) DEVICE — SOL IRR H2O BTL 1000ML STRL

## (undated) DEVICE — PAD, DEFIB, ADULT, RADIOTRANS, PHYSIO: Brand: MEDLINE

## (undated) DEVICE — SOL IRRG H2O PL/BG 1000ML STRL

## (undated) DEVICE — SWAN-GANZ POLYMER BLEND TRUE SIZE C-TIP CONTROLCATH TD: Brand: SWAN-GANZ CONTROLCATH TRUE SIZE

## (undated) DEVICE — STERILE POLYISOPRENE POWDER-FREE SURGICAL GLOVES: Brand: PROTEXIS

## (undated) DEVICE — DRAPE,ANGIO,BRACH,STERILE,38X44: Brand: MEDLINE

## (undated) DEVICE — SOL IRR NACL 0.9PCT 3000ML

## (undated) DEVICE — KT NDL GUIDE STRL 18GA

## (undated) DEVICE — SOL IRR NACL 0.9PCT BT 1000ML

## (undated) DEVICE — KT MINI ACC MAK401

## (undated) DEVICE — CATH URETRL OPN/END 5F70CM

## (undated) DEVICE — CANN NASL ETCO2 LO/FLO A/